# Patient Record
Sex: FEMALE | Race: BLACK OR AFRICAN AMERICAN | Employment: UNEMPLOYED | URBAN - METROPOLITAN AREA
[De-identification: names, ages, dates, MRNs, and addresses within clinical notes are randomized per-mention and may not be internally consistent; named-entity substitution may affect disease eponyms.]

---

## 2017-03-10 ENCOUNTER — HOSPITAL ENCOUNTER (EMERGENCY)
Age: 37
Discharge: PSYCHIATRIC OR SUBSTANCE USE FACILITY | End: 2017-03-10
Attending: EMERGENCY MEDICINE
Payer: MEDICAID

## 2017-03-10 ENCOUNTER — HOSPITAL ENCOUNTER (INPATIENT)
Age: 37
LOS: 4 days | Discharge: HOME | DRG: 750 | End: 2017-03-14
Attending: PSYCHIATRY & NEUROLOGY | Admitting: PSYCHIATRY & NEUROLOGY
Payer: MEDICAID

## 2017-03-10 VITALS
HEIGHT: 66 IN | OXYGEN SATURATION: 95 % | SYSTOLIC BLOOD PRESSURE: 124 MMHG | DIASTOLIC BLOOD PRESSURE: 83 MMHG | HEART RATE: 80 BPM | RESPIRATION RATE: 16 BRPM | TEMPERATURE: 98.4 F | WEIGHT: 155 LBS | BODY MASS INDEX: 24.91 KG/M2

## 2017-03-10 DIAGNOSIS — F25.0 SCHIZOAFFECTIVE DISORDER, BIPOLAR TYPE (HCC): Primary | ICD-10-CM

## 2017-03-10 PROCEDURE — 99285 EMERGENCY DEPT VISIT HI MDM: CPT

## 2017-03-10 PROCEDURE — 1240000000 HC EMOTIONAL WELLNESS R&B

## 2017-03-10 RX ORDER — HALOPERIDOL 5 MG
10 TABLET ORAL EVERY 6 HOURS PRN
Status: DISCONTINUED | OUTPATIENT
Start: 2017-03-10 | End: 2017-03-14 | Stop reason: HOSPADM

## 2017-03-10 RX ORDER — HALOPERIDOL 5 MG/ML
10 INJECTION INTRAMUSCULAR EVERY 6 HOURS PRN
Status: DISCONTINUED | OUTPATIENT
Start: 2017-03-10 | End: 2017-03-14 | Stop reason: HOSPADM

## 2017-03-10 ASSESSMENT — LIFESTYLE VARIABLES: HISTORY_ALCOHOL_USE: COMMENT

## 2017-03-10 ASSESSMENT — ENCOUNTER SYMPTOMS
RESPIRATORY NEGATIVE: 1
EYES NEGATIVE: 1
GASTROINTESTINAL NEGATIVE: 1
ALLERGIC/IMMUNOLOGIC NEGATIVE: 1

## 2017-03-11 LAB
-: ABNORMAL
ABSOLUTE EOS #: 0.1 K/UL (ref 0–0.4)
ABSOLUTE LYMPH #: 2.9 K/UL (ref 1–4.8)
ABSOLUTE MONO #: 0.6 K/UL (ref 0.1–1.3)
ALBUMIN SERPL-MCNC: 3.7 G/DL (ref 3.5–5.2)
ALBUMIN/GLOBULIN RATIO: ABNORMAL (ref 1–2.5)
ALP BLD-CCNC: 77 U/L (ref 35–104)
ALT SERPL-CCNC: 10 U/L (ref 5–33)
AMORPHOUS: ABNORMAL
AMPHETAMINE SCREEN URINE: NEGATIVE
ANION GAP SERPL CALCULATED.3IONS-SCNC: 12 MMOL/L (ref 9–17)
AST SERPL-CCNC: 13 U/L
BACTERIA: ABNORMAL
BARBITURATE SCREEN URINE: NEGATIVE
BASOPHILS # BLD: 1 % (ref 0–2)
BASOPHILS ABSOLUTE: 0.1 K/UL (ref 0–0.2)
BENZODIAZEPINE SCREEN, URINE: NEGATIVE
BILIRUB SERPL-MCNC: 0.27 MG/DL (ref 0.3–1.2)
BILIRUBIN URINE: NEGATIVE
BUN BLDV-MCNC: 5 MG/DL (ref 6–20)
BUN/CREAT BLD: ABNORMAL (ref 9–20)
BUPRENORPHINE URINE: NORMAL
CALCIUM SERPL-MCNC: 8.9 MG/DL (ref 8.6–10.4)
CANNABINOID SCREEN URINE: NEGATIVE
CASTS UA: ABNORMAL /LPF
CHLORIDE BLD-SCNC: 106 MMOL/L (ref 98–107)
CO2: 24 MMOL/L (ref 20–31)
COCAINE METABOLITE, URINE: NEGATIVE
COLOR: YELLOW
COMMENT UA: ABNORMAL
CREAT SERPL-MCNC: 0.62 MG/DL (ref 0.5–0.9)
CRYSTALS, UA: ABNORMAL /HPF
DIFFERENTIAL TYPE: ABNORMAL
EOSINOPHILS RELATIVE PERCENT: 1 % (ref 0–4)
EPITHELIAL CELLS UA: ABNORMAL /HPF
GFR AFRICAN AMERICAN: >60 ML/MIN
GFR NON-AFRICAN AMERICAN: >60 ML/MIN
GFR SERPL CREATININE-BSD FRML MDRD: ABNORMAL ML/MIN/{1.73_M2}
GFR SERPL CREATININE-BSD FRML MDRD: ABNORMAL ML/MIN/{1.73_M2}
GLUCOSE BLD-MCNC: 108 MG/DL (ref 70–99)
GLUCOSE URINE: NEGATIVE
HCT VFR BLD CALC: 38 % (ref 36–46)
HEMOGLOBIN: 12.7 G/DL (ref 12–16)
KETONES, URINE: NEGATIVE
LEUKOCYTE ESTERASE, URINE: NEGATIVE
LYMPHOCYTES # BLD: 41 % (ref 24–44)
MCH RBC QN AUTO: 33.4 PG (ref 26–34)
MCHC RBC AUTO-ENTMCNC: 33.5 G/DL (ref 31–37)
MCV RBC AUTO: 99.6 FL (ref 80–100)
MDMA URINE: NORMAL
METHADONE SCREEN, URINE: NEGATIVE
METHAMPHETAMINE, URINE: NORMAL
MONOCYTES # BLD: 8 % (ref 1–7)
MUCUS: ABNORMAL
NITRITE, URINE: NEGATIVE
OPIATES, URINE: NEGATIVE
OTHER OBSERVATIONS UA: ABNORMAL
OXYCODONE SCREEN URINE: NEGATIVE
PDW BLD-RTO: 13.7 % (ref 11.5–14.9)
PH UA: 7.5 (ref 5–8)
PHENCYCLIDINE, URINE: NEGATIVE
PLATELET # BLD: 303 K/UL (ref 150–450)
PLATELET ESTIMATE: ABNORMAL
PMV BLD AUTO: 7.9 FL (ref 6–12)
POTASSIUM SERPL-SCNC: 4.5 MMOL/L (ref 3.7–5.3)
PROPOXYPHENE, URINE: NORMAL
PROTEIN UA: NEGATIVE
RBC # BLD: 3.81 M/UL (ref 4–5.2)
RBC # BLD: ABNORMAL 10*6/UL
RBC UA: ABNORMAL /HPF
RENAL EPITHELIAL, UA: ABNORMAL /HPF
SEG NEUTROPHILS: 49 % (ref 36–66)
SEGMENTED NEUTROPHILS ABSOLUTE COUNT: 3.5 K/UL (ref 1.3–9.1)
SODIUM BLD-SCNC: 142 MMOL/L (ref 135–144)
SPECIFIC GRAVITY UA: 1.01 (ref 1–1.03)
TEST INFORMATION: NORMAL
TOTAL PROTEIN: 6.9 G/DL (ref 6.4–8.3)
TRICHOMONAS: ABNORMAL
TRICYCLIC ANTIDEPRESSANTS, UR: NORMAL
TURBIDITY: ABNORMAL
URINE HGB: NEGATIVE
UROBILINOGEN, URINE: NORMAL
WBC # BLD: 7.1 K/UL (ref 3.5–11)
WBC # BLD: ABNORMAL 10*3/UL
WBC UA: ABNORMAL /HPF
YEAST: ABNORMAL

## 2017-03-11 PROCEDURE — 6370000000 HC RX 637 (ALT 250 FOR IP): Performed by: PSYCHIATRY & NEUROLOGY

## 2017-03-11 PROCEDURE — 6360000002 HC RX W HCPCS: Performed by: PSYCHIATRY & NEUROLOGY

## 2017-03-11 PROCEDURE — 80307 DRUG TEST PRSMV CHEM ANLYZR: CPT

## 2017-03-11 PROCEDURE — 1240000000 HC EMOTIONAL WELLNESS R&B

## 2017-03-11 PROCEDURE — 81001 URINALYSIS AUTO W/SCOPE: CPT

## 2017-03-11 PROCEDURE — 80053 COMPREHEN METABOLIC PANEL: CPT

## 2017-03-11 PROCEDURE — 85025 COMPLETE CBC W/AUTO DIFF WBC: CPT

## 2017-03-11 PROCEDURE — 36415 COLL VENOUS BLD VENIPUNCTURE: CPT

## 2017-03-11 RX ORDER — ACETAMINOPHEN 325 MG/1
650 TABLET ORAL EVERY 4 HOURS PRN
Status: DISCONTINUED | OUTPATIENT
Start: 2017-03-11 | End: 2017-03-14 | Stop reason: HOSPADM

## 2017-03-11 RX ORDER — RISPERIDONE 2 MG/1
2 TABLET, FILM COATED ORAL NIGHTLY
Status: DISCONTINUED | OUTPATIENT
Start: 2017-03-11 | End: 2017-03-14 | Stop reason: HOSPADM

## 2017-03-11 RX ORDER — TRAZODONE HYDROCHLORIDE 100 MG/1
100 TABLET ORAL NIGHTLY
Status: DISCONTINUED | OUTPATIENT
Start: 2017-03-11 | End: 2017-03-14 | Stop reason: HOSPADM

## 2017-03-11 RX ORDER — TRAZODONE HYDROCHLORIDE 50 MG/1
50 TABLET ORAL NIGHTLY PRN
Status: DISCONTINUED | OUTPATIENT
Start: 2017-03-11 | End: 2017-03-11

## 2017-03-11 RX ORDER — CARBAMAZEPINE 200 MG/1
200 TABLET ORAL 3 TIMES DAILY
Status: DISCONTINUED | OUTPATIENT
Start: 2017-03-11 | End: 2017-03-14 | Stop reason: HOSPADM

## 2017-03-11 RX ORDER — RISPERIDONE 4 MG/1
4 TABLET, FILM COATED ORAL 2 TIMES DAILY
Status: DISCONTINUED | OUTPATIENT
Start: 2017-03-11 | End: 2017-03-14 | Stop reason: HOSPADM

## 2017-03-11 RX ORDER — HYDROXYZINE HYDROCHLORIDE 25 MG/1
25 TABLET, FILM COATED ORAL 3 TIMES DAILY PRN
Status: DISCONTINUED | OUTPATIENT
Start: 2017-03-11 | End: 2017-03-14 | Stop reason: HOSPADM

## 2017-03-11 RX ORDER — BENZTROPINE MESYLATE 1 MG/ML
2 INJECTION INTRAMUSCULAR; INTRAVENOUS DAILY PRN
Status: DISCONTINUED | OUTPATIENT
Start: 2017-03-11 | End: 2017-03-14 | Stop reason: HOSPADM

## 2017-03-11 RX ADMIN — RISPERIDONE 4 MG: 4 TABLET ORAL at 15:06

## 2017-03-11 RX ADMIN — LURASIDONE HYDROCHLORIDE 40 MG: 40 TABLET, FILM COATED ORAL at 17:29

## 2017-03-11 RX ADMIN — TRAZODONE HYDROCHLORIDE 100 MG: 100 TABLET ORAL at 21:14

## 2017-03-11 RX ADMIN — RISPERIDONE 2 MG: 2 TABLET ORAL at 21:14

## 2017-03-11 RX ADMIN — CARBAMAZEPINE 200 MG: 200 TABLET ORAL at 15:06

## 2017-03-11 RX ADMIN — CARBAMAZEPINE 200 MG: 200 TABLET ORAL at 21:11

## 2017-03-11 RX ADMIN — RISPERIDONE 4 MG: 4 TABLET ORAL at 21:19

## 2017-03-11 RX ADMIN — HALOPERIDOL LACTATE 10 MG: 5 INJECTION, SOLUTION INTRAMUSCULAR at 08:15

## 2017-03-12 PROCEDURE — 1240000000 HC EMOTIONAL WELLNESS R&B

## 2017-03-12 PROCEDURE — 6370000000 HC RX 637 (ALT 250 FOR IP): Performed by: PSYCHIATRY & NEUROLOGY

## 2017-03-12 RX ADMIN — NICOTINE POLACRILEX 2 MG: 2 GUM, CHEWING BUCCAL at 08:59

## 2017-03-12 RX ADMIN — LURASIDONE HYDROCHLORIDE 40 MG: 40 TABLET, FILM COATED ORAL at 17:05

## 2017-03-12 RX ADMIN — CARBAMAZEPINE 200 MG: 200 TABLET ORAL at 14:48

## 2017-03-12 RX ADMIN — TRAZODONE HYDROCHLORIDE 100 MG: 100 TABLET ORAL at 21:30

## 2017-03-12 RX ADMIN — RISPERIDONE 4 MG: 4 TABLET ORAL at 08:59

## 2017-03-12 RX ADMIN — CARBAMAZEPINE 200 MG: 200 TABLET ORAL at 08:59

## 2017-03-12 RX ADMIN — RISPERIDONE 4 MG: 4 TABLET ORAL at 21:29

## 2017-03-12 RX ADMIN — CARBAMAZEPINE 200 MG: 200 TABLET ORAL at 21:27

## 2017-03-12 RX ADMIN — RISPERIDONE 2 MG: 2 TABLET ORAL at 21:27

## 2017-03-12 RX ADMIN — HALOPERIDOL 10 MG: 5 TABLET ORAL at 16:08

## 2017-03-13 PROCEDURE — 6370000000 HC RX 637 (ALT 250 FOR IP): Performed by: PSYCHIATRY & NEUROLOGY

## 2017-03-13 PROCEDURE — 1240000000 HC EMOTIONAL WELLNESS R&B

## 2017-03-13 RX ADMIN — RISPERIDONE 2 MG: 2 TABLET ORAL at 20:47

## 2017-03-13 RX ADMIN — TRAZODONE HYDROCHLORIDE 100 MG: 100 TABLET ORAL at 20:45

## 2017-03-13 RX ADMIN — CARBAMAZEPINE 200 MG: 200 TABLET ORAL at 20:46

## 2017-03-13 RX ADMIN — RISPERIDONE 4 MG: 4 TABLET ORAL at 20:46

## 2017-03-13 RX ADMIN — RISPERIDONE 4 MG: 4 TABLET ORAL at 09:14

## 2017-03-13 RX ADMIN — LURASIDONE HYDROCHLORIDE 40 MG: 40 TABLET, FILM COATED ORAL at 17:40

## 2017-03-13 RX ADMIN — CARBAMAZEPINE 200 MG: 200 TABLET ORAL at 14:28

## 2017-03-13 RX ADMIN — ACETAMINOPHEN 650 MG: 325 TABLET ORAL at 10:07

## 2017-03-13 RX ADMIN — CARBAMAZEPINE 200 MG: 200 TABLET ORAL at 09:14

## 2017-03-13 ASSESSMENT — PAIN SCALES - GENERAL
PAINLEVEL_OUTOF10: 2
PAINLEVEL_OUTOF10: 0

## 2017-03-14 VITALS
BODY MASS INDEX: 20.57 KG/M2 | HEIGHT: 66 IN | TEMPERATURE: 97.8 F | SYSTOLIC BLOOD PRESSURE: 102 MMHG | WEIGHT: 128 LBS | DIASTOLIC BLOOD PRESSURE: 56 MMHG | RESPIRATION RATE: 14 BRPM | HEART RATE: 81 BPM | OXYGEN SATURATION: 98 %

## 2017-03-14 PROCEDURE — 6370000000 HC RX 637 (ALT 250 FOR IP): Performed by: PSYCHIATRY & NEUROLOGY

## 2017-03-14 PROCEDURE — 5130000000 HC BRIDGE APPOINTMENT

## 2017-03-14 RX ORDER — RISPERIDONE 4 MG/1
4 TABLET, FILM COATED ORAL 2 TIMES DAILY
Qty: 60 TABLET | Refills: 0 | Status: ON HOLD | OUTPATIENT
Start: 2017-03-14 | End: 2017-06-29

## 2017-03-14 RX ORDER — CARBAMAZEPINE 200 MG/1
200 TABLET ORAL 3 TIMES DAILY
Qty: 90 TABLET | Refills: 0 | Status: ON HOLD | OUTPATIENT
Start: 2017-03-14 | End: 2017-07-21

## 2017-03-14 RX ORDER — TRAZODONE HYDROCHLORIDE 100 MG/1
100 TABLET ORAL NIGHTLY
Qty: 30 TABLET | Refills: 0 | Status: ON HOLD | OUTPATIENT
Start: 2017-03-14 | End: 2017-06-29

## 2017-03-14 RX ADMIN — ACETAMINOPHEN 650 MG: 325 TABLET ORAL at 12:08

## 2017-03-14 RX ADMIN — CARBAMAZEPINE 200 MG: 200 TABLET ORAL at 14:32

## 2017-03-14 RX ADMIN — CARBAMAZEPINE 200 MG: 200 TABLET ORAL at 08:51

## 2017-03-14 RX ADMIN — RISPERIDONE 4 MG: 4 TABLET ORAL at 08:51

## 2017-03-14 ASSESSMENT — PAIN DESCRIPTION - ONSET: ONSET: SUDDEN

## 2017-03-14 ASSESSMENT — PAIN DESCRIPTION - PAIN TYPE: TYPE: ACUTE PAIN

## 2017-03-14 ASSESSMENT — PAIN SCALES - GENERAL
PAINLEVEL_OUTOF10: 0
PAINLEVEL_OUTOF10: 3

## 2017-03-14 ASSESSMENT — PAIN DESCRIPTION - FREQUENCY: FREQUENCY: INTERMITTENT

## 2017-03-14 ASSESSMENT — PAIN DESCRIPTION - LOCATION: LOCATION: HEAD

## 2017-05-17 ENCOUNTER — HOSPITAL ENCOUNTER (EMERGENCY)
Age: 37
Discharge: HOME | End: 2017-05-17
Attending: EMERGENCY MEDICINE
Payer: MEDICAID

## 2017-05-17 VITALS
RESPIRATION RATE: 16 BRPM | SYSTOLIC BLOOD PRESSURE: 111 MMHG | OXYGEN SATURATION: 100 % | TEMPERATURE: 97.5 F | HEART RATE: 87 BPM | DIASTOLIC BLOOD PRESSURE: 71 MMHG

## 2017-05-17 DIAGNOSIS — M54.6 ACUTE BILATERAL THORACIC BACK PAIN: Primary | ICD-10-CM

## 2017-05-17 PROCEDURE — 6370000000 HC RX 637 (ALT 250 FOR IP): Performed by: PHYSICIAN ASSISTANT

## 2017-05-17 PROCEDURE — 99283 EMERGENCY DEPT VISIT LOW MDM: CPT

## 2017-05-17 RX ORDER — ACETAMINOPHEN 500 MG
500 TABLET ORAL ONCE
Status: COMPLETED | OUTPATIENT
Start: 2017-05-17 | End: 2017-05-17

## 2017-05-17 RX ORDER — ACETAMINOPHEN 325 MG/1
325 TABLET ORAL EVERY 6 HOURS PRN
Qty: 30 TABLET | Refills: 0 | Status: ON HOLD | OUTPATIENT
Start: 2017-05-17 | End: 2017-07-21 | Stop reason: HOSPADM

## 2017-05-17 RX ADMIN — ACETAMINOPHEN 500 MG: 500 TABLET ORAL at 19:12

## 2017-05-17 ASSESSMENT — PAIN DESCRIPTION - ORIENTATION: ORIENTATION: MID

## 2017-05-17 ASSESSMENT — PAIN SCALES - GENERAL
PAINLEVEL_OUTOF10: 9
PAINLEVEL_OUTOF10: 9

## 2017-05-17 ASSESSMENT — ENCOUNTER SYMPTOMS
VOMITING: 0
DIARRHEA: 0
COLOR CHANGE: 0
SHORTNESS OF BREATH: 0
BACK PAIN: 1
ABDOMINAL PAIN: 0
COUGH: 0
NAUSEA: 0

## 2017-05-17 ASSESSMENT — PAIN DESCRIPTION - PAIN TYPE: TYPE: CHRONIC PAIN

## 2017-05-17 ASSESSMENT — PAIN DESCRIPTION - LOCATION: LOCATION: BACK

## 2017-06-29 ENCOUNTER — HOSPITAL ENCOUNTER (INPATIENT)
Age: 37
LOS: 22 days | Discharge: HOME | DRG: 750 | End: 2017-07-21
Attending: PSYCHIATRY & NEUROLOGY | Admitting: PSYCHIATRY & NEUROLOGY
Payer: MEDICAID

## 2017-06-29 PROBLEM — F25.1 SCHIZOAFFECTIVE DISORDER, DEPRESSIVE TYPE (HCC): Status: ACTIVE | Noted: 2017-06-29

## 2017-06-29 PROBLEM — F31.30 BIPOLAR I DISORDER, MOST RECENT EPISODE DEPRESSED (HCC): Status: ACTIVE | Noted: 2017-06-29

## 2017-06-29 PROBLEM — F25.1 SCHIZOAFFECTIVE DISORDER, DEPRESSIVE TYPE (HCC): Chronic | Status: ACTIVE | Noted: 2017-06-29

## 2017-06-29 PROBLEM — F31.30 BIPOLAR I DISORDER, MOST RECENT EPISODE DEPRESSED (HCC): Chronic | Status: ACTIVE | Noted: 2017-06-29

## 2017-06-29 PROCEDURE — 1240000000 HC EMOTIONAL WELLNESS R&B

## 2017-06-29 PROCEDURE — 6370000000 HC RX 637 (ALT 250 FOR IP): Performed by: PSYCHIATRY & NEUROLOGY

## 2017-06-29 RX ORDER — ACETAMINOPHEN 325 MG/1
325 TABLET ORAL EVERY 8 HOURS PRN
Status: DISCONTINUED | OUTPATIENT
Start: 2017-06-29 | End: 2017-07-21 | Stop reason: HOSPADM

## 2017-06-29 RX ORDER — CARBAMAZEPINE 200 MG/1
200 TABLET ORAL 3 TIMES DAILY
Status: DISCONTINUED | OUTPATIENT
Start: 2017-06-29 | End: 2017-07-21 | Stop reason: HOSPADM

## 2017-06-29 RX ORDER — VILAZODONE HYDROCHLORIDE 10 MG/1
10 TABLET ORAL DAILY
Status: DISCONTINUED | OUTPATIENT
Start: 2017-06-29 | End: 2017-07-21 | Stop reason: HOSPADM

## 2017-06-29 RX ORDER — HYDROXYZINE HYDROCHLORIDE 25 MG/1
25 TABLET, FILM COATED ORAL 3 TIMES DAILY PRN
Status: DISCONTINUED | OUTPATIENT
Start: 2017-06-29 | End: 2017-07-21 | Stop reason: HOSPADM

## 2017-06-29 RX ORDER — NICOTINE 21 MG/24HR
1 PATCH, TRANSDERMAL 24 HOURS TRANSDERMAL DAILY
Status: DISCONTINUED | OUTPATIENT
Start: 2017-06-29 | End: 2017-06-29

## 2017-06-29 RX ORDER — NICOTINE 21 MG/24HR
1 PATCH, TRANSDERMAL 24 HOURS TRANSDERMAL DAILY
Status: DISCONTINUED | OUTPATIENT
Start: 2017-06-30 | End: 2017-07-21 | Stop reason: HOSPADM

## 2017-06-29 RX ORDER — MAGNESIUM HYDROXIDE/ALUMINUM HYDROXICE/SIMETHICONE 120; 1200; 1200 MG/30ML; MG/30ML; MG/30ML
30 SUSPENSION ORAL 3 TIMES DAILY PRN
Status: DISCONTINUED | OUTPATIENT
Start: 2017-06-29 | End: 2017-07-21 | Stop reason: HOSPADM

## 2017-06-29 RX ORDER — TRAZODONE HYDROCHLORIDE 50 MG/1
50 TABLET ORAL NIGHTLY PRN
Status: DISCONTINUED | OUTPATIENT
Start: 2017-06-29 | End: 2017-07-21 | Stop reason: HOSPADM

## 2017-06-29 RX ADMIN — BREXPIPRAZOLE 1 MG: 1 TABLET ORAL at 08:25

## 2017-06-29 RX ADMIN — CARBAMAZEPINE 200 MG: 200 TABLET ORAL at 21:14

## 2017-06-29 RX ADMIN — VILAZODONE HYDROCHLORIDE 10 MG: 10 TABLET ORAL at 08:25

## 2017-06-29 RX ADMIN — CARBAMAZEPINE 200 MG: 200 TABLET ORAL at 10:40

## 2017-06-29 RX ADMIN — TRAZODONE HYDROCHLORIDE 50 MG: 50 TABLET ORAL at 21:14

## 2017-06-29 RX ADMIN — CARBAMAZEPINE 200 MG: 200 TABLET ORAL at 13:37

## 2017-06-29 ASSESSMENT — LIFESTYLE VARIABLES: HISTORY_ALCOHOL_USE: NO

## 2017-06-30 LAB
AMPHETAMINE SCREEN URINE: NEGATIVE
BARBITURATE SCREEN URINE: NEGATIVE
BENZODIAZEPINE SCREEN, URINE: NEGATIVE
BILIRUBIN URINE: NEGATIVE
BUPRENORPHINE URINE: NORMAL
CANNABINOID SCREEN URINE: NEGATIVE
COCAINE METABOLITE, URINE: NEGATIVE
COLOR: YELLOW
COMMENT UA: NORMAL
GLUCOSE URINE: NEGATIVE
HCG(URINE) PREGNANCY TEST: NEGATIVE
KETONES, URINE: NEGATIVE
LEUKOCYTE ESTERASE, URINE: NEGATIVE
MDMA URINE: NORMAL
METHADONE SCREEN, URINE: NEGATIVE
METHAMPHETAMINE, URINE: NORMAL
NITRITE, URINE: NEGATIVE
OPIATES, URINE: NEGATIVE
OXYCODONE SCREEN URINE: NEGATIVE
PH UA: 7.5 (ref 5–8)
PHENCYCLIDINE, URINE: NEGATIVE
PROPOXYPHENE, URINE: NORMAL
PROTEIN UA: NEGATIVE
SPECIFIC GRAVITY UA: 1 (ref 1–1.03)
TEST INFORMATION: NORMAL
TRICYCLIC ANTIDEPRESSANTS, UR: NORMAL
TURBIDITY: CLEAR
URINE HGB: NEGATIVE
UROBILINOGEN, URINE: NORMAL

## 2017-06-30 PROCEDURE — 84703 CHORIONIC GONADOTROPIN ASSAY: CPT

## 2017-06-30 PROCEDURE — 6370000000 HC RX 637 (ALT 250 FOR IP): Performed by: PSYCHIATRY & NEUROLOGY

## 2017-06-30 PROCEDURE — 80307 DRUG TEST PRSMV CHEM ANLYZR: CPT

## 2017-06-30 PROCEDURE — 1240000000 HC EMOTIONAL WELLNESS R&B

## 2017-06-30 PROCEDURE — 81003 URINALYSIS AUTO W/O SCOPE: CPT

## 2017-06-30 RX ADMIN — TRAZODONE HYDROCHLORIDE 50 MG: 50 TABLET ORAL at 21:48

## 2017-06-30 RX ADMIN — CARBAMAZEPINE 200 MG: 200 TABLET ORAL at 15:47

## 2017-06-30 RX ADMIN — BREXPIPRAZOLE 1 MG: 1 TABLET ORAL at 09:51

## 2017-06-30 RX ADMIN — HYDROXYZINE HYDROCHLORIDE 25 MG: 25 TABLET, FILM COATED ORAL at 21:48

## 2017-06-30 RX ADMIN — CARBAMAZEPINE 200 MG: 200 TABLET ORAL at 21:48

## 2017-06-30 RX ADMIN — VILAZODONE HYDROCHLORIDE 10 MG: 10 TABLET ORAL at 09:51

## 2017-06-30 RX ADMIN — CARBAMAZEPINE 200 MG: 200 TABLET ORAL at 09:50

## 2017-07-01 PROCEDURE — 1240000000 HC EMOTIONAL WELLNESS R&B

## 2017-07-01 PROCEDURE — 6370000000 HC RX 637 (ALT 250 FOR IP): Performed by: PSYCHIATRY & NEUROLOGY

## 2017-07-01 RX ADMIN — CARBAMAZEPINE 200 MG: 200 TABLET ORAL at 15:01

## 2017-07-01 RX ADMIN — ACETAMINOPHEN 325 MG: 325 TABLET ORAL at 21:36

## 2017-07-01 RX ADMIN — CARBAMAZEPINE 200 MG: 200 TABLET ORAL at 10:03

## 2017-07-01 RX ADMIN — TRAZODONE HYDROCHLORIDE 50 MG: 50 TABLET ORAL at 21:36

## 2017-07-01 RX ADMIN — BREXPIPRAZOLE 1 MG: 1 TABLET ORAL at 10:03

## 2017-07-01 RX ADMIN — CARBAMAZEPINE 200 MG: 200 TABLET ORAL at 21:36

## 2017-07-01 RX ADMIN — VILAZODONE HYDROCHLORIDE 10 MG: 10 TABLET ORAL at 11:23

## 2017-07-01 RX ADMIN — HYDROXYZINE HYDROCHLORIDE 25 MG: 25 TABLET, FILM COATED ORAL at 21:36

## 2017-07-01 ASSESSMENT — PAIN SCALES - GENERAL
PAINLEVEL_OUTOF10: 4
PAINLEVEL_OUTOF10: 3

## 2017-07-01 ASSESSMENT — PAIN DESCRIPTION - PAIN TYPE: TYPE: CHRONIC PAIN

## 2017-07-01 ASSESSMENT — PAIN DESCRIPTION - LOCATION: LOCATION: BACK

## 2017-07-02 PROCEDURE — 1240000000 HC EMOTIONAL WELLNESS R&B

## 2017-07-02 PROCEDURE — 6370000000 HC RX 637 (ALT 250 FOR IP): Performed by: PSYCHIATRY & NEUROLOGY

## 2017-07-02 RX ADMIN — VILAZODONE HYDROCHLORIDE 10 MG: 10 TABLET ORAL at 09:27

## 2017-07-02 RX ADMIN — ACETAMINOPHEN 325 MG: 325 TABLET ORAL at 22:23

## 2017-07-02 RX ADMIN — BREXPIPRAZOLE 2 MG: 2 TABLET ORAL at 12:26

## 2017-07-02 RX ADMIN — TRAZODONE HYDROCHLORIDE 50 MG: 50 TABLET ORAL at 22:23

## 2017-07-02 RX ADMIN — CARBAMAZEPINE 200 MG: 200 TABLET ORAL at 09:27

## 2017-07-02 RX ADMIN — CARBAMAZEPINE 200 MG: 200 TABLET ORAL at 22:23

## 2017-07-02 RX ADMIN — HYDROXYZINE HYDROCHLORIDE 25 MG: 25 TABLET, FILM COATED ORAL at 22:23

## 2017-07-02 RX ADMIN — CARBAMAZEPINE 200 MG: 200 TABLET ORAL at 15:17

## 2017-07-02 ASSESSMENT — PAIN SCALES - GENERAL
PAINLEVEL_OUTOF10: 0
PAINLEVEL_OUTOF10: 3
PAINLEVEL_OUTOF10: 3

## 2017-07-02 ASSESSMENT — PAIN DESCRIPTION - PAIN TYPE: TYPE: ACUTE PAIN

## 2017-07-02 ASSESSMENT — PAIN DESCRIPTION - LOCATION: LOCATION: BACK

## 2017-07-03 PROCEDURE — 6370000000 HC RX 637 (ALT 250 FOR IP): Performed by: PSYCHIATRY & NEUROLOGY

## 2017-07-03 PROCEDURE — 1240000000 HC EMOTIONAL WELLNESS R&B

## 2017-07-03 RX ADMIN — ACETAMINOPHEN 325 MG: 325 TABLET ORAL at 22:06

## 2017-07-03 RX ADMIN — CARBAMAZEPINE 200 MG: 200 TABLET ORAL at 09:30

## 2017-07-03 RX ADMIN — TRAZODONE HYDROCHLORIDE 50 MG: 50 TABLET ORAL at 22:06

## 2017-07-03 RX ADMIN — HYDROXYZINE HYDROCHLORIDE 25 MG: 25 TABLET, FILM COATED ORAL at 22:06

## 2017-07-03 RX ADMIN — CARBAMAZEPINE 200 MG: 200 TABLET ORAL at 22:05

## 2017-07-03 RX ADMIN — BREXPIPRAZOLE 2 MG: 2 TABLET ORAL at 09:30

## 2017-07-03 RX ADMIN — CARBAMAZEPINE 200 MG: 200 TABLET ORAL at 14:41

## 2017-07-03 RX ADMIN — VILAZODONE HYDROCHLORIDE 10 MG: 10 TABLET ORAL at 12:19

## 2017-07-03 ASSESSMENT — PAIN SCALES - GENERAL
PAINLEVEL_OUTOF10: 3
PAINLEVEL_OUTOF10: 0

## 2017-07-04 PROCEDURE — 1240000000 HC EMOTIONAL WELLNESS R&B

## 2017-07-04 PROCEDURE — 6370000000 HC RX 637 (ALT 250 FOR IP): Performed by: PSYCHIATRY & NEUROLOGY

## 2017-07-04 RX ADMIN — CARBAMAZEPINE 200 MG: 200 TABLET ORAL at 22:12

## 2017-07-04 RX ADMIN — TRAZODONE HYDROCHLORIDE 50 MG: 50 TABLET ORAL at 22:12

## 2017-07-04 RX ADMIN — CARBAMAZEPINE 200 MG: 200 TABLET ORAL at 15:34

## 2017-07-04 RX ADMIN — VILAZODONE HYDROCHLORIDE 10 MG: 10 TABLET ORAL at 09:20

## 2017-07-04 RX ADMIN — CARBAMAZEPINE 200 MG: 200 TABLET ORAL at 09:20

## 2017-07-04 RX ADMIN — BREXPIPRAZOLE 2 MG: 2 TABLET ORAL at 11:59

## 2017-07-04 ASSESSMENT — PAIN SCALES - GENERAL: PAINLEVEL_OUTOF10: 0

## 2017-07-05 PROCEDURE — 1240000000 HC EMOTIONAL WELLNESS R&B

## 2017-07-05 PROCEDURE — 6370000000 HC RX 637 (ALT 250 FOR IP): Performed by: PSYCHIATRY & NEUROLOGY

## 2017-07-05 RX ADMIN — VILAZODONE HYDROCHLORIDE 10 MG: 10 TABLET ORAL at 09:47

## 2017-07-05 RX ADMIN — TRAZODONE HYDROCHLORIDE 50 MG: 50 TABLET ORAL at 21:21

## 2017-07-05 RX ADMIN — CARBAMAZEPINE 200 MG: 200 TABLET ORAL at 09:47

## 2017-07-05 RX ADMIN — ACETAMINOPHEN 325 MG: 325 TABLET ORAL at 21:21

## 2017-07-05 RX ADMIN — CARBAMAZEPINE 200 MG: 200 TABLET ORAL at 21:21

## 2017-07-05 RX ADMIN — HYDROXYZINE HYDROCHLORIDE 25 MG: 25 TABLET, FILM COATED ORAL at 21:21

## 2017-07-05 RX ADMIN — BREXPIPRAZOLE 2 MG: 2 TABLET ORAL at 09:47

## 2017-07-05 ASSESSMENT — PAIN SCALES - GENERAL
PAINLEVEL_OUTOF10: 0
PAINLEVEL_OUTOF10: 10
PAINLEVEL_OUTOF10: 3

## 2017-07-05 ASSESSMENT — PAIN DESCRIPTION - LOCATION: LOCATION: GENERALIZED

## 2017-07-06 PROCEDURE — 6370000000 HC RX 637 (ALT 250 FOR IP): Performed by: PSYCHIATRY & NEUROLOGY

## 2017-07-06 PROCEDURE — 1240000000 HC EMOTIONAL WELLNESS R&B

## 2017-07-06 RX ADMIN — CARBAMAZEPINE 200 MG: 200 TABLET ORAL at 14:42

## 2017-07-06 RX ADMIN — BREXPIPRAZOLE 2 MG: 2 TABLET ORAL at 08:47

## 2017-07-06 RX ADMIN — VILAZODONE HYDROCHLORIDE 10 MG: 10 TABLET ORAL at 08:47

## 2017-07-06 RX ADMIN — CARBAMAZEPINE 200 MG: 200 TABLET ORAL at 08:47

## 2017-07-07 PROCEDURE — 6370000000 HC RX 637 (ALT 250 FOR IP): Performed by: PSYCHIATRY & NEUROLOGY

## 2017-07-07 PROCEDURE — 1240000000 HC EMOTIONAL WELLNESS R&B

## 2017-07-07 RX ADMIN — HYDROXYZINE HYDROCHLORIDE 25 MG: 25 TABLET, FILM COATED ORAL at 08:50

## 2017-07-07 RX ADMIN — TRAZODONE HYDROCHLORIDE 50 MG: 50 TABLET ORAL at 21:06

## 2017-07-07 RX ADMIN — CARBAMAZEPINE 200 MG: 200 TABLET ORAL at 21:06

## 2017-07-07 RX ADMIN — VILAZODONE HYDROCHLORIDE 10 MG: 10 TABLET ORAL at 08:50

## 2017-07-07 RX ADMIN — BREXPIPRAZOLE 3 MG: 3 TABLET ORAL at 08:50

## 2017-07-07 RX ADMIN — CARBAMAZEPINE 200 MG: 200 TABLET ORAL at 08:50

## 2017-07-07 RX ADMIN — CARBAMAZEPINE 200 MG: 200 TABLET ORAL at 13:23

## 2017-07-07 ASSESSMENT — PAIN SCALES - GENERAL: PAINLEVEL_OUTOF10: 6

## 2017-07-07 ASSESSMENT — PAIN DESCRIPTION - LOCATION: LOCATION: ABDOMEN

## 2017-07-07 ASSESSMENT — PAIN DESCRIPTION - PAIN TYPE: TYPE: ACUTE PAIN

## 2017-07-08 PROCEDURE — 1240000000 HC EMOTIONAL WELLNESS R&B

## 2017-07-08 PROCEDURE — 6370000000 HC RX 637 (ALT 250 FOR IP): Performed by: PSYCHIATRY & NEUROLOGY

## 2017-07-08 RX ADMIN — CARBAMAZEPINE 200 MG: 200 TABLET ORAL at 14:06

## 2017-07-08 RX ADMIN — CARBAMAZEPINE 200 MG: 200 TABLET ORAL at 09:44

## 2017-07-08 RX ADMIN — HYDROXYZINE HYDROCHLORIDE 25 MG: 25 TABLET, FILM COATED ORAL at 21:00

## 2017-07-08 RX ADMIN — TRAZODONE HYDROCHLORIDE 50 MG: 50 TABLET ORAL at 21:00

## 2017-07-08 RX ADMIN — CARBAMAZEPINE 200 MG: 200 TABLET ORAL at 21:00

## 2017-07-08 RX ADMIN — VILAZODONE HYDROCHLORIDE 10 MG: 10 TABLET ORAL at 09:44

## 2017-07-08 RX ADMIN — BREXPIPRAZOLE 3 MG: 3 TABLET ORAL at 09:44

## 2017-07-09 PROCEDURE — 6370000000 HC RX 637 (ALT 250 FOR IP): Performed by: PSYCHIATRY & NEUROLOGY

## 2017-07-09 PROCEDURE — 1240000000 HC EMOTIONAL WELLNESS R&B

## 2017-07-09 RX ADMIN — VILAZODONE HYDROCHLORIDE 10 MG: 10 TABLET ORAL at 09:03

## 2017-07-09 RX ADMIN — BREXPIPRAZOLE 3 MG: 3 TABLET ORAL at 09:03

## 2017-07-09 RX ADMIN — HYDROXYZINE HYDROCHLORIDE 25 MG: 25 TABLET, FILM COATED ORAL at 21:08

## 2017-07-09 RX ADMIN — CARBAMAZEPINE 200 MG: 200 TABLET ORAL at 21:08

## 2017-07-09 RX ADMIN — TRAZODONE HYDROCHLORIDE 50 MG: 50 TABLET ORAL at 21:08

## 2017-07-09 RX ADMIN — CARBAMAZEPINE 200 MG: 200 TABLET ORAL at 09:03

## 2017-07-10 PROCEDURE — 1240000000 HC EMOTIONAL WELLNESS R&B

## 2017-07-10 PROCEDURE — 6370000000 HC RX 637 (ALT 250 FOR IP): Performed by: PSYCHIATRY & NEUROLOGY

## 2017-07-10 RX ADMIN — CARBAMAZEPINE 200 MG: 200 TABLET ORAL at 09:11

## 2017-07-10 RX ADMIN — VILAZODONE HYDROCHLORIDE 10 MG: 10 TABLET ORAL at 09:12

## 2017-07-10 RX ADMIN — CARBAMAZEPINE 200 MG: 200 TABLET ORAL at 14:22

## 2017-07-11 PROCEDURE — 1240000000 HC EMOTIONAL WELLNESS R&B

## 2017-07-11 PROCEDURE — 6370000000 HC RX 637 (ALT 250 FOR IP): Performed by: PSYCHIATRY & NEUROLOGY

## 2017-07-11 RX ADMIN — TRAZODONE HYDROCHLORIDE 50 MG: 50 TABLET ORAL at 20:58

## 2017-07-11 RX ADMIN — CARBAMAZEPINE 200 MG: 200 TABLET ORAL at 09:09

## 2017-07-11 RX ADMIN — HYDROXYZINE HYDROCHLORIDE 25 MG: 25 TABLET, FILM COATED ORAL at 09:09

## 2017-07-11 RX ADMIN — BREXPIPRAZOLE 4 MG: 4 TABLET ORAL at 09:09

## 2017-07-11 RX ADMIN — VILAZODONE HYDROCHLORIDE 10 MG: 10 TABLET ORAL at 09:09

## 2017-07-11 RX ADMIN — CARBAMAZEPINE 200 MG: 200 TABLET ORAL at 20:58

## 2017-07-11 RX ADMIN — CARBAMAZEPINE 200 MG: 200 TABLET ORAL at 14:48

## 2017-07-12 PROCEDURE — 1240000000 HC EMOTIONAL WELLNESS R&B

## 2017-07-12 PROCEDURE — 6370000000 HC RX 637 (ALT 250 FOR IP): Performed by: PSYCHIATRY & NEUROLOGY

## 2017-07-12 RX ADMIN — BREXPIPRAZOLE 4 MG: 4 TABLET ORAL at 08:41

## 2017-07-12 RX ADMIN — CARBAMAZEPINE 200 MG: 200 TABLET ORAL at 08:41

## 2017-07-12 RX ADMIN — HYDROXYZINE HYDROCHLORIDE 25 MG: 25 TABLET, FILM COATED ORAL at 08:41

## 2017-07-12 RX ADMIN — CARBAMAZEPINE 200 MG: 200 TABLET ORAL at 21:57

## 2017-07-12 RX ADMIN — HYDROXYZINE HYDROCHLORIDE 25 MG: 25 TABLET, FILM COATED ORAL at 21:57

## 2017-07-12 RX ADMIN — VILAZODONE HYDROCHLORIDE 10 MG: 10 TABLET ORAL at 08:41

## 2017-07-12 RX ADMIN — TRAZODONE HYDROCHLORIDE 50 MG: 50 TABLET ORAL at 21:57

## 2017-07-12 RX ADMIN — CARBAMAZEPINE 200 MG: 200 TABLET ORAL at 14:35

## 2017-07-13 PROCEDURE — 1240000000 HC EMOTIONAL WELLNESS R&B

## 2017-07-13 PROCEDURE — 6370000000 HC RX 637 (ALT 250 FOR IP): Performed by: PSYCHIATRY & NEUROLOGY

## 2017-07-13 RX ADMIN — VILAZODONE HYDROCHLORIDE 10 MG: 10 TABLET ORAL at 08:58

## 2017-07-13 RX ADMIN — BREXPIPRAZOLE 4 MG: 4 TABLET ORAL at 08:58

## 2017-07-13 RX ADMIN — TRAZODONE HYDROCHLORIDE 50 MG: 50 TABLET ORAL at 21:36

## 2017-07-13 RX ADMIN — CARBAMAZEPINE 200 MG: 200 TABLET ORAL at 21:36

## 2017-07-13 RX ADMIN — HYDROXYZINE HYDROCHLORIDE 25 MG: 25 TABLET, FILM COATED ORAL at 21:36

## 2017-07-13 RX ADMIN — CARBAMAZEPINE 200 MG: 200 TABLET ORAL at 08:58

## 2017-07-13 RX ADMIN — HYDROXYZINE HYDROCHLORIDE 25 MG: 25 TABLET, FILM COATED ORAL at 08:58

## 2017-07-13 RX ADMIN — CARBAMAZEPINE 200 MG: 200 TABLET ORAL at 14:58

## 2017-07-14 PROCEDURE — 6370000000 HC RX 637 (ALT 250 FOR IP): Performed by: PSYCHIATRY & NEUROLOGY

## 2017-07-14 PROCEDURE — 1240000000 HC EMOTIONAL WELLNESS R&B

## 2017-07-14 RX ADMIN — TRAZODONE HYDROCHLORIDE 50 MG: 50 TABLET ORAL at 21:47

## 2017-07-14 RX ADMIN — BREXPIPRAZOLE 4 MG: 4 TABLET ORAL at 09:00

## 2017-07-14 RX ADMIN — CARBAMAZEPINE 200 MG: 200 TABLET ORAL at 21:46

## 2017-07-14 RX ADMIN — HYDROXYZINE HYDROCHLORIDE 25 MG: 25 TABLET, FILM COATED ORAL at 21:46

## 2017-07-14 RX ADMIN — VILAZODONE HYDROCHLORIDE 10 MG: 10 TABLET ORAL at 09:00

## 2017-07-14 RX ADMIN — CARBAMAZEPINE 200 MG: 200 TABLET ORAL at 08:59

## 2017-07-14 RX ADMIN — ACETAMINOPHEN 325 MG: 325 TABLET ORAL at 21:47

## 2017-07-14 ASSESSMENT — PAIN SCALES - GENERAL
PAINLEVEL_OUTOF10: 0
PAINLEVEL_OUTOF10: 3

## 2017-07-15 PROCEDURE — 6370000000 HC RX 637 (ALT 250 FOR IP): Performed by: PSYCHIATRY & NEUROLOGY

## 2017-07-15 PROCEDURE — 1240000000 HC EMOTIONAL WELLNESS R&B

## 2017-07-15 RX ADMIN — HYDROXYZINE HYDROCHLORIDE 25 MG: 25 TABLET, FILM COATED ORAL at 08:25

## 2017-07-15 RX ADMIN — BREXPIPRAZOLE 4 MG: 4 TABLET ORAL at 08:25

## 2017-07-15 RX ADMIN — CARBAMAZEPINE 200 MG: 200 TABLET ORAL at 14:24

## 2017-07-15 RX ADMIN — CARBAMAZEPINE 200 MG: 200 TABLET ORAL at 08:25

## 2017-07-15 RX ADMIN — VILAZODONE HYDROCHLORIDE 10 MG: 10 TABLET ORAL at 08:25

## 2017-07-15 RX ADMIN — HYDROXYZINE HYDROCHLORIDE 25 MG: 25 TABLET, FILM COATED ORAL at 20:40

## 2017-07-15 RX ADMIN — TRAZODONE HYDROCHLORIDE 50 MG: 50 TABLET ORAL at 20:40

## 2017-07-15 RX ADMIN — CARBAMAZEPINE 200 MG: 200 TABLET ORAL at 20:40

## 2017-07-16 PROCEDURE — 6370000000 HC RX 637 (ALT 250 FOR IP): Performed by: PSYCHIATRY & NEUROLOGY

## 2017-07-16 PROCEDURE — 1240000000 HC EMOTIONAL WELLNESS R&B

## 2017-07-16 RX ADMIN — VILAZODONE HYDROCHLORIDE 10 MG: 10 TABLET ORAL at 08:14

## 2017-07-16 RX ADMIN — HYDROXYZINE HYDROCHLORIDE 25 MG: 25 TABLET, FILM COATED ORAL at 08:14

## 2017-07-16 RX ADMIN — CARBAMAZEPINE 200 MG: 200 TABLET ORAL at 14:21

## 2017-07-16 RX ADMIN — BREXPIPRAZOLE 4 MG: 4 TABLET ORAL at 08:16

## 2017-07-16 RX ADMIN — ACETAMINOPHEN 325 MG: 325 TABLET ORAL at 14:23

## 2017-07-16 RX ADMIN — CARBAMAZEPINE 200 MG: 200 TABLET ORAL at 08:14

## 2017-07-16 ASSESSMENT — PAIN SCALES - GENERAL
PAINLEVEL_OUTOF10: 1
PAINLEVEL_OUTOF10: 3

## 2017-07-17 PROCEDURE — 1240000000 HC EMOTIONAL WELLNESS R&B

## 2017-07-17 PROCEDURE — 6370000000 HC RX 637 (ALT 250 FOR IP): Performed by: PSYCHIATRY & NEUROLOGY

## 2017-07-17 RX ADMIN — VILAZODONE HYDROCHLORIDE 10 MG: 10 TABLET ORAL at 09:33

## 2017-07-17 RX ADMIN — CARBAMAZEPINE 200 MG: 200 TABLET ORAL at 09:34

## 2017-07-17 RX ADMIN — CARBAMAZEPINE 200 MG: 200 TABLET ORAL at 15:39

## 2017-07-17 RX ADMIN — BREXPIPRAZOLE 4 MG: 4 TABLET ORAL at 09:33

## 2017-07-18 PROCEDURE — 1240000000 HC EMOTIONAL WELLNESS R&B

## 2017-07-18 PROCEDURE — 6370000000 HC RX 637 (ALT 250 FOR IP): Performed by: PSYCHIATRY & NEUROLOGY

## 2017-07-18 RX ADMIN — VILAZODONE HYDROCHLORIDE 10 MG: 10 TABLET ORAL at 10:41

## 2017-07-18 RX ADMIN — CARBAMAZEPINE 200 MG: 200 TABLET ORAL at 09:02

## 2017-07-18 RX ADMIN — BREXPIPRAZOLE 4 MG: 4 TABLET ORAL at 10:41

## 2017-07-18 RX ADMIN — CARBAMAZEPINE 200 MG: 200 TABLET ORAL at 14:33

## 2017-07-18 ASSESSMENT — PAIN SCALES - GENERAL: PAINLEVEL_OUTOF10: 9

## 2017-07-18 ASSESSMENT — PAIN DESCRIPTION - LOCATION: LOCATION: CHEST

## 2017-07-18 ASSESSMENT — PAIN DESCRIPTION - PAIN TYPE: TYPE: CHRONIC PAIN

## 2017-07-19 PROCEDURE — 6370000000 HC RX 637 (ALT 250 FOR IP): Performed by: PSYCHIATRY & NEUROLOGY

## 2017-07-19 PROCEDURE — 1240000000 HC EMOTIONAL WELLNESS R&B

## 2017-07-19 RX ADMIN — BREXPIPRAZOLE 4 MG: 4 TABLET ORAL at 09:31

## 2017-07-19 RX ADMIN — VILAZODONE HYDROCHLORIDE 10 MG: 10 TABLET ORAL at 09:31

## 2017-07-19 RX ADMIN — CARBAMAZEPINE 200 MG: 200 TABLET ORAL at 14:33

## 2017-07-19 RX ADMIN — CARBAMAZEPINE 200 MG: 200 TABLET ORAL at 09:31

## 2017-07-19 RX ADMIN — CARBAMAZEPINE 200 MG: 200 TABLET ORAL at 20:49

## 2017-07-20 PROCEDURE — 6370000000 HC RX 637 (ALT 250 FOR IP): Performed by: PSYCHIATRY & NEUROLOGY

## 2017-07-20 PROCEDURE — 1240000000 HC EMOTIONAL WELLNESS R&B

## 2017-07-20 RX ADMIN — CARBAMAZEPINE 200 MG: 200 TABLET ORAL at 14:03

## 2017-07-20 RX ADMIN — BREXPIPRAZOLE 4 MG: 4 TABLET ORAL at 08:48

## 2017-07-20 RX ADMIN — CARBAMAZEPINE 200 MG: 200 TABLET ORAL at 21:04

## 2017-07-20 RX ADMIN — CARBAMAZEPINE 200 MG: 200 TABLET ORAL at 08:48

## 2017-07-20 RX ADMIN — VILAZODONE HYDROCHLORIDE 10 MG: 10 TABLET ORAL at 08:48

## 2017-07-21 VITALS
BODY MASS INDEX: 22.5 KG/M2 | DIASTOLIC BLOOD PRESSURE: 66 MMHG | HEIGHT: 66 IN | OXYGEN SATURATION: 100 % | HEART RATE: 68 BPM | SYSTOLIC BLOOD PRESSURE: 108 MMHG | RESPIRATION RATE: 14 BRPM | TEMPERATURE: 97.9 F | WEIGHT: 140 LBS

## 2017-07-21 PROCEDURE — 6370000000 HC RX 637 (ALT 250 FOR IP): Performed by: PSYCHIATRY & NEUROLOGY

## 2017-07-21 RX ORDER — CARBAMAZEPINE 200 MG/1
200 TABLET ORAL 3 TIMES DAILY
Qty: 90 TABLET | Refills: 0 | Status: ON HOLD | OUTPATIENT
Start: 2017-07-21 | End: 2017-11-07 | Stop reason: HOSPADM

## 2017-07-21 RX ORDER — VILAZODONE HYDROCHLORIDE 10 MG/1
10 TABLET ORAL DAILY
Qty: 30 TABLET | Refills: 0 | Status: ON HOLD | OUTPATIENT
Start: 2017-07-21 | End: 2017-11-07 | Stop reason: HOSPADM

## 2017-07-21 RX ORDER — TRAZODONE HYDROCHLORIDE 50 MG/1
50 TABLET ORAL NIGHTLY PRN
Qty: 30 TABLET | Refills: 0 | Status: ON HOLD | OUTPATIENT
Start: 2017-07-21 | End: 2017-11-07 | Stop reason: HOSPADM

## 2017-07-21 RX ADMIN — BREXPIPRAZOLE 4 MG: 4 TABLET ORAL at 08:50

## 2017-07-21 RX ADMIN — CARBAMAZEPINE 200 MG: 200 TABLET ORAL at 08:50

## 2017-07-21 RX ADMIN — VILAZODONE HYDROCHLORIDE 10 MG: 10 TABLET ORAL at 08:50

## 2017-10-26 ENCOUNTER — HOSPITAL ENCOUNTER (INPATIENT)
Age: 37
LOS: 12 days | Discharge: HOME | DRG: 750 | End: 2017-11-07
Attending: PSYCHIATRY & NEUROLOGY | Admitting: PSYCHIATRY & NEUROLOGY
Payer: MEDICAID

## 2017-10-26 ENCOUNTER — HOSPITAL ENCOUNTER (EMERGENCY)
Age: 37
Discharge: PSYCHIATRIC OR SUBSTANCE USE FACILITY | End: 2017-10-26
Attending: EMERGENCY MEDICINE
Payer: MEDICAID

## 2017-10-26 VITALS
DIASTOLIC BLOOD PRESSURE: 74 MMHG | HEART RATE: 81 BPM | BODY MASS INDEX: 20.98 KG/M2 | WEIGHT: 130 LBS | OXYGEN SATURATION: 100 % | SYSTOLIC BLOOD PRESSURE: 115 MMHG | RESPIRATION RATE: 16 BRPM | TEMPERATURE: 99 F

## 2017-10-26 DIAGNOSIS — R45.851 SUICIDAL IDEATION: Primary | ICD-10-CM

## 2017-10-26 DIAGNOSIS — R45.1 AGITATION: ICD-10-CM

## 2017-10-26 DIAGNOSIS — F25.0 SCHIZOAFFECTIVE DISORDER, BIPOLAR TYPE (HCC): ICD-10-CM

## 2017-10-26 PROCEDURE — 1240000000 HC EMOTIONAL WELLNESS R&B

## 2017-10-26 PROCEDURE — 99285 EMERGENCY DEPT VISIT HI MDM: CPT

## 2017-10-26 ASSESSMENT — LIFESTYLE VARIABLES: HISTORY_ALCOHOL_USE: NO

## 2017-10-26 ASSESSMENT — SLEEP AND FATIGUE QUESTIONNAIRES
RESTFUL SLEEP: NO
DO YOU HAVE DIFFICULTY SLEEPING: YES
DIFFICULTY ARISING: NO
DIFFICULTY FALLING ASLEEP: YES
DO YOU USE A SLEEP AID: NO
DIFFICULTY STAYING ASLEEP: YES
AVERAGE NUMBER OF SLEEP HOURS: 6
SLEEP PATTERN: UTA

## 2017-10-26 ASSESSMENT — ENCOUNTER SYMPTOMS
SHORTNESS OF BREATH: 0
COUGH: 0

## 2017-10-26 ASSESSMENT — PATIENT HEALTH QUESTIONNAIRE - PHQ9: SUM OF ALL RESPONSES TO PHQ QUESTIONS 1-9: 16

## 2017-10-26 NOTE — ED NOTES
[] [place]    [] [address]    [x]  One [facility] ASSESSMENT      Y  N     [x] [] In the past two weeks have you had thoughts of hurting yourself in any way? [x] [] In the past two weeks have you had thoughts that you would be better off dead? [x] [] Have you made a suicide attempt in the past two months? [] [] Do you have a plan for hurting yourself or suicide? [x] [] Presence of hallucinations/voices related to hurting himself or herself or someone else. SUICIDE/SECURITY WATCH PRECAUTION CHECKLIST     Orders    [x]  Suicide/Security Watch Precautions initiated as checked below:   10/26/17 7:14 PM 03/03    [x] Notified physician:  Mitchell Gomez MD  10/26/17 7:14 PM    [x] Orders obtained as appropriate:     [x] 1:1 Observer     [] Psych Consult     [] Psych Consult    Name:  Date:  Time:    [x] 1:1 Observer, Notified by:  Martín Mata Nurse Supervisor    [x] Remove all personal clothes from room and place in snap/paper gown/pants. Slipper only    [x] Remove all personal belongings from room and secured away from patient. Documentation    [x] Initiate Suicide/Security Watch Precaution Flow Sheet    [x] Initiate individualized Care Plan/Problem    [x] Document why precautions initiated on flow sheet (Initiate Nursing Care Plan/Problem)    [x] 1:1 Observer in place; instructions provided. Suicide precautions require observer be within arms length. [x] Nurse-Observer Communication Hand-off initiated by RN, reviewed with Observer. Subsequently used as Hand Off between Observers. [x] Initiate every 15 minute observations per observer as delegated by the RN.     [x] Initiate RN assessment and documentation    Environmental Scan  Search Criteria and Process: OPTIONAL, see Search Policy    [x] Reason for search: suicidal    [] Nursing in presence of second person to search patient    [] Patient notified of reason for body assessment and belongings ideation/behavior  [] Depression  [] Suicide attempt      [] Low self-esteem  [x] Hallucinations      [] Feeling of Hopelessness  [] Substance abuse or withdrawal    [] Dysfunctional family  [] Major traumatic event, eg., divorce, etc   [x] Excessive stress/anxiety    10/26/17    Expected Outcomes    Patient will:   [x] Patient will remain safe for the duration of their stay   [x] Patient's environment will be safe, eg. Free of potential suicide weapons   [] Verbalize Recovery from suicidal episode and improvement in self-worth   [x] Discuss feeling that precipitated suicide attempt/thoughts/behavior   [] Will describe available resources for crisis prevention and management   [] Will verbalize positive coping skills     Nursing Intervention   [x] Assessment and Observations hourly   [x] Suicide Precautions implemented with patient, should be 1:1 observation   [x] Document observation [id number] and RN assessment hourly   [] Consult physician for:    [] Psychiatric consult    [] Pharmacological therapy    [] Other:    [x] Patient search completed by security   [x] Initiated appropriate safety protocols by removing from the patient's environment anything that could be used to inflict self injury, eg. Order safe tray, snap gown, etc   [x] Maintain open, warm, caring, non-judgmental attitude/manner towards patient   [] Discuss advantages and disadvantages of existing coping methods/skills   [x] Assist and educate patient with identifying present strengths and coping skills   [x] Keep patient informed regarding plan of care and provide clear concise explanations. Provide the patient/family education information as well as telephone numbers and other information about crisis centers, hot lines, and counselors.     Discharge Planning:   [] Referral  [] Groups [] Health agencies  [] Other:          Mike Lazar RN  10/26/17 9786

## 2017-10-26 NOTE — ED NOTES
Bed: 03  Expected date:   Expected time:   Means of arrival:   Comments:  Medic, combative     Mere Hernandes RN  10/26/17 0397
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
## 2017-10-26 NOTE — ED PROVIDER NOTES
Sexual activity: Not on file     Other Topics Concern    Not on file     Social History Narrative    No narrative on file       Family History   Problem Relation Age of Onset    Cancer Mother        Allergies:  Bee venom; Beeswax; and Wasp venom protein    Home Medications:  Prior to Admission medications    Medication Sig Start Date End Date Taking? Authorizing Provider   carBAMazepine (TEGRETOL) 200 MG tablet Take 1 tablet by mouth 3 times daily 7/21/17   Danial Zavala MD   traZODone (DESYREL) 50 MG tablet Take 1 tablet by mouth nightly as needed for Sleep 7/21/17   Danial Zavala MD   vilazodone HCl (VIIBRYD) 10 MG TABS Take 1 tablet by mouth daily 7/21/17   Danial Zavala MD   ARIPiprazole ER (ABILIFY MAINTENA) 400 MG SUSR Inject 400 mg into the muscle every 28 days 7/21/17   Danial Zavala MD   brexpiprazole (REXULTI) 4 MG TABS tablet Take 1 tablet by mouth daily 7/21/17   Danial Zavala MD       REVIEW OF SYSTEMS    (2-9 systems for level 4, 10 or more for level 5)      Review of Systems   Constitutional: Negative for chills and fever. Respiratory: Negative for cough and shortness of breath. Cardiovascular: Negative for chest pain. Allergic/Immunologic: Positive for environmental allergies. Psychiatric/Behavioral: Positive for agitation, behavioral problems, decreased concentration, hallucinations, sleep disturbance and suicidal ideas. ROS limited secondary to psychiatric disturbance. PHYSICAL EXAM   (up to 7 for level 4, 8 or more for level 5)      INITIAL VITALS:   /74   Pulse 81   Temp 99 °F (37.2 °C) (Oral)   Resp 16   Wt 130 lb (59 kg)   SpO2 100%   BMI 20.98 kg/m²     Physical Exam   Constitutional: No distress. Pressured speech, tangential thinking, does not appear internally stimulated   HENT:   Head: Normocephalic and atraumatic. Eyes: Conjunctivae and EOM are normal. Pupils are equal, round, and reactive to light.    Neck: No tracheal deviation present. Cardiovascular: Normal rate, regular rhythm, normal heart sounds and intact distal pulses. Exam reveals no gallop and no friction rub. No murmur heard. Pulmonary/Chest: Effort normal and breath sounds normal. No stridor. No respiratory distress. She has no wheezes. She has no rales. Abdominal: Soft. Bowel sounds are normal. She exhibits no distension. There is no tenderness. Neurological: She is alert. Skin: Skin is warm and dry. Psychiatric: Her affect is blunt. Her speech is tangential.       DIFFERENTIAL  DIAGNOSIS     PLAN (LABS / IMAGING / EKG):  Orders Placed This Encounter   Procedures    Suicide precautions       MEDICATIONS ORDERED:  No orders of the defined types were placed in this encounter. DDX: Acute on chronic schizoaffective disorder, Intoxication,    DIAGNOSTIC RESULTS / EMERGENCY DEPARTMENT COURSE / MDM     LABS:  No results found for this visit on 10/26/17. IMPRESSION: Acute on Chronic Schizoaeffective Disorder    RADIOLOGY:  None    EKG  None    All EKG's are interpreted by the Emergency Department Physician who either signs or Co-signs this chart in the absence of a cardiologist.    EMERGENCY DEPARTMENT COURSE:  Patient was pink slipped by police department. Patient has been accepted by [facility]. No acute events during my shift. This patient was signed out to Dr. Ananya Naqvi. PROCEDURES:  None    CONSULTS:  None    CRITICAL CARE:  None    FINAL IMPRESSION      1. Suicidal ideation    2. Agitation    3. Schizoaffective disorder, bipolar type ([facility] [address]          DISPOSITION / PLAN     DISPOSITION     PATIENT REFERRED TO:  No follow-up provider specified. DISCHARGE MEDICATIONS:  New Prescriptions    No medications on file       Gertrude Curran D.O.   Emergency Medicine Resident    (Please note that portions of this note were completed with a voice recognition program.  Efforts were made to edit the dictations but occasionally words are mis-transcribed.) Aviva Bishop MD  10/26/17 2106    Attending Addendum:    Note reviewed and I agree with resident/SPENCER documenation. Changes to chart made as appropriate.     Mitchell Gomez MD  Attending Emergency Physician  1:03 AM 10/28/2017       Mitchell Gomez MD  10/28/17 0510

## 2017-10-26 NOTE — ED PROVIDER NOTES
affect, cardiac exam regular rate and rhythm no murmurs rubs or gallops complications clear to auscultation bilaterally, abdomen is soft nontender nondistended    Impression: Schizoaffective    Plan: Involuntary admission form signed by [employer]. Medically stable for psychiatric evaluation at this time.         Coco Baxter MD  Attending Emergency Physician        Suad Cuevas MD  10/26/17 0878

## 2017-10-26 NOTE — ED NOTES
Pt changed out and put into 3A. Pt calm and cooperative at this point.       Danni Naidu RN  10/26/17 9766

## 2017-10-27 PROBLEM — F25.1 SCHIZOAFFECTIVE DISORDER, DEPRESSIVE TYPE (HCC): Chronic | Status: RESOLVED | Noted: 2017-06-29 | Resolved: 2017-10-27

## 2017-10-27 PROCEDURE — 1240000000 HC EMOTIONAL WELLNESS R&B

## 2017-10-27 RX ORDER — ACETAMINOPHEN 325 MG/1
650 TABLET ORAL EVERY 4 HOURS PRN
Status: DISCONTINUED | OUTPATIENT
Start: 2017-10-27 | End: 2017-11-07 | Stop reason: HOSPADM

## 2017-10-27 RX ORDER — HALOPERIDOL 5 MG/ML
10 INJECTION INTRAMUSCULAR EVERY 4 HOURS PRN
Status: DISCONTINUED | OUTPATIENT
Start: 2017-10-27 | End: 2017-11-07 | Stop reason: HOSPADM

## 2017-10-27 RX ORDER — CARBAMAZEPINE 200 MG/1
200 TABLET ORAL 3 TIMES DAILY
Status: DISCONTINUED | OUTPATIENT
Start: 2017-10-27 | End: 2017-11-07 | Stop reason: HOSPADM

## 2017-10-27 RX ORDER — HALOPERIDOL 5 MG
10 TABLET ORAL EVERY 4 HOURS PRN
Status: DISCONTINUED | OUTPATIENT
Start: 2017-10-27 | End: 2017-11-07 | Stop reason: HOSPADM

## 2017-10-27 RX ORDER — TRAZODONE HYDROCHLORIDE 50 MG/1
50 TABLET ORAL NIGHTLY PRN
Status: DISCONTINUED | OUTPATIENT
Start: 2017-10-27 | End: 2017-11-07 | Stop reason: HOSPADM

## 2017-10-27 RX ORDER — VILAZODONE HYDROCHLORIDE 10 MG/1
10 TABLET ORAL DAILY
Status: DISCONTINUED | OUTPATIENT
Start: 2017-10-27 | End: 2017-11-07 | Stop reason: HOSPADM

## 2017-10-27 ASSESSMENT — LIFESTYLE VARIABLES: HISTORY_ALCOHOL_USE: YES

## 2017-10-27 ASSESSMENT — SLEEP AND FATIGUE QUESTIONNAIRES
DIFFICULTY FALLING ASLEEP: YES
AVERAGE NUMBER OF SLEEP HOURS: 6
SLEEP PATTERN: INSOMNIA;DIFFICULTY FALLING ASLEEP
DIFFICULTY ARISING: YES
DO YOU HAVE DIFFICULTY SLEEPING: YES
DIFFICULTY STAYING ASLEEP: YES
DO YOU USE A SLEEP AID: NO
RESTFUL SLEEP: NO

## 2017-10-27 ASSESSMENT — PATIENT HEALTH QUESTIONNAIRE - PHQ9: SUM OF ALL RESPONSES TO PHQ QUESTIONS 1-9: 16

## 2017-10-27 NOTE — BH NOTE
`[facility]  Admission Note     Admission Type:   Admission Type: Involuntary    Reason for admission:  Reason for Admission: Pt involuntary from  [facility] ED with suicidal ideation to stab self.     PATIENT STRENGTHS:  Strengths: No significant Physical Illness    Patient Strengths and Limitations:  Limitations: Unrealistic self-view, Difficult relationships / poor social skills, Multiple barriers to leisure interests    Addictive Behavior:   Addictive Behavior  In the past 3 months, have you felt or has someone told you that you have a problem with:  : None  Do you have a history of Chemical Use?: No  Do you have a history of Alcohol Use?: No  Do you have a history of Street Drug Abuse?: No  Histroy of Prescripton Drug Abuse?: No    Medical Problems:   Past Medical History:   Diagnosis Date    Anxiety     Asthma     Bipolar 1 disorder (HCC)     Multiple personality     Seizures ([address]        Status EXAM:  Status and Exam  Normal: No  Facial Expression: Flat  Affect: Blunt  Level of Consciousness: Confused  Mood:Normal: No  Mood: Depressed, Anxious, Irritable  Motor Activity:Normal: No  Motor Activity: Agitated  Interview Behavior: Evasive, Irritable, Uncooperative/Withdrawn  Preception: Mountain to Person, Amirah Blunt to Time  Attention:Normal: No  Attention: Distractible, Unable to Concentrate  Thought Processes: Blocking  Thought Content:Normal: No  Thought Content: Paranoia  Hallucinations: None  Delusions: No  Memory:Normal: No  Memory: Poor Recent  Insight and Judgment: No  Insight and Judgment: Poor Judgment, Poor Insight  Present Suicidal Ideation: Yes  Present Homicidal Ideation: No    Tobacco Screening:  Practical Counseling, on admission, soren X, if applicable and completed (first 3 are required if patient doesn't refuse):            ( )  Recognizing danger situations (included triggers and roadblocks)                    ( )  Coping skills (new ways to manage stress, exercise, relaxation techniques, changing routine, distraction)                                                           ( )  Basic information about quitting (benefits of quitting, techniques in how to quit, available resources  ( ) Referral for counseling faxed to [facility]                                           ( ) Patient refused counseling  (X ) Patient has not smoked in the last 30 days      Pt admitted with followings belongings:  Dentures: None  Vision - Corrective Lenses: None  Hearing Aid: None  Jewelry: None  Body Piercings Removed: N/A  Clothing: Dress, Sweater, Pants, Socks, Undergarments (Comment)  Were All Patient Medications Collected?: Not Applicable  Other Valuables: None        Patient oriented to surroundings and program expectations and copy of patient rights given. Received admission packet:  Yes. Consents reviewed, signed yes. Refused yes. Patient verbalize understanding:  Yes. Patient education on precautions: Yes. Patient was pinked from [facility] Patient was delusional having auditory hallucinations to kills self. Patient was attempting to stab self in front of [employer]. Patient was taken to [facility]. Patient admits to [facility] and auditory hallucinations. Patient denies any homicidal thoughts at this time. Patient was oriented to the unit. Patient was wanded for safety and changed into paper pants hospital gown and socks. Patient denies any additional needs at this time. 15 minute safety checks were maintained.                    Nathalia Malik RN
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
## 2017-10-27 NOTE — PROGRESS NOTES
Patient seclusive to room all day with blanket over head. Patient refused all medications;very irritable.

## 2017-10-27 NOTE — PLAN OF CARE
Learner Progress Toward Treatment Goals: reviewed group plans and strategies for care    Method:group therapy, medication compliance, individualized assessments and care planning    Outcome: needs reinforcement    PATIENT GOALS: to be discussed with patient within 72 hours    PLAN/TREATMENT RECOMMENDATIONS:     continue group therapy , medications compliance, goal setting, individualized assessments and care, continue to monitor pt on unit      SHORT-TERM GOALS:   Time frame for Short-Term Goals: 5-7 days    LONG-TERM GOALS:  Time frame for Long-Term Goals: 6 months  Members Present in Team Meeting: See Signature Sheet    Flaquita Gleason, [address]
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
## 2017-10-28 PROCEDURE — 6370000000 HC RX 637 (ALT 250 FOR IP): Performed by: PSYCHIATRY & NEUROLOGY

## 2017-10-28 PROCEDURE — 1240000000 HC EMOTIONAL WELLNESS R&B

## 2017-10-28 RX ADMIN — ACETAMINOPHEN 650 MG: 325 TABLET, FILM COATED ORAL at 08:13

## 2017-10-28 RX ADMIN — BREXPIPRAZOLE 4 MG: 4 TABLET ORAL at 08:08

## 2017-10-28 RX ADMIN — VILAZODONE HYDROCHLORIDE 10 MG: 10 TABLET ORAL at 08:08

## 2017-10-28 RX ADMIN — CARBAMAZEPINE 200 MG: 200 TABLET ORAL at 08:08

## 2017-10-28 ASSESSMENT — PAIN SCALES - GENERAL
PAINLEVEL_OUTOF10: 5
PAINLEVEL_OUTOF10: 2

## 2017-10-28 NOTE — PROGRESS NOTES
Pt declined 1100 group despite staff invitation and encouragement to attend. Enrique Lefort will continue to encourage pt to attend future groups.

## 2017-10-28 NOTE — PLAN OF CARE
Problem: Altered Mood, Depressive Behavior  Goal: LTG-Able to verbalize and/or display a decrease in depressive symptoms  Outcome: Ongoing  Pt did not attend communication and problem solving skills group at [phone] despite staff invitation to attend.

## 2017-10-28 NOTE — PLAN OF CARE
Problem: Altered Mood, Depressive Behavior  Goal: LTG-Able to verbalize and/or display a decrease in depressive symptoms  Outcome: Ongoing  [facility]  Day 3 Interdisciplinary Treatment Plan NOTE    Review Date & Time: 10/28/17 1330    Admission Type:   Admission Type: Involuntary    Reason for admission:  Reason for Admission: Pt involuntary from  [facility] ED with suicidal ideation to stab self.   Estimated Length of Stay: 5-7 days  Estimated Discharge Date Update: to be determined by physician    PATIENT STRENGTHS:  Patient Strengths Strengths: No significant Physical Illness  Patient Strengths and Limitations:Limitations: Difficult relationships / poor social skills, General negative or hopeless attitude about future/recovery, Multiple barriers to leisure interests, Apathetic / unmotivated, Limited education -> difficulty reading or writing, Unrealistic self-view, Demonstrates discomfort with /lack of social skills, Inappropriate/potentially harmful leisure interests, Difficulty problem solving/relies on others to help solve problems, Lacks leisure interests, External locus of control, Tendency to isolate self  Addictive Behavior:Addictive Behavior  In the past 3 months, have you felt or has someone told you that you have a problem with:  :  (YONY)  Do you have a history of Chemical Use?: Yes  Do you have a history of Alcohol Use?: Yes  Do you have a history of Street Drug Abuse?: Yes  Histroy of Prescripton Drug Abuse?: Yes  Medical Problems:  Past Medical History:   Diagnosis Date    Anxiety     Asthma     Bipolar 1 disorder ([facility] [address]     Multiple personality     [facility]        Risk:  Fall RiskTotal: 8  Stephen Scale Stephen Scale Score: 22  BVC Total: 1  Change in scores no Changes to plan of Care no    Status EXAM:   Status and Exam  Normal: No  Facial Expression: Flat  Affect: Appropriate  Level of Consciousness: Alert  Mood:Normal: No  Mood: Empty, Depressed  Motor Activity:Normal: team, Reviewed group plan and strategies, Reviewed signs, symptoms and risk of self harm and violent behavior, Reviewed goals and plan of care    Method:small group, individual verbal education    Outcome:verbalized by patient, but needs reinforcement to obtain goals    PATIENT GOALS:  Short term: Pt refused to participate  Long term: Pt refused to participate    PLAN/TREATMENT RECOMMENDATIONS UPDATE: continue with group therapies, increased socialization, continue planning for after discharge goals, continue with medication compliance    SHORT-TERM GOALS UPDATE:   Time frame for Short-Term Goals: 5-7 days    LONG-TERM GOALS UPDATE:   Time frame for Long-Term Goals: 6 months  Members Present in Team Meeting: See Signature Sheet    Ameya Perry, ASHLYNS    Goal: STG-Able to verbalize suicidal ideations  Outcome: Ongoing    Goal: LTG-Absence of self-harm  Outcome: Ongoing      Problem: Suicide risk  Goal: Provide patient with safe environment  Provide patient with safe environment   Outcome: Ongoing

## 2017-10-28 NOTE — PLAN OF CARE
Problem: Altered Mood, Depressive Behavior  Goal: LTG-Absence of self-harm  Outcome: Ongoing  Patient currently denies any thoughts of self harm. She was medication compliant this morning and her behavior was well controlled. She allowed me to do a physical assessment. She does need a shower but currently refuses. No signs or symptoms of distress.

## 2017-10-29 PROCEDURE — 6370000000 HC RX 637 (ALT 250 FOR IP): Performed by: PSYCHIATRY & NEUROLOGY

## 2017-10-29 PROCEDURE — 1240000000 HC EMOTIONAL WELLNESS R&B

## 2017-10-29 RX ADMIN — BREXPIPRAZOLE 4 MG: 4 TABLET ORAL at 08:22

## 2017-10-29 RX ADMIN — CARBAMAZEPINE 200 MG: 200 TABLET ORAL at 20:53

## 2017-10-29 RX ADMIN — VILAZODONE HYDROCHLORIDE 10 MG: 10 TABLET ORAL at 08:22

## 2017-10-29 RX ADMIN — HALOPERIDOL 10 MG: 5 TABLET ORAL at 20:53

## 2017-10-29 RX ADMIN — ACETAMINOPHEN 650 MG: 325 TABLET, FILM COATED ORAL at 08:22

## 2017-10-29 RX ADMIN — CARBAMAZEPINE 200 MG: 200 TABLET ORAL at 08:22

## 2017-10-29 RX ADMIN — CARBAMAZEPINE 200 MG: 200 TABLET ORAL at 14:39

## 2017-10-29 ASSESSMENT — PAIN SCALES - GENERAL
PAINLEVEL_OUTOF10: 5
PAINLEVEL_OUTOF10: 1

## 2017-10-29 NOTE — CARE COORDINATION
Rescue to come out and do assessment for probate process sometime today for Dr Doc Hayes. @0575 rescue cancelled to come up and do assessment for probate due to patient signed in voluntarily this date.

## 2017-10-29 NOTE — PLAN OF CARE
Problem: Altered Mood, Depressive Behavior  Goal: LTG-Able to verbalize and/or display a decrease in depressive symptoms  Outcome: Ongoing  Pt did not attend community meeting and goal setting skills group at 0900 despite staff invitation to attend.

## 2017-10-29 NOTE — PLAN OF CARE
Problem: Altered Mood, Depressive Behavior  Goal: LTG-Able to verbalize and/or display a decrease in depressive symptoms  Outcome: Ongoing  Pt laying in bed with blanket over head. She is irritable and refuses to talk with staff. Yelling why are you bothering me and just leave. Goal: STG-Able to verbalize suicidal ideations  Outcome: Ongoing  Refuses all assessment. Goal: LTG-Absence of self-harm  Outcome: Ongoing  Did not exhibit any self harm this shift.

## 2017-10-29 NOTE — PLAN OF CARE
Problem: Altered Mood, Depressive Behavior  Goal: LTG-Able to verbalize and/or display a decrease in depressive symptoms  Outcome: Ongoing    Goal: STG-Able to verbalize suicidal ideations  Outcome: Ongoing  Pt is isolative to her room for the majority of this shift. Pt does not attend groups or socialize with peers, can be present in the milieu occasionally but remains aloof. Pt is irritable on approach, refuses assessments but is compliant with medications. Pt denies SI, HI, AVH at this time. Q15min checks maintained.

## 2017-10-29 NOTE — PLAN OF CARE
Problem: Altered Mood, Depressive Behavior  Goal: LTG-Able to verbalize and/or display a decrease in depressive symptoms  Outcome: Ongoing  Pt did not attend aromatherapy and essential oil benefits group at 1330 despite staff invitation to attend.

## 2017-10-30 PROCEDURE — 6370000000 HC RX 637 (ALT 250 FOR IP): Performed by: PSYCHIATRY & NEUROLOGY

## 2017-10-30 PROCEDURE — 1240000000 HC EMOTIONAL WELLNESS R&B

## 2017-10-30 RX ADMIN — CARBAMAZEPINE 200 MG: 200 TABLET ORAL at 20:45

## 2017-10-30 RX ADMIN — CARBAMAZEPINE 200 MG: 200 TABLET ORAL at 08:32

## 2017-10-30 RX ADMIN — HALOPERIDOL 10 MG: 5 TABLET ORAL at 20:45

## 2017-10-30 RX ADMIN — CARBAMAZEPINE 200 MG: 200 TABLET ORAL at 14:56

## 2017-10-30 RX ADMIN — BREXPIPRAZOLE 4 MG: 4 TABLET ORAL at 08:31

## 2017-10-30 RX ADMIN — VILAZODONE HYDROCHLORIDE 10 MG: 10 TABLET ORAL at 08:31

## 2017-10-30 NOTE — PLAN OF CARE
Problem: Altered Mood, Depressive Behavior  Goal: LTG-Able to verbalize and/or display a decrease in depressive symptoms  Outcome: Ongoing  Pt denies dep but states that she is \"confused\". Goal: STG-Able to verbalize suicidal ideations  Outcome: Ongoing  Pt denies SI. Goal: LTG-Absence of self-harm  Outcome: Ongoing  Safe environment maintained & pt remains free from self-harm. Agreeable to seeking staff should thoughts to harm self or others arise. Q15min checks for safety. Problem: Suicide risk  Goal: Provide patient with safe environment  Provide patient with safe environment   Outcome: Ongoing  Remains free from self-harm and is safe on the unit. Q15min checks for safety.

## 2017-10-30 NOTE — PLAN OF CARE
Problem: Altered Mood, Depressive Behavior  Goal: STG-Able to verbalize suicidal ideations  Outcome: Ongoing  Patient did not attend psychotherapy group despite encouragement from staff at 1000.

## 2017-10-30 NOTE — PLAN OF CARE
Problem: Altered Mood, Depressive Behavior  Goal: LTG-Able to verbalize and/or display a decrease in depressive symptoms  Outcome: Ongoing  Pt did not attend creative expression skills group at 1100 despite staff invitation to attend.

## 2017-10-30 NOTE — H&P
HISTORY and [id number]       NAME:  Rhonda Newsome  MRN: [id number]   YOB: 1980   Date: 10/30/2017   Age: 40 y.o. Gender: female     COMPLAINT AND PRESENT HISTORY:    40 y o female admitted to [facility] states she hears voices. She is a poor historian. Apparently has had previous psychiatric admissions. States she has contact with [employer], completed the 9th grade. \"stays with her Sister\". Has a history of seizures. She is unable to state clearly why she is admitted. ED report indicates that pt \"was at someone's house waving around a knife and was trying to stab someone. EMS arrived and pt became more erratic and then bit a . She is reporting suicidal ideations and auditory hallucinations. She states that she feels nervous and hasn't been sleeping. She is having flight of ideas. She denies thoughts of hurting others at this time. [employer] has pink slipped pt.  pt called in as suicidal watch. \"   She denies use of alcohol/drugs.          DIAGNOSTIC RESULTS   Labs:  CBC: No results for input(s): WBC, HGB, PLT in the last 72 hours. BMP:  No results for input(s): NA, K, CL, CO2, BUN, CREATININE, GLUCOSE in the last 72 hours. Hepatic: No results for input(s): AST, ALT, ALB, BILITOT, ALKPHOS in the last 72 hours. Lipids: No results for input(s): CHOL, HDL in the last 72 hours.     Invalid input(s): LDLCALCU  U/A:  Lab Results   Component Value Date    NITRITE neg 04/27/2015    COLORU YELLOW 06/30/2017    WBCUA 0 TO 2 03/11/2017    RBCUA 0 TO 2 03/11/2017    MUCUS NOT REPORTED 03/11/2017    BACTERIA FEW 03/11/2017    CLARITYU clear 04/27/2015    SPECGRAV 1.003 06/30/2017    LEUKOCYTESUR NEGATIVE 06/30/2017    BLOODU neg 04/27/2015    GLUCOSEU NEGATIVE 06/30/2017    GLUCOSEU NEGATIVE 11/13/2011    AMORPHOUS NOT REPORTED 03/11/2017       PAST MEDICAL HISTORY     Past Medical History:   Diagnosis Date    Anxiety     Asthma     Bipolar 1 disorder ([facility] [address]     Multiple personality     Seizures ([facility] [address]    Hx pneumonia, Hepatitis C,     Pt denies any history of Diabetes mellitus type 2, hypertension, stroke, heart disease, COPD, GERD, HLD, Cancer, Thyroid disease, Kidney Disease,  TB.    SURGICAL HISTORY     No past surgical history on file. FAMILY HISTORY       Family History   Problem Relation Age of Onset    Cancer Mother        SOCIAL HISTORY       Social History     Social History    Marital status: Single     Spouse name: N/A    Number of children: One, age 6    Years of education: N/A     Social History Main Topics    Smoking status: Current Every Day Smoker     Packs/day: 0.25     Types: Cigarettes, Cigars    Smokeless tobacco: Current User      Comment: Pt accepting of nicotine replacement    Alcohol use No    Drug use: No    Sexual activity: Not on file     Other Topics Concern    Not on file     Social History Narrative    No narrative on file           REVIEW OF SYSTEMS      Allergies   Allergen Reactions    Bee Venom     Beeswax     Wasp Venom Protein        No current facility-administered medications on file prior to encounter. Current Outpatient Prescriptions on File Prior to Encounter   Medication Sig Dispense Refill    carBAMazepine (TEGRETOL) 200 MG tablet Take 1 tablet by mouth 3 times daily 90 tablet 0    traZODone (DESYREL) 50 MG tablet Take 1 tablet by mouth nightly as needed for Sleep 30 tablet 0    vilazodone HCl (VIIBRYD) 10 MG TABS Take 1 tablet by mouth daily 30 tablet 0    ARIPiprazole ER (ABILIFY MAINTENA) 400 MG SUSR Inject 400 mg into the muscle every 28 days 1 each 0    brexpiprazole (REXULTI) 4 MG TABS tablet Take 1 tablet by mouth daily 30 tablet 0                      General health:  Fairly good. No fever or chills. Skin:  No itching, redness or rash. Head, eyes, ears, nose, throat:  No headache, epistaxis, rhinorrhea hearing loss or sore throat. Pain at back of head.     Neck:  No pain,

## 2017-10-30 NOTE — PROGRESS NOTES
She started taking her medications this morning but continues to be very psychotic delusional paranoid and interacting with internal stimuli. Getting very agitated . Affect-Superficial  Mood -  Agitated and labile  Thought process -  Disorganized showing looseness of association and tangentiality. Reality testing-Impaired  Cognition -  Impaired  Memory- Recent-Impaired                Remote-Impaired  Orientation-Disoriented                                              Insight-Poor  Judgement-Poor    Attempted to challenge his delusions. Attempted to develop insight and improve reality testing  Medications reviewed. Side effects of medications reviewed and medication education provided  No side effects including akathisia,tremers,dystonia or orthostasis reported or observed. Plan: Stabilization with antipsychotic and mood stabilization medications,group therapy and develop  Coping skills,discharge to community. Encouraged to interact with peers  and participate in activities. Lorenso Frankel

## 2017-10-30 NOTE — PLAN OF CARE
Problem: Altered Mood, Depressive Behavior  Goal: LTG-Able to verbalize and/or display a decrease in depressive symptoms  Outcome: Ongoing  Pt did not attend Community meeting/goal setting at 0900 d/t resting in room despite staff invitation to attend.

## 2017-10-31 PROCEDURE — 1240000000 HC EMOTIONAL WELLNESS R&B

## 2017-10-31 PROCEDURE — 6370000000 HC RX 637 (ALT 250 FOR IP): Performed by: PSYCHIATRY & NEUROLOGY

## 2017-10-31 RX ADMIN — TRAZODONE HYDROCHLORIDE 50 MG: 50 TABLET ORAL at 20:27

## 2017-10-31 RX ADMIN — CARBAMAZEPINE 200 MG: 200 TABLET ORAL at 20:27

## 2017-10-31 RX ADMIN — NICOTINE POLACRILEX 2 MG: 2 GUM, CHEWING BUCCAL at 15:07

## 2017-10-31 RX ADMIN — VILAZODONE HYDROCHLORIDE 10 MG: 10 TABLET ORAL at 08:41

## 2017-10-31 RX ADMIN — CARBAMAZEPINE 200 MG: 200 TABLET ORAL at 08:41

## 2017-10-31 RX ADMIN — BREXPIPRAZOLE 4 MG: 4 TABLET ORAL at 08:42

## 2017-10-31 RX ADMIN — CARBAMAZEPINE 200 MG: 200 TABLET ORAL at 15:03

## 2017-10-31 RX ADMIN — ACETAMINOPHEN 650 MG: 325 TABLET, FILM COATED ORAL at 09:25

## 2017-10-31 ASSESSMENT — PAIN SCALES - GENERAL
PAINLEVEL_OUTOF10: 3
PAINLEVEL_OUTOF10: 0

## 2017-10-31 ASSESSMENT — PAIN DESCRIPTION - DESCRIPTORS: DESCRIPTORS: ACHING

## 2017-10-31 ASSESSMENT — PAIN DESCRIPTION - LOCATION: LOCATION: HEAD

## 2017-10-31 NOTE — PLAN OF CARE
Problem: Altered Mood, Depressive Behavior  Goal: LTG-Able to verbalize and/or display a decrease in depressive symptoms  Outcome: Ongoing  Pt did not attend RT group at 1330 d/t resting in room despite staff invitation to attend.

## 2017-10-31 NOTE — PLAN OF CARE
Problem: Altered Mood, Depressive Behavior  Goal: STG-Able to verbalize suicidal ideations  Outcome: Ongoing  Pt denies SI/HI at this time. Pt denies A/V hallucinations at this time. Patient stated he doesn't take any Medication,so wont be needing any. Encouraged to express feelings during 1:1 and as needed. Patient aloof of peers. Pt eating  Well. q15 minute safety checks maintained.

## 2017-10-31 NOTE — BH NOTE
Pt was encouraged to attend evening relaxation group pt declined group at this time. Pt will continue to be encouraged to attend groups.

## 2017-10-31 NOTE — PLAN OF CARE
Problem: Altered Mood, Depressive Behavior  Intervention: Group therapy participation per treatment plan to increase activity level  Patient declined to attend 10:00 am psychotherapy group even when encouraged by clinician.

## 2017-10-31 NOTE — PROGRESS NOTES
She continues to be responding to internal stimuli, isolating self and doesn't have any motivation or insight. Thought process is disorganized and poorly productive. Psychomotor activity is retarded and has no motivation . Affect-Superficial  Mood -  Agitated and labile  Thought process -  Disorganized showing looseness of association and tangentiality. Reality testing-Impaired  Cognition -  Impaired  Memory- Recent-Impaired                Remote-Impaired  Orientation-Disoriented                                              Insight-Poor  Judgement-Poor     Encouraged to participate and interact with peers. Attempted to develop insight. Medications reviewed. Side effects of medications reviewed and medication education provided. No side effects including akathisia,tremers,dystonia or orthostasis reported or observed. Plan: Stabilization with antipsychotic and mood stabilization medications,group therapy and develop coping skills,discharge to community. Encouraged to interact with peers  and participate in activities.

## 2017-10-31 NOTE — PLAN OF CARE
Problem: Altered Mood, Depressive Behavior  Goal: LTG-Able to verbalize and/or display a decrease in depressive symptoms  Outcome: Ongoing  Pt encouraged to explore coping skills for reducing anxiety. Patient need much encouragement, PT ambulate about the unit ,appears to be responding at times,but denies all. Patient MED complaint. 15 MN safety checks maintained.

## 2017-10-31 NOTE — PLAN OF CARE
Problem: Altered Mood, Depressive Behavior  Goal: LTG-Able to verbalize and/or display a decrease in depressive symptoms  Outcome: Ongoing  Therapeutic Recreation Refusal  Pt did not participate in coping skills at 1100 despite staff encouragement.

## 2017-10-31 NOTE — PLAN OF CARE
Problem: Altered Mood, Depressive Behavior  Goal: LTG-Able to verbalize and/or display a decrease in depressive symptoms  Outcome: Ongoing  PSYCHOEDUCATION GROUP NOTE    Date: 10/31/17  Start Time: 0900  End Time: 0915    Number Participants in Group:  9/19     Goal:  Patient will demonstrate increased interpersonal interaction   Topic: Community Meeting     Discipline Responsible:   OT  AT  [facility]. x RT MHP Other       Participation Level:     None  Minimal   x Active Listener x Interactive    Monopolizing         Participation Quality:  x Appropriate  Inappropriate   x       Attentive        Intrusive   x       Sharing        Resistant          Supportive        Lethargic       Affective:   x Congruent  Incongruent  Blunted  Flat    Constricted  Anxious  Elated  Angry    Labile  Depressed  Other         Cognitive:  x Alert x Oriented PPTP     Concentration x G  F  P   Attention Span x G  F  P   Short-Term Memory x G  F  P   Long-Term Memory x G  F  P   ProblemSolving/  Decision Making x G  F  P   Ability to Process  Information x G  F  P      Contributing Factors             Delusional             Hallucinating             Flight of Ideas             Other:       Modes of Intervention:  x Education x Support x Exploration   x Clarifying x Problem Solving  Confrontation    Socialization  Limit Setting x Reality Testing    Activity  Movement  Media    Other:            Response to Learning:   Able to verbalize current knowledge/experience    Able to verbalize/acknowledge new learning    Able to retain information    Capable of insight    Able to change behavior   x Progressing to goal    Other:        Comments:

## 2017-11-01 PROCEDURE — 6370000000 HC RX 637 (ALT 250 FOR IP): Performed by: PSYCHIATRY & NEUROLOGY

## 2017-11-01 PROCEDURE — 1240000000 HC EMOTIONAL WELLNESS R&B

## 2017-11-01 RX ADMIN — CARBAMAZEPINE 200 MG: 200 TABLET ORAL at 15:05

## 2017-11-01 RX ADMIN — BREXPIPRAZOLE 4 MG: 4 TABLET ORAL at 07:58

## 2017-11-01 RX ADMIN — VILAZODONE HYDROCHLORIDE 10 MG: 10 TABLET ORAL at 07:58

## 2017-11-01 RX ADMIN — HALOPERIDOL 10 MG: 5 TABLET ORAL at 20:57

## 2017-11-01 RX ADMIN — TRAZODONE HYDROCHLORIDE 50 MG: 50 TABLET ORAL at 20:57

## 2017-11-01 RX ADMIN — CARBAMAZEPINE 200 MG: 200 TABLET ORAL at 07:58

## 2017-11-01 RX ADMIN — NICOTINE POLACRILEX 2 MG: 2 GUM, CHEWING BUCCAL at 09:45

## 2017-11-01 RX ADMIN — CARBAMAZEPINE 200 MG: 200 TABLET ORAL at 20:57

## 2017-11-01 NOTE — PLAN OF CARE
Problem: Altered Mood, Depressive Behavior  Goal: LTG-Able to verbalize and/or display a decrease in depressive symptoms  Outcome: Ongoing  Pt denies depression at this time. Goal: STG-Able to verbalize suicidal ideations  Outcome: Ongoing  Pt denies suicidal ideations at this time. Goal: LTG-Absence of self-harm  Outcome: Ongoing  Pt denies thoughts of self-harm at this time.

## 2017-11-01 NOTE — PLAN OF CARE
Problem: Altered Mood, Depressive Behavior  Goal: LTG-Able to verbalize and/or display a decrease in depressive symptoms  Outcome: Ongoing  Pt did not attend creative expression skills group at 1330 despite staff invitation to attend.

## 2017-11-01 NOTE — PLAN OF CARE
Problem: Altered Mood, Depressive Behavior  Goal: LTG-Able to verbalize and/or display a decrease in depressive symptoms  Outcome: Ongoing  PSYCHOEDUCATION GROUP NOTE    Date: 11/1/17  Start Time: 0900  End Time: 0920    Number Participants in Group:  8    Goal:  Patient will demonstrate increased interpersonal interaction   Topic: Community meeting and goal setting    Discipline Responsible:   OT  AT  [facility]. x RT MHP Other       Participation Level:     None x Minimal   x Active Listener  Interactive    Monopolizing         Participation Quality:  x Appropriate  Inappropriate   x       Attentive        Intrusive          Sharing        Resistant          Supportive        Lethargic       Affective:    Congruent x Incongruent  Blunted  Flat    Constricted x Anxious  Elated  Angry    Labile  Depressed  Other         Cognitive:  x Alert x Oriented PPTP     Concentration  G x F  P   Attention Span  G x F  P   Short-Term Memory  G  F x P   Long-Term Memory  G x F  P   ProblemSolving/  Decision Making  G  F x P   Ability to Process  Information  G x F  P      Contributing Factors             Delusional             Hallucinating             Flight of Ideas             Other:       Modes of Intervention:  x Education x Support x Exploration    Clarifying  Problem Solving  Confrontation   x Socialization x Limit Setting  Reality Testing   x Activity  Movement  Media    Other:            Response to Learning:  x Able to verbalize current knowledge/experience   x Able to verbalize/acknowledge new learning   x Able to retain information    Capable of insight    Able to change behavior   x Progressing to goal    Other:        Comments: Pt attended group and participated.

## 2017-11-01 NOTE — PROGRESS NOTES
Her thought process is slightly improving as less incoherent and bizarre. Starting to make some sense. Still quite delusional and reality testing is impaired. Withdrawn and isolating self as paranoid around others. Affect-Superficial  Mood -  Agitated and labile  Thought process -  Disorganized showing looseness of association and tangentiality. Reality testing-Impaired  Cognition -  Impaired  Memory- Recent-Impaired                Remote-Impaired  Orientation-Disoriented                                              Insight-Poor  Judgement-Poor                                                Attempted to develop insight. Medications reviewed. Side effects of medications reviewed and medication education provided. Reassurance and support provided with focus on developing No side effects including akathisia,tremers,dystonia or orthostasis reported or observed. Plan: Start injection Midkiff Batman; Stabilization with antipsychotic and mood stabilization medications,group therapy and develop copingl skills,discharge to community. Oma Abreu

## 2017-11-01 NOTE — PLAN OF CARE
Problem: Altered Mood, Depressive Behavior  Goal: LTG-Able to verbalize and/or display a decrease in depressive symptoms  Outcome: Ongoing  Therapeutic Recreation Refusal  Pt did not participate in cognitive skills at 1100 despite staff encouragement.

## 2017-11-02 PROCEDURE — 1240000000 HC EMOTIONAL WELLNESS R&B

## 2017-11-02 PROCEDURE — 6370000000 HC RX 637 (ALT 250 FOR IP): Performed by: PSYCHIATRY & NEUROLOGY

## 2017-11-02 RX ADMIN — VILAZODONE HYDROCHLORIDE 10 MG: 10 TABLET ORAL at 10:28

## 2017-11-02 RX ADMIN — CARBAMAZEPINE 200 MG: 200 TABLET ORAL at 14:57

## 2017-11-02 RX ADMIN — HALOPERIDOL 10 MG: 5 TABLET ORAL at 22:46

## 2017-11-02 RX ADMIN — CARBAMAZEPINE 200 MG: 200 TABLET ORAL at 10:27

## 2017-11-02 RX ADMIN — ACETAMINOPHEN 650 MG: 325 TABLET, FILM COATED ORAL at 15:10

## 2017-11-02 RX ADMIN — BREXPIPRAZOLE 4 MG: 4 TABLET ORAL at 10:28

## 2017-11-02 RX ADMIN — TRAZODONE HYDROCHLORIDE 50 MG: 50 TABLET ORAL at 22:46

## 2017-11-02 RX ADMIN — CARBAMAZEPINE 200 MG: 200 TABLET ORAL at 22:46

## 2017-11-02 ASSESSMENT — PAIN SCALES - GENERAL
PAINLEVEL_OUTOF10: 3
PAINLEVEL_OUTOF10: 2
PAINLEVEL_OUTOF10: 0

## 2017-11-02 NOTE — BH NOTE
Pt loud with staff and slamming doors in room. Pt also popped open bag of popcorn on way to room and threw is all over floor. Pt given PRN Hadol 10mg oral for behaviors.

## 2017-11-02 NOTE — PLAN OF CARE
Problem: Altered Mood, Depressive Behavior  Goal: LTG-Able to verbalize and/or display a decrease in depressive symptoms  Outcome: Ongoing  Pt did not attend health education group at 1100 despite staff invitation to attend.

## 2017-11-02 NOTE — PROGRESS NOTES
She is gradually and slowly improving. She is still quite withdrawn, seclusive, and reality testing is impaired. Still delusional and hallucinating. Affect-Superficial  Mood -  labile  Thought process -  Disorganized showing looseness of association and circumstantiality. Reality testing-Impaired  Cognition -  Improving  Memory- Recent-Intact                Remote-Intact  Orientation-OK                                            Insight-Poor  Judgement-Poor   Attempted to challenge his delusions. Attempted to develop insight. Medications reviewed. Side effects of medications reviewed and medication education provided  . No side effects including akathisia,tremers,dystonia or orthostasis reported or observed. Plan: Stabilization with antipsychotic and mood stabilization medications,group therapy and develop coping skills,discharge to community. Encouraged to interact with peers  and participate in activities.

## 2017-11-02 NOTE — PLAN OF CARE
Problem: Altered Mood, Depressive Behavior  Goal: LTG-Able to verbalize and/or display a decrease in depressive symptoms  Outcome: Ongoing  Pt did not attend physical health and yoga principles group at [phone] despite staff invitation to attend.

## 2017-11-02 NOTE — PLAN OF CARE
Problem: Altered Mood, Depressive Behavior  Goal: LTG-Absence of self-harm  Outcome: Ongoing  Pt denies SI/HI at this time. Pt denies any hallucinations , but appears responding to internal stimuli at times. Pt thoughts blocked and mood is labile, pt easily irritated upon approach or with increase conversation. Encouraged to express feelings during 1:1 and as needed. Pt is med compliant. Pt eating appropriately. Instructed and encouraged to attend to ADLs, as pt exhibits poor hygiene . q15 minute safety checks maintained.

## 2017-11-02 NOTE — PLAN OF CARE
Problem: Altered Mood, Depressive Behavior  Goal: LTG-Absence of self-harm  Pt has had no self harm whilst in care of [facility] stall this shift    Problem: Pain:  Goal: Control of acute pain  Control of acute pain   Pt has had no c/o pain this shift.

## 2017-11-02 NOTE — PLAN OF CARE
Problem: Altered Mood, Depressive Behavior  Intervention: Group therapy participation per treatment plan to increase activity level  Pt declined to attend psychotherapy at 1000 am despite encouragement.   Pt offered 1:1.

## 2017-11-03 PROCEDURE — 6370000000 HC RX 637 (ALT 250 FOR IP): Performed by: PSYCHIATRY & NEUROLOGY

## 2017-11-03 PROCEDURE — 1240000000 HC EMOTIONAL WELLNESS R&B

## 2017-11-03 RX ADMIN — CARBAMAZEPINE 200 MG: 200 TABLET ORAL at 08:36

## 2017-11-03 RX ADMIN — ACETAMINOPHEN 650 MG: 325 TABLET, FILM COATED ORAL at 08:32

## 2017-11-03 RX ADMIN — VILAZODONE HYDROCHLORIDE 10 MG: 10 TABLET ORAL at 08:36

## 2017-11-03 RX ADMIN — BREXPIPRAZOLE 4 MG: 4 TABLET ORAL at 08:36

## 2017-11-03 RX ADMIN — NICOTINE POLACRILEX 2 MG: 2 GUM, CHEWING BUCCAL at 15:19

## 2017-11-03 RX ADMIN — CARBAMAZEPINE 200 MG: 200 TABLET ORAL at 15:19

## 2017-11-03 ASSESSMENT — PAIN DESCRIPTION - LOCATION: LOCATION: BACK

## 2017-11-03 ASSESSMENT — PAIN SCALES - GENERAL
PAINLEVEL_OUTOF10: 2
PAINLEVEL_OUTOF10: 5

## 2017-11-03 ASSESSMENT — PAIN DESCRIPTION - PAIN TYPE: TYPE: ACUTE PAIN

## 2017-11-03 NOTE — PLAN OF CARE
Problem: Altered Mood, Depressive Behavior  Goal: LTG-Able to verbalize and/or display a decrease in depressive symptoms  Outcome: Ongoing  PSYCHOEDUCATION GROUP NOTE    Date: 11/3/17  Start Time: 1345  End Time: 1415    Number Participants in Group:  7    Goal:  Patient will demonstrate increased interpersonal interaction   Topic: Music therapy     Discipline Responsible:   OT  AT  [facility]. x RT MHP Other       Participation Level:     None  Minimal    Active Listener x Interactive    Monopolizing         Participation Quality:  x Appropriate  Inappropriate          Attentive        Intrusive          Sharing        Resistant          Supportive        Lethargic       Affective:    Congruent  Incongruent  Blunted  Flat    Constricted x Anxious  Elated  Angry    Labile  Depressed  Other         Cognitive:  x Alert  Oriented PPTP     Concentration  G  F x P   Attention Span  G  F x P   Short-Term Memory  G  F x P   Long-Term Memory  G x F  P   ProblemSolving/  Decision Making  G  F x P   Ability to Process  Information  G x F  P      Contributing Factors             Delusional             Hallucinating             Flight of Ideas             Other:       Modes of Intervention:   Education x Support x Exploration    Clarifying  Problem Solving  Confrontation   x Socialization  Limit Setting x Reality Testing   x Activity  Movement x Media    Other:            Response to Learning:  x Able to verbalize current knowledge/experience    Able to verbalize/acknowledge new learning    Able to retain information    Capable of insight    Able to change behavior   x Progressing to goal    Other:        Comments: Pt attended group but chose to only observe. Pt was in behavioral control and had no disruptions during group.

## 2017-11-03 NOTE — PLAN OF CARE
Problem: Altered Mood, Depressive Behavior  Goal: LTG-Able to verbalize and/or display a decrease in depressive symptoms  Outcome: Ongoing  Pt denies depression but was seclusive to room most of the evening and wouldn't even take blanket off the head when writer attempted to have 1 to 1 with pt. Goal: STG-Able to verbalize suicidal ideations  Outcome: Ongoing  Pt denies any suicidal ideation at this time. Goal: LTG-Absence of self-harm  Outcome: Ongoing  Pt is free of self harm at this time. Pt agrees to seek out staff if thoughts to harm self arise. Staff will provide support and reassurance as needed. Safety checks maintained every 15 minutes.

## 2017-11-03 NOTE — PLAN OF CARE
care no     EDUCATION:   Learner Progress Toward Treatment Goals: Reviewed results and recommendations of this team    Method: Small group    Outcome: Verbalized understanding    PATIENT GOALS: to take medications and keep on medications and long term is to find own housing with [employer]     PLAN/TREATMENT RECOMMENDATIONS UPDATE:  Follow all treatment team recommendations and attend all groups     SHORT-TERM GOALS UPDATE:  Time frame for Short-Term Goals: 5-7 days     LONG-TERM GOALS UPDATE:  Time frame for Long-Term Goals: 6 months   Members Present in Team Meeting: See Signature Sheet    [id number], [place]  Goal: STG-Able to verbalize suicidal ideations  Outcome: Ongoing    Goal: LTG-Absence of self-harm  Outcome: Ongoing

## 2017-11-04 PROCEDURE — 1240000000 HC EMOTIONAL WELLNESS R&B

## 2017-11-04 PROCEDURE — 6370000000 HC RX 637 (ALT 250 FOR IP): Performed by: PSYCHIATRY & NEUROLOGY

## 2017-11-04 RX ADMIN — TRAZODONE HYDROCHLORIDE 50 MG: 50 TABLET ORAL at 00:02

## 2017-11-04 RX ADMIN — HALOPERIDOL 10 MG: 5 TABLET ORAL at 21:44

## 2017-11-04 RX ADMIN — CARBAMAZEPINE 200 MG: 200 TABLET ORAL at 00:02

## 2017-11-04 RX ADMIN — TRAZODONE HYDROCHLORIDE 50 MG: 50 TABLET ORAL at 21:44

## 2017-11-04 RX ADMIN — NICOTINE POLACRILEX 2 MG: 2 GUM, CHEWING BUCCAL at 17:04

## 2017-11-04 RX ADMIN — HALOPERIDOL 10 MG: 5 TABLET ORAL at 11:33

## 2017-11-04 RX ADMIN — VILAZODONE HYDROCHLORIDE 10 MG: 10 TABLET ORAL at 11:32

## 2017-11-04 RX ADMIN — CARBAMAZEPINE 200 MG: 200 TABLET ORAL at 08:23

## 2017-11-04 RX ADMIN — BREXPIPRAZOLE 6 MG: 3 TABLET ORAL at 10:50

## 2017-11-04 RX ADMIN — NICOTINE POLACRILEX 2 MG: 2 GUM, CHEWING BUCCAL at 08:23

## 2017-11-04 RX ADMIN — CARBAMAZEPINE 200 MG: 200 TABLET ORAL at 21:44

## 2017-11-04 NOTE — PLAN OF CARE
Problem: Altered Mood, Depressive Behavior  Goal: LTG-Able to verbalize and/or display a decrease in depressive symptoms  Outcome: Ongoing  Pt is out on the unit in intervals remaining calm much of the morning, then became agitated and yelling shortly after 1100, see progress note. Pt stated she slept well and is feeling ok, pt denies hallucinations but is responding to internal stimuli. Pt attended select groups for brief intervals. Pt is selectively social with staff, but aloof of peers. Pts appetite is good and pt took all medications as prescribed. Patient denies any current thoughts to harm herself or others. After patient calmed down she stated she is sorry for cussing the way she did and towels to assist staff in cleaning up the beverage she spilled in the dayroom. Goal: STG-Able to verbalize suicidal ideations  Outcome: Ongoing  Pt denies any thoughts to harm self.

## 2017-11-04 NOTE — PLAN OF CARE
Problem: Altered Mood, Depressive Behavior  Goal: STG-Able to verbalize suicidal ideations  Outcome: Ongoing  YONY refused talk time with nurse  Goal: LTG-Absence of self-harm  Outcome: Ongoing  Pt refused 1x1 time with nurse only allowing basic physical assessment.

## 2017-11-04 NOTE — BH NOTE
Pt was encouraged to attend evening wrap up group with staff encouragement. Pt declined to attend. Pt will continue to be encouraged to attend groups.

## 2017-11-04 NOTE — PLAN OF CARE
Problem: Altered Mood, Depressive Behavior  Goal: LTG-Able to verbalize and/or display a decrease in depressive symptoms  Outcome: Ongoing                                                   PSYCHOEDUCATION GROUP NOTE       Date:   11/04/17               Start Time: 10:00 am                     End Time: 10:55 am      Number Participants in Group: 5/18      Name of group: Psychoeducation-Dealing with Conflicts        RT  SW  Nsg  LPN   BHTII     X Other       Participation Level:     None  Minimal   x Active Listener  Interactive    Monopolizing         Participation Quality:  x Appropriate  Inappropriate   x  Attentive   Intrusive   x  Sharing   Resistant     Supportive    Lethargic       Affective:   x Congruent  Incongruent  Blunted  Flat    Constricted  Anxious  Elated  Angry    Labile  Depressed  Other         Cognitive:  x Alert  Oriented PPTS     Concentration G x F  P    Attention Span G x F  P    Short-Term Memory G x F  P    Long-Term Memory G x F  P    ProblemSolving/  Decision Making G x F  P    Ability to Process  Information G x F  P       Contributing Factors             Delusional             Hallucinating             Flight of Ideas             Other: poor concentration       Modes of Intervention:  x Education x Support  Exploration    Clarifying x Problem Solving  Confrontation    Socialization  Limit Setting  Reality Testing    Activity  Movement  Media    Other:          Response to Learning:  x Able to verbalize current knowledge/experience   x Able to verbalize/acknowledge new learning   x Able to retain information   x Capable of insight   x Able to change behavior   x Progressing to goal    Other:        Comments: PT was attentive and engaged.

## 2017-11-05 PROCEDURE — 6360000002 HC RX W HCPCS: Performed by: PSYCHIATRY & NEUROLOGY

## 2017-11-05 PROCEDURE — 1240000000 HC EMOTIONAL WELLNESS R&B

## 2017-11-05 PROCEDURE — 6370000000 HC RX 637 (ALT 250 FOR IP): Performed by: PSYCHIATRY & NEUROLOGY

## 2017-11-05 RX ADMIN — BREXPIPRAZOLE 6 MG: 3 TABLET ORAL at 08:47

## 2017-11-05 RX ADMIN — CARBAMAZEPINE 200 MG: 200 TABLET ORAL at 08:47

## 2017-11-05 RX ADMIN — VILAZODONE HYDROCHLORIDE 10 MG: 10 TABLET ORAL at 08:48

## 2017-11-05 RX ADMIN — NICOTINE POLACRILEX 2 MG: 2 GUM, CHEWING BUCCAL at 08:49

## 2017-11-05 RX ADMIN — CARBAMAZEPINE 200 MG: 200 TABLET ORAL at 13:51

## 2017-11-05 RX ADMIN — NICOTINE POLACRILEX 2 MG: 2 GUM, CHEWING BUCCAL at 13:51

## 2017-11-05 RX ADMIN — HALOPERIDOL LACTATE 10 MG: 5 INJECTION, SOLUTION INTRAMUSCULAR at 15:38

## 2017-11-05 RX ADMIN — HALOPERIDOL 10 MG: 5 TABLET ORAL at 08:47

## 2017-11-05 RX ADMIN — ACETAMINOPHEN 650 MG: 325 TABLET, FILM COATED ORAL at 12:37

## 2017-11-05 RX ADMIN — ACETAMINOPHEN 650 MG: 325 TABLET, FILM COATED ORAL at 17:38

## 2017-11-05 ASSESSMENT — PAIN SCALES - GENERAL
PAINLEVEL_OUTOF10: 4
PAINLEVEL_OUTOF10: 0
PAINLEVEL_OUTOF10: 0
PAINLEVEL_OUTOF10: 3

## 2017-11-05 NOTE — BH NOTE
Pt shows signs of agitation as evidence by slamming doors, demanding food and other items. Pt stated that she wanted something to help keep her calm. Pt was given Haldol 10mg orally. Pt was accepting of medication.

## 2017-11-05 NOTE — BH NOTE
Pt out in the dayroom yelling, threatening to murder writer for giving her the PRN injection. States she will \"smash your head in\".

## 2017-11-05 NOTE — PLAN OF CARE
Problem: Altered Mood, Depressive Behavior  Goal: LTG-Able to verbalize and/or display a decrease in depressive symptoms  Outcome: Ongoing                                                   PSYCHOEDUCATION GROUP NOTE       Date:    11/05/17              Start Time:  10:00 am              End Time: 11:00 am      Number Participants in Group: 5/20      Name of group: Psychoeducation-Defense Mechanisms        RT  SW  Nsg  LPN   BHTII    X Other       Participation Level:     None x Minimal    Active Listener  Interactive    Monopolizing         Participation Quality:  x Appropriate  Inappropriate   x  Attentive   Intrusive     Sharing   Resistant     Supportive    Lethargic       Affective:    Congruent  Incongruent x Blunted  Flat    Constricted  Anxious  Elated  Angry    Labile  Depressed  Other         Cognitive:   Alert  Oriented PPTS     Concentration G  F x P    Attention Span G  F x P    Short-Term Memory G  F x P    Long-Term Memory G  F x P    ProblemSolving/  Decision Making G  F x P    Ability to Process  Information G  F x P       Contributing Factors             Delusional             Hallucinating             Flight of Ideas             Other: poor concentration       Modes of Intervention:  x Education x Support  Exploration    Clarifying x Problem Solving  Confrontation    Socialization  Limit Setting  Reality Testing    Activity  Movement  Media    Other:          Response to Learning:  x Able to verbalize current knowledge/experience    Able to verbalize/acknowledge new learning   x Able to retain information   x Capable of insight    Able to change behavior    Progressing to goal    Other:        Comments: PT participated and left group early.

## 2017-11-05 NOTE — PROGRESS NOTES
Her thought process is slightly improving as less incoherent and bizarre. Starting to make some sense. Still quite delusional and reality testing is impaired. Withdrawn and isolating self as paranoid around others. Affect-Superficial  Mood -  Agitated and labile  Thought process -  Disorganized showing looseness of association and tangentiality. Reality testing-Impaired  Cognition -  Impaired  Memory- Recent-Impaired                Remote-Impaired  Orientation-Disoriented                                              Insight-Poor  Judgement-Poor                                                Attempted to develop insight. Medications reviewed. Side effects of medications reviewed and medication education provided. Reassurance and support provided with focus on developing No side effects including akathisia,tremers,dystonia or orthostasis reported or observed. Plan: Stabilization with antipsychotic and mood stabilization medications,group therapy and develop copingl skills,discharge to community. Chelsy Villarreal

## 2017-11-05 NOTE — PLAN OF CARE
Problem: Altered Mood, Depressive Behavior  Goal: LTG-Able to verbalize and/or display a decrease in depressive symptoms  Outcome: Ongoing  Psychoeducation Group Note    Date: 11/5/2017  Start Time: 0900  End Time: 0920    Number Participants in Group:  6    Goal:  Patient will demonstrate increased interpersonal interaction   Topic: Community meeting/ goals group    Discipline Responsible:   OT  AT  [facility]. x RT  Other       Participation Level:     None  Minimal   x Active Listener x Interactive    Monopolizing         Participation Quality:  x Appropriate  Inappropriate   x       Attentive        Intrusive   x       Sharing        Resistant          Supportive        Lethargic       Affective:    Congruent  Incongruent x Blunted  Flat    Constricted  Anxious  Elated  Angry    Labile  Depressed  Other         Cognitive:  x Alert x Oriented PPTP     Concentration  G x F  P   Attention Span  G x F  P   Short-Term Memory  G x F  P   Long-Term Memory  G x F  P   ProblemSolving/  Decision Making  G x F  P   Ability to Process  Information  G x F  P      Contributing Factors             Delusional             Hallucinating             Flight of Ideas             Other:       Modes of Intervention:  x Education x Support x Exploration   x Clarifying x Problem Solving  Confrontation   x Socialization  Limit Setting x Reality Testing   x Activity  Movement  Media    Other:            Response to Learning:  x Able to verbalize current knowledge/experience   x Able to verbalize/acknowledge new learning   x Able to retain information    Capable of insight    Able to change behavior   x Progressing to goal    Other:        Comments:

## 2017-11-05 NOTE — BH NOTE
Patient agitated AEB yelling  And cursing in room and slamming doors in room. Attempted talk time with patient. Not accepting. Attempted quiet room not accepting. Patient received Haldol IM prn 10mg. Patient accepting of shot. Remains in room.  Will continue to monitor

## 2017-11-06 PROCEDURE — 6370000000 HC RX 637 (ALT 250 FOR IP): Performed by: PSYCHIATRY & NEUROLOGY

## 2017-11-06 PROCEDURE — 1240000000 HC EMOTIONAL WELLNESS R&B

## 2017-11-06 RX ADMIN — CARBAMAZEPINE 200 MG: 200 TABLET ORAL at 13:12

## 2017-11-06 RX ADMIN — HALOPERIDOL 10 MG: 5 TABLET ORAL at 08:04

## 2017-11-06 RX ADMIN — BREXPIPRAZOLE 6 MG: 3 TABLET ORAL at 08:04

## 2017-11-06 RX ADMIN — CARBAMAZEPINE 200 MG: 200 TABLET ORAL at 08:03

## 2017-11-06 RX ADMIN — NICOTINE POLACRILEX 2 MG: 2 GUM, CHEWING BUCCAL at 17:56

## 2017-11-06 RX ADMIN — ACETAMINOPHEN 650 MG: 325 TABLET, FILM COATED ORAL at 15:30

## 2017-11-06 RX ADMIN — NICOTINE POLACRILEX 2 MG: 2 GUM, CHEWING BUCCAL at 15:30

## 2017-11-06 RX ADMIN — VILAZODONE HYDROCHLORIDE 10 MG: 10 TABLET ORAL at 08:04

## 2017-11-06 ASSESSMENT — PAIN DESCRIPTION - PAIN TYPE
TYPE: ACUTE PAIN
TYPE: ACUTE PAIN

## 2017-11-06 ASSESSMENT — PAIN DESCRIPTION - ORIENTATION: ORIENTATION: LEFT

## 2017-11-06 ASSESSMENT — PAIN SCALES - GENERAL
PAINLEVEL_OUTOF10: 10
PAINLEVEL_OUTOF10: 2
PAINLEVEL_OUTOF10: 5
PAINLEVEL_OUTOF10: 5

## 2017-11-06 ASSESSMENT — PAIN DESCRIPTION - LOCATION
LOCATION: BACK
LOCATION: ARM
LOCATION: BACK

## 2017-11-06 NOTE — PLAN OF CARE
Problem: Altered Mood, Depressive Behavior  Goal: LTG-Able to verbalize and/or display a decrease in depressive symptoms  Outcome: Ongoing  Psychoeducation Group Note    Date: 11/6/17  Start Time: 0900  End Time: 1133    Number Participants in Group:  5     Goal:  Patient will demonstrate increased interpersonal interaction. Topic:  Community meeting/goals    Discipline Responsible:   OT  AT  [facility]. X RT  Other       Participation Level:     None x Minimal    Active Listener  Interactive    Monopolizing         Participation Quality:   Appropriate  Inappropriate   x       Attentive        Intrusive          Sharing        Resistant          Supportive        Lethargic       Affective:   x Congruent  Incongruent  Blunted  Flat    Constricted  Anxious  Elated  Angry    Labile  Depressed  Other  Bright       Cognitive:  x Alert  Oriented PPTP     Concentration  G x F  P   Attention Span  G x F  P   Short-Term Memory  G  F  P   Long-Term Memory  G  F  P   ProblemSolving/  Decision Making  G  F x P   Ability to Process  Information  G x F  P      Contributing Factors             Delusional             Hallucinating             Flight of Ideas             Other:       Modes of Intervention:   Education x Support  Exploration   x Clarifying x Problem Solving x Confrontation   x Socialization  Limit Setting  Reality Testing    Activity  Movement  Media    Other:            Response to Learning:  x Able to verbalize current knowledge/experience    Able to verbalize/acknowledge new learning    Able to retain information    Capable of insight    Able to change behavior   x Progressing to goal    Other:        Comments: pt attended group and participated.

## 2017-11-06 NOTE — PROGRESS NOTES
Patient is still very bizarre and combative, threatening staff,  psychotic, agitated, paranoid, and guarded. Overwhelmed with persecutory delusions and . Unable to make any rational plans . Affect-Superficial  Mood -  Agitated and labile  Thought process -  Disorganized showing looseness of association and tangentiality. Reality testing-Impaired  Cognition -  Impaired  Memory- Recent-Impaired                Remote-Impaired  Orientation-Disoriented                                              Insight-Poor  Judgement-Poor                                                Attempted to develop insight. Medications reviewed. Side effects of medications reviewed and medication education provided. No side effects including akathisia,tremers,dystonia or orthostasis reported or observed. Plan: Stabilization with antipsychotic and mood stabilization medications,group therapy and develop coping skills,discharge to community. Fer Snyder

## 2017-11-07 VITALS
DIASTOLIC BLOOD PRESSURE: 49 MMHG | WEIGHT: 124 LBS | TEMPERATURE: 97.9 F | BODY MASS INDEX: 19.93 KG/M2 | HEIGHT: 66 IN | RESPIRATION RATE: 16 BRPM | OXYGEN SATURATION: 96 % | HEART RATE: 71 BPM | SYSTOLIC BLOOD PRESSURE: 89 MMHG

## 2017-11-07 PROCEDURE — 6370000000 HC RX 637 (ALT 250 FOR IP): Performed by: PSYCHIATRY & NEUROLOGY

## 2017-11-07 PROCEDURE — 5130000000 HC BRIDGE APPOINTMENT: Performed by: COUNSELOR

## 2017-11-07 RX ORDER — CARBAMAZEPINE 200 MG/1
200 TABLET ORAL 3 TIMES DAILY
Qty: 90 TABLET | Refills: 0 | Status: ON HOLD | OUTPATIENT
Start: 2017-11-07 | End: 2018-03-29 | Stop reason: HOSPADM

## 2017-11-07 RX ORDER — VILAZODONE HYDROCHLORIDE 10 MG/1
10 TABLET ORAL DAILY
Qty: 30 TABLET | Refills: 0 | Status: ON HOLD | OUTPATIENT
Start: 2017-11-08 | End: 2018-03-29 | Stop reason: HOSPADM

## 2017-11-07 RX ORDER — TRAZODONE HYDROCHLORIDE 50 MG/1
50 TABLET ORAL NIGHTLY PRN
Qty: 30 TABLET | Refills: 0 | Status: ON HOLD | OUTPATIENT
Start: 2017-11-07 | End: 2018-03-29 | Stop reason: HOSPADM

## 2017-11-07 RX ADMIN — NICOTINE POLACRILEX 2 MG: 2 GUM, CHEWING BUCCAL at 11:11

## 2017-11-07 RX ADMIN — VILAZODONE HYDROCHLORIDE 10 MG: 10 TABLET ORAL at 08:03

## 2017-11-07 RX ADMIN — NICOTINE POLACRILEX 2 MG: 2 GUM, CHEWING BUCCAL at 08:02

## 2017-11-07 RX ADMIN — BREXPIPRAZOLE 6 MG: 3 TABLET ORAL at 08:02

## 2017-11-07 RX ADMIN — CARBAMAZEPINE 200 MG: 200 TABLET ORAL at 01:27

## 2017-11-07 RX ADMIN — CARBAMAZEPINE 200 MG: 200 TABLET ORAL at 08:02

## 2017-11-07 NOTE — CARE COORDINATION
Patient signed release for sister to see if she can go back there to her residence. Phone call to [facility] team Wili Da Silva to let her know patient is possibly being discharged tomorrow and sister needs to be contacted. Clinician called fabiana SHELBY and Fatimah Brady has for patient's sister and number keeps ringing as busy and there is no voice mail options. Jann Cope    Contact 1: Number [phone]     Patient will be discharged to sister's home on [facility] as she wishes.

## 2017-11-07 NOTE — PLAN OF CARE
Problem: Altered Mood, Depressive Behavior  Goal: LTG-Able to verbalize and/or display a decrease in depressive symptoms  Outcome: Ongoing  Patient denies feelings of depression, reports feelings of anxiety. Patient denies SI stating she doesn't hurt herself, denies HI stating \"thats not nice. \" Patient also denies AVH. Patient encouraged to seek staff if this changes while in patient. Patient reports wanting to be discharge and states she might be going to a group home, which she is okay with because she use to be in a group home in [place]. Goal: STG-Able to verbalize suicidal ideations  Outcome: Ongoing  Patient denies Suicidal ideations. Patient contracts for safety while on unit. Goal: LTG-Absence of self-harm  Outcome: Ongoing  Patient remains free from self harm thus far this shift. Patient states she doesn't hurt herself.

## 2017-11-07 NOTE — CARE COORDINATION
Bridge Appointment completed: Reviewed Discharge Instructions with patient. Patient verbalizes understanding and agreement with the discharge plan using the teachback method.        Discharge Arrangements: to sisters and follow up with [facility] PACT team     Guardian notified: no but PACT  Marvin Fernandez was left a vm today about discharge of patient   Discharge destination/address:   [address]   [facility] [address]       Transported by:  [facility]

## 2017-11-07 NOTE — PLAN OF CARE
Problem: Altered Mood, Depressive Behavior  Goal: STG-Able to verbalize suicidal ideations  Outcome: Ongoing  Pt did not attend community meeting and goal setting group at [phone] despite staff invitation to attend.

## 2017-11-07 NOTE — PLAN OF CARE
Problem: Altered Mood, Depressive Behavior  Goal: STG-Able to verbalize suicidal ideations  Outcome: Ongoing  Patient denies any thoughts of suicide, homicide or self harm. Patient states that she is ready for discharge and agrees to return to group home. Med compliant and behavior is controlled.

## 2017-11-07 NOTE — CARE COORDINATION
Name: Crow Holley    : 1980    Discharge Date: 2017    Primary Auth/Cert #: [id number]    Discharge Medications:      Medication List      START taking these medications    nicotine polacrilex 2 MG gum  Commonly known as:  NICORETTE  Take 1 each by mouth as needed for Smoking cessation  Notes to patient:  Smoking cessation        CHANGE how you take these medications    brexpiprazole 3 MG Tabs tablet  Commonly known as:  REXULTI  Take 2 tablets by mouth daily  Start taking on:  2017  What changed:  · medication strength  · how much to take  Notes to patient:  Used to improve mood and clear thoughts        CONTINUE taking these medications    ARIPiprazole  MG Susr  Commonly known as:  ABILIFY MAINTENA  Inject 400 mg into the muscle every 28 days  Notes to patient:  Used to improve mood and clear thoughts     carBAMazepine 200 MG tablet  Commonly known as:  TEGRETOL  Take 1 tablet by mouth 3 times daily  Notes to patient:  Used to help prevent seizures     traZODone 50 MG tablet  Commonly known as:  DESYREL  Take 1 tablet by mouth nightly as needed for Sleep  Notes to patient:  For sleep     vilazodone HCl 10 MG Tabs  Commonly known as:  VIIBRYD  Take 1 tablet by mouth daily  Start taking on:  2017  Notes to patient:  Used to improve mood           Where to Get Your Medications      You can get these medications from any pharmacy    Bring a paper prescription for each of these medications  · brexpiprazole 3 MG Tabs tablet  · carBAMazepine 200 MG tablet  · traZODone 50 MG tablet  · vilazodone HCl 10 MG Tabs     Information about where to get these medications is not yet available    Ask your nurse or doctor about these medications  · nicotine polacrilex 2 MG gum         Follow Up Appointment: [place] [phone]  On 2017  @ 140 PM with Dr Alisha Michelle for medication continuation

## 2017-11-07 NOTE — PROGRESS NOTES
Therapeutic Recreation Refusal  Pt did not participate in creative expressions at 1000 despite staff encouragement.

## 2017-11-07 NOTE — BH NOTE
[facility]  Discharge Note    Pt discharged with followings belongings:   Dentures: None  Vision - Corrective Lenses: None  Hearing Aid: None  Jewelry: None  Body Piercings Removed: N/A  Clothing: Dress, Sweater, Pants, Socks, Undergarments (Comment)  Were All Patient Medications Collected?: Not Applicable  Other Valuables: None   Valuables sent home with patient. Valuables retrieved from safe, Security envelope number: N/A and returned to patient. Patient left department with Departure Mode: By self, In cab via Mobility at Departure: Ambulatory, discharged to Discharged to: Group Home. Patient education on aftercare instructions: Yes  Information faxed to [facility] by LPN Patient verbalize understanding of AVS: Yes. Status EXAM upon discharge:  Status and Exam  Normal: No  Facial Expression: Avoids Gaze, Flat  Affect: Blunt  Level of Consciousness: Alert  Mood:Normal: No  Mood: Anxious  Motor Activity:Normal: No  Motor Activity: Decreased  Interview Behavior: Cooperative  Preception: [place] to Person, [place]i Founds to Time, [place] to Place, [place] to Situation  Attention:Normal: No  Attention: Distractible, Unable to Concentrate  Thought Processes: Circumstantial  Thought Content:Normal: No  Thought Content: Poverty of Content, Preoccupations  Hallucinations:  Other (Comment) (denies but appears to be responding to internal stimuli)  Delusions: No  Delusions: Persecution  Memory:Normal: No  Memory: Poor Recent, Poor Remote  Insight and Judgment: No  Insight and Judgment: Poor Judgment, Poor Insight  Present Suicidal Ideation: No  Present Homicidal Ideation: No    Kee Parker LPN

## 2017-11-18 ENCOUNTER — HOSPITAL ENCOUNTER (EMERGENCY)
Age: 37
Discharge: HOME | End: 2017-11-18
Attending: EMERGENCY MEDICINE
Payer: MEDICAID

## 2017-11-18 VITALS
TEMPERATURE: 97.5 F | HEART RATE: 90 BPM | SYSTOLIC BLOOD PRESSURE: 105 MMHG | WEIGHT: 124 LBS | RESPIRATION RATE: 16 BRPM | OXYGEN SATURATION: 98 % | DIASTOLIC BLOOD PRESSURE: 68 MMHG | BODY MASS INDEX: 20.01 KG/M2

## 2017-11-18 DIAGNOSIS — F20.9 SCHIZOPHRENIA, UNSPECIFIED TYPE (HCC): Primary | ICD-10-CM

## 2017-11-18 LAB
-: ABNORMAL
AMORPHOUS: ABNORMAL
AMPHETAMINE SCREEN URINE: NEGATIVE
BACTERIA: ABNORMAL
BARBITURATE SCREEN URINE: NEGATIVE
BENZODIAZEPINE SCREEN, URINE: NEGATIVE
BILIRUBIN URINE: ABNORMAL
BUPRENORPHINE URINE: NORMAL
CANNABINOID SCREEN URINE: NEGATIVE
CASTS UA: ABNORMAL /LPF
COCAINE METABOLITE, URINE: NEGATIVE
COLOR: YELLOW
COMMENT UA: ABNORMAL
CRYSTALS, UA: ABNORMAL /HPF
EPITHELIAL CELLS UA: ABNORMAL /HPF
GLUCOSE URINE: NEGATIVE
HCG(URINE) PREGNANCY TEST: NEGATIVE
KETONES, URINE: NEGATIVE
LEUKOCYTE ESTERASE, URINE: NEGATIVE
MDMA URINE: NORMAL
METHADONE SCREEN, URINE: NEGATIVE
METHAMPHETAMINE, URINE: NORMAL
MUCUS: ABNORMAL
NITRITE, URINE: NEGATIVE
OPIATES, URINE: NEGATIVE
OTHER OBSERVATIONS UA: ABNORMAL
OXYCODONE SCREEN URINE: NEGATIVE
PH UA: 6 (ref 5–8)
PHENCYCLIDINE, URINE: NEGATIVE
PROPOXYPHENE, URINE: NORMAL
PROTEIN UA: ABNORMAL
RBC UA: ABNORMAL /HPF
RENAL EPITHELIAL, UA: ABNORMAL /HPF
SPECIFIC GRAVITY UA: 1.03 (ref 1–1.03)
TEST INFORMATION: NORMAL
TRICHOMONAS: ABNORMAL
TRICYCLIC ANTIDEPRESSANTS, UR: NORMAL
TURBIDITY: ABNORMAL
URINE HGB: NEGATIVE
UROBILINOGEN, URINE: NORMAL
WBC UA: ABNORMAL /HPF
YEAST: ABNORMAL

## 2017-11-18 PROCEDURE — 80307 DRUG TEST PRSMV CHEM ANLYZR: CPT

## 2017-11-18 PROCEDURE — 81001 URINALYSIS AUTO W/SCOPE: CPT

## 2017-11-18 PROCEDURE — 84703 CHORIONIC GONADOTROPIN ASSAY: CPT

## 2017-11-18 PROCEDURE — 99285 EMERGENCY DEPT VISIT HI MDM: CPT

## 2017-11-18 ASSESSMENT — ENCOUNTER SYMPTOMS
DIARRHEA: 0
COUGH: 0
SHORTNESS OF BREATH: 0
RHINORRHEA: 0
SORE THROAT: 0
CHEST TIGHTNESS: 0
SINUS PRESSURE: 0
FACIAL SWELLING: 0
EYE DISCHARGE: 0
ABDOMINAL PAIN: 0
TROUBLE SWALLOWING: 0
NAUSEA: 0
BACK PAIN: 0
CONSTIPATION: 0
COLOR CHANGE: 0
BLOOD IN STOOL: 0
VOMITING: 0
EYE REDNESS: 0
EYE PAIN: 0
WHEEZING: 0

## 2017-11-18 NOTE — FLOWSHEET NOTE
11/18/17 1152   Encounter Summary   Services provided to: Patient   Referral/Consult From: Nurse   Continue Visiting (11/18/17)   Complexity of Encounter Low   Length of Encounter 15 minutes   Spiritual Assessment Completed Yes   Routine   Type Initial   Assessment Approachable  (confussed )   Intervention Active listening;Prayer;Sustaining presence/ Ministry of presence   Outcome Expressed gratitude;Engaged in conversation

## 2017-11-18 NOTE — ED PROVIDER NOTES
[address] ED  eMERGENCY dEPARTMENT eNCOUnter      Pt Name: Stephanie Monet  MRN: [id number]  [place] 1980  Date of evaluation: 11/18/17      CHIEF COMPLAINT       Chief Complaint   Patient presents with    Other     off medication         HISTORY OF PRESENT ILLNESS    Stephanie Monet is a 40 y.o. female who presents complaining of Psychiatric evaluation. Patient was found on the other side of [facility] wandering and talking strangely. Please also the found her evidently knows her well and states that she's not normally on that side of town. She evidently reported to the  that she was having thoughts of hurting people. Please also also states that while in custody with her she was having what she thought were many seizures but was not really able to explain what that meant. Patient at this time states that she is not having any thoughts of hurting herself or anyone else because she does not hurt people. Patient denies any pain anywhere. REVIEW OF SYSTEMS       Review of Systems   Constitutional: Negative for activity change, appetite change, chills, diaphoresis and fever. HENT: Negative for congestion, ear pain, facial swelling, nosebleeds, rhinorrhea, sinus pressure, sore throat and trouble swallowing. Eyes: Negative for pain, discharge and redness. Respiratory: Negative for cough, chest tightness, shortness of breath and wheezing. Cardiovascular: Negative for chest pain, palpitations and leg swelling. Gastrointestinal: Negative for abdominal pain, blood in stool, constipation, diarrhea, nausea and vomiting. Genitourinary: Negative for difficulty urinating, dysuria, flank pain, frequency, genital sores and hematuria. Musculoskeletal: Negative for arthralgias, back pain, gait problem, joint swelling, myalgias and neck pain. Skin: Negative for color change, pallor, rash and wound. Neurological: Positive for seizures.  Negative for dizziness, tremors, syncope, speech difficulty, weakness, numbness and headaches. Psychiatric/Behavioral: Negative for confusion, decreased concentration, hallucinations, self-injury, sleep disturbance and suicidal ideas. PAST MEDICAL HISTORY     Past Medical History:   Diagnosis Date    Anxiety     Asthma     Bipolar 1 disorder ([facility] [address]     Multiple personality     Seizures ([facility] [address]        SURGICAL HISTORY     History reviewed. No pertinent surgical history. CURRENT MEDICATIONS       Current Discharge Medication List      CONTINUE these medications which have NOT CHANGED    Details   carBAMazepine (TEGRETOL) 200 MG tablet Take 1 tablet by mouth 3 times daily  Qty: 90 tablet, Refills: 0      traZODone (DESYREL) 50 MG tablet Take 1 tablet by mouth nightly as needed for Sleep  Qty: 30 tablet, Refills: 0      vilazodone HCl (VIIBRYD) 10 MG TABS Take 1 tablet by mouth daily  Qty: 30 tablet, Refills: 0      brexpiprazole (REXULTI) 3 MG TABS tablet Take 2 tablets by mouth daily  Qty: 60 tablet, Refills: 0      nicotine polacrilex (NICORETTE) 2 MG gum Take 1 each by mouth as needed for Smoking cessation  Qty: 110 each, Refills: 3      ARIPiprazole ER (ABILIFY MAINTENA) 400 MG SUSR Inject 400 mg into the muscle every 28 days  Qty: 1 each, Refills: 0             ALLERGIES     is allergic to bee venom; beeswax; and wasp venom protein. SOCIAL HISTORY      reports that she has been smoking Cigarettes and Cigars. She has been smoking about 0.25 packs per day. She uses smokeless tobacco. She reports that she does not drink alcohol or use drugs. PHYSICAL EXAM     INITIAL VITALS: /68   Pulse 90   Temp 97.5 °F (36.4 °C) (Oral)   Resp 16   Wt 124 lb (56.2 kg)   SpO2 98%   BMI 20.01 kg/m²      Physical Exam   Constitutional: She appears well-developed and well-nourished. No distress. HENT:   Head: Normocephalic and atraumatic. Eyes: Conjunctivae and EOM are normal. Pupils are equal, round, and reactive to light.  Right eye exhibits no discharge. Left eye exhibits no discharge. No scleral icterus. Cardiovascular: Normal rate, regular rhythm, normal heart sounds and intact distal pulses. Exam reveals no gallop and no friction rub. No murmur heard. Pulmonary/Chest: Effort normal and breath sounds normal. No respiratory distress. She has no wheezes. She has no rales. Abdominal: Soft. Bowel sounds are normal. She exhibits no distension and no mass. There is no tenderness. There is no rebound and no guarding. Neurological: She is alert. She is disoriented. No cranial nerve deficit or sensory deficit. She exhibits normal muscle tone. Skin: She is not diaphoretic. Psychiatric: Her affect is blunt. Her speech is rapid and/or pressured and tangential. Her speech is not slurred. She is hyperactive. She expresses no homicidal and no suicidal ideation. DIAGNOSTIC RESULTS     RADIOLOGY:All plain film, CT, MRI, and formal ultrasound images (except ED bedside ultrasound) are read by the radiologist and the images and interpretations are directly viewed by the emergency physician. LABS: All lab results were reviewed by myself, and all abnormals are listed below. Labs Reviewed   UA W/REFLEX CULTURE - Abnormal; Notable for the following:        Result Value    Turbidity UA CLOUDY (*)     Bilirubin Urine   (*)     Value: Presumptive positive. Unable to confirm due to unavailability of reagent.     Specific [place], UA 1.035 (*)     Protein, UA TRACE (*)     All other components within normal limits   MICROSCOPIC URINALYSIS - Abnormal; Notable for the following:     Bacteria, UA FEW (*)     Mucus, UA 1+ (*)     All other components within normal limits   URINE DRUG SCREEN   PREGNANCY, URINE   CBC WITH AUTO DIFFERENTIAL   COMPREHENSIVE METABOLIC PANEL   TSH WITHOUT REFLEX   ETHANOL   ACETAMINOPHEN LEVEL   SALICYLATE LEVEL   ICTOTEST, URINE         MEDICAL DECISION MAKING:     I will have the patient evaluated by the  for

## 2018-03-22 ENCOUNTER — HOSPITAL ENCOUNTER (INPATIENT)
Age: 38
LOS: 7 days | Discharge: HOME | DRG: 750 | End: 2018-03-29
Attending: EMERGENCY MEDICINE | Admitting: PSYCHIATRY & NEUROLOGY
Payer: MEDICAID

## 2018-03-22 DIAGNOSIS — F25.9 SCHIZOAFFECTIVE DISORDER, UNSPECIFIED TYPE (HCC): Primary | ICD-10-CM

## 2018-03-22 PROCEDURE — 1240000000 HC EMOTIONAL WELLNESS R&B

## 2018-03-22 PROCEDURE — 99284 EMERGENCY DEPT VISIT MOD MDM: CPT

## 2018-03-22 RX ORDER — LORAZEPAM 1 MG/1
1 TABLET ORAL 2 TIMES DAILY PRN
Status: DISPENSED | OUTPATIENT
Start: 2018-03-22 | End: 2018-03-24

## 2018-03-22 RX ORDER — TRAZODONE HYDROCHLORIDE 50 MG/1
50 TABLET ORAL NIGHTLY PRN
Status: DISCONTINUED | OUTPATIENT
Start: 2018-03-22 | End: 2018-03-29 | Stop reason: HOSPADM

## 2018-03-22 RX ORDER — BENZTROPINE MESYLATE 1 MG/ML
2 INJECTION INTRAMUSCULAR; INTRAVENOUS DAILY PRN
Status: DISCONTINUED | OUTPATIENT
Start: 2018-03-22 | End: 2018-03-29 | Stop reason: HOSPADM

## 2018-03-22 RX ORDER — HALOPERIDOL 5 MG/ML
5 INJECTION INTRAMUSCULAR ONCE
Status: DISCONTINUED | OUTPATIENT
Start: 2018-03-22 | End: 2018-03-29 | Stop reason: HOSPADM

## 2018-03-22 RX ORDER — ACETAMINOPHEN 325 MG/1
650 TABLET ORAL EVERY 6 HOURS PRN
Status: DISCONTINUED | OUTPATIENT
Start: 2018-03-22 | End: 2018-03-29 | Stop reason: HOSPADM

## 2018-03-22 RX ORDER — MAGNESIUM HYDROXIDE/ALUMINUM HYDROXICE/SIMETHICONE 120; 1200; 1200 MG/30ML; MG/30ML; MG/30ML
30 SUSPENSION ORAL 2 TIMES DAILY PRN
Status: DISCONTINUED | OUTPATIENT
Start: 2018-03-22 | End: 2018-03-29 | Stop reason: HOSPADM

## 2018-03-22 RX ORDER — HALOPERIDOL 5 MG/ML
5 INJECTION INTRAMUSCULAR 3 TIMES DAILY PRN
Status: ACTIVE | OUTPATIENT
Start: 2018-03-22 | End: 2018-03-25

## 2018-03-22 ASSESSMENT — SLEEP AND FATIGUE QUESTIONNAIRES
AVERAGE NUMBER OF SLEEP HOURS: 6
DO YOU HAVE DIFFICULTY SLEEPING: NO
DO YOU USE A SLEEP AID: NO

## 2018-03-22 ASSESSMENT — PAIN - FUNCTIONAL ASSESSMENT: PAIN_FUNCTIONAL_ASSESSMENT: 0-10

## 2018-03-22 ASSESSMENT — PATIENT HEALTH QUESTIONNAIRE - PHQ9: SUM OF ALL RESPONSES TO PHQ QUESTIONS 1-9: 5

## 2018-03-22 ASSESSMENT — LIFESTYLE VARIABLES: HISTORY_ALCOHOL_USE: NO

## 2018-03-22 NOTE — ED NOTES
Provisional Diagnosis:   Schizoaffective Disorder    Psychosocial and Contextual Factors:   Police was called on patient by her sister whom she lives with. Patient is on the ACT team and the worker who was     C-SSRS Summary:      Patient: X  Family:   Agency: [place] [employer]/[facility] ACT team      Present Suicidal Behavior:  Patient unable to answer question due to intensity of symptoms. Verbal:     Attempt:    Past Suicidal Behavior: Unable to assess due to interference from mental health symptoms. Verbal:    Attempt:      Self-Injurious/Self-Mutilation: Unable to assess due to interference from mental health symptoms. Trauma Identified:  Unable to assess due to interference from mental health symptoms. Protective Factors:    Patient is linked with outpatient services at [facility]. Patient appears to having housing with her sister. Risk Factors:    Patient has been off her medication for the last month. Clinical Summary:    Michelle Pinto is a 40 y.o. female who presents to the ED by [place] police after patients sister called due to patient making threats to kill everyone within the home. Patients  on the ACT team whom went to patients home to administer medication reports patient has been making threats to kill everyone in the home and has been having an increase in aggressive behaviors such as throwing objects at her sister.  and Police report patient threatened to cut the police when they arrived. Patient has been off her medications for over a month. Patient is positive for delusions and reports when people give her a shot \"they make worms come out\" and states \"A whip went through my head\". Patient has tangential speech and word salad. Patient does not respond to direct questions asked due to mental health symptoms leading to tangential thoughts. Unable to assess suicidal thoughts due to patients tangential thought when asked.  Patient unable to meet basic needs

## 2018-03-23 PROBLEM — F19.10 POLYSUBSTANCE ABUSE (HCC): Status: ACTIVE | Noted: 2018-03-23

## 2018-03-23 PROCEDURE — 6370000000 HC RX 637 (ALT 250 FOR IP): Performed by: PSYCHIATRY & NEUROLOGY

## 2018-03-23 PROCEDURE — 1240000000 HC EMOTIONAL WELLNESS R&B

## 2018-03-23 RX ORDER — CARBAMAZEPINE 200 MG/1
200 TABLET ORAL 3 TIMES DAILY
Status: DISCONTINUED | OUTPATIENT
Start: 2018-03-23 | End: 2018-03-29 | Stop reason: HOSPADM

## 2018-03-23 RX ORDER — ARIPIPRAZOLE 10 MG/1
10 TABLET ORAL NIGHTLY
Status: DISCONTINUED | OUTPATIENT
Start: 2018-03-23 | End: 2018-03-24

## 2018-03-23 RX ORDER — ESCITALOPRAM OXALATE 10 MG/1
10 TABLET ORAL DAILY
Status: DISCONTINUED | OUTPATIENT
Start: 2018-03-24 | End: 2018-03-24

## 2018-03-23 RX ORDER — CARBAMAZEPINE 200 MG/1
400 TABLET ORAL ONCE
Status: COMPLETED | OUTPATIENT
Start: 2018-03-23 | End: 2018-03-23

## 2018-03-23 RX ORDER — VILAZODONE HYDROCHLORIDE 10 MG/1
10 TABLET ORAL DAILY
Status: DISCONTINUED | OUTPATIENT
Start: 2018-03-23 | End: 2018-03-23

## 2018-03-23 RX ADMIN — CARBAMAZEPINE 400 MG: 200 TABLET ORAL at 11:35

## 2018-03-23 RX ADMIN — LORAZEPAM 1 MG: 1 TABLET ORAL at 11:35

## 2018-03-23 RX ADMIN — VILAZODONE HYDROCHLORIDE 10 MG: 10 TABLET ORAL at 11:35

## 2018-03-23 RX ADMIN — ACETAMINOPHEN 650 MG: 325 TABLET, FILM COATED ORAL at 11:35

## 2018-03-23 RX ADMIN — BREXPIPRAZOLE 2 MG: 2 TABLET ORAL at 11:36

## 2018-03-23 RX ADMIN — CARBAMAZEPINE 200 MG: 200 TABLET ORAL at 20:46

## 2018-03-23 RX ADMIN — ARIPIPRAZOLE 10 MG: 10 TABLET ORAL at 20:46

## 2018-03-23 ASSESSMENT — LIFESTYLE VARIABLES: HISTORY_ALCOHOL_USE: NO

## 2018-03-23 ASSESSMENT — PAIN SCALES - GENERAL
PAINLEVEL_OUTOF10: 1
PAINLEVEL_OUTOF10: 3

## 2018-03-23 NOTE — CARE COORDINATION
Pt declined to attend psychotherapy at 1100 am despite encouragement. PT was offered 1:1 and accepted on this date.

## 2018-03-23 NOTE — BH NOTE
RT ASSESSMENT TREATMENT GOALS    []Pt Goal: Pt will identify 1-2 positive coping skills by time of discharge. []Pt Goal: Pt will identify 1-2 positive aspects of self by time of discharge. [x]Pt Goal: Pt will remain on task/topic for 15-30 minutes during group by time of discharge. []Pt Goal: Pt will identify 1-2 aspects of relapse prevention plan by time of discharge. [x]Pt Goal: Pt will join in conversation with peers 1-2 times per group by time of discharge. []Pt Goal: Pt will identify 1-2 new leisure interests by time of discharge. [x]Pt Goal: Pt will not voice any delusional content by time of discharge.

## 2018-03-23 NOTE — PLAN OF CARE
Problem: Anger Management/Homicidal Ideation:  Goal: Able to display appropriate communication and problem solving  Able to display appropriate communication and problem solving   Outcome: Ongoing  [facility]  Initial Interdisciplinary Treatment Plan NO      Original treatment plan Date & Time: 3/23/2018 0730    Admission Type:  Admission Type: Voluntary    Reason for admission:   Reason for Admission: unhappy with current life situation    Estimated Length of Stay:  5-7days  Estimated Discharge Date: to be determined by physician    PATIENT STRENGTHS:  Patient Strengths:Strengths: Connection to output provider, Positive Support  Patient Strengths and Limitations:Limitations: Lacks leisure interests, Inappropriate/potentially harmful leisure interests, Multiple barriers to leisure interests, Perceives need for assistance with self-care, Difficult relationships / poor social skills, Tendency to isolate self, Difficulty problem solving/relies on others to help solve problems  Addictive Behavior: Addictive Behavior  In the past 3 months, have you felt or has someone told you that you have a problem with:  : None  Do you have a history of Chemical Use?: No  Do you have a history of Alcohol Use?: No  Do you have a history of Street Drug Abuse?: No  Histroy of Prescripton Drug Abuse?: No  Medical Problems:  Past Medical History:   Diagnosis Date    Anxiety     Asthma     Bipolar 1 disorder ([facility] [address]     Multiple personality     Seizures ([facility] [address]      Status EXAM:Status and Exam  Normal: No  Facial Expression: Avoids Gaze, Flat, Worried  Affect: Blunt  Level of Consciousness: Alert (A&Ox4)  Mood:Normal: No  Mood: Depressed, Anxious, Helpless, Guilty, Worthless, low self-esteem  Motor Activity:Normal: No  Motor Activity: Decreased  Interview Behavior: Evasive, Cooperative  Preception: [place] to Person, [place] to Place, [place] to Situation, [place] to Time (A&Ox4)  Attention:Normal: No  Attention: Distractible, Unable to Concentrate  Thought Processes: Blocking, Flt.of Ideas, Loose Assoc., Tangential  Thought Content:Normal: No  Thought Content: Delusions, Preoccupations, Poverty of Content  Hallucinations: Unable to assess (YONY: pt was unable to focus on answering the question)  Delusions: Yes  Delusions:  Other(See Comment)  Memory:Normal: No  Memory: Poor Recent, Poor Remote, Confabulation  Insight and Judgment: No  Insight and Judgment: Poor Judgment, Poor Insight, Unmotivated  Present Suicidal Ideation: No (denied )  Present Homicidal Ideation: No (denied)    EDUCATION:   Learner Progress Toward Treatment Goals: reviewed group plans and strategies for care    Method:group therapy, medication compliance, individualized assessments and care planning    Outcome: needs reinforcement    PATIENT GOALS: to be discussed with patient within 72 hours    PLAN/TREATMENT RECOMMENDATIONS:     continue group therapy , medications compliance, goal setting, individualized assessments and care, continue to monitor pt on unit      SHORT-TERM GOALS:   Time frame for Short-Term Goals: 5-7 days    LONG-TERM GOALS:  Time frame for Long-Term Goals: 6 months  Members Present in Team Meeting: See Signature Sheet    CARY SORTO  Goal: Absence of homicidal ideation  Absence of homicidal ideation   Outcome: Ongoing

## 2018-03-23 NOTE — CARE COORDINATION
BHI Biopsychosocial Assessment    Current Level of Psychosocial Functioning     Independent   Dependent   X  Minimal Assist     Comments:  Prior to admission PT lived with her sister Kanwal Auguste in her home in [facility]. Psychosocial High Risk Factors (check all that apply)    Unable to obtain meds   Chronic illness/pain    Substance abuse   Lack of Family Support X  Financial stress X  Isolation X  Inadequate Community Resources  Suicide attempt(s)  Not taking medications X  Victim of crime   Developmental Delay  Unable to manage personal needs    Age 72 or older   Homeless  No transportation   Readmission within 30 days  Unemployment  Traumatic Event      Psychiatric Advanced Directives: none reported    Family to Involve in Treatment: lack of family support    Sexual Orientation:  YONY    Patient Strengths: YONY PT was unable to answer assessment questions, according to ED notes PT is linked with ACT Team at [place], PT has adequate housing with her sister. Patient Barriers: YONY PT was unable to answer assessment questions, according to ED notes PT has been off of her medications for one month, PT is non-compliant with treatment    Opiate Education Provided: N/A PT does not have any documemented history of Heroin or Opiate use/ abuse. CMHC/mental health history: PT is linked with the [facility] Team and they have been informed that PT was admitted. Plan of Care   medication management, group/individual therapies, family meetings, psycho -education, treatment team meetings to assist with stabilization, referral to community resources. Initial Discharge Plan:   YONY PT was unable to answer assessment questions, It was noted from ED documentation that PT was living with her sister Kanwal Auguste prior to admission.        Clinical Summary:  YONY PT was unable to answer assessment questions, according to ED notes PT Nakul Wallace is a 40 y.o. female who presents to the ED by [facility] police after patients sister called due to patient making threats to kill everyone within the home. Patients  on the ACT team whom went to patients home,  reports patient has been making threats to kill everyone in the home and has been having an increase in aggressive behaviors such as throwing objects at her sister.  and Police report patient threatened to cut the police when they arrived. Patient has been off her medications for over a month. Patient is positive for delusions and reports when people give her a shot \"they make worms come out\" and states \"A whip went through my head\". Patient has tangential speech. Patient does not respond to direct questions asked due to mental health symptoms leading to tangential thoughts.     Unable to assess suicidal thoughts due to patients tangential thought when asked. Patient unable to meet basic needs at this time due to severity of mental health symptoms.  Patient is a threat to others as evidence of threats made to [facility] police and family members within her home.

## 2018-03-23 NOTE — PROGRESS NOTES
Psychiatric Admission Note            Referred by ED       Chief Complaint;     Brought by [facility] after pt was threatening to kill everyone in the house,been off her psych meds more than a month    HX of present illness[de-identified]      Flavia Bhagat is a 40 y.o. female who presents to the ED by [place] police after patients sister called due to patient making threats to kill everyone within the home. Patients  on the ACT team whom went to patients home to administer medication reports patient has been making threats to kill everyone in the home and has been having an increase in aggressive behaviors such as throwing objects at her sister.  and Police report patient threatened to cut the police when they arrived. Patient has been off her medications for over a month. Patient is positive for delusions and reports when people give her a shot \"they make worms come out\" and states \"A whip went through my head\". Patient has tangential speech and word salad. Patient does not respond to direct questions asked due to mental health symptoms leading to tangential thoughts. Flavia Bhagat is a 40 y.o. female who presented with specific plan to harm others. . The patient also presents with sleep disturbance difficulty getting to sleep, feelings of guilt, worthlessness or loss of self confidence and psychomotor changes  agitation. volatile mood, racing thoughts, mood swings and paranoia, and auditory hallucinations were present. Allergies:  Bee venom; Beeswax; and Wasp venom protein       History of Substance Abuse:    Patient did  present with substance use, being positive for  alcohol and cocaine . Past Psychiatric History:     Patient admits to past psychiatric treatment. Family History of psychiatric disorders:    Family history: negative mood disorders.         Medical History:     Past Medical History:   Diagnosis Date    Anxiety     Asthma     B[facility] 1 disorder [address] [address]    

## 2018-03-24 PROCEDURE — 6370000000 HC RX 637 (ALT 250 FOR IP): Performed by: PSYCHIATRY & NEUROLOGY

## 2018-03-24 PROCEDURE — 6360000002 HC RX W HCPCS: Performed by: PSYCHIATRY & NEUROLOGY

## 2018-03-24 PROCEDURE — 1240000000 HC EMOTIONAL WELLNESS R&B

## 2018-03-24 RX ORDER — PAROXETINE 10 MG/1
10 TABLET, FILM COATED ORAL DAILY
Status: DISCONTINUED | OUTPATIENT
Start: 2018-03-24 | End: 2018-03-29 | Stop reason: HOSPADM

## 2018-03-24 RX ADMIN — CARBAMAZEPINE 200 MG: 200 TABLET ORAL at 09:03

## 2018-03-24 RX ADMIN — CARBAMAZEPINE 200 MG: 200 TABLET ORAL at 13:59

## 2018-03-24 RX ADMIN — LORAZEPAM 1 MG: 1 TABLET ORAL at 20:51

## 2018-03-24 RX ADMIN — ACETAMINOPHEN 650 MG: 325 TABLET, FILM COATED ORAL at 01:27

## 2018-03-24 RX ADMIN — PAROXETINE HYDROCHLORIDE 10 MG: 10 TABLET, FILM COATED ORAL at 13:59

## 2018-03-24 RX ADMIN — ACETAMINOPHEN 650 MG: 325 TABLET, FILM COATED ORAL at 17:02

## 2018-03-24 RX ADMIN — LORAZEPAM 1 MG: 1 TABLET ORAL at 01:45

## 2018-03-24 RX ADMIN — CARBAMAZEPINE 200 MG: 200 TABLET ORAL at 20:51

## 2018-03-24 RX ADMIN — ESCITALOPRAM OXALATE 10 MG: 10 TABLET ORAL at 09:03

## 2018-03-24 RX ADMIN — BREXPIPRAZOLE 2 MG: 2 TABLET ORAL at 09:03

## 2018-03-24 RX ADMIN — TRAZODONE HYDROCHLORIDE 50 MG: 50 TABLET ORAL at 01:28

## 2018-03-24 RX ADMIN — ARIPIPRAZOLE 400 MG: KIT at 09:24

## 2018-03-24 RX ADMIN — NICOTINE POLACRILEX 2 MG: 2 GUM, CHEWING BUCCAL at 18:13

## 2018-03-24 RX ADMIN — TRAZODONE HYDROCHLORIDE 50 MG: 50 TABLET ORAL at 20:51

## 2018-03-24 RX ADMIN — ACETAMINOPHEN 650 MG: 325 TABLET, FILM COATED ORAL at 09:37

## 2018-03-24 ASSESSMENT — PAIN SCALES - GENERAL
PAINLEVEL_OUTOF10: 5
PAINLEVEL_OUTOF10: 10
PAINLEVEL_OUTOF10: 3
PAINLEVEL_OUTOF10: 0
PAINLEVEL_OUTOF10: 3
PAINLEVEL_OUTOF10: 2

## 2018-03-24 NOTE — PROGRESS NOTES
Psychiatry Progress  Note     CHIEF  COMPLAINT     <principal problem not specified>        SUBJECT DEEPA AND OBJECT DEEPA:    Steve Rabago is presenting with persistant depressed moods,  delusional thinking, limited participation in groups, less agitation than yesterday and thought blocking,   Moderate to severe mood swings. with auditory hallucinations and thoughts are tangential. The symptoms still   present are marked in severity    . ASSESSMENT AND PLAN    Symptoms of illness and medications  discussed with pt and support provided. Progress of treatment discussed with staff     Encouraged to participate in activies and groups. Her   <principal problem not specified>  is gradually improving.        carBAMazepine  200 mg Oral TID    escitalopram  10 mg Oral Daily    haloperidol lactate  5 mg Intramuscular Once       nicotine polacrilex, acetaminophen, LORazepam, haloperidol lactate, traZODone, benztropine mesylate, magnesium hydroxide, aluminum & magnesium hydroxide-simethicone       Loyda Hutson MD  Psychiatry

## 2018-03-24 NOTE — H&P
HISTORY and [id number]       NAME:  Sherrie Talamantes  MRN: [id number]   YOB: 1980   Date: 3/24/2018   Age: 40 y.o. Gender: female     COMPLAINT AND PRESENT HISTORY:      Sherrie Talamantes is 40 y.o., female, admitted after she was brought to the ER by police after she threatened her family with physical violence. Patient reports that she was brought in due to [place] nervous breakdown\". Patient reports that she had a lot going on in her life, denies any suicidal or homicidal ideation prior to admission, none currently. Patient denies history of previous suicide attempts or self harm. Patients current stressors include chronic pain in back and shoulders, relationships with her boyfriend and son, financial stressors, and lack of close friends. Patient admits to feeling hopeless, helpless, worthless, with a general lack of interest in everyday activities. In the past few weeks, patient states that sleep has been decreased, appetite has been decreased, energy level has been fair, focus/concentration has been abnormal, and memory has been abnormal. Patient denies any auditory, visual or tactile hallucinations. Patient denies any current alcohol or substance abuse. Patient lives with her mother and sister, says she has good support system. Patient states that she has been compliant with psychiatric medications. Patient complaining of chronic pain in back and shoulders which is unchanged for baseline. No other somatic complaints, patient denies any fever/chills, chest pain, shortness of breath. DIAGNOSTIC RESULTS   Labs:  None available.      PAST MEDICAL HISTORY     Past Medical History:   Diagnosis Date    Anxiety     Asthma     B[facility] disorder ([facility] [address]     Multiple personality     Seizures ([facility] [address]      Pt denies any history of:  Diabetes Mellitus, Hypertension, Hyperlipidemia, Coronary Artery Disease, Stroke/TIA, COPD, Thyroid disease, GERD/PUD, Kidney epistaxis, rhinorrhea hearing loss or sore throat. Neck:  No pain, stiffness or masses. Cardiovascular/Respiratory system:  No chest pain, palpitations, shortness of breath, coughing or expectoration. Gastrointestinal tract: No abdominal pain, nausea, vomiting, diarrhea or constipation. Genitourinary:  No burning on micturition. No hesitancy, urgency, frequency or discoloration of urine. Locomotor:  No swelling or deformities. Chronic back pain, shoulder pain bilateral.     Neuropsychiatric:  See HPI. GENERAL PHYSICAL EXAM:     Vitals: /69   Pulse 86   Temp 97.9 °F (36.6 °C)   Resp 16   Ht 5' 5\" (1.651 m)   Wt 124 lb (56.2 kg)   SpO2 100%   BMI 20.63 kg/m²  Body mass index is 20.63 kg/m². Pt was examined with a nurse present in the room. GENERAL APPEARANCE:  Jessica Saavedra is 40 y.o.,   female, not obese, nourished, conscious, alert. Does not appear to be distress or pain at this time. Patient is cooperative. Affect is flat. Mood is anxious. Grooming and hygiene slightly disheveled, strong body odor. Some pressured speech, tangential thinking. Bizarre thought content, delusions. SKIN:  Normal temperature, turgor and texture. No cyanosis or jaundice. HEAD:  Normocephalic, atraumatic, no swelling or tenderness. EYES:  Pupils equal, reactive to light and accomodation. Conjunctiva clear. EOMs intact bilaterally. EARS:  No discharge, no marked hearing loss. NOSE:  No rhinorrhea, epistaxis or septal deformity. THROAT:  Mucous membranes moist. No tonsillar erythema or exudates. Poor dental hygiene. NECK:  No stiffness, trachea central.  No palpable masses. CHEST:  Symmetrical and equal on expansion. HEART:  Regular rate and rhythm. S1 > S2, No audible murmurs or gallops. LUNGS:  Equal on expansion, normal breath sounds. No adventitious sounds.

## 2018-03-24 NOTE — PLAN OF CARE
Poverty of Content  Hallucinations: None  Delusions: No  Delusions: Other(See Comment)  Memory:Normal: No  Memory: Poor Recent, Poor Remote  Insight and Judgment: No  Insight and Judgment: Poor Judgment, Poor Insight  Present Suicidal Ideation: No  Present Homicidal Ideation: No    Daily Assessment Last Entry:   Daily Sleep (WDL): Exceptions to WDL         Patient Currently in Pain: Denies  Daily Nutrition (WDL): Within Defined Limits    Patient Monitoring:  Frequency of Checks: 4 times per hour, close    Psychiatric Symptoms:   Depression Symptoms  Depression Symptoms: Loss of interest  Anxiety Symptoms  Anxiety Symptoms: Generalized, Obsessions  Lora Symptoms  Lora Symptoms: No problems reported or observed. Psychosis Symptoms  Delusion Type: No problems reported or observed.     Suicide Risk CSSR-S:  1) Within the past month, have you wished you were dead or wished you could go to sleep and not wake up? : NO  2) Within the past month, have you actually had any thoughts of killing yourself? : NO  6)  Have you ever done anything, started to do anything, or prepared to do anything to end your life?: NO  Change in Result No Change in Plan of care No      EDUCATION:   EDUCATION:   Learner Progress Toward Treatment Goals: Reviewed results and recommendations of this team, Reviewed group plan and strategies, Reviewed signs, symptoms and risk of self harm and violent behavior, Reviewed goals and plan of care    Method:small group, individual verbal education    Outcome:verbalized by patient, but needs reinforcement to obtain goals    PATIENT GOALS:  Short term: Pt refused to attend  Long term: Pt refused to attend     PLAN/TREATMENT RECOMMENDATIONS UPDATE: continue with group therapies, increased socialization, continue planning for after discharge goals, continue with medication compliance    SHORT-TERM GOALS UPDATE:   Time frame for Short-Term Goals: 5-7 days    LONG-TERM GOALS UPDATE:   Time frame for Long-Term

## 2018-03-24 NOTE — PLAN OF CARE
Problem: Depressive Behavior With or Without Suicide Precautions:  Goal: Absence of self-harm  Absence of self-harm   Outcome: Ongoing  Pt remains free from self harm

## 2018-03-25 PROCEDURE — 6370000000 HC RX 637 (ALT 250 FOR IP): Performed by: PSYCHIATRY & NEUROLOGY

## 2018-03-25 PROCEDURE — 1240000000 HC EMOTIONAL WELLNESS R&B

## 2018-03-25 RX ADMIN — NICOTINE POLACRILEX 2 MG: 2 GUM, CHEWING BUCCAL at 12:02

## 2018-03-25 RX ADMIN — CARBAMAZEPINE 200 MG: 200 TABLET ORAL at 08:31

## 2018-03-25 RX ADMIN — ALUMINUM HYDROXIDE, MAGNESIUM HYDROXIDE, AND SIMETHICONE 30 ML: 200; 200; 20 SUSPENSION ORAL at 00:52

## 2018-03-25 RX ADMIN — CARBAMAZEPINE 200 MG: 200 TABLET ORAL at 14:09

## 2018-03-25 RX ADMIN — ACETAMINOPHEN 650 MG: 325 TABLET, FILM COATED ORAL at 00:51

## 2018-03-25 RX ADMIN — TRAZODONE HYDROCHLORIDE 50 MG: 50 TABLET ORAL at 23:36

## 2018-03-25 RX ADMIN — CARBAMAZEPINE 200 MG: 200 TABLET ORAL at 23:36

## 2018-03-25 RX ADMIN — PAROXETINE HYDROCHLORIDE 10 MG: 10 TABLET, FILM COATED ORAL at 08:31

## 2018-03-25 ASSESSMENT — PAIN DESCRIPTION - PAIN TYPE: TYPE: ACUTE PAIN

## 2018-03-25 ASSESSMENT — PAIN DESCRIPTION - LOCATION: LOCATION: ABDOMEN

## 2018-03-25 ASSESSMENT — PAIN SCALES - GENERAL
PAINLEVEL_OUTOF10: 3
PAINLEVEL_OUTOF10: 0

## 2018-03-25 NOTE — BH NOTE
Pt and other peer had a verbal altercation, pt began threatening stating \"I will cut off your head if you keep talking. \"  Pt was unable to be redirected at that time, stating \"stop talking bitch, I will slap your mouth, don't say my name. \"  Pt then got up at that time and walked back to her room briefly then came out pacing the unit, upsetting the other peer due to walking behind him frequently. Pt was transferred to A unit, pt agreeable at this time, walked off the unit with staff and security without incident.

## 2018-03-25 NOTE — CARE COORDINATION
SOCIAL SERVICE NOTE: AMANDA returned Call from [employer]  on the [employer],  [place]. SW gave [place] an update on PT's behavior and let her know that at this time PT was covering her head up with blankets and refusing to speak with staff. Lynn asked regarding PT's injection and if it will be restarted before discharge. AMANDA coordinated with nursing staff who reported that PT's Abilify Maintena injection was refused by PT today, but staff will attempt again to administer it again tomorrow 3/24/18. AMANDA relayed this information to Pt's  on this date. AMANDA will continue to monitor the situation.

## 2018-03-25 NOTE — PLAN OF CARE
Problem: Anger Management/Homicidal Ideation:  Goal: Able to display appropriate communication and problem solving  Able to display appropriate communication and problem solving   Outcome: Ongoing  Pt refused 1 to 1 stating she is just tired. Goal: Absence of homicidal ideation  Absence of homicidal ideation   Outcome: Ongoing  Pt denies any homicidal ideation at this time. Problem: Depressive Behavior With or Without Suicide Precautions:  Goal: Ability to disclose and discuss suicidal ideas will improve  Ability to disclose and discuss suicidal ideas will improve   Outcome: Ongoing  Pt denies any suicidal ideation at this time. Goal: Absence of self-harm  Absence of self-harm   Outcome: Ongoing  Pt is free of self harm at this time. Pt agrees to seek out staff if thoughts to harm self arise. Staff will provide support and reassurance as needed. Safety checks maintained every 15 minutes.

## 2018-03-26 PROCEDURE — 6370000000 HC RX 637 (ALT 250 FOR IP): Performed by: PSYCHIATRY & NEUROLOGY

## 2018-03-26 PROCEDURE — 1240000000 HC EMOTIONAL WELLNESS R&B

## 2018-03-26 RX ADMIN — CARBAMAZEPINE 200 MG: 200 TABLET ORAL at 13:56

## 2018-03-26 RX ADMIN — NICOTINE POLACRILEX 2 MG: 2 GUM, CHEWING BUCCAL at 14:26

## 2018-03-26 RX ADMIN — PAROXETINE HYDROCHLORIDE 10 MG: 10 TABLET, FILM COATED ORAL at 08:31

## 2018-03-26 RX ADMIN — CARBAMAZEPINE 200 MG: 200 TABLET ORAL at 08:31

## 2018-03-26 NOTE — PLAN OF CARE
Problem: Anger Management/Homicidal Ideation:  Goal: Able to display appropriate communication and problem solving  Able to display appropriate communication and problem solving   Outcome: Ongoing  Psychoeducation Group Note    Date: 3/26/18  Start Time: 1330  End Time: 7111    Number Participants in Group:  9    Goal:  Patient will demonstrate increased interpersonal interaction. Topic:  Cognitive Skills Therapeutic Group    Discipline Responsible:   OT  AT  [facility]. X RT  Other       Participation Level:     None X Minimal    Active Listener  Interactive    Monopolizing         Participation Quality:  X Appropriate  Inappropriate          Attentive        Intrusive          Sharing        Resistant          Supportive        Lethargic       Affective:    Congruent  Incongruent  Blunted  Flat   X Constricted  Anxious  Elated  Angry    Labile  Depressed  Other  Bright       Cognitive:  X Alert X Oriented PPTP     Concentration  G  F X P   Attention Span  G  F X P   Short-Term Memory  G X F  P   Long-Term Memory  G X F  P   ProblemSolving/  Decision Making  G X F  P   Ability to Process  Information  G X F  P      Contributing Factors             Delusional             Hallucinating             Flight of Ideas             Other:       Modes of Intervention:   Education X Support X Exploration   X Clarifying X Problem Solving  Confrontation   X Socialization X Limit Setting  Reality Testing   X Activity  Movement  Media    Other:            Response to Learning:  X Able to verbalize current knowledge/experience    Able to verbalize/acknowledge new learning    Able to retain information    Capable of insight    Able to change behavior   X Progressing to goal    Other:        Comments: Pt had minimal participation in group and wandered in and out of group area.

## 2018-03-26 NOTE — PLAN OF CARE
Problem: Anger Management/Homicidal Ideation:  Goal: Able to display appropriate communication and problem solving  Able to display appropriate communication and problem solving   Outcome: Ongoing  Psychoeducation Group Note    Date: 3/26/18  Start Time: 0797  End Time: 0930    Number Participants in Group:  12    Goal:  Patient will demonstrate increased interpersonal interaction.    Topic:  Goal Setting and [employer]    Discipline Responsible:   OT  AT  [facility]. X RT  Other       Participation Level:     None X Minimal    Active Listener  Interactive    Monopolizing         Participation Quality:  X Appropriate  Inappropriate          Attentive        Intrusive          Sharing        Resistant          Supportive        Lethargic       Affective:   X Congruent  Incongruent  Blunted  Flat    Constricted  Anxious  Elated  Angry    Labile  Depressed  Other  Bright       Cognitive:  X Alert X Oriented PPTP     Concentration  G  F X P   Attention Span  G  F X P   Short-Term Memory  G  F X P   Long-Term Memory  G  F X P   ProblemSolving/  Decision Making  G  F X P   Ability to Process  Information  G  F X P      Contributing Factors             Delusional             Hallucinating             Flight of Ideas             Other:       Modes of Intervention:  X Education X Support X Exploration   X Clarifying X Problem Solving  Confrontation   X Socialization  Limit Setting  Reality Testing    Activity  Movement  Media    Other:            Response to Learning:   Able to verbalize current knowledge/experience    Able to verbalize/acknowledge new learning    Able to retain information    Capable of insight    Able to change behavior   X Progressing to goal    Other:        Comments: Pt was in and out of group area but did not develop daily goal.

## 2018-03-26 NOTE — PLAN OF CARE
Problem: Anger Management/Homicidal Ideation:  Goal: Able to display appropriate communication and problem solving  Able to display appropriate communication and problem solving   Outcome: Ongoing  Pt declined to attend psychotherapy at 11:00am despite encouragement from staff. Pt offered 1:1. Pt refused 1:1 at this time.

## 2018-03-26 NOTE — PLAN OF CARE
Problem: Anger Management/Homicidal Ideation:  Goal: Able to display appropriate communication and problem solving  Able to display appropriate communication and problem solving   Outcome: Ongoing  Pt did not participate in Leisure Skills / Leisure Awareness / Social Skills group at 1000 despite staff encouragement.

## 2018-03-27 PROCEDURE — 6370000000 HC RX 637 (ALT 250 FOR IP): Performed by: PSYCHIATRY & NEUROLOGY

## 2018-03-27 PROCEDURE — 1240000000 HC EMOTIONAL WELLNESS R&B

## 2018-03-27 RX ORDER — HALOPERIDOL 5 MG
5 TABLET ORAL 3 TIMES DAILY PRN
Status: DISCONTINUED | OUTPATIENT
Start: 2018-03-27 | End: 2018-03-29 | Stop reason: HOSPADM

## 2018-03-27 RX ADMIN — HALOPERIDOL 5 MG: 5 TABLET ORAL at 14:46

## 2018-03-27 RX ADMIN — PAROXETINE HYDROCHLORIDE 10 MG: 10 TABLET, FILM COATED ORAL at 07:38

## 2018-03-27 RX ADMIN — CARBAMAZEPINE 200 MG: 200 TABLET ORAL at 07:38

## 2018-03-27 RX ADMIN — CARBAMAZEPINE 200 MG: 200 TABLET ORAL at 13:24

## 2018-03-27 NOTE — PLAN OF CARE
Problem: Anger Management/Homicidal Ideation:  Goal: Able to display appropriate communication and problem solving  Able to display appropriate communication and problem solving   Pt. Alert and oriented, able to make needs known, Pt. Denies all, denies SI/HI/self harm,  but is responding, yells in room, irritable, pacing Pt.  Accepted medications with encouragement   Goal: Absence of homicidal ideation  Absence of homicidal ideation   Outcome: Ongoing      Problem: Depressive Behavior With or Without Suicide Precautions:  Goal: Ability to disclose and discuss suicidal ideas will improve  Ability to disclose and discuss suicidal ideas will improve   Outcome: Ongoing    Goal: Absence of self-harm  Absence of self-harm   Outcome: Ongoing

## 2018-03-27 NOTE — PLAN OF CARE
Problem: Depressive Behavior With or Without Suicide Precautions:  Goal: Ability to disclose and discuss suicidal ideas will improve  Ability to disclose and discuss suicidal ideas will improve   Outcome: Ongoing  Psychoeducation Group Note    Date: 3/27/18  Start Time: 0845  End Time: 0920    Number Participants in Group:  8    Goal:  Patient will demonstrate increased interpersonal interaction. Topic:  Goal Setting and [employer]    Discipline Responsible:   OT  AT  [facility]. X RT  Other       Participation Level:     None X Minimal    Active Listener  Interactive    Monopolizing         Participation Quality:  X Appropriate  Inappropriate          Attentive        Intrusive   X       Sharing        Resistant          Supportive        Lethargic       Affective:    Congruent  Incongruent  Blunted  Flat   X Constricted  Anxious  Elated  Angry    Labile  Depressed  Other  Bright       Cognitive:  X Alert X Oriented PPTP     Concentration  G X F  P   Attention Span  G X F  P   Short-Term Memory  G X F  P   Long-Term Memory  G X F  P   ProblemSolving/  Decision Making  G X F  P   Ability to Process  Information  G X F  P      Contributing Factors             Delusional             Hallucinating             Flight of Ideas             Other:       Modes of Intervention:   Education X Support X Exploration   X Clarifying X Problem Solving  Confrontation   X Socialization X Limit Setting  Reality Testing    Activity  Movement  Media    Other:            Response to Learning:  X Able to verbalize current knowledge/experience    Able to verbalize/acknowledge new learning    Able to retain information    Capable of insight    Able to change behavior   X Progressing to goal    Other:        Comments: Pt developed daily goal of discharge planning, \"self time,\" and go to more groups.

## 2018-03-27 NOTE — PLAN OF CARE
Problem: Anger Management/Homicidal Ideation:  Goal: Able to display appropriate communication and problem solving  Able to display appropriate communication and problem solving   Outcome: Ongoing  PSYCHOTHERAPY GROUP NOTE    Date: 3/27/18  Start Time: 10 AM  End Time: 1050AM    Number Participants in Group:  7    Goal:  Patient will demonstrate increased interpersonal interaction   Topic: Support     Discipline Responsible:   OT  AT X SW  Nsg.  RT [facility] Other       Participation Level:    x None  Minimal    Active Listener  Interactive    Monopolizing         Participation Quality:   Appropriate  Inappropriate          Attentive        Intrusive          Sharing x       Resistant          Supportive        Lethargic       Affective:    Congruent  Incongruent  Blunted x Flat    Constricted  Anxious  Elated  Angry    Labile  Depressed  Other         Cognitive:  x Alert  Oriented PPTP     Concentration  G  F x P   Attention Span  G  F x P   Short-Term Memory  G  F x P   Long-Term Memory  G  F x P   ProblemSolving/  Decision Making  G  F x P   Ability to Process  Information  G  F x P      Contributing Factors             Delusional             Hallucinating             Flight of Ideas             Other:       Modes of Intervention:   Education x Support  Exploration    Clarifying  Problem Solving  Confrontation    Socialization  Limit Setting  Reality Testing    Activity  Movement  Media    Other:            Response to Learning:   Able to verbalize current knowledge/experience    Able to verbalize/acknowledge new learning    Able to retain information    Capable of insight    Able to change behavior    Progressing to goal   x Other: attempted group attendance        Comments:   Pt unable to concentrate AEB coming In and out of room; did not interact with peers

## 2018-03-28 PROCEDURE — 1240000000 HC EMOTIONAL WELLNESS R&B

## 2018-03-28 PROCEDURE — 6370000000 HC RX 637 (ALT 250 FOR IP): Performed by: PSYCHIATRY & NEUROLOGY

## 2018-03-28 RX ADMIN — ACETAMINOPHEN 650 MG: 325 TABLET, FILM COATED ORAL at 09:18

## 2018-03-28 RX ADMIN — CARBAMAZEPINE 200 MG: 200 TABLET ORAL at 15:27

## 2018-03-28 RX ADMIN — PAROXETINE HYDROCHLORIDE 10 MG: 10 TABLET, FILM COATED ORAL at 09:18

## 2018-03-28 RX ADMIN — CARBAMAZEPINE 200 MG: 200 TABLET ORAL at 09:18

## 2018-03-28 ASSESSMENT — PAIN SCALES - GENERAL
PAINLEVEL_OUTOF10: 5
PAINLEVEL_OUTOF10: 0

## 2018-03-28 NOTE — PLAN OF CARE
Problem: Anger Management/Homicidal Ideation:  Goal: Able to display appropriate communication and problem solving  Able to display appropriate communication and problem solving   Outcome: Ongoing  Pt is isolative to self in room/bed  Goal: Absence of homicidal ideation  Absence of homicidal ideation   Outcome: Ongoing  Pt denies homicidal ideation at this time. Problem: Depressive Behavior With or Without Suicide Precautions:  Goal: Ability to disclose and discuss suicidal ideas will improve  Ability to disclose and discuss suicidal ideas will improve   Outcome: Ongoing  Pt denies suicidal ideations at this time. Goal: Absence of self-harm  Absence of self-harm   Outcome: Ongoing  Pt denies thoughts of self-harm at this time.

## 2018-03-28 NOTE — PLAN OF CARE
Problem: Depressive Behavior With or Without Suicide Precautions:  Goal: Absence of self-harm  Absence of self-harm   Outcome: Ongoing  Patient in room for long intervals,appears sad,no interaction with peers,brief with staff Pt denies thoughts of self harm and is agreeable to seeking out should thoughts of self harm arise. Safe environment maintained. Q15 minute checks for safety cont per unit policy. Will cont to monitor for safety and provides support and reassurance as needed.

## 2018-03-28 NOTE — PLAN OF CARE
Problem: Anger Management/Homicidal Ideation:  Goal: Able to display appropriate communication and problem solving  Able to display appropriate communication and problem solving   Outcome: Ongoing  Pt did not participate in Leisure Skills + Awareness / Recalling / Social Skills group at 1000 despite staff encouragement.

## 2018-03-28 NOTE — PROGRESS NOTES
Pharmacy Med Education Group Note    Date: 3/28/18  Start Time: 5259  End Time: 6146    Number Participants in Group:  8    Goal:  Patient will demonstrate an understanding of the medications intended purpose and possible adverse effects  Topic: [facility] for Pharmacy Med Ed Group    Discipline Responsible:     OT  AT  [facility].  RT     X Other       Participation Level:     None  Minimal      X Active Listener    X Interactive    Monopolizing         Participation Quality:    X Appropriate  Inappropriate     X       Attentive        Intrusive          Sharing        Resistant          Supportive        Lethargic       Affective:     X Congruent  Incongruent  Blunted  Flat    Constricted  Anxious  Elated  Angry    Labile  Depressed  Other         Cognitive:    X Alert  Oriented PPTP     Concentration   X G  F  P   Attention Span   X G  F  P   Short-Term Memory   X G  F  P   Long-Term Memory  G  F  P   ProblemSolving/  Decision Making  G  F  P   Ability to Process  Information   X G  F  P      Contributing Factors             Delusional             Hallucinating             Flight of Ideas             Other:       Modes of Intervention:    X Education   X Support  Exploration    Clarifying  Problem Solving  Confrontation    Socialization  Limit Setting  Reality Testing    Activity  Movement  Media    Other:            Response to Learning:    X Able to verbalize current knowledge/experience    Able to verbalize/acknowledge new learning    Able to retain information    Capable of insight    Able to change behavior    Progressing to goal    Other:        Comments:     Kelley Foster,PharmD, 3/28/2018, 4:21 PM

## 2018-03-29 VITALS
RESPIRATION RATE: 14 BRPM | HEART RATE: 74 BPM | WEIGHT: 124 LBS | HEIGHT: 65 IN | TEMPERATURE: 97.9 F | OXYGEN SATURATION: 100 % | BODY MASS INDEX: 20.66 KG/M2 | DIASTOLIC BLOOD PRESSURE: 69 MMHG | SYSTOLIC BLOOD PRESSURE: 102 MMHG

## 2018-03-29 PROCEDURE — 6370000000 HC RX 637 (ALT 250 FOR IP): Performed by: PSYCHIATRY & NEUROLOGY

## 2018-03-29 PROCEDURE — 5130000000 HC BRIDGE APPOINTMENT

## 2018-03-29 RX ORDER — PAROXETINE 10 MG/1
10 TABLET, FILM COATED ORAL DAILY
Qty: 14 TABLET | Refills: 0 | Status: ON HOLD | OUTPATIENT
Start: 2018-03-30 | End: 2019-04-09 | Stop reason: HOSPADM

## 2018-03-29 RX ORDER — TRAZODONE HYDROCHLORIDE 50 MG/1
50 TABLET ORAL NIGHTLY PRN
Qty: 14 TABLET | Refills: 0 | Status: ON HOLD | OUTPATIENT
Start: 2018-03-29 | End: 2019-04-09 | Stop reason: SDUPTHER

## 2018-03-29 RX ORDER — CARBAMAZEPINE 200 MG/1
200 TABLET ORAL 3 TIMES DAILY
Qty: 42 TABLET | Refills: 0 | Status: ON HOLD | OUTPATIENT
Start: 2018-03-29 | End: 2018-05-08 | Stop reason: HOSPADM

## 2018-03-29 RX ADMIN — CARBAMAZEPINE 200 MG: 200 TABLET ORAL at 00:18

## 2018-03-29 RX ADMIN — CARBAMAZEPINE 200 MG: 200 TABLET ORAL at 14:39

## 2018-03-29 RX ADMIN — TRAZODONE HYDROCHLORIDE 50 MG: 50 TABLET ORAL at 00:18

## 2018-03-29 RX ADMIN — CARBAMAZEPINE 200 MG: 200 TABLET ORAL at 08:21

## 2018-03-29 RX ADMIN — PAROXETINE HYDROCHLORIDE 10 MG: 10 TABLET, FILM COATED ORAL at 08:21

## 2018-04-04 NOTE — DISCHARGE SUMMARY
carBAMazepine 200 MG tablet  Commonly known as:  TEGRETOL  Take 1 tablet by mouth 3 times daily for 14 days  Notes to patient:  Help with seizures     traZODone 50 MG tablet  Commonly known as:  DESYREL  Take 1 tablet by mouth nightly as needed for Sleep  Notes to patient:  Help with sleep        STOP taking these medications    brexpiprazole 3 MG Tabs tablet  Commonly known as:  REXULTI     vilazodone HCl 10 MG Tabs  Commonly known as:  VIIBRYD        ASK your doctor about these medications    nicotine polacrilex 2 MG gum  Commonly known as:  NICORETTE  Take 1 each by mouth as needed for Smoking cessation  Notes to patient:  Help with smoking cessation           Where to Get Your Medications      You can get these medications from any pharmacy    Bring a paper prescription for each of these medications  · carBAMazepine 200 MG tablet  · PARoxetine 10 MG tablet  · traZODone 50 MG tablet      Pharmacy Instructions:      [address] [place] [address] (discharge meds called in)                        Follow Up and disposition    Discharged  to  Home    Follow up with [facility]/PCP/PSYCHIATRIST within 2 weeks.   [facility] [place]  [phone]  [phone]  On 3/29/2018  @ 4 pm with nurse for discharge follow up    [facility] [place]  [phone]  [phone]  On 4/2/2018  @ 215 PM with Dr Keily Meyer MD  Psychiatrist.

## 2018-05-01 ENCOUNTER — HOSPITAL ENCOUNTER (EMERGENCY)
Age: 38
Discharge: PSYCHIATRIC OR SUBSTANCE USE FACILITY | End: 2018-05-01
Attending: EMERGENCY MEDICINE
Payer: MEDICAID

## 2018-05-01 ENCOUNTER — APPOINTMENT (OUTPATIENT)
Dept: GENERAL RADIOLOGY | Age: 38
End: 2018-05-01
Payer: MEDICAID

## 2018-05-01 ENCOUNTER — HOSPITAL ENCOUNTER (INPATIENT)
Age: 38
LOS: 7 days | Discharge: PSYCHIATRIC OR SUBSTANCE USE FACILITY | DRG: 750 | End: 2018-05-08
Attending: PSYCHIATRY & NEUROLOGY | Admitting: PSYCHIATRY & NEUROLOGY
Payer: MEDICAID

## 2018-05-01 VITALS
OXYGEN SATURATION: 99 % | SYSTOLIC BLOOD PRESSURE: 101 MMHG | HEART RATE: 60 BPM | DIASTOLIC BLOOD PRESSURE: 73 MMHG | HEIGHT: 66 IN | TEMPERATURE: 98.2 F | WEIGHT: 130 LBS | RESPIRATION RATE: 16 BRPM | BODY MASS INDEX: 20.89 KG/M2

## 2018-05-01 DIAGNOSIS — R45.850 HOMICIDAL IDEATION: ICD-10-CM

## 2018-05-01 DIAGNOSIS — R45.851 SUICIDAL IDEATION: Primary | ICD-10-CM

## 2018-05-01 PROBLEM — F25.9 SCHIZOAFFECTIVE DISORDER (HCC): Status: ACTIVE | Noted: 2018-05-01

## 2018-05-01 LAB
EKG ATRIAL RATE: 55 BPM
EKG P AXIS: 78 DEGREES
EKG P-R INTERVAL: 164 MS
EKG Q-T INTERVAL: 430 MS
EKG QRS DURATION: 82 MS
EKG QTC CALCULATION (BAZETT): 411 MS
EKG R AXIS: 88 DEGREES
EKG T AXIS: 100 DEGREES
EKG VENTRICULAR RATE: 55 BPM

## 2018-05-01 PROCEDURE — 1240000000 HC EMOTIONAL WELLNESS R&B

## 2018-05-01 PROCEDURE — 73630 X-RAY EXAM OF FOOT: CPT

## 2018-05-01 PROCEDURE — 99285 EMERGENCY DEPT VISIT HI MDM: CPT

## 2018-05-01 PROCEDURE — 86803 HEPATITIS C AB TEST: CPT

## 2018-05-01 PROCEDURE — 87389 HIV-1 AG W/HIV-1&-2 AB AG IA: CPT

## 2018-05-01 PROCEDURE — 96372 THER/PROPH/DIAG INJ SC/IM: CPT

## 2018-05-01 PROCEDURE — 87340 HEPATITIS B SURFACE AG IA: CPT

## 2018-05-01 PROCEDURE — 6360000002 HC RX W HCPCS: Performed by: EMERGENCY MEDICINE

## 2018-05-01 PROCEDURE — 93005 ELECTROCARDIOGRAM TRACING: CPT

## 2018-05-01 PROCEDURE — 6370000000 HC RX 637 (ALT 250 FOR IP): Performed by: EMERGENCY MEDICINE

## 2018-05-01 RX ORDER — PAROXETINE 10 MG/1
10 TABLET, FILM COATED ORAL DAILY
Status: DISCONTINUED | OUTPATIENT
Start: 2018-05-02 | End: 2018-05-08 | Stop reason: HOSPADM

## 2018-05-01 RX ORDER — IBUPROFEN 800 MG/1
800 TABLET ORAL ONCE
Status: COMPLETED | OUTPATIENT
Start: 2018-05-01 | End: 2018-05-01

## 2018-05-01 RX ORDER — HALOPERIDOL 5 MG/ML
5 INJECTION INTRAMUSCULAR ONCE
Status: COMPLETED | OUTPATIENT
Start: 2018-05-01 | End: 2018-05-01

## 2018-05-01 RX ORDER — CARBAMAZEPINE 200 MG/1
200 TABLET ORAL 2 TIMES DAILY
Status: ON HOLD | COMMUNITY
Start: 2018-05-02 | End: 2019-04-09 | Stop reason: SDUPTHER

## 2018-05-01 RX ORDER — LORAZEPAM 2 MG/ML
1 INJECTION INTRAMUSCULAR EVERY 4 HOURS PRN
Status: DISCONTINUED | OUTPATIENT
Start: 2018-05-01 | End: 2018-05-08 | Stop reason: HOSPADM

## 2018-05-01 RX ORDER — HYDROXYZINE HYDROCHLORIDE 25 MG/1
50 TABLET, FILM COATED ORAL 3 TIMES DAILY PRN
Status: DISCONTINUED | OUTPATIENT
Start: 2018-05-01 | End: 2018-05-08 | Stop reason: HOSPADM

## 2018-05-01 RX ORDER — ACETAMINOPHEN 325 MG/1
650 TABLET ORAL EVERY 4 HOURS PRN
Status: DISCONTINUED | OUTPATIENT
Start: 2018-05-01 | End: 2018-05-08 | Stop reason: HOSPADM

## 2018-05-01 RX ORDER — TRAZODONE HYDROCHLORIDE 50 MG/1
50 TABLET ORAL NIGHTLY PRN
Status: DISCONTINUED | OUTPATIENT
Start: 2018-05-02 | End: 2018-05-08 | Stop reason: HOSPADM

## 2018-05-01 RX ORDER — CARBAMAZEPINE 200 MG/1
200 TABLET ORAL 2 TIMES DAILY
Status: DISCONTINUED | OUTPATIENT
Start: 2018-05-02 | End: 2018-05-08 | Stop reason: HOSPADM

## 2018-05-01 RX ORDER — LORAZEPAM 2 MG/ML
2 INJECTION INTRAMUSCULAR ONCE
Status: COMPLETED | OUTPATIENT
Start: 2018-05-01 | End: 2018-05-01

## 2018-05-01 RX ORDER — DIPHENHYDRAMINE HYDROCHLORIDE 50 MG/ML
25 INJECTION INTRAMUSCULAR; INTRAVENOUS ONCE
Status: COMPLETED | OUTPATIENT
Start: 2018-05-01 | End: 2018-05-01

## 2018-05-01 RX ORDER — DOCUSATE SODIUM 100 MG/1
100 CAPSULE, LIQUID FILLED ORAL 2 TIMES DAILY
Status: DISCONTINUED | OUTPATIENT
Start: 2018-05-02 | End: 2018-05-08 | Stop reason: HOSPADM

## 2018-05-01 RX ADMIN — LORAZEPAM 2 MG: 2 INJECTION INTRAMUSCULAR at 15:29

## 2018-05-01 RX ADMIN — IBUPROFEN 800 MG: 800 TABLET, FILM COATED ORAL at 12:20

## 2018-05-01 RX ADMIN — HALOPERIDOL LACTATE 5 MG: 5 INJECTION, SOLUTION INTRAMUSCULAR at 14:57

## 2018-05-01 RX ADMIN — DIPHENHYDRAMINE HYDROCHLORIDE 25 MG: 50 INJECTION, SOLUTION INTRAMUSCULAR; INTRAVENOUS at 15:30

## 2018-05-01 ASSESSMENT — PAIN DESCRIPTION - PAIN TYPE: TYPE: ACUTE PAIN

## 2018-05-01 ASSESSMENT — PAIN DESCRIPTION - LOCATION: LOCATION: HAND;NOSE

## 2018-05-01 ASSESSMENT — PAIN SCALES - GENERAL: PAINLEVEL_OUTOF10: 8

## 2018-05-01 ASSESSMENT — PAIN DESCRIPTION - ORIENTATION: ORIENTATION: RIGHT

## 2018-05-01 ASSESSMENT — PAIN - FUNCTIONAL ASSESSMENT: PAIN_FUNCTIONAL_ASSESSMENT: 0-10

## 2018-05-01 ASSESSMENT — ENCOUNTER SYMPTOMS
ABDOMINAL PAIN: 0
SHORTNESS OF BREATH: 0

## 2018-05-01 ASSESSMENT — PAIN DESCRIPTION - DESCRIPTORS: DESCRIPTORS: DISCOMFORT

## 2018-05-02 LAB
ALBUMIN SERPL-MCNC: 4.2 G/DL (ref 3.5–5.2)
ALBUMIN/GLOBULIN RATIO: ABNORMAL (ref 1–2.5)
ALP BLD-CCNC: 81 U/L (ref 35–104)
ALT SERPL-CCNC: 12 U/L (ref 5–33)
ANION GAP SERPL CALCULATED.3IONS-SCNC: 8 MMOL/L (ref 9–17)
AST SERPL-CCNC: 30 U/L
BILIRUB SERPL-MCNC: 0.35 MG/DL (ref 0.3–1.2)
BUN BLDV-MCNC: 8 MG/DL (ref 6–20)
BUN/CREAT BLD: ABNORMAL (ref 9–20)
CALCIUM SERPL-MCNC: 8.5 MG/DL (ref 8.6–10.4)
CARBAMAZEPINE DATE LAST DOSE: ABNORMAL
CARBAMAZEPINE DOSE AMOUNT: 200
CARBAMAZEPINE DOSE TIME: 35
CARBAMAZEPINE LEVEL: 3.9 UG/ML (ref 4–12)
CHLORIDE BLD-SCNC: 102 MMOL/L (ref 98–107)
CO2: 27 MMOL/L (ref 20–31)
CREAT SERPL-MCNC: 0.81 MG/DL (ref 0.5–0.9)
GFR AFRICAN AMERICAN: >60 ML/MIN
GFR NON-AFRICAN AMERICAN: >60 ML/MIN
GFR SERPL CREATININE-BSD FRML MDRD: ABNORMAL ML/MIN/{1.73_M2}
GFR SERPL CREATININE-BSD FRML MDRD: ABNORMAL ML/MIN/{1.73_M2}
GLUCOSE BLD-MCNC: 110 MG/DL (ref 70–99)
HCG QUANTITATIVE: <1 IU/L
HEPATITIS B SURFACE ANTIGEN: ABNORMAL
HEPATITIS C ANTIBODY: ABNORMAL
HIV AG/AB: ABNORMAL
POTASSIUM SERPL-SCNC: 4.2 MMOL/L (ref 3.7–5.3)
SODIUM BLD-SCNC: 137 MMOL/L (ref 135–144)
TOTAL PROTEIN: 7.7 G/DL (ref 6.4–8.3)

## 2018-05-02 PROCEDURE — 36415 COLL VENOUS BLD VENIPUNCTURE: CPT

## 2018-05-02 PROCEDURE — 80053 COMPREHEN METABOLIC PANEL: CPT

## 2018-05-02 PROCEDURE — 80156 ASSAY CARBAMAZEPINE TOTAL: CPT

## 2018-05-02 PROCEDURE — 6370000000 HC RX 637 (ALT 250 FOR IP): Performed by: NURSE PRACTITIONER

## 2018-05-02 PROCEDURE — 84702 CHORIONIC GONADOTROPIN TEST: CPT

## 2018-05-02 PROCEDURE — 90792 PSYCH DIAG EVAL W/MED SRVCS: CPT | Performed by: REGISTERED NURSE

## 2018-05-02 PROCEDURE — 1240000000 HC EMOTIONAL WELLNESS R&B

## 2018-05-02 RX ADMIN — NICOTINE POLACRILEX 2 MG: 2 GUM, CHEWING BUCCAL at 10:59

## 2018-05-02 RX ADMIN — CARBAMAZEPINE 200 MG: 200 TABLET ORAL at 08:55

## 2018-05-02 RX ADMIN — DOCUSATE SODIUM 100 MG: 100 CAPSULE, LIQUID FILLED ORAL at 21:09

## 2018-05-02 RX ADMIN — DOCUSATE SODIUM 100 MG: 100 CAPSULE, LIQUID FILLED ORAL at 08:55

## 2018-05-02 RX ADMIN — PAROXETINE HYDROCHLORIDE 10 MG: 10 TABLET, FILM COATED ORAL at 08:55

## 2018-05-02 RX ADMIN — CARBAMAZEPINE 200 MG: 200 TABLET ORAL at 21:09

## 2018-05-02 RX ADMIN — TRAZODONE HYDROCHLORIDE 50 MG: 50 TABLET ORAL at 21:09

## 2018-05-02 RX ADMIN — HYDROXYZINE HYDROCHLORIDE 50 MG: 25 TABLET, FILM COATED ORAL at 21:09

## 2018-05-02 RX ADMIN — CARBAMAZEPINE 200 MG: 200 TABLET ORAL at 00:35

## 2018-05-02 RX ADMIN — NICOTINE POLACRILEX 2 MG: 2 GUM, CHEWING BUCCAL at 15:27

## 2018-05-02 RX ADMIN — NICOTINE POLACRILEX 2 MG: 2 GUM, CHEWING BUCCAL at 17:54

## 2018-05-02 ASSESSMENT — LIFESTYLE VARIABLES
HISTORY_ALCOHOL_USE: NO
HISTORY_ALCOHOL_USE: NO

## 2018-05-03 PROCEDURE — 99232 SBSQ HOSP IP/OBS MODERATE 35: CPT | Performed by: REGISTERED NURSE

## 2018-05-03 PROCEDURE — 1240000000 HC EMOTIONAL WELLNESS R&B

## 2018-05-03 PROCEDURE — 6370000000 HC RX 637 (ALT 250 FOR IP): Performed by: NURSE PRACTITIONER

## 2018-05-03 RX ADMIN — CARBAMAZEPINE 200 MG: 200 TABLET ORAL at 08:34

## 2018-05-03 RX ADMIN — DOCUSATE SODIUM 100 MG: 100 CAPSULE, LIQUID FILLED ORAL at 21:13

## 2018-05-03 RX ADMIN — NICOTINE POLACRILEX 2 MG: 2 GUM, CHEWING BUCCAL at 08:52

## 2018-05-03 RX ADMIN — ACETAMINOPHEN 650 MG: 325 TABLET, FILM COATED ORAL at 21:13

## 2018-05-03 RX ADMIN — TRAZODONE HYDROCHLORIDE 50 MG: 50 TABLET ORAL at 21:13

## 2018-05-03 RX ADMIN — HYDROXYZINE HYDROCHLORIDE 50 MG: 25 TABLET, FILM COATED ORAL at 21:13

## 2018-05-03 RX ADMIN — CARBAMAZEPINE 200 MG: 200 TABLET ORAL at 21:13

## 2018-05-03 RX ADMIN — ACETAMINOPHEN 650 MG: 325 TABLET, FILM COATED ORAL at 02:42

## 2018-05-03 RX ADMIN — DOCUSATE SODIUM 100 MG: 100 CAPSULE, LIQUID FILLED ORAL at 08:34

## 2018-05-03 RX ADMIN — NICOTINE POLACRILEX 2 MG: 2 GUM, CHEWING BUCCAL at 18:24

## 2018-05-03 RX ADMIN — NICOTINE POLACRILEX 2 MG: 2 GUM, CHEWING BUCCAL at 06:25

## 2018-05-03 RX ADMIN — NICOTINE POLACRILEX 2 MG: 2 GUM, CHEWING BUCCAL at 21:13

## 2018-05-03 RX ADMIN — PAROXETINE HYDROCHLORIDE 10 MG: 10 TABLET, FILM COATED ORAL at 08:34

## 2018-05-03 ASSESSMENT — PAIN DESCRIPTION - LOCATION: LOCATION: FOOT

## 2018-05-03 ASSESSMENT — PAIN SCALES - GENERAL
PAINLEVEL_OUTOF10: 3
PAINLEVEL_OUTOF10: 3
PAINLEVEL_OUTOF10: 0
PAINLEVEL_OUTOF10: 1

## 2018-05-04 PROCEDURE — 1240000000 HC EMOTIONAL WELLNESS R&B

## 2018-05-04 PROCEDURE — 99232 SBSQ HOSP IP/OBS MODERATE 35: CPT | Performed by: NURSE PRACTITIONER

## 2018-05-04 PROCEDURE — 6370000000 HC RX 637 (ALT 250 FOR IP): Performed by: NURSE PRACTITIONER

## 2018-05-04 RX ORDER — ARIPIPRAZOLE 5 MG/1
5 TABLET ORAL DAILY
Status: DISCONTINUED | OUTPATIENT
Start: 2018-05-04 | End: 2018-05-08 | Stop reason: HOSPADM

## 2018-05-04 RX ADMIN — PAROXETINE HYDROCHLORIDE 10 MG: 10 TABLET, FILM COATED ORAL at 08:21

## 2018-05-04 RX ADMIN — TRAZODONE HYDROCHLORIDE 50 MG: 50 TABLET ORAL at 22:15

## 2018-05-04 RX ADMIN — DOCUSATE SODIUM 100 MG: 100 CAPSULE, LIQUID FILLED ORAL at 08:21

## 2018-05-04 RX ADMIN — DOCUSATE SODIUM 100 MG: 100 CAPSULE, LIQUID FILLED ORAL at 22:15

## 2018-05-04 RX ADMIN — HYDROXYZINE HYDROCHLORIDE 50 MG: 25 TABLET, FILM COATED ORAL at 08:21

## 2018-05-04 RX ADMIN — CARBAMAZEPINE 200 MG: 200 TABLET ORAL at 22:15

## 2018-05-04 RX ADMIN — NICOTINE POLACRILEX 2 MG: 2 GUM, CHEWING BUCCAL at 09:37

## 2018-05-04 RX ADMIN — CARBAMAZEPINE 200 MG: 200 TABLET ORAL at 08:21

## 2018-05-05 PROCEDURE — 99232 SBSQ HOSP IP/OBS MODERATE 35: CPT | Performed by: REGISTERED NURSE

## 2018-05-05 PROCEDURE — 6370000000 HC RX 637 (ALT 250 FOR IP): Performed by: NURSE PRACTITIONER

## 2018-05-05 PROCEDURE — 1240000000 HC EMOTIONAL WELLNESS R&B

## 2018-05-05 PROCEDURE — 6360000002 HC RX W HCPCS: Performed by: REGISTERED NURSE

## 2018-05-05 RX ADMIN — NICOTINE POLACRILEX 2 MG: 2 GUM, CHEWING BUCCAL at 16:32

## 2018-05-05 RX ADMIN — ARIPIPRAZOLE 400 MG: KIT at 18:13

## 2018-05-05 RX ADMIN — CARBAMAZEPINE 200 MG: 200 TABLET ORAL at 08:44

## 2018-05-05 RX ADMIN — ARIPIPRAZOLE 5 MG: 5 TABLET ORAL at 08:44

## 2018-05-05 RX ADMIN — PAROXETINE HYDROCHLORIDE 10 MG: 10 TABLET, FILM COATED ORAL at 08:44

## 2018-05-05 RX ADMIN — DOCUSATE SODIUM 100 MG: 100 CAPSULE, LIQUID FILLED ORAL at 08:44

## 2018-05-06 PROCEDURE — 1240000000 HC EMOTIONAL WELLNESS R&B

## 2018-05-06 PROCEDURE — 6370000000 HC RX 637 (ALT 250 FOR IP): Performed by: NURSE PRACTITIONER

## 2018-05-06 RX ADMIN — ARIPIPRAZOLE 5 MG: 5 TABLET ORAL at 08:36

## 2018-05-06 RX ADMIN — NICOTINE POLACRILEX 2 MG: 2 GUM, CHEWING BUCCAL at 20:45

## 2018-05-06 RX ADMIN — CARBAMAZEPINE 200 MG: 200 TABLET ORAL at 20:45

## 2018-05-06 RX ADMIN — DOCUSATE SODIUM 100 MG: 100 CAPSULE, LIQUID FILLED ORAL at 08:36

## 2018-05-06 RX ADMIN — TRAZODONE HYDROCHLORIDE 50 MG: 50 TABLET ORAL at 20:45

## 2018-05-06 RX ADMIN — HYDROXYZINE HYDROCHLORIDE 50 MG: 25 TABLET, FILM COATED ORAL at 20:45

## 2018-05-06 RX ADMIN — CARBAMAZEPINE 200 MG: 200 TABLET ORAL at 08:36

## 2018-05-06 RX ADMIN — ACETAMINOPHEN 650 MG: 325 TABLET, FILM COATED ORAL at 20:45

## 2018-05-06 RX ADMIN — PAROXETINE HYDROCHLORIDE 10 MG: 10 TABLET, FILM COATED ORAL at 08:36

## 2018-05-06 RX ADMIN — DOCUSATE SODIUM 100 MG: 100 CAPSULE, LIQUID FILLED ORAL at 20:45

## 2018-05-06 ASSESSMENT — PAIN SCALES - GENERAL
PAINLEVEL_OUTOF10: 3
PAINLEVEL_OUTOF10: 1

## 2018-05-06 ASSESSMENT — PAIN DESCRIPTION - LOCATION: LOCATION: HEAD

## 2018-05-07 PROCEDURE — 6370000000 HC RX 637 (ALT 250 FOR IP): Performed by: NURSE PRACTITIONER

## 2018-05-07 PROCEDURE — 99232 SBSQ HOSP IP/OBS MODERATE 35: CPT | Performed by: REGISTERED NURSE

## 2018-05-07 PROCEDURE — 1240000000 HC EMOTIONAL WELLNESS R&B

## 2018-05-07 RX ADMIN — DOCUSATE SODIUM 100 MG: 100 CAPSULE, LIQUID FILLED ORAL at 08:29

## 2018-05-07 RX ADMIN — ARIPIPRAZOLE 5 MG: 5 TABLET ORAL at 08:29

## 2018-05-07 RX ADMIN — CARBAMAZEPINE 200 MG: 200 TABLET ORAL at 22:01

## 2018-05-07 RX ADMIN — CARBAMAZEPINE 200 MG: 200 TABLET ORAL at 08:29

## 2018-05-07 RX ADMIN — NICOTINE POLACRILEX 2 MG: 2 GUM, CHEWING BUCCAL at 08:29

## 2018-05-07 RX ADMIN — TRAZODONE HYDROCHLORIDE 50 MG: 50 TABLET ORAL at 22:01

## 2018-05-07 RX ADMIN — NICOTINE POLACRILEX 2 MG: 2 GUM, CHEWING BUCCAL at 17:51

## 2018-05-07 RX ADMIN — NICOTINE POLACRILEX 2 MG: 2 GUM, CHEWING BUCCAL at 15:10

## 2018-05-07 RX ADMIN — HYDROXYZINE HYDROCHLORIDE 50 MG: 25 TABLET, FILM COATED ORAL at 22:01

## 2018-05-07 RX ADMIN — PAROXETINE HYDROCHLORIDE 10 MG: 10 TABLET, FILM COATED ORAL at 08:29

## 2018-05-07 RX ADMIN — DOCUSATE SODIUM 100 MG: 100 CAPSULE, LIQUID FILLED ORAL at 22:01

## 2018-05-08 VITALS
DIASTOLIC BLOOD PRESSURE: 68 MMHG | RESPIRATION RATE: 14 BRPM | HEART RATE: 72 BPM | BODY MASS INDEX: 20.89 KG/M2 | TEMPERATURE: 97.5 F | WEIGHT: 130 LBS | SYSTOLIC BLOOD PRESSURE: 99 MMHG | HEIGHT: 66 IN

## 2018-05-08 PROCEDURE — 99239 HOSP IP/OBS DSCHRG MGMT >30: CPT | Performed by: REGISTERED NURSE

## 2018-05-08 PROCEDURE — 5130000000 HC BRIDGE APPOINTMENT

## 2018-05-08 PROCEDURE — 6370000000 HC RX 637 (ALT 250 FOR IP): Performed by: NURSE PRACTITIONER

## 2018-05-08 RX ADMIN — PAROXETINE HYDROCHLORIDE 10 MG: 10 TABLET, FILM COATED ORAL at 08:12

## 2018-05-08 RX ADMIN — ARIPIPRAZOLE 5 MG: 5 TABLET ORAL at 08:11

## 2018-05-08 RX ADMIN — NICOTINE POLACRILEX 2 MG: 2 GUM, CHEWING BUCCAL at 08:14

## 2018-05-08 RX ADMIN — DOCUSATE SODIUM 100 MG: 100 CAPSULE, LIQUID FILLED ORAL at 08:12

## 2018-05-08 RX ADMIN — CARBAMAZEPINE 200 MG: 200 TABLET ORAL at 08:12

## 2018-08-02 ENCOUNTER — HOSPITAL ENCOUNTER (OUTPATIENT)
Age: 38
Setting detail: SPECIMEN
Discharge: HOME | End: 2018-08-02
Payer: MEDICAID

## 2018-08-02 ENCOUNTER — TELEPHONE (OUTPATIENT)
Dept: OBGYN | Age: 38
End: 2018-08-02

## 2018-08-02 ENCOUNTER — NURSE ONLY (OUTPATIENT)
Dept: OBGYN | Age: 38
End: 2018-08-02
Payer: MEDICAID

## 2018-08-02 VITALS
SYSTOLIC BLOOD PRESSURE: 106 MMHG | DIASTOLIC BLOOD PRESSURE: 73 MMHG | WEIGHT: 131.4 LBS | HEART RATE: 82 BPM | BODY MASS INDEX: 19.91 KG/M2 | HEIGHT: 68 IN

## 2018-08-02 DIAGNOSIS — Z34.90 PREGNANCY, UNSPECIFIED GESTATIONAL AGE: ICD-10-CM

## 2018-08-02 DIAGNOSIS — N91.2 AMENORRHEA: Primary | ICD-10-CM

## 2018-08-02 DIAGNOSIS — Z34.90 PREGNANCY, UNSPECIFIED GESTATIONAL AGE: Primary | ICD-10-CM

## 2018-08-02 LAB
CONTROL: PRESENT
HCG QUANTITATIVE: 4720 IU/L
PREGNANCY TEST URINE, POC: POSITIVE

## 2018-08-02 PROCEDURE — 81025 URINE PREGNANCY TEST: CPT

## 2018-08-02 PROCEDURE — 36415 COLL VENOUS BLD VENIPUNCTURE: CPT

## 2018-08-02 PROCEDURE — 84702 CHORIONIC GONADOTROPIN TEST: CPT

## 2018-08-02 PROCEDURE — 99211 OFF/OP EST MAY X REQ PHY/QHP: CPT

## 2018-08-02 RX ORDER — BENZTROPINE MESYLATE 1 MG/1
1 TABLET ORAL
Status: ON HOLD | COMMUNITY
End: 2019-04-09 | Stop reason: SDUPTHER

## 2018-08-29 ENCOUNTER — TELEPHONE (OUTPATIENT)
Dept: OBGYN | Age: 38
End: 2018-08-29

## 2018-09-03 ENCOUNTER — HOSPITAL ENCOUNTER (EMERGENCY)
Age: 38
Discharge: HOME | End: 2018-09-03
Attending: EMERGENCY MEDICINE
Payer: MEDICAID

## 2018-09-03 VITALS
SYSTOLIC BLOOD PRESSURE: 122 MMHG | DIASTOLIC BLOOD PRESSURE: 80 MMHG | HEIGHT: 68 IN | OXYGEN SATURATION: 98 % | BODY MASS INDEX: 19.7 KG/M2 | HEART RATE: 99 BPM | RESPIRATION RATE: 15 BRPM | TEMPERATURE: 98.9 F | WEIGHT: 130 LBS

## 2018-09-03 DIAGNOSIS — R51.9 NONINTRACTABLE HEADACHE, UNSPECIFIED CHRONICITY PATTERN, UNSPECIFIED HEADACHE TYPE: Primary | ICD-10-CM

## 2018-09-03 LAB — GLUCOSE BLD-MCNC: 92 MG/DL (ref 65–105)

## 2018-09-03 PROCEDURE — 82947 ASSAY GLUCOSE BLOOD QUANT: CPT

## 2018-09-03 PROCEDURE — 99283 EMERGENCY DEPT VISIT LOW MDM: CPT

## 2018-09-03 PROCEDURE — 6370000000 HC RX 637 (ALT 250 FOR IP): Performed by: EMERGENCY MEDICINE

## 2018-09-03 RX ORDER — ACETAMINOPHEN 325 MG/1
650 TABLET ORAL ONCE
Status: COMPLETED | OUTPATIENT
Start: 2018-09-03 | End: 2018-09-03

## 2018-09-03 RX ADMIN — ACETAMINOPHEN 650 MG: 325 TABLET ORAL at 17:48

## 2018-09-03 ASSESSMENT — ENCOUNTER SYMPTOMS
ABDOMINAL PAIN: 0
VOMITING: 0
SORE THROAT: 0
BACK PAIN: 0
SHORTNESS OF BREATH: 0

## 2018-09-03 ASSESSMENT — PAIN SCALES - GENERAL: PAINLEVEL_OUTOF10: 5

## 2018-09-03 NOTE — ED PROVIDER NOTES
venom; Beeswax; and Wasp venom protein    Home Medications:  Prior to Admission medications    Medication Sig Start Date End Date Taking? Authorizing Provider   benztropine (COGENTIN) 1 MG tablet Take 1 mg by mouth    Historical Provider, MD   lurasidone (LATUDA) 80 MG TABS tablet Take 160 mg by mouth    Historical Provider, MD   carBAMazepine (TEGRETOL) 200 MG tablet Take 200 mg by mouth 2 times daily 5/2/18   Historical Provider, MD   traZODone (DESYREL) 50 MG tablet Take 1 tablet by mouth nightly as needed for Sleep 3/29/18   Brittany Trotter MD   PARoxetine (PAXIL) 10 MG tablet Take 1 tablet by mouth daily for 14 days 3/30/18 4/13/18  Brittany Trotter MD   nicotine polacrilex (NICORETTE) 2 MG gum Take 1 each by mouth as needed for Smoking cessation 11/7/17   Ronald Preciado MD   ARIPiprazole ER (ABILIFY MAINTENA) 400 MG SUSR Inject 400 mg into the muscle every 28 days 7/21/17   Peter Blake MD       REVIEW OF SYSTEMS    (2-9 systems for level 4, 10 or more for level 5)      Review of Systems   Constitutional: Negative for chills and fever. HENT: Negative for sore throat. Eyes: Negative for visual disturbance. Respiratory: Negative for shortness of breath. Cardiovascular: Negative for chest pain. Gastrointestinal: Negative for abdominal pain and vomiting. Genitourinary: Negative for dysuria. Musculoskeletal: Negative for back pain. Skin: Negative for rash. Neurological: Positive for headaches. Negative for dizziness and weakness. Psychiatric/Behavioral: Negative for confusion. PHYSICAL EXAM   (up to 7 for level 4, 8 or more for level 5)      INITIAL VITALS:   /80   Pulse 99   Temp 98.9 °F (37.2 °C) (Oral)   Resp 15   Ht 5' 8\" (1.727 m)   Wt 130 lb (59 kg)   LMP 06/03/2018 (Approximate)   SpO2 98%   BMI 19.77 kg/m²     Physical Exam   Constitutional: She is oriented to person, place, and time. She appears well-developed and well-nourished. No distress.    HENT: 1. Nonintractable headache, unspecified chronicity pattern, unspecified headache type          DISPOSITION / PLAN     DISPOSITION Decision to discharge       PATIENT REFERRED TO:  No follow-up provider specified. DISCHARGE MEDICATIONS:  New Prescriptions    No medications on file       Andrew Monroe MD  Emergency Medicine Resident    (Please note that portions of this note were completed with a voice recognition program.  Efforts were made to edit the dictations but occasionally words are mis-transcribed. )        Andrew Monroe MD  Resident  09/03/18 4894

## 2018-09-03 NOTE — ED NOTES
Patient medicated for pain as ordered, up to restroom with steady gait.      Raissa Lucero RN  09/03/18 0506

## 2018-09-03 NOTE — ED NOTES
Bed: 29  Expected date:   Expected time:   Means of arrival:   Comments:     Fausto Perry RN  09/03/18 8508

## 2018-09-03 NOTE — ED TRIAGE NOTES
Pt presents to ED via EMS with reort of being found in parking lot for some time. Pt has no complaints at this time, NAD noted.

## 2018-10-03 ENCOUNTER — TELEPHONE (OUTPATIENT)
Dept: OBGYN | Age: 38
End: 2018-10-03

## 2018-11-09 ENCOUNTER — TELEPHONE (OUTPATIENT)
Dept: OBGYN | Age: 38
End: 2018-11-09

## 2018-11-14 DIAGNOSIS — Z34.90 PREGNANCY, UNSPECIFIED GESTATIONAL AGE: Primary | ICD-10-CM

## 2018-11-15 ENCOUNTER — TELEPHONE (OUTPATIENT)
Dept: OBGYN | Age: 38
End: 2018-11-15

## 2018-11-15 NOTE — TELEPHONE ENCOUNTER
Call from this patient's  from [place]. She states they did go to pick pt up for her appt and the patient refuses, she still believes she is not preg. Plan to re-scheduled patient and keep attempting to get her her for care in this pregnancy. Pt rescheduled on 11/27/18 at 3pm     Pt has severe emotional health history. GA at this point is unknown. HCG on  8/2/18 awas 4,720.

## 2018-12-06 ENCOUNTER — TELEPHONE (OUTPATIENT)
Dept: OBGYN | Age: 38
End: 2018-12-06

## 2018-12-19 ENCOUNTER — TELEPHONE (OUTPATIENT)
Dept: OBGYN | Age: 38
End: 2018-12-19

## 2018-12-19 NOTE — TELEPHONE ENCOUNTER
According to pt chart, she is receiving mental health services through [facility]. Thus, she can not be referred to Dr. Ismael Patel. Matias Razo is it possibly for you and I to meet or phone conference with [facility] caseworkers to brainstorm?

## 2018-12-20 ENCOUNTER — HOSPITAL ENCOUNTER (INPATIENT)
Age: 38
LOS: 50 days | Discharge: ANOTHER HOSPITAL | DRG: 566 | End: 2019-02-08
Attending: EMERGENCY MEDICINE | Admitting: PSYCHIATRY & NEUROLOGY
Payer: MEDICAID

## 2018-12-20 DIAGNOSIS — F32.1 CURRENT MODERATE EPISODE OF MAJOR DEPRESSIVE DISORDER, UNSPECIFIED WHETHER RECURRENT (HCC): Primary | ICD-10-CM

## 2018-12-20 LAB
ALBUMIN SERPL-MCNC: 3.1 G/DL (ref 3.5–5.2)
ALBUMIN/GLOBULIN RATIO: ABNORMAL (ref 1–2.5)
ALP BLD-CCNC: 69 U/L (ref 35–104)
ALT SERPL-CCNC: 7 U/L (ref 5–33)
ANION GAP SERPL CALCULATED.3IONS-SCNC: 10 MMOL/L (ref 9–17)
AST SERPL-CCNC: 12 U/L
BILIRUB SERPL-MCNC: 0.32 MG/DL (ref 0.3–1.2)
BILIRUBIN DIRECT: 0.1 MG/DL
BILIRUBIN, INDIRECT: 0.22 MG/DL (ref 0–1)
BUN BLDV-MCNC: 6 MG/DL (ref 6–20)
BUN/CREAT BLD: ABNORMAL (ref 9–20)
CALCIUM SERPL-MCNC: 8.1 MG/DL (ref 8.6–10.4)
CHLORIDE BLD-SCNC: 104 MMOL/L (ref 98–107)
CO2: 23 MMOL/L (ref 20–31)
CREAT SERPL-MCNC: 0.59 MG/DL (ref 0.5–0.9)
ETHANOL PERCENT: <0.01 %
ETHANOL: <10 MG/DL
GFR AFRICAN AMERICAN: >60 ML/MIN
GFR NON-AFRICAN AMERICAN: >60 ML/MIN
GFR SERPL CREATININE-BSD FRML MDRD: ABNORMAL ML/MIN/{1.73_M2}
GFR SERPL CREATININE-BSD FRML MDRD: ABNORMAL ML/MIN/{1.73_M2}
GLOBULIN: ABNORMAL G/DL (ref 1.5–3.8)
GLUCOSE BLD-MCNC: 77 MG/DL (ref 70–99)
HCG QUALITATIVE: POSITIVE
HCT VFR BLD CALC: 30 % (ref 36–46)
HEMOGLOBIN: 10.8 G/DL (ref 12–16)
MAGNESIUM: 2 MG/DL (ref 1.6–2.6)
MCH RBC QN AUTO: 36 PG (ref 26–34)
MCHC RBC AUTO-ENTMCNC: 35.9 G/DL (ref 31–37)
MCV RBC AUTO: 100.3 FL (ref 80–100)
NRBC AUTOMATED: ABNORMAL PER 100 WBC
PDW BLD-RTO: 13 % (ref 11.5–14.9)
PLATELET # BLD: 316 K/UL (ref 150–450)
PMV BLD AUTO: 7.5 FL (ref 6–12)
POTASSIUM SERPL-SCNC: 3.5 MMOL/L (ref 3.7–5.3)
RBC # BLD: 2.99 M/UL (ref 4–5.2)
SODIUM BLD-SCNC: 137 MMOL/L (ref 135–144)
TOTAL PROTEIN: 6.7 G/DL (ref 6.4–8.3)
WBC # BLD: 9.6 K/UL (ref 3.5–11)

## 2018-12-20 PROCEDURE — 36415 COLL VENOUS BLD VENIPUNCTURE: CPT

## 2018-12-20 PROCEDURE — 80048 BASIC METABOLIC PNL TOTAL CA: CPT

## 2018-12-20 PROCEDURE — 80076 HEPATIC FUNCTION PANEL: CPT

## 2018-12-20 PROCEDURE — 99285 EMERGENCY DEPT VISIT HI MDM: CPT

## 2018-12-20 PROCEDURE — G0480 DRUG TEST DEF 1-7 CLASSES: HCPCS

## 2018-12-20 PROCEDURE — 1240000000 HC EMOTIONAL WELLNESS R&B

## 2018-12-20 PROCEDURE — 80307 DRUG TEST PRSMV CHEM ANLYZR: CPT

## 2018-12-20 PROCEDURE — 85027 COMPLETE CBC AUTOMATED: CPT

## 2018-12-20 PROCEDURE — 83735 ASSAY OF MAGNESIUM: CPT

## 2018-12-20 PROCEDURE — 84703 CHORIONIC GONADOTROPIN ASSAY: CPT

## 2018-12-20 RX ORDER — DIPHENHYDRAMINE HCL 25 MG
25 TABLET ORAL NIGHTLY PRN
Status: DISCONTINUED | OUTPATIENT
Start: 2018-12-21 | End: 2019-02-08 | Stop reason: HOSPADM

## 2018-12-20 RX ORDER — ACETAMINOPHEN 325 MG/1
650 TABLET ORAL EVERY 4 HOURS PRN
Status: DISCONTINUED | OUTPATIENT
Start: 2018-12-20 | End: 2019-02-08 | Stop reason: HOSPADM

## 2018-12-20 RX ORDER — HYDROXYZINE HYDROCHLORIDE 25 MG/1
25 TABLET, FILM COATED ORAL 3 TIMES DAILY PRN
Status: DISCONTINUED | OUTPATIENT
Start: 2018-12-20 | End: 2019-02-08 | Stop reason: HOSPADM

## 2018-12-20 ASSESSMENT — SLEEP AND FATIGUE QUESTIONNAIRES
SLEEP PATTERN: DIFFICULTY FALLING ASLEEP;DISTURBED/INTERRUPTED SLEEP
AVERAGE NUMBER OF SLEEP HOURS: 4
DO YOU USE A SLEEP AID: YES
DIFFICULTY ARISING: YES
RESTFUL SLEEP: NO
DO YOU HAVE DIFFICULTY SLEEPING: YES
DIFFICULTY STAYING ASLEEP: YES
DIFFICULTY FALLING ASLEEP: YES

## 2018-12-20 ASSESSMENT — PATIENT HEALTH QUESTIONNAIRE - PHQ9: SUM OF ALL RESPONSES TO PHQ QUESTIONS 1-9: 9

## 2018-12-20 ASSESSMENT — PAIN SCALES - GENERAL: PAINLEVEL_OUTOF10: 0

## 2018-12-20 ASSESSMENT — LIFESTYLE VARIABLES: HISTORY_ALCOHOL_USE: YES

## 2018-12-20 NOTE — ED NOTES
Provisional Diagnosis:  Bi-Polar Disorder      Psychosocial and Contextual Factors:   Pt has issues with relationships. Pt has issues with social environment. Pt is pregnant per [employer] report. C-SSRS Summary: X    Patient: X  Family:  Agency: [employer], part of PACT Team.     Present Suicidal Behavior: YONY     Verbal:    Attempt:    Past Suicidal Behavior: YONY     Verbal:    Attempt:    Self-Injurious/Self-Mutilation:    Protective Factors:  Pt has housing with her sister. Pt is linked. Risk Factors:  Pt is not taking any psychiatric medications. Pt is pregnant per [employer] report. Pt is unable to care for herself. Pt is delusional. Pt has poor judgment, insight and coping skills. Clinical Summary:  Pt is a 41year old [facility] American female who presents to the ED on a [place] slip via [employer]. Pt is refusing to talk with RN and SW. Per the Pink slip, Pt had a scheduled psychiatric medication evaluation where it was found that pt is not caring for herself. Pt  is pregnant. Pt is not taking any psychiatric medications. Pt has had no pre-mode care. [place] slip states that pt is not sleeping and hears voices. Per family report, pt was laying on the side walk last night, urinating on herself. Pt is disheveled with poor hygiene. Pt has a history of crack and cannabis abuse. Level of Care Disposition:  Pending medical clearance.

## 2018-12-20 NOTE — ED NOTES
Pt presents to ED on pink slip from [facility]. Pt changed into gown and vitals taken. Pt uncooperative answering any questions.       Gavin Parada RN  12/20/18 0762

## 2018-12-21 PROBLEM — O09.30 NO PRENATAL CARE IN CURRENT PREGNANCY: Status: ACTIVE | Noted: 2018-12-21

## 2018-12-21 PROCEDURE — 87591 N.GONORRHOEAE DNA AMP PROB: CPT

## 2018-12-21 PROCEDURE — 1240000000 HC EMOTIONAL WELLNESS R&B

## 2018-12-21 PROCEDURE — 87491 CHLMYD TRACH DNA AMP PROBE: CPT

## 2018-12-21 RX ORDER — SWAB
1 SWAB, NON-MEDICATED MISCELLANEOUS DAILY
Status: DISCONTINUED | OUTPATIENT
Start: 2018-12-21 | End: 2018-12-21

## 2018-12-21 RX ORDER — SWAB
1 SWAB, NON-MEDICATED MISCELLANEOUS
Status: DISCONTINUED | OUTPATIENT
Start: 2018-12-21 | End: 2019-02-08 | Stop reason: HOSPADM

## 2018-12-21 ASSESSMENT — LIFESTYLE VARIABLES: HISTORY_ALCOHOL_USE: COMMENT

## 2018-12-21 NOTE — PLAN OF CARE
Problem: Altered Mood, Psychotic Behavior:  Goal: Absence of self-harm  Absence of self-harm   Outcome: Ongoing  Pt did not attend Community Meeting at [address] [place] [address] d/ resting in room despite staff invitation to attend.

## 2018-12-21 NOTE — ED PROVIDER NOTES
eMERGENCY dEPARTMENT eNCOUnter    Pt Name: Brandy Fields  MRN: [id number]  [place] 1980  Date of evaluation: 12/20/18  CHIEF COMPLAINT       Chief Complaint   Patient presents with    Psychiatric Evaluation     HISTORY OF PRESENT ILLNESS   Patient presents for psychiatric evaluation. According to the officers who brought her in and she isn't neglecting her personal care. She is pregnant. She has not attended to her pregnancy really. Patient denies any belly or back pain. She has no vaginal bleeding or discharge. She has no chest pain or shortness of breath. She denies any other symptoms. REVIEW OF SYSTEMS     Review of Systems   Psychiatric/Behavioral: Positive for behavioral problems. All other systems reviewed and are negative. PASTMEDICAL HISTORY     Past Medical History:   Diagnosis Date    Anxiety     Asthma     Bipolar 1 disorder ([facility]     Multiple personality ([facility]     Seizures ([facility] [facility]      SURGICAL HISTORY     History reviewed. No pertinent surgical history. CURRENT MEDICATIONS       Previous Medications    ARIPIPRAZOLE ER (ABILIFY MAINTENA) 400 MG SUSR    Inject 400 mg into the muscle every 28 days    BENZTROPINE (COGENTIN) 1 MG TABLET    Take 1 mg by mouth    CARBAMAZEPINE (TEGRETOL) 200 MG TABLET    Take 200 mg by mouth 2 times daily    LURASIDONE (LATUDA) 80 MG TABS TABLET    Take 160 mg by mouth    NICOTINE POLACRILEX (NICORETTE) 2 MG GUM    Take 1 each by mouth as needed for Smoking cessation    PAROXETINE (PAXIL) 10 MG TABLET    Take 1 tablet by mouth daily for 14 days    TRAZODONE (DESYREL) 50 MG TABLET    Take 1 tablet by mouth nightly as needed for Sleep     ALLERGIES     is allergic to bee venom; beeswax; and wasp venom protein. FAMILY HISTORY     indicated that the status of her mother is unknown.       SOCIAL HISTORY       Social History   Substance Use Topics    Smoking status: Current Every Day Smoker     Packs/day: 0.25     Types: Cigarettes, Cigars    Smokeless tobacco: Current User      Comment: Pt accepting of nicotine replacement    Alcohol use No     PHYSICAL EXAM     INITIAL VITALS: /69   Pulse 112   Temp 98.8 °F (37.1 °C)   Resp 16   Wt 130 lb (59 kg)   LMP  (LMP Unknown)   SpO2 100%   BMI 19.77 kg/m²    Physical Exam   Constitutional: She appears well-developed and well-nourished. HENT:   Head: Normocephalic. Eyes: Pupils are equal, round, and reactive to light. Neck: Normal range of motion. Neck supple. Cardiovascular: Normal rate and regular rhythm. Pulmonary/Chest: Effort normal.   Musculoskeletal: Normal range of motion. Neurological: She is alert. Skin: Skin is warm. Nursing note and vitals reviewed. MEDICAL DECISION MAKING:     Patient presents for psychiatric evaluaton. Patient will be admitted to psychiatry. ED Course as of Dec 20 2018   Thu Dec 20, 2018   1945 Patient is medically clear for psychiatry. [BK]      ED Course User Index  [BK] Douglas Rashid MD         Procedures    DIAGNOSTIC RESULTS   EKG:All EKG's are interpreted by the Emergency Department Physician who either signs or Co-signs this chart in the absence of a cardiologist.      RADIOLOGY:All plain film, CT, MRI, and formal ultrasound images (except ED bedside ultrasound) are read by the radiologist, see reports below, unless otherwisenoted in MDM or here. No orders to display     LABS: All lab results were reviewed by myself, and all abnormals are listed below.   Labs Reviewed   CBC - Abnormal; Notable for the following:        Result Value    RBC 2.99 (*)     Hemoglobin 10.8 (*)     Hematocrit 30.0 (*)     .3 (*)     MCH 36.0 (*)     All other components within normal limits   BASIC METABOLIC PANEL W/ REFLEX TO MG FOR LOW K - Abnormal; Notable for the following:     Calcium 8.1 (*)     Potassium 3.5 (*)     All other components within normal limits   HEPATIC FUNCTION PANEL - Abnormal; Notable for the following:     Alb 3.1 (*)     All other components within normal limits   ETHANOL   HCG, SERUM, QUALITATIVE   URINE DRUG SCREEN   HCG, SERUM, QUALITATIVE   MAGNESIUM     EMERGENCY DEPARTMENTCOURSE:         Vitals:    Vitals:    12/20/18 1739   BP: 139/69   Pulse: 112   Resp: 16   Temp: 98.8 °F (37.1 °C)   SpO2: 100%   Weight: 130 lb (59 kg)       The patient was given the following medications while in the emergency department:  No orders of the defined types were placed in this encounter. CONSULTS:  None    FINAL IMPRESSION    No diagnosis found. DISPOSITION/PLAN   DISPOSITION      Admit to psychiatry. PATIENT REFERRED TO:  No follow-up provider specified.   DISCHARGE MEDICATIONS:  New Prescriptions    No medications on file     Arabella Lopez MD  Attending Emergency Physician                    Ania Mcmullen MD  12/20/18 2021

## 2018-12-21 NOTE — PROGRESS NOTES

## 2018-12-21 NOTE — PLAN OF CARE
No  Hallucinations:  Auditory (Comment) (\"voices\")  Delusions: No  Memory:Normal: No  Memory: Confabulation, Poor Remote, Poor Recent  Insight and Judgment: No  Insight and Judgment: Poor Judgment, Poor Insight, Unmotivated  Present Suicidal Ideation: No  Present Homicidal Ideation: No    EDUCATION:   Learner Progress Toward Treatment Goals: reviewed group plans and strategies for care    Method:group therapy, medication compliance, individualized assessments and care planning    Outcome: needs reinforcement    PATIENT GOALS: to be discussed with patient within 72 hours    PLAN/TREATMENT RECOMMENDATIONS:     continue group therapy , medications compliance, goal setting, individualized assessments and care, continue to monitor pt on unit      SHORT-TERM GOALS:   Time frame for Short-Term Goals: 5-7 days    LONG-TERM GOALS:  Time frame for Long-Term Goals: 6 months  Members Present in Team Meeting: See Signature Sheet    CARY Noyola  Goal: Absence of self-harm  Absence of self-harm   Outcome: Ongoing
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
## 2018-12-21 NOTE — PLAN OF CARE
Problem: Altered Mood, Psychotic Behavior:  Goal: Able to demonstrate trust by eating, participating in treatment and following staff's direction  Able to demonstrate trust by eating, participating in treatment and following staff's direction   Outcome: Ongoing  Patient did eat in the dayroom this morning with peers. Pt denies thoughts of self harm and is agreeable to seeking out should thoughts of self harm arise. Safe environment maintained. Q15 minute checks for safety cont per unit policy. Will cont to monitor for safety and provides support and reassurance as needed. Goal: Absence of self-harm  Absence of self-harm   Outcome: Ongoing  No self harm noted this shift. Patient agrees to seek staff out if negative thoughts arise. Will continue to monitor Q15 minute and intermittently.

## 2018-12-21 NOTE — PROGRESS NOTES
Psychiatric Admission Note         Ivanna Nicholas is a 41 y.o. female who was admitted from the ED where she was brought on a pink slip by [facility] due to patient being psychotic, not caring for herself, around 6 months pregnant. She refuses to speak with me today, yells at me to go away. She appears disheveled, poor hygiene, laying in bed all day. Allergies:  Bee venom; Beeswax; and Wasp venom protein     History of Substance Abuse     Denied in ED    Past Psychiatric History     Patient Reports noncompliance with outpatient psychiatric care. Reported history of psychiatric inpatient hospitalizations. Unclear history of suicide attempts. Family History of psychiatric disorders    Family history: positive for psychosis      Medical History     Past Medical History:   Diagnosis Date    Anxiety     Asthma     Bipolar 1 disorder ([address]     Multiple personality ([address]     Seizures ([facility]         Mental Status  Pt. Was uncooperative, not oriented. Gaurav Barrow Appearance and hygiene weredisheveled, poor hygiene . Mood was angry. Affect was irritable Thought process was disorganized. Patient minimized symptoms but appears to be responding to internal stimuli. Patient denied suicidal ideations. Patient denied homicidal ideations . Patient's gross cognitive functions were impaired. Insight and judgement were poor. Both recent and remote memory were impaired. Psychomotor status was without abnormality     Diagnostic Impression    Schizoaffective disorder      Medications   prenatal vitamin  1 tablet Oral Dinner    lurasidone  40 mg Oral Dinner     nicotine polacrilex, acetaminophen, diphenhydrAMINE, hydrOXYzine    Treatment Plan:    1. Admit to inpatient psychiatric treatment  2. Supportive therapy with medication management. Reviewed risks and benefits as well as potential side effects with patient. Will start Latuda, ordered OB consult. 3. Therapeutic activities and groups  4.  Follow up at community mental health center after symptoms stabilized    Estimated length of stay: 5-7 days    Yaquelin Landis MD  Psychiatrist.

## 2018-12-21 NOTE — PLAN OF CARE
Problem: Altered Mood, Psychotic Behavior:  Goal: Able to demonstrate trust by eating, participating in treatment and following staff's direction  Able to demonstrate trust by eating, participating in treatment and following staff's direction   Outcome: Ongoing  Pt did not attend music trivia skills group at [phone] despite staff invitation to attend.

## 2018-12-21 NOTE — CARE COORDINATION
Ortiz  called and stated concerns of pts pregnancy.  stated pt is somewhere around 6.5 months pregnant. Has completed one ultrasound and is not taking prenatals or any other follow up as recommended.  added pt has consistently stated she is not pregnant.

## 2018-12-21 NOTE — ED NOTES
SW consulted with Dr.S John Arciniega. Pt accepted for an inpatient admission to the [facility] for safety and stabilization.

## 2018-12-21 NOTE — CARE COORDINATION
BHI Biopsychosocial Assessment    Current Level of Psychosocial Functioning     Independent:   Dependent:   Minimal Assist: xx    Comments:      Psychosocial High Risk Factors (check all that apply)    Unable to obtain meds: YONY   Chronic illness/pain: YONY   Substance abuse: xx-per hx   Lack of Family Support: YONY  Financial stress: YONY  Isolation: YONY  Inadequate Community Resources: YONY  Suicide attempt(s): YONY  Not taking medications: X-per [facility]    Victim of crime: YONY  Developmental Delay: YONY  Unable to manage personal needs: YONY  Age 72 or older:   Homeless: xx-per [facility]  No transportation: xx  Readmission within 30 days:   Unemployment: xx  Traumatic Event:     Comments:     Psychiatric Advanced Directives: none reported     Family to Involve in Treatment: YONY    Sexual Orientation: YONY    Patient Strengths: no physical limitations noted    Patient Barriers: poor coping skills, homeless    Opiate Education: yony    CMHC/mental health history: Pinked for [facility]   medication management, group/individual therapies, family meetings, psycho -education, treatment team meetings to assist with stabilization    Initial Discharge Plan: continue medication management via [facility], work with  for housing need, follow up with gynecologist for ongoing pregnancy care. Clinical Summary:      Pt is a 41- year- old [facility] [place] female who presented to the ED on a pink slip from [facility] appointment. PT denied to complete assessment, would not remove blanket from over head. Stated \"what\" when  attempted to complete assessment then refused to continue. PT had food split on dresser and floor and presented with poor hygiene. Information was collected from previous assessment and pink slip. PT is currently pregnant and denies being pregnant. PT does not follow up with gynecologist for ongoing care. PT has completed 1 ultrasound.  PT has a hx of staying in hospitals during pregnancies. Unable to assess if pt is SI, HI, AH, VH or previous suicide attempts.

## 2018-12-22 PROBLEM — F48.9: Status: ACTIVE | Noted: 2018-12-22

## 2018-12-22 PROBLEM — D53.9 MACROCYTIC ANEMIA: Status: ACTIVE | Noted: 2018-12-22

## 2018-12-22 PROBLEM — Z91.199 NONCOMPLIANCE: Status: ACTIVE | Noted: 2018-12-22

## 2018-12-22 PROBLEM — O09.529 ADVANCED MATERNAL AGE IN MULTIGRAVIDA: Status: ACTIVE | Noted: 2018-12-22

## 2018-12-22 PROCEDURE — 1240000000 HC EMOTIONAL WELLNESS R&B

## 2018-12-22 PROCEDURE — 6370000000 HC RX 637 (ALT 250 FOR IP): Performed by: PSYCHIATRY & NEUROLOGY

## 2018-12-22 RX ADMIN — LURASIDONE HYDROCHLORIDE 40 MG: 40 TABLET, FILM COATED ORAL at 17:44

## 2018-12-22 RX ADMIN — Medication 1 TABLET: at 17:44

## 2018-12-22 NOTE — PLAN OF CARE
Problem: Altered Mood, Psychotic Behavior:  Goal: Able to demonstrate trust by eating, participating in treatment and following staff's direction  Able to demonstrate trust by eating, participating in treatment and following staff's direction   Outcome: Ongoing  Patient observed with flat and unstable affect. Withdrawn and isolative. 1:1 interaction provided. Appears anxious and irritable. Patient observed guarded and hesitant to engage. Thought blocking and response lag. Denies any SI, HI, and hallucinations. Appears preoccupied, respondent, and paranoid at times. ADL's are poor, but did shower with multiple staff encouragement. Reorientation provided. Encouraged to notify staff to process thoughts if needed. Discussed the benefits of inpatient treatment and programming expectations. Encouraged to attend groups and socialize with peers. Patient is meal compliant. Remains free from harm and safety maintained. Will continue to monitor and intervene as needed. Goal: Absence of self-harm  Absence of self-harm   Outcome: Ongoing      Problem: Tobacco Use:  Goal: Inpatient tobacco use cessation counseling participation  Inpatient tobacco use cessation counseling participation   Outcome: Ongoing  Education provided. Denies need for smoking cessation products at this time.

## 2018-12-22 NOTE — CARE COORDINATION
Pt declined to attend psycho education at 1000 am despite encouragement. Pt offered 1:1 and refused.

## 2018-12-22 NOTE — PLAN OF CARE
Problem: Altered Mood, Psychotic Behavior:  Goal: Able to demonstrate trust by eating, participating in treatment and following staff's direction  Able to demonstrate trust by eating, participating in treatment and following staff's direction   Outcome: Ongoing  Pt did not participate in Creative Expression / Stress and Relaxation / Leisure Awareness group at 1330 despite staff encouragement.

## 2018-12-22 NOTE — PLAN OF CARE
Problem: Altered Mood, Psychotic Behavior:  Goal: Able to demonstrate trust by eating, participating in treatment and following staff's direction  Able to demonstrate trust by eating, participating in treatment and following staff's direction   Outcome: Ongoing  PT refused all assessment and refused to answer any questions. PT remains disheveled and is urinating on self and refusing to get out of bed or allow staff to assist her in changing clothing or sheets. PT is only coming out of her room for meals and snacks. PT is heard with loud angry self talk yelling at no one there. PT maintained on 15 min safety checks and rounds at irregular intervals.

## 2018-12-22 NOTE — PLAN OF CARE
Problem: Altered Mood, Psychotic Behavior:  Goal: Able to demonstrate trust by eating, participating in treatment and following staff's direction  Able to demonstrate trust by eating, participating in treatment and following staff's direction   Outcome: Ongoing  [facility]  Day 3 Interdisciplinary Treatment Plan NOTE    Review Date & Time: 12/22/2018 4737    Admission Type:   Admission Type:  Involuntary    Reason for admission:  Reason for Admission: Patient admitted for poor hygiene, auditory hallucinations, depression  Estimated Length of Stay: 5-7 days  Estimated Discharge Date Update: to be determined by physician    PATIENT STRENGTHS:  Patient Strengths Strengths: Connection to output provider  Patient Strengths and Limitations:Limitations: Apathetic / unmotivated, Tendency to isolate self, External locus of control  Addictive Behavior:Addictive Behavior  In the past 3 months, have you felt or has someone told you that you have a problem with:  : Eating (too much/too little)  Do you have a history of Chemical Use?: Comment (YONY)  Do you have a history of Alcohol Use?: Comment (YONY)  Do you have a history of Street Drug Abuse?: Comment (YONY)  Histroy of Prescripton Drug Abuse?: Comment (YONY)  Medical Problems:  Past Medical History:   Diagnosis Date    Anxiety     Asthma     [facility] ([facility]     Multiple personality ([facility]     [facility]        Risk:  Fall RiskTotal: 65  Stephen Scale Stephen Scale Score: 22  BVC Total: 0  Change in scores no Changes to plan of Care no    Status EXAM:   Status and Exam  Normal: No  Facial Expression: Avoids Gaze, Flat, Hostile  Affect: Unstable  Level of Consciousness: Lethargic  Mood:Normal: No  Mood: Irritable  Motor Activity:Normal: No  Motor Activity: Decreased  Interview Behavior: Uncooperative/Withdrawn  Preception: [place] to Person, [place] to Time, [place] to Place  Attention:Normal: No  Attention: Unable to Concentrate  Thought Processes: Blocking  Thought Content:Normal: No  Thought Content: Preoccupations  Hallucinations: Unable to assess  Delusions:  (YONY)  Memory:Normal: No  Memory: Poor Recent, Poor Remote  Insight and Judgment: No  Insight and Judgment: Poor Judgment, Poor Insight, Unmotivated, Unrealistic  Present Suicidal Ideation: Other(See comment) (YONY)  Present Homicidal Ideation: Other(See comment) (YONY)    Daily Assessment Last Entry:   Daily Sleep (WDL): Within Defined Limits         Patient Currently in Pain: No  Daily Nutrition (WDL): Within Defined Limits    Patient Monitoring:  Frequency of Checks: 4 times per hour, close    Psychiatric Symptoms:   Depression Symptoms  Depression Symptoms: Increased irritability, Isolative, Loss of interest, Impaired concentration  Anxiety Symptoms  Anxiety Symptoms: Generalized  Lora Symptoms  Lora Symptoms:  (YONY)     Psychosis Symptoms  Delusion Type:  (YONY)    Suicide Risk CSSR-S:  1) Within the past month, have you wished you were dead or wished you could go to sleep and not wake up? : No  2) Have you actually had any thoughts of killing yourself? : No  6) Have you ever done anything, started to do anything, or prepared to do anything to end your life?: No  Change in Result: No Change in Plan of care: No      EDUCATION:   EDUCATION:   Learner Progress Toward Treatment Goals: Reviewed results and recommendations of this team, Reviewed group plan and strategies, Reviewed signs, symptoms and risk of self harm and violent behavior, Reviewed goals and plan of care    Method:small group, individual verbal education    Outcome:Patient refused to attend treatment team meeting. Patient needs reinforcement to obtain goals. PATIENT GOALS:  Short term: Patient refused to attend treatment team meeting. Long term: Patient refused to attend treatment team meeting.      PLAN/TREATMENT RECOMMENDATIONS UPDATE: continue with group therapies, increased socialization, continue planning for after discharge goals, continue with medication compliance    SHORT-TERM GOALS UPDATE:   Time frame for Short-Term Goals: 5-7 days    LONG-TERM GOALS UPDATE:   Time frame for Long-Term Goals: 6 months  Members Present in Team Meeting: See Signature Sheet    CARY Wren  Goal: Absence of self-harm  Absence of self-harm   Outcome: Ongoing

## 2018-12-22 NOTE — H&P
HISTORY and [id number]       NAME:  Sunshine Santos  MRN: [id number]   YOB: 1980   Date: 12/22/2018   Age: 41 y.o. Gender: female     COMPLAINT AND PRESENT HISTORY:      Sunshine Santos is 41 y.o.,  female, admitted because of Schizoaffective Disorder. Pt states that she is 5 weeks pregnant, according to the chart the Pt previously mentioned that she is 6 months pregnant. Pt is delusional paranoid, confused, incoherent at times. Responding to internal stimuli. Patient is a poor historian because of her condition,  Patient admits to having auditory hallucinations hears voices screaming in her head. Patient is unable to care for herself, has poor appetite and concentration, fair sleep. Patient denies any visual hallucinations. Patient has been noncompliant with her medications. Patient was unable to answer questions and the system review and regarding to where she lives her she lives with. Patient denied any current alcohol or substance abuse recently. She has a history previous substance abuse. DIAGNOSTIC RESULTS   Labs:  CBC:   Recent Labs      12/20/18   1835   WBC  9.6   HGB  10.8*   PLT  316     BMP:    Recent Labs      12/20/18   1835   NA  137   K  3.5*   CL  104   CO2  23   BUN  6   CREATININE  0.59   GLUCOSE  77     Hepatic:   Recent Labs      12/20/18   1835   AST  12   ALT  7   BILITOT  0.32   ALKPHOS  71       PAST MEDICAL HISTORY     Past Medical History:   Diagnosis Date    Anxiety     Asthma     B[facility] ([facility]     Multiple personality ([facility]     Seizures ([facility]        Pt denies any history of Diabetes mellitus type 2, hypertension, stroke, heart disease, COPD, GERD, HLD, Cancer, Thyroid disease, Kidney Disease, Hepatitis, TB.    SURGICAL HISTORY     History reviewed. No pertinent surgical history.     FAMILY HISTORY       Family History   Problem Relation Age of Onset    Cancer Mother        SOCIAL HISTORY Social History     Social History    Marital status: Single     Spouse name: N/A    Number of children: N/A    Years of education: N/A     Social History Main Topics    Smoking status: Current Every Day Smoker     Packs/day: 0.25     Types: Cigarettes, Cigars    Smokeless tobacco: Current User      Comment: Pt accepting of nicotine replacement    Alcohol use No    Drug use: No    Sexual activity: Not Asked     Other Topics Concern    None     Social History Narrative    None           REVIEW OF SYSTEMS      Allergies   Allergen Reactions    Bee Venom     Beeswax     Wasp Venom Protein        No current facility-administered medications on file prior to encounter. Current Outpatient Prescriptions on File Prior to Encounter   Medication Sig Dispense Refill    benztropine (COGENTIN) 1 MG tablet Take 1 mg by mouth      lurasidone (LATUDA) 80 MG TABS tablet Take 80 mg by mouth Daily with supper       carBAMazepine (TEGRETOL) 200 MG tablet Take 200 mg by mouth 2 times daily      traZODone (DESYREL) 50 MG tablet Take 1 tablet by mouth nightly as needed for Sleep 14 tablet 0    PARoxetine (PAXIL) 10 MG tablet Take 1 tablet by mouth daily for 14 days 14 tablet 0    nicotine polacrilex (NICORETTE) 2 MG gum Take 1 each by mouth as needed for Smoking cessation 110 each 3    ARIPiprazole ER (ABILIFY MAINTENA) 400 MG SUSR Inject 400 mg into the muscle every 28 days 1 each 0                      General health:  Fairly good. No fever or chills. Skin:  No itching, redness or rash. Head, eyes, ears, nose, throat:  No headache, epistaxis, rhinorrhea hearing loss or sore throat. Neck:  No pain, stiffness or masses. Cardiovascular/Respiratory system:  No chest pain, palpitation, shortness of breath, coughing or expectoration. Gastrointestinal tract: No abdominal pain, nausea, vomiting, diarrhea or constipation. Genitourinary:  No burning on micturition.   No hesitancy, urgency, frequency or discoloration of urine. Locomotor:  No bone or joint pains. No swelling or deformities. Neuropsychiatric:  See HPI. She is unreliable historian. GENERAL PHYSICAL EXAM:     Vitals: /60   Pulse 74   Temp 98.7 °F (37.1 °C) (Oral)   Resp 16   Ht 5' 8\" (1.727 m)   Wt 130 lb (59 kg)   LMP  (LMP Unknown)   SpO2 100%   BMI 19.77 kg/m²  Body mass index is 19.77 kg/m². Pt was examined with a nurse present in the room. GENERAL APPEARANCE:  Reema Church is 41 y.o.,  female, not obese, nourished, conscious, confused. Does not appear to be distress or pain at this time. SKIN:  Warm, dry, no cyanosis or jaundice. HEAD:  Normocephalic, atraumatic, no swelling or tenderness. EYES:  Pupils equal, reactive to light, Conjunctiva is clear, EOMs intact evangelist. eyelids WNL. EARS:  No discharge, no marked hearing loss. NOSE:  No rhinorrhea, epistaxis or septal deformity. THROAT:  Not congested. No ulceration bleeding or discharge. NECK:  No stiffness, trachea central.  No palpable masses or L.N.      CHEST:  Symmetrical and equal on expansion. HEART:  Regular rate and rhythm. S1 > S2, No audible murmurs or gallops. LUNGS:  Equal on expansion, normal breath sounds. No adventitious sounds. ABDOMEN:  Obese. Soft on palpation. large abdominal mass corresponding to a gravid uterusabout 6 months pregnant. No localized tenderness. No guarding or rigidity. No palpable organomegaly. LYMPHATICS:  No palpable Lymphadenopathy. LOCOMOTOR, BACK AND SPINE:  No tenderness or deformities. EXTREMITIES:  Symmetrical, no pretibial edema. Reis sign negative. No discoloration or ulcerations. NEUROLOGIC:  The patient is conscious, alert, oriented, Gait and balance WNL. No apparent focal sensory deficits. No motor deficits, muscle strength equal Evangelist.  No facial droop, tongue protrudes centrally, no slurring of the speech. PROVISIONAL DIAGNOSES:      Active Problems:    Polysubstance abuse (HCC)    Schizoaffective disorder (HCC)    No prenatal care in current pregnancy    Macrocytic anemia    Advanced maternal age in multigravida    Patient does not believe she is pregnant, refusin care    Noncompliance  Resolved Problems:    * No resolved hospital problems.  *      BUBBA DELEON PA-C on 12/22/2018 at 9:15 AM

## 2018-12-23 PROCEDURE — 1240000000 HC EMOTIONAL WELLNESS R&B

## 2018-12-23 PROCEDURE — 6370000000 HC RX 637 (ALT 250 FOR IP): Performed by: PSYCHIATRY & NEUROLOGY

## 2018-12-23 RX ADMIN — DIPHENHYDRAMINE HCL 25 MG: 25 TABLET ORAL at 20:07

## 2018-12-23 RX ADMIN — ACETAMINOPHEN 650 MG: 325 TABLET, FILM COATED ORAL at 20:07

## 2018-12-23 RX ADMIN — HYDROXYZINE HYDROCHLORIDE 25 MG: 25 TABLET, FILM COATED ORAL at 20:07

## 2018-12-23 ASSESSMENT — PAIN DESCRIPTION - PAIN TYPE: TYPE: ACUTE PAIN

## 2018-12-23 ASSESSMENT — PAIN DESCRIPTION - LOCATION: LOCATION: GENERALIZED

## 2018-12-23 ASSESSMENT — PAIN SCALES - GENERAL: PAINLEVEL_OUTOF10: 3

## 2018-12-23 NOTE — PLAN OF CARE
Problem: Altered Mood, Psychotic Behavior:  Goal: Able to demonstrate trust by eating, participating in treatment and following staff's direction  Able to demonstrate trust by eating, participating in treatment and following staff's direction   Outcome: Ongoing  Patient did not participate in Goal Setting / Community Meeting group at 0900 despite staff encouragement.

## 2018-12-23 NOTE — PLAN OF CARE
Problem: Altered Mood, Psychotic Behavior:  Goal: Able to demonstrate trust by eating, participating in treatment and following staff's direction  Able to demonstrate trust by eating, participating in treatment and following staff's direction   Outcome: Ongoing  Patient able to demonstrated trust by eating 100% during meals time,however unable to participated in groups or communicate with peers or staff at present time. Patient has a flat effect;ambulated back and forth to her room, PT encouraged to seek staff for all her needs while on the unit. MC/BC. 15 MIN safety checks  maintained.

## 2018-12-23 NOTE — PLAN OF CARE
Problem: Altered Mood, Psychotic Behavior:  Goal: Absence of self-harm  Absence of self-harm   Outcome: Ongoing  Pt denies thoughts of self harm and is agreeable to seeking out should thoughts of self harm arise. Safe environment maintained. Q15 minute checks for safety cont per unit policy. Will cont to monitor for safety and provides support and reassurance as needed.

## 2018-12-23 NOTE — PLAN OF CARE
Problem: Altered Mood, Psychotic Behavior:  Goal: Able to demonstrate trust by eating, participating in treatment and following staff's direction  Able to demonstrate trust by eating, participating in treatment and following staff's direction   Outcome: Ongoing  Pt sleep and appetite WNL. Pt remains avoidant, aloof, and on non-interactive. She demonstrates self talk. Pt mood is irritable, affect angry, out to milieu for needs only. Pt safety maintained per safety checks every 15 minutes and irregular rounding. Goal: Absence of self-harm  Absence of self-harm   Outcome: Ongoing  Pt denies SI/HI - no self harm has occurred. Pt is medication compliant during the day, receives no scheduled medications at HS. Pt safety maintained per safety checks every 15 minutes and irregular rounding.

## 2018-12-24 PROCEDURE — 6370000000 HC RX 637 (ALT 250 FOR IP): Performed by: PSYCHIATRY & NEUROLOGY

## 2018-12-24 PROCEDURE — 1240000000 HC EMOTIONAL WELLNESS R&B

## 2018-12-24 RX ADMIN — LURASIDONE HYDROCHLORIDE 80 MG: 80 TABLET, FILM COATED ORAL at 17:46

## 2018-12-24 RX ADMIN — Medication 1 TABLET: at 17:46

## 2018-12-24 NOTE — PLAN OF CARE
Problem: Altered Mood, Psychotic Behavior:  Goal: Absence of self-harm  Absence of self-harm   Outcome: Ongoing  Patient remains free from self harm.

## 2018-12-24 NOTE — PLAN OF CARE
Problem: Altered Mood, Psychotic Behavior:  Goal: Able to demonstrate trust by eating, participating in treatment and following staff's direction  Able to demonstrate trust by eating, participating in treatment and following staff's direction   Outcome: Ongoing  Pt did not attend community meeting and goal setting group at [address] [place] [address] despite staff invitation to attend.

## 2018-12-25 PROCEDURE — 1240000000 HC EMOTIONAL WELLNESS R&B

## 2018-12-25 PROCEDURE — 6370000000 HC RX 637 (ALT 250 FOR IP): Performed by: PSYCHIATRY & NEUROLOGY

## 2018-12-25 RX ADMIN — Medication 1 TABLET: at 17:13

## 2018-12-25 RX ADMIN — LURASIDONE HYDROCHLORIDE 80 MG: 80 TABLET, FILM COATED ORAL at 17:13

## 2018-12-25 NOTE — PLAN OF CARE
Problem: Altered Mood, Psychotic Behavior:  Goal: Absence of self-harm  Absence of self-harm   Outcome: Ongoing  Psychoeducation Group Note    Date: 12/25/2018  Start Time: 0900  End Time: 0915    Number Participants in Group:  6    Goal:  Patient will demonstrate increased interpersonal interaction   Topic: Community meeting/ goals group    Discipline Responsible:   OT  AT  [facility]. x RT  Other       Participation Level:     None x Minimal   x Active Listener  Interactive    Monopolizing         Participation Quality:  x Appropriate  Inappropriate          Attentive        Intrusive          Sharing        Resistant          Supportive        Lethargic       Affective:    Congruent  Incongruent x Blunted  Flat    Constricted  Anxious  Elated  Angry    Labile  Depressed  Other         Cognitive:  x Alert x Oriented PPTP     Concentration  G x F  P   Attention Span  G x F  P   Short-Term Memory  G  F  P   Long-Term Memory  G  F  P   ProblemSolving/  Decision Making  G  F  P   Ability to Process  Information  G  F  P      Contributing Factors             Delusional             Hallucinating             Flight of Ideas             Other:       Modes of Intervention:  x Education x Support x Exploration   x Clarifying x Problem Solving  Confrontation   x Socialization  Limit Setting x Reality Testing   x Activity  Movement  Media    Other:            Response to Learning:   Able to verbalize current knowledge/experience    Able to verbalize/acknowledge new learning    Able to retain information    Capable of insight    Able to change behavior   x Progressing to goal    Other:        Comments:

## 2018-12-26 PROCEDURE — 6370000000 HC RX 637 (ALT 250 FOR IP): Performed by: PSYCHIATRY & NEUROLOGY

## 2018-12-26 PROCEDURE — 1240000000 HC EMOTIONAL WELLNESS R&B

## 2018-12-26 RX ADMIN — Medication 1 TABLET: at 17:33

## 2018-12-26 RX ADMIN — LURASIDONE HYDROCHLORIDE 120 MG: 120 TABLET, FILM COATED ORAL at 17:33

## 2018-12-26 NOTE — PLAN OF CARE
Problem: Altered Mood, Psychotic Behavior:  Goal: Able to demonstrate trust by eating, participating in treatment and following staff's direction  Able to demonstrate trust by eating, participating in treatment and following staff's direction   Outcome: Ongoing  Pt did not participate in Therapeutic Group at 1430 despite staff encouragement.

## 2018-12-26 NOTE — PLAN OF CARE
Problem: Altered Mood, Psychotic Behavior:  Goal: Absence of self-harm  Absence of self-harm   Outcome: Ongoing  Pt remains free from self harm

## 2018-12-26 NOTE — PROGRESS NOTES
Patient refused to take Renée Merchant or pre- vitamin yesterday- refused to give nurse any explanation or provide any explanation today. Angrily states she will take meds. She has been irritable, isolative to her room, displaying extremely poor self-care. This is especially concerning given that she is currently pregnant. Displays poor care of ADL's. Displays thought blocking. She barely comes out of her room, lays in bed, doing nothing. Charting and medications reviewed. Will continue titration of Latuda.

## 2018-12-26 NOTE — PLAN OF CARE
Problem: Altered Mood, Psychotic Behavior:  Goal: Able to demonstrate trust by eating, participating in treatment and following staff's direction  Able to demonstrate trust by eating, participating in treatment and following staff's direction   Outcome: Ongoing  Pt did not participate in Therapeutic Leisure Skills Group at 1100 despite staff encouragement.

## 2018-12-26 NOTE — PLAN OF CARE
Problem: Altered Mood, Psychotic Behavior:  Goal: Absence of self-harm  Absence of self-harm   Outcome: Ongoing  Pt did not participate in Communication Skills / Social Skills / Self Awareness group at 1330 despite staff encouragement.

## 2018-12-26 NOTE — PROGRESS NOTES
Patient continues to display severe psychotic symptoms at this time. She displays severe thought blocking and disorganization of thought process. Patient is observed frequently responding to internal stimuli. She continues to isolate to her room, lays in bed most of the day doing nothing. Displays poor insight into pregnancy, denies that she is pregnant. Patient is clearly still unable to care for themself and keep themself safe. Denies side effects to medication regimen. Charting and medications reviewed. Therapeutic support provided. Will continue Latuda.

## 2018-12-26 NOTE — PLAN OF CARE
Problem: Altered Mood, Psychotic Behavior:  Goal: Able to demonstrate trust by eating, participating in treatment and following staff's direction  Able to demonstrate trust by eating, participating in treatment and following staff's direction   Outcome: Ongoing  PSYCHOTHERAPY GROUP NOTE       Date:   12/26/18              Start Time:    10:00am                  End Time: 10:40 am      Number Participants in Group: 6/19      Goals: Pt will demonstrate increased interpersonal interactions.         RT x SW  Nsg  LPN   BHTII  Other       Participation Level:     None  Minimal    Active Listener x Interactive    Monopolizing         Participation Quality:  x Appropriate  Inappropriate     Attentive   Intrusive     Sharing   Resistant     Supportive    Lethargic       Affective:   x Congruent  Incongruent  Blunted  Flat    Constricted  Anxious  Elated  Angry    Labile  Depressed  Other         Cognitive:  x Alert x Oriented PPTS     Concentration G  F  P x   Attention Span G  F  P x   Short-Term Memory G  F  P x   Long-Term Memory G  F  P x   ProblemSolving/  Decision Making G  F  P x   Ability to Process  Information G  F  P x      Contributing Factors             Delusional             Hallucinating             Flight of Ideas             Other: poor concentration       Modes of Intervention:  x Education x Support x Exploration    Clarifying x Problem Solving  Confrontation   x Socialization  Limit Setting  Reality Testing    Activity  Movement  Media    Other:          Response to Learning:  x Able to verbalize current knowledge/experience    Able to verbalize/acknowledge new learning    Able to retain information    Capable of insight    Able to change behavior   x Progressing to goal    Other:

## 2018-12-26 NOTE — PLAN OF CARE
Problem: Altered Mood, Psychotic Behavior:  Goal: Absence of self-harm  Absence of self-harm   Outcome: Ongoing  Pt did not participate in Goal Setting / [employer] group at 2923 despite staff encouragement.

## 2018-12-26 NOTE — PLAN OF CARE
Problem: Altered Mood, Psychotic Behavior:  Goal: Able to demonstrate trust by eating, participating in treatment and following staff's direction  Able to demonstrate trust by eating, participating in treatment and following staff's direction   Outcome: Ongoing    Goal: Absence of self-harm  Absence of self-harm   Outcome: Ongoing  Pt remains free of self harm, Q 15 minute safety checks maintained at irregular interval.

## 2018-12-27 PROCEDURE — 6370000000 HC RX 637 (ALT 250 FOR IP): Performed by: PSYCHIATRY & NEUROLOGY

## 2018-12-27 PROCEDURE — 1240000000 HC EMOTIONAL WELLNESS R&B

## 2018-12-27 RX ADMIN — Medication 1 TABLET: at 17:00

## 2018-12-27 RX ADMIN — NICOTINE POLACRILEX 2 MG: 2 GUM, CHEWING BUCCAL at 12:14

## 2018-12-27 RX ADMIN — LURASIDONE HYDROCHLORIDE 120 MG: 120 TABLET, FILM COATED ORAL at 16:59

## 2018-12-27 NOTE — PLAN OF CARE
Problem: Altered Mood, Psychotic Behavior:  Goal: Able to demonstrate trust by eating, participating in treatment and following staff's direction  Able to demonstrate trust by eating, participating in treatment and following staff's direction   Outcome: Ongoing  Pt did not participate in Goal Setting and Community Meeting Group at [address] [place] [address] despite staff encouragement.

## 2018-12-27 NOTE — PLAN OF CARE
Problem: Altered Mood, Psychotic Behavior:  Goal: Able to demonstrate trust by eating, participating in treatment and following staff's direction  Able to demonstrate trust by eating, participating in treatment and following staff's direction   Outcome: Ongoing  Patient is able to demonstrate trust by eating, patient is not participating treatment team as she unable to hold a reality  based conversation.   Goal: Absence of self-harm  Absence of self-harm   Outcome: Ongoing  Patient has been absent of self harm, patient encouraged to seek out staff if any thoughts of self harm should arise, patient verbally contracts for  Safety q15 min checks for safety remain patient safety maintained

## 2018-12-27 NOTE — PLAN OF CARE
Problem: Altered Mood, Psychotic Behavior:  Goal: Able to demonstrate trust by eating, participating in treatment and following staff's direction  Able to demonstrate trust by eating, participating in treatment and following staff's direction   Outcome: Ongoing  Pt did not participate in Therapeutic Leisure Activity at 1100 despite staff encouragement.

## 2018-12-27 NOTE — PLAN OF CARE
Problem: Altered Mood, Psychotic Behavior:  Goal: Able to demonstrate trust by eating, participating in treatment and following staff's direction  Able to demonstrate trust by eating, participating in treatment and following staff's direction   Outcome: Ongoing  Pt did not participate in Therapeutic Recovery Group at 1430 despite staff encouragement.

## 2018-12-27 NOTE — PROGRESS NOTES
Today, patient refused to remain close. She was exposing private parts. Staff redirection, she was becoming profane and threatening. We have been trying to avoid IM medication due to patient being pregnant. She continues to display severe psychotic symptoms. She is responding to internal stimuli. She displays psychomotor agitation. She displays no insight into illness or role of medications. She does not care for ADLs appropriately. Spends most of her room isolative, engaging in self talk. Charting and medications reviewed. Therapeutic support attempted. Will continue titration of Latuda.

## 2018-12-27 NOTE — PLAN OF CARE
Problem: Altered Mood, Psychotic Behavior:  Goal: Absence of self-harm  Absence of self-harm   Denies wanting to harm self.

## 2018-12-27 NOTE — PLAN OF CARE
Problem: Tobacco Use:  Goal: Inpatient tobacco use cessation counseling participation  Inpatient tobacco use cessation counseling participation   Declined.

## 2018-12-28 PROCEDURE — 1240000000 HC EMOTIONAL WELLNESS R&B

## 2018-12-28 PROCEDURE — 6370000000 HC RX 637 (ALT 250 FOR IP): Performed by: PSYCHIATRY & NEUROLOGY

## 2018-12-28 RX ADMIN — Medication 1 TABLET: at 17:46

## 2018-12-28 RX ADMIN — LURASIDONE HYDROCHLORIDE 120 MG: 120 TABLET, FILM COATED ORAL at 17:46

## 2018-12-28 NOTE — PLAN OF CARE
Problem: Altered Mood, Psychotic Behavior:  Goal: Able to demonstrate trust by eating, participating in treatment and following staff's direction  Able to demonstrate trust by eating, participating in treatment and following staff's direction   Outcome: Ongoing  Pt was seclusive to room most of the day. She would yell out at staff at times. She showered today. She was medication compliant. She ate all meals out in the day room. She did not attend any groups. Goal: Absence of self-harm  Absence of self-harm   Outcome: Ongoing  Pt denied suicidal ideations. She did not have any episodes of self harm.

## 2018-12-28 NOTE — PLAN OF CARE
Problem: Altered Mood, Psychotic Behavior:  Goal: Able to demonstrate trust by eating, participating in treatment and following staff's direction  Able to demonstrate trust by eating, participating in treatment and following staff's direction   Outcome: Ongoing  Pt did not participate in Cognitive Skills Therapeutic Group at  despite staff encouragement.

## 2018-12-28 NOTE — PLAN OF CARE
Problem: Altered Mood, Psychotic Behavior:  Goal: Absence of self-harm  Absence of self-harm   Outcome: Ongoing  Pt did not participate in community meeting/ goals group at [address] [place] [address] despite staff encouragement to attend.

## 2018-12-28 NOTE — PLAN OF CARE
Pt did not attend psychotherapy at 10am despite encouragement. 1:1 offered and pt declined on this date 12/28/18.

## 2018-12-28 NOTE — PLAN OF CARE
Problem: Altered Mood, Psychotic Behavior:  Goal: Able to demonstrate trust by eating, participating in treatment and following staff's direction  Able to demonstrate trust by eating, participating in treatment and following staff's direction   Outcome: Ongoing  Pt did not participate in Cognitive Skills Group at 1100 despite staff encouragement.

## 2018-12-28 NOTE — PLAN OF CARE
Problem: Altered Mood, Psychotic Behavior:  Goal: Absence of self-harm  Absence of self-harm   Outcome: Ongoing  Pt did not participate in social interaction/ self-awareness group at 1330 despite staff encouragement to attend.

## 2018-12-28 NOTE — PLAN OF CARE
Problem: Altered Mood, Psychotic Behavior:  Goal: Able to demonstrate trust by eating, participating in treatment and following staff's direction  Able to demonstrate trust by eating, participating in treatment and following staff's direction   Outcome: Ongoing  [facility]  Week Interdisciplinary Treatment Plan Note     Review Date & Time: 12/28/18 0931    Admission Type:   Admission Type:  Involuntary    Reason for admission:  Reason for Admission: Patient admitted for poor hygiene, auditory hallucinations, depression    Estimated Length of Stay:  8-14 days  Estimated Discharge Date Update:  1/3/18 to be determined by physician    PATIENT STRENGTHS:  Patient Strengths:Strengths: Connection to output provider  Patient Strengths and Limitations:Limitations: Apathetic / unmotivated, Tendency to isolate self, External locus of control  Addictive Behavior:Addictive Behavior  In the past 3 months, have you felt or has someone told you that you have a problem with:  : Eating (too much/too little)  Do you have a history of Chemical Use?: Comment (YONY)  Do you have a history of Alcohol Use?: Comment (YONY)  Do you have a history of Street Drug Abuse?: Comment (YONY)  Histroy of Prescripton Drug Abuse?: Comment (YONY)  Medical Problems:   Past Medical History:   Diagnosis Date    Anxiety     Asthma     Bipolar 1 disorder ([address]     Multiple personality ([address]     Seizures ([address]        Risk:  Fall RiskTotal: 53  Stephen Scale Stephen Scale Score: 22  BVC Total: 0    Change in scores:  No. Changes to plan of Care:  No    Status EXAM:   Status and Exam  Normal: No  Facial Expression: Avoids Gaze, Flat  Affect: Blunt  Level of Consciousness: Alert  Mood:Normal: No  Mood: Suspicious, Depressed, Anhedonia  Motor Activity:Normal: Yes  Motor Activity: Decreased  Interview Behavior: Evasive, Cooperative  Preception: [place] to Person, [place] to Time, [place] to Place, [place] to Situation  Attention:Normal: No  Attention: Distractible  Thought Processes: Blocking  Thought Content:Normal: No  Thought Content: Poverty of Content  Hallucinations: None  Delusions: No  Delusions: Persecution  Memory:Normal:  (YONY)  Memory:  (YONY)  Insight and Judgment: No  Insight and Judgment: Poor Judgment, Poor Insight, Unmotivated  Present Suicidal Ideation: No  Present Homicidal Ideation: No    Daily Assessment Last Entry:   Daily Sleep (WDL): Within Defined Limits         Patient Currently in Pain: Denies  Daily Nutrition (WDL): Within Defined Limits    Patient Monitoring:  Frequency of Checks: 4 times per hour, close    Psychiatric Symptoms:   Depression Symptoms  Depression Symptoms: Isolative, Loss of interest, Impaired concentration, Increased irritability  Anxiety Symptoms  Anxiety Symptoms: Generalized  Lora Symptoms  Lora Symptoms: Poor judgment     Psychosis Symptoms  Delusion Type: No problems reported or observed. Suicide Risk CSSR-S:  1) Within the past month, have you wished you were dead or wished you could go to sleep and not wake up? : No  2) Have you actually had any thoughts of killing yourself? : No  6) Have you ever done anything, started to do anything, or prepared to do anything to end your life?: No  Change in result:  no  Change in plan of care:  no    EDUCATION:   Learner Progress Toward Treatment Goals:  Pt refused treatment team meeting, Reviewed results and recommendations of this team, reviewed group plan and strategies, reviewed signs, symptoms and risk of self harm and violent behavior, reviewed goals and plan of care. Method:  individual education, small group, verbal education. Outcome:  Pt refused treatment team meeting, Pt needs reinforcement to develop and obtain goals.     PATIENT GOALS:  Short term:  Pt refused treatment team meeting  Long term:  Pt refused treatment team meeting    PLAN/TREATMENT RECOMMENDATIONS UPDATE:   Continue with group therapies, education of coping skills   Continue to monitor patient on unit. Medications provided/medication compliance by patient. Continue for plans to obtain long term goals after discharge.     SHORT-TERM GOALS UPDATE:  Time frame for Short-Term Goals:  8-14days     LONG-TERM GOALS UPDATE:  Time frame for Long-Term Goals:  6 months    Members Present in Team Meeting:     CAYR Cardenas  Goal: Absence of self-harm  Absence of self-harm   Outcome: Ongoing      Problem: Tobacco Use:  Goal: Inpatient tobacco use cessation counseling participation  Inpatient tobacco use cessation counseling participation   Outcome: Ongoing

## 2018-12-29 PROCEDURE — 1240000000 HC EMOTIONAL WELLNESS R&B

## 2018-12-29 PROCEDURE — 6370000000 HC RX 637 (ALT 250 FOR IP): Performed by: PSYCHIATRY & NEUROLOGY

## 2018-12-29 RX ADMIN — Medication 1 TABLET: at 17:23

## 2018-12-29 RX ADMIN — LURASIDONE HYDROCHLORIDE 120 MG: 120 TABLET, FILM COATED ORAL at 17:23

## 2018-12-29 NOTE — PLAN OF CARE
Problem: Altered Mood, Psychotic Behavior:  Goal: Able to demonstrate trust by eating, participating in treatment and following staff's direction  Able to demonstrate trust by eating, participating in treatment and following staff's direction   PATIENT DID NOT [address] PROMPTS.

## 2018-12-29 NOTE — PLAN OF CARE
Problem: Altered Mood, Psychotic Behavior:  Goal: Able to demonstrate trust by eating, participating in treatment and following staff's direction  Able to demonstrate trust by eating, participating in treatment and following staff's direction   Outcome: Ongoing  Pt out in the dayroom to eat meals. She was out in the milieu more today. She did not interact with peers. She showered. Refused vitals and physical assessment. Goal: Absence of self-harm  Absence of self-harm   Outcome: Ongoing  Pt did not have any episodes of self harm. She denied current suicidal ideations.

## 2018-12-29 NOTE — PLAN OF CARE
Problem: Altered Mood, Psychotic Behavior:  Goal: Absence of self-harm  Absence of self-harm   PATIENT DID NOT ATTEND SKILLS GROUP WHICH FOCUSED ON STRESS MANAGEMENT DESPITE STAFF ENCOURAGEMENT AND PROMPT

## 2018-12-29 NOTE — PLAN OF CARE
Problem: Altered Mood, Psychotic Behavior:  Goal: Able to demonstrate trust by eating, participating in treatment and following staff's direction  Able to demonstrate trust by eating, participating in treatment and following staff's direction   Outcome: Ongoing  Patient isolative to room. Patient is evasive and offers no insight. Patient continues to have poor hygiene and encouraged by writer to shower. Patient currently resting in bed with eyes closed. Will continue to monitor. Goal: Absence of self-harm  Absence of self-harm   Outcome: Ongoing  Patient remains free from self-harm. 15 minute checks maintained for patient safety. Will continue to monitor and provide support and reassurance as needed.

## 2018-12-30 PROCEDURE — 1240000000 HC EMOTIONAL WELLNESS R&B

## 2018-12-30 PROCEDURE — 6370000000 HC RX 637 (ALT 250 FOR IP): Performed by: PSYCHIATRY & NEUROLOGY

## 2018-12-30 NOTE — PLAN OF CARE
Problem: Altered Mood, Psychotic Behavior:  Goal: Able to demonstrate trust by eating, participating in treatment and following staff's direction  Able to demonstrate trust by eating, participating in treatment and following staff's direction   Outcome: Ongoing  No self harm noted this shift. Will continue to monitor. Pt overheard responding in her room at intervals this shift, out, aloof, and withdrawn in milieu. Pt eating % of meals. Has remained in behavioral control this shift. Safety maintained through Q15 minute and irregular safety checks. Goal: Absence of self-harm  Absence of self-harm   Outcome: Ongoing  No self harm noted this shift. Will continue to monitor. Pt overheard responding in her room at intervals this shift, out, aloof, and withdrawn in milieu. Pt eating % of meals. Has remained in behavioral control this shift. Safety maintained through Q15 minute and irregular safety checks.

## 2018-12-30 NOTE — PLAN OF CARE
Problem: Altered Mood, Psychotic Behavior:  Goal: Absence of self-harm  Absence of self-harm   Patient did not attend goal setting and community meeting from [phone] despite staff encouragement and prompts.

## 2018-12-31 PROCEDURE — 6370000000 HC RX 637 (ALT 250 FOR IP): Performed by: PSYCHIATRY & NEUROLOGY

## 2018-12-31 PROCEDURE — 1240000000 HC EMOTIONAL WELLNESS R&B

## 2018-12-31 RX ADMIN — LURASIDONE HYDROCHLORIDE 160 MG: 80 TABLET, FILM COATED ORAL at 18:18

## 2018-12-31 NOTE — PLAN OF CARE
Problem: Altered Mood, Psychotic Behavior:  Goal: Able to demonstrate trust by eating, participating in treatment and following staff's direction  Able to demonstrate trust by eating, participating in treatment and following staff's direction   Outcome: Ongoing  Pt did not participate in Goal Setting and [employer] [facility] [employer] despite staff encouragement.

## 2018-12-31 NOTE — PLAN OF CARE
Problem: Altered Mood, Psychotic Behavior:  Goal: Able to demonstrate trust by eating, participating in treatment and following staff's direction  Able to demonstrate trust by eating, participating in treatment and following staff's direction   Outcome: Ongoing  Pt did not participate in Cognitive Skills Therapeutic Activity at 1330 despite staff encouragement.

## 2018-12-31 NOTE — PLAN OF CARE
Problem: Altered Mood, Psychotic Behavior:  Goal: Able to demonstrate trust by eating, participating in treatment and following staff's direction  Able to demonstrate trust by eating, participating in treatment and following staff's direction   Outcome: Ongoing  Pt eating on unit. Pt aloof of peers on unit. Pt not interactive. Comes to staff for needs.

## 2018-12-31 NOTE — PLAN OF CARE
Problem: Altered Mood, Psychotic Behavior:  Goal: Absence of self-harm  Absence of self-harm   Outcome: Ongoing  Pt doesn't answer assessment questions. Isolative, no self harming behaviors noted.

## 2018-12-31 NOTE — PLAN OF CARE
Problem: Altered Mood, Psychotic Behavior:  Goal: Able to demonstrate trust by eating, participating in treatment and following staff's direction  Able to demonstrate trust by eating, participating in treatment and following staff's direction   Outcome: Ongoing  Pt comes out of room for meals, otherwise is isolative to room/bed, no morning meds ordered. More controlled. Selectively cooperative.

## 2018-12-31 NOTE — PROGRESS NOTES
She continues to display severe psychotic symptoms. She is responding to internal stimuli. She displays psychomotor agitation. She displays no insight into illness or role of medications. She does not care for ADLs appropriately. Spends most of her room isolative, engaging in self talk. Charting and medications reviewed. Therapeutic support attempted. Will continue titration of Latuda.

## 2018-12-31 NOTE — PLAN OF CARE
Problem: Altered Mood, Psychotic Behavior:  Intervention: Manage a safe environment  Pt declined to attend education group at 10 am despite encouragement.   Pt offered 1:1 and refused

## 2019-01-01 PROCEDURE — 1240000000 HC EMOTIONAL WELLNESS R&B

## 2019-01-01 NOTE — PLAN OF CARE
Problem: Altered Mood, Psychotic Behavior:  Goal: Absence of self-harm  Absence of self-harm   Patient did not participate in skills group which focused on developing a personal coping skills list from [phone] despite staff encouragement and prompts.

## 2019-01-01 NOTE — PLAN OF CARE
Pt did not attend psychoeducational group at 10:00am despite encouragement. 1:1 offered; pt refused on this date 1/1/2019.

## 2019-01-01 NOTE — PLAN OF CARE
Problem: Altered Mood, Psychotic Behavior:  Goal: Able to demonstrate trust by eating, participating in treatment and following staff's direction  Able to demonstrate trust by eating, participating in treatment and following staff's direction   Outcome: Ongoing  Patient is out in dayroom aloof of peers patient is responding to internal stimuli with self talk, pt is controlled and cooperative, denies any suicidal ideations at this time. Patient did not attended night groups.    Goal: Absence of self-harm  Absence of self-harm   Outcome: Ongoing  Patient is free from self harm

## 2019-01-01 NOTE — PLAN OF CARE
Problem: Altered Mood, Psychotic Behavior:  Goal: Able to demonstrate trust by eating, participating in treatment and following staff's direction  Able to demonstrate trust by eating, participating in treatment and following staff's direction   Outcome: Ongoing  Patient is in behavioral control. Patient has been seclusive to self. Patient denies suicidal and homicidal ideation. Patient denies auditory and visual hallucinations, but engages in self talk in the milieu. Patient has engaged in ADL's. Patient has consumed meals and snacks this shift. Patient reports improved sleep. Patient has remained free from harm. Every 15 minute and spontaneous safety checks have been maintained. Goal: Absence of self-harm  Absence of self-harm   Outcome: Ongoing  Patient has remained free from harm. Every 15 minute and spontaneous safety checks have been maintained.     Problem: Tobacco Use:  Goal: Inpatient tobacco use cessation counseling participation  Inpatient tobacco use cessation counseling participation   Outcome: Ongoing

## 2019-01-02 PROCEDURE — 1240000000 HC EMOTIONAL WELLNESS R&B

## 2019-01-02 PROCEDURE — 6370000000 HC RX 637 (ALT 250 FOR IP): Performed by: PSYCHIATRY & NEUROLOGY

## 2019-01-02 RX ADMIN — LURASIDONE HYDROCHLORIDE 160 MG: 80 TABLET, FILM COATED ORAL at 18:08

## 2019-01-02 NOTE — PLAN OF CARE
Problem: Altered Mood, Psychotic Behavior:  Goal: Able to demonstrate trust by eating, participating in treatment and following staff's direction  Able to demonstrate trust by eating, participating in treatment and following staff's direction   She resists extended contact allowing physical assessment but otherwise guarded & aloof.

## 2019-01-02 NOTE — PLAN OF CARE
Problem: Altered Mood, Psychotic Behavior:  Goal: Able to demonstrate trust by eating, participating in treatment and following staff's direction  Able to demonstrate trust by eating, participating in treatment and following staff's direction   Outcome: Ongoing  Pt comes out to the day area for meals but is not interactive with staff or peers.   Goal: Absence of self-harm  Absence of self-harm   Outcome: Ongoing  No self harm noted

## 2019-01-02 NOTE — PLAN OF CARE
Problem: Altered Mood, Psychotic Behavior:  Goal: Absence of self-harm  Absence of self-harm   Outcome: Ongoing  Pt did not participate in Creative Expression / Stress and Relaxation / Positive Coping Skills group at 1440 despite staff encouragement.

## 2019-01-02 NOTE — PLAN OF CARE
Problem: Altered Mood, Psychotic Behavior:  Goal: Able to demonstrate trust by eating, participating in treatment and following staff's direction  Able to demonstrate trust by eating, participating in treatment and following staff's direction   Outcome: Ongoing  Pt did not participate in Positive Thinking Cognitive Skills Group at 1330 despite staff encouragement.

## 2019-01-02 NOTE — PLAN OF CARE
Problem: Altered Mood, Psychotic Behavior:  Intervention: Group therapy to identify positive coping skills  Pt declined to attend psychotherapy at 1000 am despite encouragement. Pt offered 1:1 and refused.

## 2019-01-02 NOTE — PLAN OF CARE
Problem: Tobacco Use:  Goal: Inpatient tobacco use cessation counseling participation  Inpatient tobacco use cessation counseling participation   Declines.

## 2019-01-03 PROCEDURE — 1240000000 HC EMOTIONAL WELLNESS R&B

## 2019-01-03 PROCEDURE — 6370000000 HC RX 637 (ALT 250 FOR IP): Performed by: PSYCHIATRY & NEUROLOGY

## 2019-01-03 RX ADMIN — LURASIDONE HYDROCHLORIDE 160 MG: 80 TABLET, FILM COATED ORAL at 17:56

## 2019-01-03 NOTE — PLAN OF CARE
Problem: Altered Mood, Psychotic Behavior:  Goal: Able to demonstrate trust by eating, participating in treatment and following staff's direction  Able to demonstrate trust by eating, participating in treatment and following staff's direction   Outcome: Ongoing  Psychoeducation Group Note    Date: 1/3/19  Start Time: 1100  End Time: 1140    Number Participants in Group:  2    Goal:  Patient will demonstrate increased interpersonal interaction. Topic:  Therapeutic Leisure Skills Group    Discipline Responsible:   OT  AT  [facility]. X RT  Other       Participation Level:    X None  Minimal    Active Listener  Interactive    Monopolizing         Participation Quality:  X Appropriate  Inappropriate          Attentive        Intrusive          Sharing        Resistant          Supportive        Lethargic       Affective:    Congruent  Incongruent  Blunted  Flat   X Constricted  Anxious  Elated  Angry    Labile  Depressed  Other  Bright       Cognitive:  X Alert X Oriented PPTP     Concentration  G X F  P   Attention Span  G X F  P   Short-Term Memory  G  F  P   Long-Term Memory  G  F  P   ProblemSolving/  Decision Making  G  F  P   Ability to Process  Information  G  F  P      Contributing Factors             Delusional             Hallucinating             Flight of Ideas             Other:       Modes of Intervention:  X Education  Support X Exploration    Clarifying X Problem Solving  Confrontation   X Socialization X Limit Setting  Reality Testing   X Activity  Movement  Media    Other:            Response to Learning:   Able to verbalize current knowledge/experience    Able to verbalize/acknowledge new learning    Able to retain information    Capable of insight    Able to change behavior   X Progressing to goal    Other:        Comments: Pt was present in group area but did not participate in activity nor interact with peers.

## 2019-01-03 NOTE — PLAN OF CARE
Problem: Altered Mood, Psychotic Behavior:  Goal: Absence of self-harm  Absence of self-harm   Outcome: Ongoing  Pt did not participate in community meeting/ goals group at [address] [place] [address] despite staff encouragement to attend.

## 2019-01-03 NOTE — PLAN OF CARE
Problem: Altered Mood, Psychotic Behavior:  Goal: Absence of self-harm  Absence of self-harm   Doesn't respond to my questions re: self harm but no behaviors reflect this.

## 2019-01-03 NOTE — PLAN OF CARE
Problem: Altered Mood, Psychotic Behavior:  Goal: Able to demonstrate trust by eating, participating in treatment and following staff's direction  Able to demonstrate trust by eating, participating in treatment and following staff's direction   Outcome: Ongoing  Pt eats meals in the day area and attended group this morning. Pt also able to ask for needs appropriately.   Goal: Absence of self-harm  Absence of self-harm   Outcome: Ongoing  No self harm noted

## 2019-01-03 NOTE — PLAN OF CARE
Problem: Altered Mood, Psychotic Behavior:  Goal: Absence of self-harm  Absence of self-harm   Outcome: Ongoing  Pt did not participate in leisure/ cognitive skills group at 1330 despite staff encouragement to attend.

## 2019-01-03 NOTE — PROGRESS NOTES
Patient continues to display severe psychotic symptoms at this time. She displays severe thought blocking and disorganization of thought process. Patient is observed frequently responding to internal stimuli- engaging in self-talk. . She continues to report experiencing auditory hallucinations. Displays poor insight. Patient is clearly still unable to care for themself and keep themself safe. She does not independently care for ADL's. Shows very poor insight into illness, role of medications. . Charting and medications reviewed. Will continue Latuda at 160 mg qdinner- unfortunately I am unable to use any stronger antipsychotic medication due to pregnancy.

## 2019-01-03 NOTE — PLAN OF CARE
Problem: Altered Mood, Psychotic Behavior:  Intervention: Group therapy to identify positive coping skills  Psychotherapy Group Note    Date: 01/03/2019  Start Time: 100  End Time: 1045    Number Participants in Group:  5/8    Goal:  Patient will demonstrate increased interpersonal interaction   Topic: [place] [facility]    Discipline Responsible:   OT  AT x SW  Nsg.  RT  Other       Participation Level:     None  Minimal   x Active Listener x Interactive    Monopolizing         Participation Quality:  x Appropriate  Inappropriate   x       Attentive        Intrusive   x       Sharing        Resistant   x       Supportive        Lethargic       Affective:   x Congruent  Incongruent  Blunted  Flat    Constricted  Anxious  Elated  Angry    Labile  Depressed  Other         Cognitive:  x Alert x Oriented PPTP     Concentration  G  F x P   Attention Span  G  F x P   Short-Term Memory  G  F x P   Long-Term Memory  G  F x P   ProblemSolving/  Decision Making  G  F x P   Ability to Process  Information  G  F x P      Contributing Factors             Delusional             Hallucinating             Flight of Ideas             Other:       Modes of Intervention:  x Education x Support  Exploration   x Clarifying x Problem Solving  Confrontation    Socialization  Limit Setting  Reality Testing    Activity  Movement  Media    Other:            Response to Learning:   Able to verbalize current knowledge/experience    Able to verbalize/acknowledge new learning    Able to retain information    Capable of insight    Able to change behavior    Progressing to goal    Other:        Comments:

## 2019-01-03 NOTE — PLAN OF CARE
Problem: Tobacco Use:  Goal: Inpatient tobacco use cessation counseling participation  Inpatient tobacco use cessation counseling participation   Ignores my attempts. Comments: Ignored my attempts.

## 2019-01-04 PROCEDURE — 1240000000 HC EMOTIONAL WELLNESS R&B

## 2019-01-04 PROCEDURE — 6370000000 HC RX 637 (ALT 250 FOR IP): Performed by: PSYCHIATRY & NEUROLOGY

## 2019-01-04 RX ORDER — HALOPERIDOL 5 MG
5 TABLET ORAL EVERY 8 HOURS PRN
Status: DISCONTINUED | OUTPATIENT
Start: 2019-01-04 | End: 2019-01-05

## 2019-01-04 RX ORDER — HALOPERIDOL 5 MG/ML
5 INJECTION INTRAMUSCULAR EVERY 8 HOURS PRN
Status: DISCONTINUED | OUTPATIENT
Start: 2019-01-04 | End: 2019-01-05

## 2019-01-04 RX ADMIN — Medication 1 TABLET: at 17:33

## 2019-01-04 RX ADMIN — HYDROXYZINE HYDROCHLORIDE 25 MG: 25 TABLET, FILM COATED ORAL at 11:05

## 2019-01-04 RX ADMIN — LURASIDONE HYDROCHLORIDE 160 MG: 80 TABLET, FILM COATED ORAL at 17:33

## 2019-01-04 RX ADMIN — DIPHENHYDRAMINE HCL 25 MG: 25 TABLET ORAL at 21:11

## 2019-01-04 RX ADMIN — NICOTINE POLACRILEX 2 MG: 2 GUM, CHEWING BUCCAL at 11:00

## 2019-01-04 RX ADMIN — HYDROXYZINE HYDROCHLORIDE 25 MG: 25 TABLET, FILM COATED ORAL at 03:39

## 2019-01-04 RX ADMIN — HYDROXYZINE HYDROCHLORIDE 25 MG: 25 TABLET, FILM COATED ORAL at 21:11

## 2019-01-04 NOTE — PLAN OF CARE
Problem: Altered Mood, Psychotic Behavior:  Goal: Able to demonstrate trust by eating, participating in treatment and following staff's direction  Able to demonstrate trust by eating, participating in treatment and following staff's direction   Outcome: Ongoing  PSYCHOEDUCATION GROUP NOTE    Date: 1/4/2019    Start Time: 1330  End Time: 1445    Number Participants in Group:  10/16    Goal:  Patient will demonstrate increased interpersonal interaction   Topic: Creative Expression     Discipline Responsible:   OT  AT    Ns. x RT MHP Other       Participation Level:     None x Minimal    Active Listener  Interactive    Monopolizing         Participation Quality:  x Appropriate  Inappropriate          Attentive        Intrusive          Sharing        Resistant          Supportive        Lethargic       Affective:    Congruent  Incongruent  Blunted x Flat    Constricted  Anxious  Elated  Angry    Labile  Depressed  Other         Cognitive:  x Alert  Oriented PPTP     Concentration  G x F  P   Attention Span  G x F  P   Short-Term Memory  G x F  P   Long-Term Memory  G x F  P   ProblemSolving/  Decision Making  G x F  P   Ability to Process  Information  G x F  P      Contributing Factors             Delusional             Hallucinating             Flight of Ideas             Other:       Modes of Intervention:  x Education  Support  Exploration    Clarifying x Problem Solving  Confrontation   x Socialization  Limit Setting x Reality Testing   x Activity  Movement  Media    Other:            Response to Learning:   Able to verbalize current knowledge/experience    Able to verbalize/acknowledge new learning    Able to retain information    Capable of insight    Able to change behavior   x Progressing to goal    Other:        Comments:

## 2019-01-04 NOTE — PLAN OF CARE
Problem: Altered Mood, Psychotic Behavior:  Goal: Able to demonstrate trust by eating, participating in treatment and following staff's direction  Able to demonstrate trust by eating, participating in treatment and following staff's direction   Outcome: Ongoing  [facility]  Week Interdisciplinary Treatment Plan Note     Review Date & Time: 1/4/19 9:25AM    Admission Type:   Admission Type: Involuntary    Reason for admission:  Reason for Admission: Patient admitted for poor hygiene, auditory hallucinations, depression    Estimated Length of Stay :  5-7 days  Estimated Discharge Date Update: to be determined by physician    PATIENT STRENGTHS:  Patient Strengths:Strengths: Connection to output provider  Patient Strengths and Limitations:Limitations: Apathetic / unmotivated, Tendency to isolate self, External locus of control  Addictive Behavior:Addictive Behavior  In the past 3 months, have you felt or has someone told you that you have a problem with:  : Eating (too much/too little)  Do you have a history of Chemical Use?: Comment (YONY)  Do you have a history of Alcohol Use?: Comment (YONY)  Do you have a history of Street Drug Abuse?: Comment (YONY)  Histroy of Prescripton Drug Abuse?: Comment (YONY)  Medical Problems:   Past Medical History:   Diagnosis Date    Anxiety     Asthma     Bipolar 1 disorder ([facility]     Multiple personality ([facility]     Seizures ([facility] [address] [facility]        Risk:  Fall RiskTotal: 53  Stephen Scale Stephen Scale Score: 22  BVC Total: 0    Change in scores no.  Changes to plan of Care no    Status EXAM:   Status and Exam  Normal: No  Facial Expression: Avoids Gaze  Affect: Blunt  Level of Consciousness: Alert  Mood:Normal: No  Mood: Suspicious  Motor Activity:Normal: Yes  Motor Activity: Decreased  Interview Behavior: Evasive  Preception: [place] to Person, [place] to Time, [place] to Place, [place] to Situation  Attention:Normal: No  Attention: Distractible  Thought Processes: Blocking  Thought Content:Normal: No  Thought Content: Poverty of Content  Hallucinations: Auditory (Comment) (self taked noted )  Delusions: No  Delusions: Persecution  Memory:Normal: No  Memory: Poor Recent, Poor Remote  Insight and Judgment: No  Insight and Judgment: Poor Judgment, Poor Insight, Unmotivated  Present Suicidal Ideation: No  Present Homicidal Ideation: No    Daily Assessment Last Entry:   Daily Sleep (WDL): Exceptions to WDL         Patient Currently in Pain: Denies  Daily Nutrition (WDL): Within Defined Limits    Patient Monitoring:  Frequency of Checks: 4 times per hour, close    Psychiatric Symptoms:   Depression Symptoms  Depression Symptoms: Impaired concentration, Isolative  Anxiety Symptoms  Anxiety Symptoms: No problems reported or observed. Lora Symptoms  Lora Symptoms: No problems reported or observed. Psychosis Symptoms  Delusion Type: No problems reported or observed. Suicide Risk CSSR-S:  1) Within the past month, have you wished you were dead or wished you could go to sleep and not wake up? : No  2) Have you actually had any thoughts of killing yourself? : No  6) Have you ever done anything, started to do anything, or prepared to do anything to end your life?: No  Change in Result no Change in Plan of care no    EDUCATION:   Learner Progress Toward Treatment Goals: Reviewed results and recommendations of this team, Reviewed group plan and strategies, Reviewed signs, symptoms and risk of self harm and violent behavior, Reviewed goals and plan of care    Method: individual education, small group, verbal education    Outcome: verbalized understanding, but needs reinforcement to obtain goals    PATIENT GOALS:  Short term: Keep to myself. Long term: Continue with outpatient services at [facility]. PLAN/TREATMENT RECOMMENDATIONS UPDATE:   Continue with group therapies, education of coping skills   Continue to monitor patient on unit. Medications provided/ medication compliance by patient.   Continue for plans to obtain long term goals after discharge.     SHORT-TERM GOALS UPDATE:  Time frame for Short-Term Goals: 8-14days     LONG-TERM GOALS UPDATE:  Time frame for Long-Term Goals: 6 months  Members Present in Team Meeting: See Signature Sheet    Delmy Acosta, MSW, LSW

## 2019-01-04 NOTE — PLAN OF CARE
Problem: Altered Mood, Psychotic Behavior:  Goal: Able to demonstrate trust by eating, participating in treatment and following staff's direction  Able to demonstrate trust by eating, participating in treatment and following staff's direction   Outcome: Ongoing  PSYCHOEDUCATION GROUP NOTE    Date: 1/4/2019    Start Time: 1100  End Time: 1135    Number Participants in Group:  11/16    Goal:  Patient will demonstrate increased interpersonal interaction   Topic: Cognitive Skills    Discipline Responsible:   OT  AT  [facility]. x RT MHP Other       Participation Level:    x None  Minimal    Active Listener  Interactive    Monopolizing         Participation Quality:   Appropriate  Inappropriate          Attentive        Intrusive          Sharing x       Resistant          Supportive        Lethargic       Affective:    Congruent  Incongruent  Blunted x Flat    Constricted  Anxious  Elated  Angry    Labile  Depressed  Other         Cognitive:  x Alert x Oriented PPTP     Concentration  G  F x P   Attention Span  G  F x P   Short-Term Memory  G x F  P   Long-Term Memory  G x F  P   ProblemSolving/  Decision Making  G  F x P   Ability to Process  Information  G  F x P      Contributing Factors             Delusional             Hallucinating             Flight of Ideas             Other:       Modes of Intervention:  x Education  Support  Exploration    Clarifying x Problem Solving  Confrontation   x Socialization  Limit Setting x Reality Testing   x Activity  Movement  Media    Other:            Response to Learning:   Able to verbalize current knowledge/experience    Able to verbalize/acknowledge new learning    Able to retain information    Capable of insight    Able to change behavior   x Progressing to goal    Other:        Comments:     Patient attended group but did not engage in activity. Patient was in and out of group frequently.

## 2019-01-05 PROCEDURE — 1240000000 HC EMOTIONAL WELLNESS R&B

## 2019-01-05 PROCEDURE — 6370000000 HC RX 637 (ALT 250 FOR IP): Performed by: PSYCHIATRY & NEUROLOGY

## 2019-01-05 RX ADMIN — Medication 1 TABLET: at 17:54

## 2019-01-05 RX ADMIN — LURASIDONE HYDROCHLORIDE 160 MG: 80 TABLET, FILM COATED ORAL at 17:54

## 2019-01-05 NOTE — PLAN OF CARE
Psychoeducational Group Note    Date: 1/5/18  Start Time: 10:00am  End Time: 11:00am    Number Participants in Group:  10    Goal:  Patient will demonstrate increased interpersonal interaction   Topic: Cognitive Distortions     Discipline Responsible:   OT  AT X SW  Nsg.  RT  Other       Participation Level:     None X Minimal    Active Listener  Interactive    Monopolizing         Participation Quality:  X Appropriate X Inappropriate   X       Attentive        Intrusive          Sharing X       Resistant          Supportive        Lethargic       Affective:   X Congruent  Incongruent  Blunted  Flat    Constricted  Anxious  Elated  Angry    Labile  Depressed  Other         Cognitive:  X Alert X Oriented PPTP     Concentration X G  F  P   Attention Span X G  F  P   Short-Term Memory X G  F  P   Long-Term Memory X G  F  P   ProblemSolving/  Decision Making X G  F  P   Ability to Process  Information X G  F  P     N/A  Contributing Factors             Delusional             Hallucinating             Flight of Ideas             Other:       Modes of Intervention:  X Education X Support X Exploration   X Clarifying X Problem Solving  Confrontation    Socialization  Limit Setting  Reality Testing    Activity  Movement  Media    Other:            Response to Learning:   Able to verbalize current knowledge/experience    Able to verbalize/acknowledge new learning   X Able to retain information    Capable of insight   X Able to change behavior   X Progressing to goal    Other:        Comments:

## 2019-01-05 NOTE — PLAN OF CARE
Problem: Altered Mood, Psychotic Behavior:  Goal: Able to demonstrate trust by eating, participating in treatment and following staff's direction  Able to demonstrate trust by eating, participating in treatment and following staff's direction   Outcome: Ongoing  Patient irritable and refused to talk to staff during 1:1 time. Goal: Absence of self-harm  Absence of self-harm   Outcome: Ongoing  Patient refused to talk during 1:1 time. Patient yelled\" no, no, go away\". Problem: Tobacco Use:  Goal: Inpatient tobacco use cessation counseling participation  Inpatient tobacco use cessation counseling participation   Outcome: Ongoing  Patient does not participate in tobacco cessation counseling.

## 2019-01-05 NOTE — PLAN OF CARE
Problem: Altered Mood, Psychotic Behavior:  Goal: Able to demonstrate trust by eating, participating in treatment and following staff's direction  Able to demonstrate trust by eating, participating in treatment and following staff's direction   Outcome: Ongoing  PT not active in own care. PT is irritable with staff. PT stated \"im tired of being here fuck you mother fuckers\". Goal: Absence of self-harm  Absence of self-harm   Outcome: Ongoing  PT absent of self harm.

## 2019-01-05 NOTE — PLAN OF CARE
Psychoeducational  Group Note    Date: 1/5/19  Start Time: 9:00am  End Time: 9:15am    Number Participants in Group:  10    Goal:  Patient will demonstrate increased interpersonal interaction   Topic: Goal Setting    Discipline Responsible:   OT  AT X SW  Nsg.  RT  Other       Participation Level:     None  Minimal    Active Listener  Interactive    Monopolizing         Participation Quality:  X Appropriate  Inappropriate   X       Attentive        Intrusive   X       Sharing        Resistant          Supportive        Lethargic       Affective:   X Congruent  Incongruent  Blunted  Flat    Constricted  Anxious  Elated  Angry    Labile  Depressed  Other         Cognitive:  X Alert X Oriented PPTP     Concentration X G  F  P   Attention Span X G  F  P   Short-Term Memory X G  F  P   Long-Term Memory X G  F  P   ProblemSolving/  Decision Making X G  F  P   Ability to Process  Information X G  F  P     N/A  Contributing Factors             Delusional             Hallucinating             Flight of Ideas             Other:       Modes of Intervention:  X Education  Support X Exploration   X Clarifying  Problem Solving  Confrontation    Socialization  Limit Setting  Reality Testing    Activity  Movement  Media    Other:            Response to Learning:   Able to verbalize current knowledge/experience    Able to verbalize/acknowledge new learning   X Able to retain information    Capable of insight    Able to change behavior   X Progressing to goal    Other:        Comments: Pt unable to verbalize goal at this time. Pt suspicious when writer asked for her name.

## 2019-01-05 NOTE — PROGRESS NOTES
Patient continues to be extremely agitated, irritable, poor impulse control, accusing me of stealing her belongings. She continues to deny that she is pregnant, has been refusing prenatal vitamins. She refuses to care for ADL's, is very inappropriate in her behavior- often exposing her private parts in the day area. She has no insight into illness, role of treatment. Will continue Latuda at 160 mg qdinner- unfortunately I am unable to use any stronger antipsychotic medication due to pregnancy.

## 2019-01-06 PROCEDURE — 6370000000 HC RX 637 (ALT 250 FOR IP): Performed by: PSYCHIATRY & NEUROLOGY

## 2019-01-06 PROCEDURE — 1240000000 HC EMOTIONAL WELLNESS R&B

## 2019-01-06 RX ADMIN — Medication 1 TABLET: at 17:29

## 2019-01-06 RX ADMIN — LURASIDONE HYDROCHLORIDE 160 MG: 80 TABLET, FILM COATED ORAL at 17:28

## 2019-01-06 RX ADMIN — NICOTINE POLACRILEX 2 MG: 2 GUM, CHEWING BUCCAL at 21:42

## 2019-01-06 RX ADMIN — NICOTINE POLACRILEX 2 MG: 2 GUM, CHEWING BUCCAL at 19:50

## 2019-01-06 NOTE — FLOWSHEET NOTE
01/05/19 1945   Vital Signs   Temp (refused)   Temp Source Oral   Heart Rate Source Monitor   patient refused vitals at this time.

## 2019-01-06 NOTE — PROGRESS NOTES
Wellness Group Note    Date: 1/6/19  Start Time: 1600  End Time: 9926    Number Participants in Group:  10/16  Topic:  Last Day  Discipline Responsible: BHT    Pt came to group and left as soon as discussion began.

## 2019-01-06 NOTE — PLAN OF CARE
Problem: Altered Mood, Psychotic Behavior:  Goal: Able to demonstrate trust by eating, participating in treatment and following staff's direction  Able to demonstrate trust by eating, participating in treatment and following staff's direction   Outcome: Ongoing  Patient is irritable, evasive and not cooperative with 1:1 talk time. Isolative to room and out for needs only. Patient is suspicious. Only accepting of food or items when she request but refuses with offering from staff. Goal: Absence of self-harm  Absence of self-harm   Outcome: Ongoing  Denies suicidal and homicidal ideations.

## 2019-01-06 NOTE — PLAN OF CARE
Problem: Altered Mood, Psychotic Behavior:  Goal: Absence of self-harm  Absence of self-harm   Outcome: Ongoing  Patient irritable at 1:1 time; refused to talk to staff.

## 2019-01-06 NOTE — PLAN OF CARE
Problem: Altered Mood, Psychotic Behavior:  Goal: Absence of self-harm  Absence of self-harm   Outcome: Ongoing  Patient evasive upon 1:1 time. Patient stated she did not want to talk. Problem: Tobacco Use:  Goal: Inpatient tobacco use cessation counseling participation  Inpatient tobacco use cessation counseling participation   Outcome: Ongoing  Patient does not participate in the tobacco cessation participation counseling.

## 2019-01-06 NOTE — PLAN OF CARE
Pt did not attend psychoeducational group at 1:30pm today despite encouragement. 1:1 offered; pt declined on this date 1/6/2019.

## 2019-01-06 NOTE — PROGRESS NOTES
Pt did not attend 0900 community meeting/ goals  group d/t resting in room despite staff invitation to attend.

## 2019-01-07 PROCEDURE — 6370000000 HC RX 637 (ALT 250 FOR IP): Performed by: PSYCHIATRY & NEUROLOGY

## 2019-01-07 PROCEDURE — 1240000000 HC EMOTIONAL WELLNESS R&B

## 2019-01-07 RX ADMIN — NICOTINE POLACRILEX 2 MG: 2 GUM, CHEWING BUCCAL at 17:23

## 2019-01-07 RX ADMIN — NICOTINE POLACRILEX 2 MG: 2 GUM, CHEWING BUCCAL at 07:05

## 2019-01-07 RX ADMIN — Medication 1 TABLET: at 17:21

## 2019-01-07 RX ADMIN — LURASIDONE HYDROCHLORIDE 160 MG: 80 TABLET, FILM COATED ORAL at 17:22

## 2019-01-07 NOTE — PROGRESS NOTES
Pharmacy Med Education Group Note    Date: 01/07  Start Time: 1330  End Time: 5954    Number Participants in Group:  11    Goal:  Patient will demonstrate an understanding of the medications intended purpose and possible adverse effects  Topic: [facility] for Pharmacy Med Ed Group    Discipline Responsible:     OT  AT  [facility].  RT     X Other       Participation Level:     None  Minimal      X Active Listener    X Interactive    Monopolizing         Participation Quality:    X Appropriate  Inappropriate     X       Attentive        Intrusive          Sharing        Resistant          Supportive        Lethargic       Affective:     X Congruent  Incongruent  Blunted  Flat    Constricted  Anxious  Elated  Angry    Labile  Depressed  Other         Cognitive:    X Alert  Oriented PPTP     Concentration   X G  F  P   Attention Span   X G  F  P   Short-Term Memory   X G  F  P   Long-Term Memory  G  F  P   ProblemSolving/  Decision Making  G  F  P   Ability to Process  Information   X G  F  P      Contributing Factors             Delusional             Hallucinating             Flight of Ideas             Other:       Modes of Intervention:    X Education   X Support  Exploration    Clarifying  Problem Solving  Confrontation    Socialization  Limit Setting  Reality Testing    Activity  Movement  Media    Other:            Response to Learning:    X Able to verbalize current knowledge/experience    Able to verbalize/acknowledge new learning    Able to retain information    Capable of insight    Able to change behavior    Progressing to goal    Other:        Comments:

## 2019-01-07 NOTE — PLAN OF CARE
Problem: Altered Mood, Psychotic Behavior:  Goal: Absence of self-harm  Absence of self-harm   Outcome: Ongoing  Pt did not attend RT group at 1100 d/t resting in room despite staff invitation to attend.

## 2019-01-07 NOTE — PLAN OF CARE
Problem: Altered Mood, Psychotic Behavior:  Goal: Able to demonstrate trust by eating, participating in treatment and following staff's direction  Able to demonstrate trust by eating, participating in treatment and following staff's direction   Outcome: Ongoing  Patient is accepting of snacks and fluids this evening. Patient did go to group but was did not participate. Accepting of requested medications. Goal: Absence of self-harm  Absence of self-harm   Outcome: Ongoing  Denies suicidal and homicidal ideations.

## 2019-01-07 NOTE — PROGRESS NOTES
Patient again refused to speak with me. She at times acknowledges my presence, yells profane threats towards me, then refuses to interact further. She has been intermittently agitated with staff, throwing food and other belongings in the day area. She continues to display non reality based thinking. She continues to verbalize that she is not pregnant despite objective evidence to the contrary. She continues to refuse prenatal vitamins. She continues to display severe psychotic symptoms, engaging in self talk, responding internal stimuli. There are no apparent side effects to medication. This is a very difficult situation given she has about 7 months pregnant, has not been getting any prenatal care, medications are limited due to pregnancy, is not currently showing any ability to care for self outside of structured setting. Charting and medications reviewed. Will continue Latuda.

## 2019-01-07 NOTE — PLAN OF CARE
Problem: Altered Mood, Psychotic Behavior:  Goal: Able to demonstrate trust by eating, participating in treatment and following staff's direction  Able to demonstrate trust by eating, participating in treatment and following staff's direction   Outcome: Ongoing    Goal: Absence of self-harm  Absence of self-harm   Outcome: Ongoing  Patient has bee absent of self harm patient is encouraged to seek out, staff if any thoughts of self harm should arise, q15min checks for safety remain patient safety maintained

## 2019-01-08 PROCEDURE — 6370000000 HC RX 637 (ALT 250 FOR IP): Performed by: PSYCHIATRY & NEUROLOGY

## 2019-01-08 PROCEDURE — 1240000000 HC EMOTIONAL WELLNESS R&B

## 2019-01-08 RX ADMIN — Medication 1 TABLET: at 16:44

## 2019-01-08 RX ADMIN — LURASIDONE HYDROCHLORIDE 160 MG: 80 TABLET, FILM COATED ORAL at 16:44

## 2019-01-08 RX ADMIN — NICOTINE POLACRILEX 2 MG: 2 GUM, CHEWING BUCCAL at 09:07

## 2019-01-08 NOTE — PLAN OF CARE
Problem: Altered Mood, Psychotic Behavior:  Goal: Able to demonstrate trust by eating, participating in treatment and following staff's direction  Able to demonstrate trust by eating, participating in treatment and following staff's direction   Outcome: Ongoing  PT remains isolative to self and suspicious of staff and peers. Goal: Absence of self-harm  Absence of self-harm   Outcome: Ongoing  PT is absent of self harm.

## 2019-01-08 NOTE — PLAN OF CARE
Problem: Altered Mood, Psychotic Behavior:  Goal: Able to demonstrate trust by eating, participating in treatment and following staff's direction  Able to demonstrate trust by eating, participating in treatment and following staff's direction   Outcome: Ongoing  Pt did not attend Therapeutic Recreation at 1100 d/t resting in room despite staff invitation to attend.

## 2019-01-08 NOTE — PLAN OF CARE
Problem: Altered Mood, Psychotic Behavior:  Goal: Able to demonstrate trust by eating, participating in treatment and following staff's direction  Able to demonstrate trust by eating, participating in treatment and following staff's direction   Patient will eat her meals but does not demonstrate trust with staff for any other reason q15 min checks for safety remain patient safety maintained   Goal: Absence of self-harm  Absence of self-harm   Outcome: Ongoing  Patient has been free of self harm, patient encouraged to seek out staff if any thoughts of self harm should arise, q15min checks for safety remain patient safety maintained.

## 2019-01-09 PROCEDURE — 1240000000 HC EMOTIONAL WELLNESS R&B

## 2019-01-09 PROCEDURE — 6370000000 HC RX 637 (ALT 250 FOR IP): Performed by: PSYCHIATRY & NEUROLOGY

## 2019-01-09 RX ADMIN — LURASIDONE HYDROCHLORIDE 160 MG: 80 TABLET, FILM COATED ORAL at 17:10

## 2019-01-09 RX ADMIN — Medication 1 TABLET: at 17:10

## 2019-01-09 RX ADMIN — NICOTINE POLACRILEX 2 MG: 2 GUM, CHEWING BUCCAL at 19:11

## 2019-01-09 RX ADMIN — NICOTINE POLACRILEX 2 MG: 2 GUM, CHEWING BUCCAL at 18:46

## 2019-01-09 RX ADMIN — ACETAMINOPHEN 650 MG: 325 TABLET, FILM COATED ORAL at 18:46

## 2019-01-09 ASSESSMENT — PAIN SCALES - GENERAL
PAINLEVEL_OUTOF10: 4
PAINLEVEL_OUTOF10: 0

## 2019-01-09 NOTE — PLAN OF CARE
Problem: Altered Mood, Psychotic Behavior:  Goal: Able to demonstrate trust by eating, participating in treatment and following staff's direction  Able to demonstrate trust by eating, participating in treatment and following staff's direction   Outcome: Ongoing  Pt did not attend Community Meeting at 5950 d/t resting in room despite staff invitation to attend.

## 2019-01-09 NOTE — PROGRESS NOTES
Patient again refused to speak with me today. She continues to display very disorganized thinking and behavior. She was lying in bed in the middle of the afternoon, no sheets, blanket pulled over her head, refused to remove blanket. With verbal prompting, patient began yelling in a  profane manner. Her room is a complete disaster, food and belongings everywhere. She shows no care of hygiene are ADLs. She continues to have no insight into illness, pregnancy, need for prenatal care. She has been compliant with taking prescribed Latuda, only medication able to give patient due to pregnancy. However, due to patient's severity of illness, there is minimal benefit in terms of reduction of psychosis. Patient shows no ability to care for herself in any reasonable means outside of hospital setting. Charting and medications reviewed. There is no alternative to keeping patient in hospital from a safety perspective.

## 2019-01-09 NOTE — PLAN OF CARE
Problem: Altered Mood, Psychotic Behavior:  Goal: Able to demonstrate trust by eating, participating in treatment and following staff's direction  Able to demonstrate trust by eating, participating in treatment and following staff's direction   Outcome: Ongoing  Pt declined to attend psychotherapy at 1000 am despite encouragement. Pt offered 1:1 and refused.

## 2019-01-09 NOTE — PLAN OF CARE
Problem: Altered Mood, Psychotic Behavior:  Goal: Able to demonstrate trust by eating, participating in treatment and following staff's direction  Able to demonstrate trust by eating, participating in treatment and following staff's direction   Outcome: Ongoing  Patient refused 1:1 time with staff. Patient stated she \"is not doing it\". Patient irritable when approached by staff. Patient witnessed eating meals and is medication compliant. Goal: Absence of self-harm  Absence of self-harm   Outcome: Ongoing  Patient refused 1:1 time with staff. Patient stated she \"is not doing it\". Problem: Tobacco Use:  Goal: Inpatient tobacco use cessation counseling participation  Inpatient tobacco use cessation counseling participation   Outcome: Ongoing  Patient does not participate in tobacco cessation counseling.

## 2019-01-09 NOTE — PLAN OF CARE
Problem: Altered Mood, Psychotic Behavior:  Goal: Able to demonstrate trust by eating, participating in treatment and following staff's direction  Able to demonstrate trust by eating, participating in treatment and following staff's direction   Outcome: Not Met This Shift  PT is suspicious of staff. PT refused vitals. PT is irritable when approached by staff. Writer approached staff for 1:1 talk time and patient stated \"dont fucking talk to me\". Goal: Absence of self-harm  Absence of self-harm   Outcome: Ongoing  PT is absent of self harm.

## 2019-01-09 NOTE — PLAN OF CARE
Problem: Altered Mood, Psychotic Behavior:  Goal: Able to demonstrate trust by eating, participating in treatment and following staff's direction  Able to demonstrate trust by eating, participating in treatment and following staff's direction   Outcome: Ongoing  Pt did not attend Therapeutic Recreation at 1330 d/t resting in room despite staff invitation to attend.

## 2019-01-10 PROCEDURE — 6370000000 HC RX 637 (ALT 250 FOR IP): Performed by: PSYCHIATRY & NEUROLOGY

## 2019-01-10 PROCEDURE — 1240000000 HC EMOTIONAL WELLNESS R&B

## 2019-01-10 RX ADMIN — NICOTINE POLACRILEX 2 MG: 2 GUM, CHEWING BUCCAL at 10:59

## 2019-01-10 RX ADMIN — NICOTINE POLACRILEX 2 MG: 2 GUM, CHEWING BUCCAL at 11:46

## 2019-01-10 RX ADMIN — LURASIDONE HYDROCHLORIDE 160 MG: 80 TABLET, FILM COATED ORAL at 17:22

## 2019-01-10 RX ADMIN — Medication 1 TABLET: at 17:22

## 2019-01-10 NOTE — PLAN OF CARE
Problem: Altered Mood, Psychotic Behavior:  Goal: Absence of self-harm  Absence of self-harm   Outcome: Ongoing  Pt did not participate in leisure/ mental health education group at 1100 despite staff encouragement to attend.

## 2019-01-10 NOTE — PLAN OF CARE
Problem: Altered Mood, Psychotic Behavior:  Goal: Able to demonstrate trust by eating, participating in treatment and following staff's direction  Able to demonstrate trust by eating, participating in treatment and following staff's direction   Outcome: Ongoing  Psychotherapy Group Note    Date: 1/10/2019  Start Time: 10:00AM  End Time: 10:45AM    Number Participants in Group:  12/17    Goal:  Patient will demonstrate increased interpersonal interaction   Topic: [facility]    Discipline Responsible:   OT  AT x SW  Nsg.  RT  Other       Participation Level:     None  Minimal   x Active Listener x Interactive    Monopolizing         Participation Quality:   Appropriate  Inappropriate   x       Attentive        Intrusive   x       Sharing        Resistant   x       Supportive        Lethargic       Affective:   x Congruent  Incongruent  Blunted  Flat    Constricted  Anxious  Elated  Angry    Labile  Depressed  Other         Cognitive:  x Alert x Oriented PPTP     Concentration  G x F  P   Attention Span  G x F  P   Short-Term Memory  G x F  P   Long-Term Memory  G x F  P   Problem Solving/  Decision Making  G x F  P   Ability to Process  Information  G x F  P      Contributing Factors             Delusional             Hallucinating             Flight of Ideas             Other:       Modes of Intervention:  x Education x Support x Exploration   x Clarifying x Problem Solving x Confrontation   x Socialization x Limit Setting x Reality Testing    Activity  Movement  Media    Other:            Response to Learning:  x Able to verbalize current knowledge/experience   x Able to verbalize/acknowledge new learning   x Able to retain information    Capable of insight    Able to change behavior   x Progressing to goal    Other:        Comments:  Pt was an active listener for group discussion.

## 2019-01-10 NOTE — FLOWSHEET NOTE
*Patient participated in the [address] [place] [address], but left early       01/10/19 1359   Encounter Summary   Services provided to: Patient   Referral/Consult From: Rounding   Continue Visiting (1/10/19)   Complexity of Encounter Low   Length of Encounter 30 minutes   Spiritual Assessment Completed Yes   Spiritual/Anabaptist   Type Spiritual support   Assessment Calm; Approachable   Intervention Active listening   Outcome Receptive

## 2019-01-10 NOTE — PLAN OF CARE
Problem: Altered Mood, Psychotic Behavior:  Goal: Able to demonstrate trust by eating, participating in treatment and following staff's direction  Able to demonstrate trust by eating, participating in treatment and following staff's direction   Outcome: Ongoing  Patient appetite appears appropriate,  Out at desk for snack and beverages. Goal: Absence of self-harm  Absence of self-harm   Outcome: Ongoing  Patient will only answer \"yes or no\" to assessment questions. Patient answered \"no\" to question of self harm. Patient would not open eyes and acknowledge staff was in room at time of assessment. Pt. Remains on q15 min checks and frequent spontaneous checks throughout shift. Pt. Safety maintained.

## 2019-01-10 NOTE — PLAN OF CARE
Problem: Altered Mood, Psychotic Behavior:  Goal: Able to demonstrate trust by eating, participating in treatment and following staff's direction  Able to demonstrate trust by eating, participating in treatment and following staff's direction   Outcome: Ongoing  Patient declined 1:1 time. Staff witnessed patient eating at meals and participating in snack time. Goal: Absence of self-harm  Absence of self-harm   Outcome: Ongoing  Patient decline to talk at 1:1 time. Stated \" go away\" when engaged by staff member. Staff safety checks every 15 minutes. Problem: Tobacco Use:  Goal: Inpatient tobacco use cessation counseling participation  Inpatient tobacco use cessation counseling participation   Outcome: Ongoing  Patient has participated in tobacco cessation counseling by chewing nicotine gum.

## 2019-01-11 PROCEDURE — 1240000000 HC EMOTIONAL WELLNESS R&B

## 2019-01-11 PROCEDURE — 6370000000 HC RX 637 (ALT 250 FOR IP): Performed by: PSYCHIATRY & NEUROLOGY

## 2019-01-11 RX ADMIN — Medication 1 TABLET: at 17:47

## 2019-01-11 RX ADMIN — NICOTINE POLACRILEX 2 MG: 2 GUM, CHEWING BUCCAL at 16:09

## 2019-01-11 RX ADMIN — LURASIDONE HYDROCHLORIDE 160 MG: 80 TABLET, FILM COATED ORAL at 17:47

## 2019-01-11 ASSESSMENT — PAIN SCALES - GENERAL: PAINLEVEL_OUTOF10: 0

## 2019-01-11 NOTE — PLAN OF CARE
Problem: Altered Mood, Psychotic Behavior:  Goal: Able to demonstrate trust by eating, participating in treatment and following staff's direction  Able to demonstrate trust by eating, participating in treatment and following staff's direction   Outcome: Ongoing  Pt did not attend RT group at 1330 d/t resting in room despite staff invitation to attend.

## 2019-01-11 NOTE — PLAN OF CARE
Problem: Altered Mood, Psychotic Behavior:  Goal: Absence of self-harm  Absence of self-harm   Outcome: Ongoing  Pt did not attend RT group at 1430 d/t resting in room despite staff invitation to attend.

## 2019-01-11 NOTE — PLAN OF CARE
Problem: Altered Mood, Psychotic Behavior:  Goal: Absence of self-harm  Absence of self-harm   Outcome: Ongoing  PSYCHOEDUCATION GROUP NOTE    Date: 1/11/19  Start Time: 1100  End Time: 1130    Number Participants in Group:  10/16    Goal:  Patient will demonstrate increased interpersonal interaction   Topic: Stress Management/Problem Solving    Discipline Responsible:   OT  AT  [facility]. x RT MHP Other       Participation Level:     None x Minimal    Active Listener  Interactive    Monopolizing         Participation Quality:  x Appropriate  Inappropriate          Attentive        Intrusive          Sharing        Resistant          Supportive        Lethargic       Affective:    Congruent  Incongruent x Blunted  Flat    Constricted  Anxious  Elated  Angry    Labile  Depressed  Other         Cognitive:  x Alert  Oriented PPTP     Concentration  G  F x P   Attention Span  G  F x P   Short-Term Memory  G  F x P   Long-Term Memory  G  F x P   ProblemSolving/  Decision Making  G  F x P   Ability to Process  Information  G  F x P      Contributing Factors             Delusional             Hallucinating             Flight of Ideas             Other:       Modes of Intervention:  x Education x Support x Exploration    Clarifying x Problem Solving  Confrontation    Socialization  Limit Setting x Reality Testing   x Activity  Movement  Media    Other:            Response to Learning:   Able to verbalize current knowledge/experience    Able to verbalize/acknowledge new learning    Able to retain information    Capable of insight    Able to change behavior   x Progressing to goal    Other:        Comments:

## 2019-01-11 NOTE — PLAN OF CARE
Problem: Altered Mood, Psychotic Behavior:  Goal: Absence of self-harm  Absence of self-harm   Outcome: Ongoing  PSYCHOEDUCATION GROUP NOTE    Date: 1/11/18  Start Time: 0900  End Time: 0915    Number Participants in Group:  11/16    Goal:  Patient will demonstrate increased interpersonal interaction   Topic: Community Meeting     Discipline Responsible:   OT  AT  [facility]. x RT MHP Other       Participation Level:     None  Minimal   x Active Listener x Interactive    Monopolizing         Participation Quality:  x Appropriate  Inappropriate   x       Attentive        Intrusive   x       Sharing        Resistant          Supportive        Lethargic       Affective:    Congruent  Incongruent  Blunted  Flat   x Constricted  Anxious  Elated  Angry    Labile  Depressed  Other         Cognitive:  x Alert x Oriented PPTP     Concentration x G  F  P   Attention Span x G  F  P   Short-Term Memory x G  F  P   Long-Term Memory x G  F  P   ProblemSolving/  Decision Making x G  F  P   Ability to Process  Information x G  F  P      Contributing Factors             Delusional             Hallucinating             Flight of Ideas             Other:       Modes of Intervention:  x Education x Support x Exploration    Clarifying x Problem Solving  Confrontation   x Socialization  Limit Setting x Reality Testing   x Activity  Movement  Media    Other:            Response to Learning:   Able to verbalize current knowledge/experience    Able to verbalize/acknowledge new learning   x Able to retain information    Capable of insight    Able to change behavior   x Progressing to goal    Other:        Comments:

## 2019-01-11 NOTE — CARE COORDINATION
Pt's ACT Team Ortiz AGUILAR, An Martínez, states pt sees [facility] and they are willing to see pt, on unit, if pt stays until delivery. Writer spoke to Kat [facility] for [facility] office states they cannot see pt, as they are only able to see [place]'s pts not [place]'s pts. Kat suggested pt's admitting doctor put in a OBGYN consult for [employer].

## 2019-01-11 NOTE — PLAN OF CARE
Problem: Altered Mood, Psychotic Behavior:  Intervention: Group therapy to identify positive coping skills  [id number]  Week Interdisciplinary Treatment Plan Note     Review Date & Time: 1/11/2019  0935    Admission Type:   Admission Type: Involuntary    Reason for admission:  Reason for Admission: Patient admitted for poor hygiene, auditory hallucinations, depression    Estimated Length of Stay :  5-7 days  Estimated Discharge Date Update: to be determined by physician    PATIENT STRENGTHS:  Patient Strengths:Strengths: Connection to output provider  Patient Strengths and Limitations:Limitations: Apathetic / unmotivated, Tendency to isolate self, External locus of control  Addictive Behavior:Addictive Behavior  In the past 3 months, have you felt or has someone told you that you have a problem with:  : Eating (too much/too little)  Do you have a history of Chemical Use?: Comment (YONY)  Do you have a history of Alcohol Use?: Comment (YONY)  Do you have a history of Street Drug Abuse?: Comment (YONY)  Histroy of Prescripton Drug Abuse?: Comment (YONY)  Medical Problems:   Past Medical History:   Diagnosis Date    Anxiety     Asthma     Bipolar 1 disorder ([facility]     Multiple personality ([facility]     Seizures ([facility]        Risk:  Fall RiskTotal: 53  Stephen Scale Stephen Scale Score: 22  BVC Total: 1    Change in scores no. Changes to plan of Care no    Status EXAM:   Status and Exam  Normal: No  Facial Expression: Avoids Gaze  Affect: Blunt  Level of Consciousness: Alert  Mood:Normal: No  Mood: Anxious, Irritable  Motor Activity:Normal: No  Motor Activity: Decreased  Interview Behavior: Irritable, Evasive  Preception: [place] to Person, [place] to Place  Attention:Normal: No  Attention: Unable to Concentrate  Thought Processes: Blocking  Thought Content:Normal: No  Thought Content: Paranoia, Poverty of Content  Hallucinations:  Auditory (Comment)  Delusions: Yes  Delusions: Persecution  Memory:Normal: Team Meeting: See Signature Sheet    Marie Whitten, LSLORETA

## 2019-01-11 NOTE — PLAN OF CARE
Problem: Altered Mood, Psychotic Behavior:  Goal: Able to demonstrate trust by eating, participating in treatment and following staff's direction  Able to demonstrate trust by eating, participating in treatment and following staff's direction   Outcome: Ongoing  Patient has been seclusive to her room in bed this evening. Patient is irritable and refused assessments. Patient states she has been eating and sleeping okay. Patient has been behaviorally controlled this evening. Patient admits to auditory hallucinations.

## 2019-01-12 PROCEDURE — 6370000000 HC RX 637 (ALT 250 FOR IP): Performed by: PSYCHIATRY & NEUROLOGY

## 2019-01-12 PROCEDURE — 1240000000 HC EMOTIONAL WELLNESS R&B

## 2019-01-12 RX ADMIN — LURASIDONE HYDROCHLORIDE 160 MG: 80 TABLET, FILM COATED ORAL at 16:27

## 2019-01-12 RX ADMIN — Medication 1 TABLET: at 16:27

## 2019-01-12 ASSESSMENT — PAIN SCALES - GENERAL: PAINLEVEL_OUTOF10: 0

## 2019-01-12 NOTE — PLAN OF CARE
Problem: Altered Mood, Psychotic Behavior:  Goal: Able to demonstrate trust by eating, participating in treatment and following staff's direction  Able to demonstrate trust by eating, participating in treatment and following staff's direction   Outcome: Ongoing  Patient is encouraged and accepting of fluids and snacks. Goal: Absence of self-harm  Absence of self-harm   Outcome: Ongoing  Patient denies thoughts of self harm at this time. Patient agrees to seek out staff if they begin having thoughts of harming self or they need to talk.  Q15min safety checks continue

## 2019-01-12 NOTE — PLAN OF CARE
Problem: Altered Mood, Psychotic Behavior:  Goal: Able to demonstrate trust by eating, participating in treatment and following staff's direction  Able to demonstrate trust by eating, participating in treatment and following staff's direction   Outcome: Ongoing  Psychotherapy Group Note    Date: 1/12/2019  Start Time: 10:00AM  End Time: 10:45AM    Number Participants in Group:  9/13    Goal:  Patient will demonstrate increased interpersonal interaction   Topic: [facility]    Discipline Responsible:   OT  AT x SW  Nsg.  RT  Other       Participation Level:    x None  Minimal    Active Listener  Interactive    Monopolizing         Participation Quality:   Appropriate  Inappropriate          Attentive x       Intrusive          Sharing        Resistant          Supportive        Lethargic       Affective:    Congruent  Incongruent  Blunted  Flat    Constricted  Anxious  Elated x Angry    Labile  Depressed  Other         Cognitive:  x Alert x Oriented PPTP     Concentration  G  F x P   Attention Span  G  F x P   Short-Term Memory  G  F x P   Long-Term Memory  G  F x P   Problem Solving/  Decision Making  G  F x P   Ability to Process  Information  G  F x P      Contributing Factors             Delusional             Hallucinating             Flight of Ideas             Other:       Modes of Intervention:  x Education x Support x Exploration   x Clarifying x Problem Solving x Confrontation   x Socialization x Limit Setting x Reality Testing    Activity  Movement  Media    Other:            Response to Learning:   Able to verbalize current knowledge/experience    Able to verbalize/acknowledge new learning    Able to retain information    Capable of insight    Able to change behavior   x Progressing to goal    Other:        Comments: Pt did not participate in group, arrived late and left early after greeting another pt with profanities.

## 2019-01-12 NOTE — FLOWSHEET NOTE
01/11/19 2132   Assessment   Charting Type Shift assessment   Neurological   Neuro (WDL) WDL   Level of Consciousness 0   Orientation Level Oriented to person   [place] Coma Scale   Eye Opening 4   Best Verbal Response 5   Best Motor Response 6   [place] Coma Scale Score 15   NIH/MNHISS Stroke Scale   NIH/MNIHSS Stroke Scale Assessed No   Patient refuses physical assessment at this time, patient out to the tv area, no complaints of pain at this time, patient is pregnant.

## 2019-01-12 NOTE — PROGRESS NOTES
OB Resident Progress Note     Attempted to ask patient if she would like FHT evaluated while in psychiatric unit, however, patient unwilling to respond to resident. RN informed resident that patient is not admitting to pregnancy and is not yet ready to see OBGYN. Please inform OBGYN if patient is agreeable to exam/testing at any point.        Tawana Flowers  OB/GYN Resident, PGY2  Pager: [phone]  [facility]  01/12/19  7:35 AM

## 2019-01-13 PROCEDURE — 6370000000 HC RX 637 (ALT 250 FOR IP): Performed by: PSYCHIATRY & NEUROLOGY

## 2019-01-13 PROCEDURE — 1240000000 HC EMOTIONAL WELLNESS R&B

## 2019-01-13 RX ADMIN — NICOTINE POLACRILEX 2 MG: 2 GUM, CHEWING BUCCAL at 17:37

## 2019-01-13 RX ADMIN — LURASIDONE HYDROCHLORIDE 160 MG: 80 TABLET, FILM COATED ORAL at 18:45

## 2019-01-13 RX ADMIN — NICOTINE POLACRILEX 2 MG: 2 GUM, CHEWING BUCCAL at 08:55

## 2019-01-13 RX ADMIN — Medication 1 TABLET: at 18:45

## 2019-01-13 ASSESSMENT — PAIN SCALES - GENERAL: PAINLEVEL_OUTOF10: 0

## 2019-01-13 NOTE — PROGRESS NOTES
Pt did not attend Therapeutic Recreation at 1330 despite staff invitation to attend. During group therapy in the day area, patient became verbally threatening towards this Claria Nuria stating she was going to break this writers jaw. Unit staff attempted to redirect and this writer relocated group to the back group room.

## 2019-01-13 NOTE — PLAN OF CARE
Problem: Altered Mood, Psychotic Behavior:  Goal: Able to demonstrate trust by eating, participating in treatment and following staff's direction  Able to demonstrate trust by eating, participating in treatment and following staff's direction   Outcome: Ongoing  Patient out in dayroom accepting of food and beverage,  Patient is not accepting of redirection from staff. Staff attempted to tidy up patients room, patient begame agitated when staff removed curdled milk and clothes soiled in urine. Patient began yelling at staff \"get the hell out of my room before I beat your ass\". Staff was not able to put clean linens on patients bed and patient was unwilling to change into clean clothes. Goal: Absence of self-harm  Absence of self-harm   Outcome: Ongoing  No self harm observed this shift. Pt. Remains on q15 min checks and frequent spontaneous checks throughout shift. Pt. Safety maintained.

## 2019-01-14 PROCEDURE — 1240000000 HC EMOTIONAL WELLNESS R&B

## 2019-01-14 PROCEDURE — 6370000000 HC RX 637 (ALT 250 FOR IP): Performed by: PSYCHIATRY & NEUROLOGY

## 2019-01-14 RX ADMIN — NICOTINE POLACRILEX 2 MG: 2 GUM, CHEWING BUCCAL at 17:52

## 2019-01-14 RX ADMIN — LURASIDONE HYDROCHLORIDE 160 MG: 80 TABLET, FILM COATED ORAL at 17:52

## 2019-01-14 RX ADMIN — Medication 1 TABLET: at 17:52

## 2019-01-14 RX ADMIN — NICOTINE POLACRILEX 2 MG: 2 GUM, CHEWING BUCCAL at 10:26

## 2019-01-14 NOTE — PLAN OF CARE
Problem: Altered Mood, Psychotic Behavior:  Goal: Able to demonstrate trust by eating, participating in treatment and following staff's direction  Able to demonstrate trust by eating, participating in treatment and following staff's direction   Outcome: Ongoing  Patient is unwilling to allow staff to assist in cleaning up room, or laundering soiled clothes. Patient did take shower,  Was unwilling to use soap. Patient was uncooperative with physical assessment. Goal: Absence of self-harm  Absence of self-harm   Outcome: Ongoing  Pt denies thoughts of self harm and is agreeable to seeking out should thoughts of self harm arise. Safe environment maintained. Q15 minute checks for safety cont per unit policy. Will cont to monitor for safety and provides support and reassurance as needed. Problem: Tobacco Use:  Goal: Inpatient tobacco use cessation counseling participation  Inpatient tobacco use cessation counseling participation   Outcome: Ongoing  Education given on smoking cessation, pt was not of information received.

## 2019-01-14 NOTE — PLAN OF CARE
Problem: Altered Mood, Psychotic Behavior:  Goal: Able to demonstrate trust by eating, participating in treatment and following staff's direction  Able to demonstrate trust by eating, participating in treatment and following staff's direction   Outcome: Met This Shift  Pt is eating well, occasionally trying to take food from the soiled trays, extra snacks provided. Does not attend groups or interact with staff or peers. Out in dayroom frequently, does not like to be approached or spoken to, becomes agitated when asked questions. Goal: Absence of self-harm  Absence of self-harm   Outcome: Met This Shift  No attempts to do self-harm this shift.

## 2019-01-14 NOTE — PLAN OF CARE
Problem: Altered Mood, Psychotic Behavior:  Goal: Able to demonstrate trust by eating, participating in treatment and following staff's direction  Able to demonstrate trust by eating, participating in treatment and following staff's direction   Outcome: Ongoing  PSYCHOEDUCATION GROUP NOTE    Date: 1/14/19  Start Time: 0900  End Time: 0915    Number Participants in Group:  12/16    Goal:  Patient will demonstrate increased interpersonal interaction   Topic: Community Meeting     Discipline Responsible:   OT  AT    Ns. x RT MHP Other       Participation Level:     None x Minimal   x Active Listener  Interactive    Monopolizing         Participation Quality:  x Appropriate  Inappropriate          Attentive        Intrusive          Sharing        Resistant          Supportive        Lethargic       Affective:    Congruent  Incongruent  Blunted  Flat   x Constricted  Anxious  Elated  Angry    Labile  Depressed  Other         Cognitive:  x Alert  Oriented PPTP     Concentration  G x F  P   Attention Span  G x F  P   Short-Term Memory  G x F  P   Long-Term Memory  G x F  P   ProblemSolving/  Decision Making  G x F  P   Ability to Process  Information  G x F  P      Contributing Factors             Delusional             Hallucinating             Flight of Ideas             Other:       Modes of Intervention:  x Education x Support x Exploration   x Clarifying x Problem Solving  Confrontation   x Socialization  Limit Setting x Reality Testing    Activity  Movement  Media    Other:            Response to Learning:   Able to verbalize current knowledge/experience    Able to verbalize/acknowledge new learning    Able to retain information    Capable of insight    Able to change behavior   x Progressing to goal    Other:        Comments:

## 2019-01-14 NOTE — PROGRESS NOTES
Patient continues to make threatening comments to myself and staff , threatening to [facility] your ass\", \"mess you up\", threatens to punch me in the face. She refuses to acknowledge that she is pregnant, refuses to discuss treatment in the hospital, role of medication- she has been accepting oral Latuda however. She displays no care of ADL's, refuses to let staff help her. Her room is a disaster with soiled clothes and food products everywhere. She continues to be extremely delusional, denying being pregnant, refusing prenatal care. Charting and medications reviewed. Therapeutic support attempted. Aurea Desmond is only medication able to give patient due to pregnancy. However, due to patient's severity of illness, there is minimal benefit in terms of reduction of psychosis. Patient shows no ability to care for herself in any reasonable means outside of hospital setting. Charting and medications reviewed. There is no alternative to keeping patient in hospital from a safety perspective.

## 2019-01-14 NOTE — PROGRESS NOTES
Patient continues to make threatening comments to myself and staff , threatening to [facility] your ass\", \"mess you up\", threatens to punch me in the face. She refuses to acknowledge that she is pregnant, refuses to discuss treatment in the hospital, role of medication- she has been accepting oral Latuda however. She displays no care of ADL's, refuses to let staff help her. Her room is a disaster with soiled clothes and food products everywhere. She continues to be extremely delusional, denying being pregnant, refusing prenatal care. Charting and medications reviewed. Therapeutic support attempted. Lonita Like is only medication able to give patient due to pregnancy. However, due to patient's severity of illness, there is minimal benefit in terms of reduction of psychosis. Patient shows no ability to care for herself in any reasonable means outside of hospital setting. Charting and medications reviewed. There is no alternative to keeping patient in hospital from a safety perspective.

## 2019-01-15 PROCEDURE — 1240000000 HC EMOTIONAL WELLNESS R&B

## 2019-01-15 PROCEDURE — 6370000000 HC RX 637 (ALT 250 FOR IP): Performed by: PSYCHIATRY & NEUROLOGY

## 2019-01-15 RX ADMIN — NICOTINE POLACRILEX 2 MG: 2 GUM, CHEWING BUCCAL at 16:58

## 2019-01-15 RX ADMIN — LURASIDONE HYDROCHLORIDE 160 MG: 80 TABLET, FILM COATED ORAL at 16:57

## 2019-01-15 RX ADMIN — Medication 1 TABLET: at 16:57

## 2019-01-15 RX ADMIN — NICOTINE POLACRILEX 2 MG: 2 GUM, CHEWING BUCCAL at 09:35

## 2019-01-15 NOTE — PROGRESS NOTES
OB/GYN RESIDENT INTERVAL NOTE    Called to [facility] today to see if patient was amenable to seeing Ob/Gyn. Patient initially told RN she was amenable. RN asked patient to go to room to talk privately and patient stated that we could come to her. She is sitting at a table near the windows. Initially patient says \"Hi. \" Introduced self to patient and asked if she would be ok with an ultrasound to check on the baby. The patient became upset at the mention of a child and stated \"I'm not pregnant and I don't want no children. \" Tried to reason with the patient and talk about the ultrasound she had had on 10/6/18 that showed she was 14w5d giving her an HARRIETT of 3/31/19. The patient became escalated at this point yelling \"you need to get the fuck away from me. \" Apologized to the patient for upsetting her and let her know we were only present to help. Patient states \"I don't want your help. \" Asked patient if she had any questions. Patient again asked that we get away from her. Respectfully left patient at this time. Notified RN to inform Ob/Gyn service if the patient changes her mind at any point. Ethics consult placed at this time. Will await further instruction as patient is now 29w2d pregnant without any prenatal care and declining prenatal labs. She also needs an anatomy US. She has been admitted since 12/22/18. She has been compliant w/ PNVs this week.      Marcos Rubinstein, DO, PGY4  Ob/Gyn Resident  Pager: [phone], [address]  1/15/2019, 3:06 PM

## 2019-01-15 NOTE — PLAN OF CARE
Problem: Altered Mood, Psychotic Behavior:  Goal: Able to demonstrate trust by eating, participating in treatment and following staff's direction  Able to demonstrate trust by eating, participating in treatment and following staff's direction   Outcome: Ongoing  PSYCHOEDUCATION GROUP NOTE    Date: 1/15/2019    Start Time: 0900  End Time: 0910    Number Participants in Group:  5/11    Goal:  Patient will demonstrate increased interpersonal interaction   Topic: Community Meeting     Discipline Responsible:   OT  AT    Ns. x RT MHP Other       Participation Level:     None x Minimal    Active Listener  Interactive    Monopolizing         Participation Quality:   Appropriate  Inappropriate          Attentive        Intrusive   x       Sharing        Resistant          Supportive        Lethargic       Affective:    Congruent  Incongruent x Blunted  Flat    Constricted  Anxious  Elated  Angry    Labile  Depressed  Other         Cognitive:  x Alert  Oriented PPTP     Concentration  G  F x P   Attention Span  G  F x P   Short-Term Memory  G x F  P   Long-Term Memory  G x F  P   ProblemSolving/  Decision Making  G  F x P   Ability to Process  Information  G  F x P      Contributing Factors             Delusional             Hallucinating             Flight of Ideas             Other:       Modes of Intervention:  x Education  Support  Exploration   x Clarifying x Problem Solving  Confrontation   x Socialization  Limit Setting x Reality Testing   x Activity  Movement  Media    Other:            Response to Learning:   Able to verbalize current knowledge/experience    Able to verbalize/acknowledge new learning    Able to retain information    Capable of insight    Able to change behavior   x Progressing to goal    Other:        Comments:

## 2019-01-15 NOTE — PLAN OF CARE
Problem: Altered Mood, Psychotic Behavior:  Intervention: Assess psychotic behavior  Attempted to approach pt multiple times to do assessments but she refused. Goal: Able to demonstrate trust by eating, participating in treatment and following staff's direction  Able to demonstrate trust by eating, participating in treatment and following staff's direction   Outcome: Ongoing  Pt is eating well today, asked to take a shower, has been out in milieu most of shift but not interactive with peers or staff. Gets irritable when approached and talked to. OB/GYN residents came to floor after she said she would letr them see her. Upon arrival to unit she refused to let them assess her or do an ultrasound on her. Became agitated and told them to leave. Goal: Absence of self-harm  Absence of self-harm   Outcome: Met This Shift  No attempts to do self-harm this shift.

## 2019-01-15 NOTE — PLAN OF CARE
Problem: Altered Mood, Psychotic Behavior:  Goal: Able to demonstrate trust by eating, participating in treatment and following staff's direction  Able to demonstrate trust by eating, participating in treatment and following staff's direction   Outcome: Ongoing  Patient is accepting of fluids and snack. Refused group participations. Goal: Absence of self-harm  Absence of self-harm   Outcome: Ongoing  Denies suicidal and homicidal ideations.

## 2019-01-15 NOTE — PLAN OF CARE
Problem: Altered Mood, Psychotic Behavior:  Goal: Able to demonstrate trust by eating, participating in treatment and following staff's direction  Able to demonstrate trust by eating, participating in treatment and following staff's direction   Outcome: Ongoing  Psychotherapy Group Note    Date: 1/15/2019  Start Time: 10:00AM  End Time: 10:45AM    Number Participants in Group:  6/12    Goal:  Patient will demonstrate increased interpersonal interaction   Topic: [facility]    Discipline Responsible:   OT  AT x SW  Nsg.  RT  Other       Participation Level:     None  Minimal   x Active Listener x Interactive    Monopolizing         Participation Quality:   Appropriate  Inappropriate   x       Attentive        Intrusive   x       Sharing        Resistant   x       Supportive        Lethargic       Affective:   x Congruent  Incongruent  Blunted  Flat    Constricted  Anxious  Elated  Angry    Labile  Depressed  Other         Cognitive:  x Alert x Oriented PPTP     Concentration  G x F  P   Attention Span  G x F  P   Short-Term Memory  G x F  P   Long-Term Memory  G x F  P   Problem Solving/  Decision Making  G x F  P   Ability to Process  Information  G x F  P      Contributing Factors             Delusional             Hallucinating             Flight of Ideas             Other:       Modes of Intervention:  x Education x Support x Exploration   x Clarifying x Problem Solving x Confrontation   x Socialization x Limit Setting x Reality Testing    Activity  Movement  Media    Other:            Response to Learning:  x Able to verbalize current knowledge/experience   x Able to verbalize/acknowledge new learning   x Able to retain information    Capable of insight    Able to change behavior   x Progressing to goal    Other:        Comments:  Pt was an active listener, shared a coping skill she utilizes.

## 2019-01-16 LAB
ABSOLUTE EOS #: NORMAL K/UL
ABSOLUTE IMMATURE GRANULOCYTE: NORMAL K/UL (ref 0–0.3)
ABSOLUTE LYMPH #: NORMAL K/UL
ABSOLUTE MONO #: NORMAL K/UL
BASOPHILS # BLD: NORMAL %
BASOPHILS ABSOLUTE: NORMAL K/UL (ref 0–0.2)
DIFFERENTIAL TYPE: NORMAL
EOSINOPHILS RELATIVE PERCENT: NORMAL %
HCT VFR BLD CALC: NORMAL %
HEMOGLOBIN: NORMAL G/DL
HEPATITIS B SURFACE ANTIGEN: NORMAL
IMMATURE GRANULOCYTES: NORMAL %
LYMPHOCYTES # BLD: NORMAL %
MCH RBC QN AUTO: NORMAL PG
MCHC RBC AUTO-ENTMCNC: NORMAL G/DL
MCV RBC AUTO: NORMAL FL
MONOCYTES # BLD: NORMAL %
NRBC AUTOMATED: NORMAL PER 100 WBC
PDW BLD-RTO: NORMAL %
PLATELET # BLD: NORMAL K/UL
PLATELET ESTIMATE: NORMAL
PMV BLD AUTO: NORMAL FL
RBC # BLD: NORMAL 10*6/UL
RBC # BLD: NORMAL M/UL
RUBV IGG SER QL: NORMAL IU/ML
SEG NEUTROPHILS: NORMAL %
SEGMENTED NEUTROPHILS ABSOLUTE COUNT: NORMAL K/UL
T. PALLIDUM, IGG: NORMAL
WBC # BLD: NORMAL 10*3/UL
WBC # BLD: NORMAL K/UL

## 2019-01-16 PROCEDURE — 86762 RUBELLA ANTIBODY: CPT

## 2019-01-16 PROCEDURE — 86780 TREPONEMA PALLIDUM: CPT

## 2019-01-16 PROCEDURE — 87340 HEPATITIS B SURFACE AG IA: CPT

## 2019-01-16 PROCEDURE — 85025 COMPLETE CBC W/AUTO DIFF WBC: CPT

## 2019-01-16 PROCEDURE — 1240000000 HC EMOTIONAL WELLNESS R&B

## 2019-01-16 PROCEDURE — 6370000000 HC RX 637 (ALT 250 FOR IP): Performed by: PSYCHIATRY & NEUROLOGY

## 2019-01-16 RX ADMIN — Medication 1 TABLET: at 16:47

## 2019-01-16 RX ADMIN — NICOTINE POLACRILEX 2 MG: 2 GUM, CHEWING BUCCAL at 17:03

## 2019-01-16 RX ADMIN — LURASIDONE HYDROCHLORIDE 160 MG: 80 TABLET, FILM COATED ORAL at 16:46

## 2019-01-16 NOTE — PLAN OF CARE
Problem: Altered Mood, Psychotic Behavior:  Goal: Able to demonstrate trust by eating, participating in treatment and following staff's direction  Able to demonstrate trust by eating, participating in treatment and following staff's direction   Outcome: Ongoing  Pt demonstrate trust by eating. Pt does not attend groups but sit close by. Writer told pt that it was time for her medications and she came to the desk to take them. Goal: Absence of self-harm  Absence of self-harm   Outcome: Ongoing  Pt is free from self harm. Q 15 safety checks continues for pt. Problem: Tobacco Use:  Goal: Inpatient tobacco use cessation counseling participation  Inpatient tobacco use cessation counseling participation   Outcome: Ongoing  Pt has requested  nicorette gum.

## 2019-01-16 NOTE — PROGRESS NOTES
Ob Gyn Interval Note    Patient is still refusing any ob gyn prenatal care. Discussed case with Dr. Kenzie Vines. At this time, Dr. eLta Luna is recommending primary service transfer patient to a tertiary care center for joint inpatient psych and ob care with MFM available. Patient is very high risk due to no prenatal care. [place] would not be safe for her to deliver. Patient needs to be at a higher acuity center. Per Dr. Kenzie Vines, psych is unable to arrange transfer as no outside facility would accept the patient. Will discuss with attendingSammie Juarez All   Ob-Gyn Resident PGY-2  Pager: [phone]

## 2019-01-16 NOTE — PLAN OF CARE
Problem: Altered Mood, Psychotic Behavior:  Goal: Able to demonstrate trust by eating, participating in treatment and following staff's direction  Able to demonstrate trust by eating, participating in treatment and following staff's direction   Outcome: Ongoing  PSYCHOEDUCATION GROUP NOTE    Date: 1/16/19  Start Time: 0900  End Time: 0915    Number Participants in Group:  6/13    Goal:  Patient will demonstrate increased interpersonal interaction   Topic: Community Meeting     Discipline Responsible:   OT  AT  [facility]. x RT MHP Other       Participation Level:     None  Minimal   x Active Listener x Interactive    Monopolizing         Participation Quality:  x Appropriate  Inappropriate   x       Attentive        Intrusive   x       Sharing        Resistant          Supportive        Lethargic       Affective:    Congruent  Incongruent  Blunted  Flat   x Constricted  Anxious  Elated  Angry    Labile  Depressed  Other         Cognitive:  x Alert x Oriented PPTP     Concentration x G  F  P   Attention Span x G  F  P   Short-Term Memory x G  F  P   Long-Term Memory x G  F  P   ProblemSolving/  Decision Making x G  F  P   Ability to Process  Information x G  F  P      Contributing Factors             Delusional             Hallucinating             Flight of Ideas             Other:       Modes of Intervention:  x Education x Support x Exploration   x Clarifying x Problem Solving  Confrontation   x Socialization  Limit Setting x Reality Testing    Activity  Movement  Media    Other:            Response to Learning:   Able to verbalize current knowledge/experience    Able to verbalize/acknowledge new learning   x Able to retain information    Capable of insight    Able to change behavior   x Progressing to goal    Other:        Comments:

## 2019-01-17 PROCEDURE — 86780 TREPONEMA PALLIDUM: CPT

## 2019-01-17 PROCEDURE — 6370000000 HC RX 637 (ALT 250 FOR IP): Performed by: PSYCHIATRY & NEUROLOGY

## 2019-01-17 PROCEDURE — 86762 RUBELLA ANTIBODY: CPT

## 2019-01-17 PROCEDURE — 87340 HEPATITIS B SURFACE AG IA: CPT

## 2019-01-17 PROCEDURE — 1240000000 HC EMOTIONAL WELLNESS R&B

## 2019-01-17 PROCEDURE — 85025 COMPLETE CBC W/AUTO DIFF WBC: CPT

## 2019-01-17 RX ADMIN — NICOTINE POLACRILEX 2 MG: 2 GUM, CHEWING BUCCAL at 14:45

## 2019-01-17 RX ADMIN — Medication 1 TABLET: at 17:18

## 2019-01-17 RX ADMIN — LURASIDONE HYDROCHLORIDE 160 MG: 80 TABLET, FILM COATED ORAL at 17:18

## 2019-01-17 RX ADMIN — NICOTINE POLACRILEX 2 MG: 2 GUM, CHEWING BUCCAL at 17:18

## 2019-01-17 NOTE — PROGRESS NOTES
OB/GYN Resident Interval Note     Called [facility] to ask if patient would be agreeable to consultation/evaluation by OB/GYN. Spoke with RN, [facility] who stated that patient is not agreeable at this time and continues to deny pregnancy. Will continue to attempt to see patient and obtain prenatal labs daily. OB/GYN continues to recommend transfer to tertiary care center with inpatient adult psychiatry and maternal fetal medicine as patient is very high risk due to no prenatal care. OB/GYN hopes to coordinate care plan with psychiatry. Will discuss with attending.      Rayo Lopez DO  Ob/Gyn Resident  Pager: [phone]  1/17/2019 12:32 AM

## 2019-01-17 NOTE — PLAN OF CARE
Problem: Altered Mood, Psychotic Behavior:  Goal: Able to demonstrate trust by eating, participating in treatment and following staff's direction  Able to demonstrate trust by eating, participating in treatment and following staff's direction   Outcome: Ongoing  PSYCHOEDUCATION GROUP NOTE    Date: 1/17/19  Start Time: 0900  End Time: 0915    Number Participants in Group:  4/13    Goal:  Patient will demonstrate increased interpersonal interaction   Topic: Community Meeting     Discipline Responsible:   OT  AT    Ns. x RT MHP Other       Participation Level:     None  Minimal   x Active Listener x Interactive    Monopolizing         Participation Quality:  x Appropriate  Inappropriate          Attentive        Intrusive          Sharing        Resistant          Supportive        Lethargic       Affective:    Congruent  Incongruent x Blunted  Flat    Constricted  Anxious  Elated  Angry    Labile  Depressed  Other         Cognitive:  x Alert  Oriented PPTP     Concentration  G x F  P   Attention Span  G x F  P   Short-Term Memory  G x F  P   Long-Term Memory  G x F  P   ProblemSolving/  Decision Making  G x F  P   Ability to Process  Information  G x F  P      Contributing Factors             Delusional             Hallucinating             Flight of Ideas             Other:       Modes of Intervention:  x Education x Support x Exploration   x Clarifying x Problem Solving  Confrontation   x Socialization  Limit Setting x Reality Testing    Activity  Movement x Media    Other:            Response to Learning:   Able to verbalize current knowledge/experience    Able to verbalize/acknowledge new learning    Able to retain information   x Capable of insight    Able to change behavior   x Progressing to goal    Other:        Comments:

## 2019-01-17 NOTE — PLAN OF CARE
Problem: Altered Mood, Psychotic Behavior:    Goal: Able to demonstrate trust by eating, participating in treatment and following staff's direction  Able to demonstrate trust by eating, participating in treatment and following staff's direction   Outcome: Ongoing  Pt ate meals provided by staff this shift. However pt pt does not participate in treatment, refuses assessments and does not attend group. Goal: Absence of self-harm  Absence of self-harm   Outcome: Ongoing  Pt remains free of harm. Safe environment maintained. Q15 minute checks for safety continued per unit policy. Will continue to monitor for safety and provide support and reassurance as needed. Problem: Altered Mood, Deterioration in Function:    oal: Ability to perform activities of daily living will improve  Ability to perform activities of daily living will improve   Outcome: Ongoing  Pt able to perform activities of daily living, pt took a shower this shift. oal: Able to verbalize reality based thinking  Able to verbalize reality based thinking   Outcome: Ongoing  Pt evesive not very interactive, refusing assessments.

## 2019-01-17 NOTE — PLAN OF CARE
Problem: Altered Mood, Deterioration in Function:  Goal: Able to verbalize reality based thinking  Able to verbalize reality based thinking   Outcome: Ongoing  Pt did not attend RT group at 1430 d/t resting in room despite staff invitation to attend.

## 2019-01-17 NOTE — PLAN OF CARE
Problem: Altered Mood, Psychotic Behavior:  Goal: Able to demonstrate trust by eating, participating in treatment and following staff's direction  Able to demonstrate trust by eating, participating in treatment and following staff's direction   Outcome: Ongoing  Patient was more willing to allow staff to tidy up room. Staff attempted to encourage patient to shower and was clothes,  Patient responded with \"no I'm fine, thank you\". Patient was accepting of physical assessment. Staff provided patient with snack and fluids, patient was accepting. Staff will continue to encourage patient to wash urine soiled clothes. Goal: Absence of self-harm  Absence of self-harm   Outcome: Ongoing  Pt denies thoughts of self harm and is agreeable to seeking out should thoughts of self harm arise. Safe environment maintained. Q15 minute checks for safety cont per unit policy. Will cont to monitor for safety and provides support and reassurance as needed.

## 2019-01-17 NOTE — PLAN OF CARE
Problem: Altered Mood, Deterioration in Function:  Goal: Ability to perform activities of daily living will improve  Ability to perform activities of daily living will improve   Outcome: Ongoing  Psychotherapy Group Note    Date: 1/17/2019  Start Time: 10:00AM  End Time: 10:45AM    Number Participants in Group:  5/13    Goal:  Patient will demonstrate increased interpersonal interaction   Topic: [facility]    Discipline Responsible:   OT  AT x SW  Nsg.  RT  Other       Participation Level:     None  Minimal   x Active Listener x Interactive    Monopolizing         Participation Quality:   Appropriate  Inappropriate   x       Attentive        Intrusive   x       Sharing        Resistant   x       Supportive        Lethargic       Affective:   x Congruent  Incongruent  Blunted  Flat    Constricted  Anxious  Elated  Angry    Labile  Depressed  Other         Cognitive:  x Alert x Oriented PPTP     Concentration  G x F  P   Attention Span  G x F  P   Short-Term Memory  G x F  P   Long-Term Memory  G x F  P   Problem Solving/  Decision Making  G x F  P   Ability to Process  Information  G x F  P      Contributing Factors             Delusional             Hallucinating             Flight of Ideas             Other:       Modes of Intervention:  x Education x Support x Exploration   x Clarifying x Problem Solving x Confrontation   x Socialization x Limit Setting x Reality Testing    Activity  Movement  Media    Other:            Response to Learning:  x Able to verbalize current knowledge/experience   x Able to verbalize/acknowledge new learning   x Able to retain information    Capable of insight    Able to change behavior   x Progressing to goal    Other:        Comments: Pt was an active listener, declined to share thoughts with others.

## 2019-01-17 NOTE — PROGRESS NOTES
Patient is quite  psychotic, agitated, paranoid, and guarded. Overwhelmed with persecutory delusions and hallucinationsStill responding to internal stimuli . /71   Pulse 90   Temp 97.5 °F (36.4 °C) (Oral)   Resp 14   Ht 5' 8\" (1.727 m)   Wt 130 lb (59 kg)   LMP  (LMP Unknown)   SpO2 100%   BMI 19.77 kg/m²     Affect-Superficial  Mood -  Agitated and labile  Thought process -  Disorganized showing looseness of association and tangentiality. Reality testing-Impaired  Cognition -  Impaired  Memory- Recent-Impaired                Remote-Impaired  Orientation-Disoriented                                              Insight-Poor  Judgement-Poor                                                Attempted to develop insight. Medications reviewed. Side effects of medications reviewed and medication education provided. No side effects including akathisia,tremers,dystonia or orthostasis reported or observed. Plan: Stabilization with antipsychotic and mood stabilization medications,group therapy and develop coping skills,discharge to community. Yoselin Westbrook

## 2019-01-18 PROBLEM — B96.89 BACTERIAL VAGINOSIS: Status: ACTIVE | Noted: 2019-01-18

## 2019-01-18 PROBLEM — Z78.9 POOR HISTORIAN: Status: ACTIVE | Noted: 2019-01-18

## 2019-01-18 PROBLEM — N76.0 BACTERIAL VAGINOSIS: Status: ACTIVE | Noted: 2019-01-18

## 2019-01-18 PROBLEM — B37.9 YEAST INFECTION: Status: ACTIVE | Noted: 2019-01-18

## 2019-01-18 PROBLEM — F25.9 SCHIZOAFFECTIVE DISORDER (HCC): Status: RESOLVED | Noted: 2018-05-01 | Resolved: 2019-01-18

## 2019-01-18 LAB
DIRECT EXAM: ABNORMAL
Lab: ABNORMAL
SPECIMEN DESCRIPTION: ABNORMAL
STATUS: ABNORMAL

## 2019-01-18 PROCEDURE — 1240000000 HC EMOTIONAL WELLNESS R&B

## 2019-01-18 PROCEDURE — 6370000000 HC RX 637 (ALT 250 FOR IP): Performed by: PSYCHIATRY & NEUROLOGY

## 2019-01-18 PROCEDURE — 6370000000 HC RX 637 (ALT 250 FOR IP): Performed by: STUDENT IN AN ORGANIZED HEALTH CARE EDUCATION/TRAINING PROGRAM

## 2019-01-18 PROCEDURE — 87660 TRICHOMONAS VAGIN DIR PROBE: CPT

## 2019-01-18 PROCEDURE — 87491 CHLMYD TRACH DNA AMP PROBE: CPT

## 2019-01-18 PROCEDURE — 87591 N.GONORRHOEAE DNA AMP PROB: CPT

## 2019-01-18 PROCEDURE — 87480 CANDIDA DNA DIR PROBE: CPT

## 2019-01-18 PROCEDURE — 87510 GARDNER VAG DNA DIR PROBE: CPT

## 2019-01-18 RX ORDER — FLUCONAZOLE 150 MG/1
150 TABLET ORAL ONCE
Status: COMPLETED | OUTPATIENT
Start: 2019-01-18 | End: 2019-01-18

## 2019-01-18 RX ORDER — ALBUTEROL SULFATE 90 UG/1
2 AEROSOL, METERED RESPIRATORY (INHALATION) EVERY 6 HOURS PRN
Status: DISCONTINUED | OUTPATIENT
Start: 2019-01-18 | End: 2019-02-08 | Stop reason: HOSPADM

## 2019-01-18 RX ORDER — METRONIDAZOLE 500 MG/1
500 TABLET ORAL EVERY 12 HOURS SCHEDULED
Status: DISPENSED | OUTPATIENT
Start: 2019-01-18 | End: 2019-01-25

## 2019-01-18 RX ADMIN — LURASIDONE HYDROCHLORIDE 160 MG: 80 TABLET, FILM COATED ORAL at 17:05

## 2019-01-18 RX ADMIN — NICOTINE POLACRILEX 2 MG: 2 GUM, CHEWING BUCCAL at 13:08

## 2019-01-18 RX ADMIN — FLUCONAZOLE 150 MG: 150 TABLET ORAL at 06:05

## 2019-01-18 RX ADMIN — METRONIDAZOLE 500 MG: 500 TABLET ORAL at 08:18

## 2019-01-18 RX ADMIN — NICOTINE POLACRILEX 2 MG: 2 GUM, CHEWING BUCCAL at 16:10

## 2019-01-18 RX ADMIN — Medication 1 TABLET: at 17:05

## 2019-01-18 ASSESSMENT — PAIN SCALES - GENERAL: PAINLEVEL_OUTOF10: 0

## 2019-01-18 NOTE — PROGRESS NOTES
OBSTETRICAL PROGRESS NOTE   Salvatore Carraminatas    Date: 2019       Time: 1:43 AM   Patient Name: Viktoriya Gary     Patient : 1980  Room/Bed: 0213/0213-01    Admission Date/Time: 2018  5:24 PM        HPI: Viktoriya Gary is a 41 y.o.  at 29w5d admitted to the [facility] for schizoaffective disorder. She was admitted by pink slip from [facility] due to psychosis, not caring for herself, and neglecting pregnancy. OB/GYN was originally consulted on 18 due to no prenatal care, however the patient did not want to be seen at that time. The patient has continuing to deny pregnancy and refusing to be seen by OB/GYN despite multiple attempts. I was notified by the RN today that the patient was agreeable to being seen. When I entered the patient was resting comfortably in bed. Her room is disheveled with pile of dirty linens on the floor and the pateint does not appear to be keeping up with grooming. RN asked if OB/GYN could do an ultrasound of the baby and she pleasantly agreed. The patient reports she has been feeling the baby kick. The patient denies contractions, denies loss of fluid, denies vaginal bleeding. She states she has been eating and drinking well. She denies N/V. She denies vaginal discharge or urinary complaints. When I asked if anything was bothering her, she replied, \"Is there any medicine you can give me to help me breathe better in pregnancy? \" The patient admits to a history of asthma. I asked if the patient if she has an inhaler and she said, \"I haven't taken that in two years. \" During the ultrasound the patient asked, \"Is there anything you can give me to get this baby out of me? I shouldn't have no more kids. \" The patient goes on to express guilt for getting pregnant. I explained to the patient that termination is not offered at this facility and likely not feasible due to advanced gestational age.  I asked if she would be agreeable to adopting the baby out, the patient states she is agreeable to adoption. I attempted to get a medical and obstetric history, however the patient is a very poor historian and her thoughts are very disorganized and difficult to follow. She states this is her second pregnancy, she reports she had a  of a baby boy about 11-14 years ago at [facility] (although per chart review records indicate her first delivery was while she was institutionalized in [place]). The patient is not able to tell me if this was a term pregnancy. When asked if she had any complications during pregnancy she does not clearly provide an answer. The patient is unable to tell me if she has a history of high blood pressure or diabetes in pregnancy. The only medical history she acknowledges is history of asthma. Patient agreed to speculum exam, however during exam patient abruptly said, \"Get off me, get this out of me, I'm done\", and got up from bed and went into bathroom. OB/GYN left room at that time. Pregnancy is complicated by no prenatal care with denial of pregnancy up to this point (per RN the patient will sometimes acknowledge pregnancy, and other time denies pregnancy), schizoaffective disorder (currently on Latuda 160mg QD per psych), asthma, Macrocytic anemia (Hgb 10.8 and .3 on 18), AMA, and tobacco abuse (currenlty using nicorette gum). DATING:  LMP: No LMP recorded (lmp unknown). Patient is pregnant.   Estimated Date of Delivery: 3/31/19   Based on: early ultrasound, at 15 5/7 weeks GA    PREGNANCY RISK FACTORS:  Patient Active Problem List   Diagnosis    Schizoaffective disorder, bipolar type ([address]    Polysubstance abuse ([facility] [facility]    Schizoaffective disorder ([address]    No prenatal care in current pregnancy    Macrocytic anemia    Advanced maternal age in multigravida    Patient does not believe she is pregnant, refusing care    Noncompliance        Steroids Given In This Pregnancy:  no     REVIEW OF SYSTEMS: Constitutional: negative fever, negative chills  HEENT: negative visual disturbances, negative headaches  Respiratory: positive dyspnea, negative cough  Cardiovascular: negative chest pain,  negative palpitations  Gastrointestinal: negative abdominal pain, negative RUQ pain, negative N/V, negative diarrhea, negative constipation  Genitourinary: negative dysuria, negative vaginal discharge  Dermatological: negative rash  Hematologic: negative bruising  Immunologic/Lymphatic: negative recent illness, negative recent sick contact  Musculoskeletal: negative back pain, negative myalgias, negative arthralgias  Neurological:  negative dizziness, negative weakness  Behavior/Psych: negative depression, negative anxiety      OBSTETRICAL HISTORY:   Obstetric History       T0      L0     SAB0   TAB0   Ectopic0   Molar0   Multiple0   Live Births0       # Outcome Date GA Lbr Lio/2nd Weight Sex Delivery Anes PTL Lv   2 Current            1      M Vag-Spont         Obstetric Comments   Her last preg was more than 10 years ago and she was institutionalized during the preg in the state of [place] (from chart review)       PAST MEDICAL HISTORY:   has a past medical history of Anxiety; Asthma; Bipolar 1 disorder ([facility] [address] [facility] Multiple personality ([facility] [address] and Seizures ([facility] [address] PAST SURGICAL HISTORY:   has no past surgical history on file. ALLERGIES:  is allergic to bee venom; beeswax; and wasp venom protein. MEDICATIONS:  Prior to Admission medications    Medication Sig Start Date End Date Taking?  Authorizing Provider   benztropine (COGENTIN) 1 MG tablet Take 1 mg by mouth    Historical Provider, MD   lurasidone (LATUDA) 80 MG TABS tablet Take 80 mg by mouth Daily with supper     Historical Provider, MD   carBAMazepine (TEGRETOL) 200 MG tablet Take 200 mg by mouth 2 times daily 18   Historical Provider, MD   traZODone (DESYREL) 50 MG tablet Take 1 tablet by mouth nightly as needed for Sleep 3/29/18 Resident  1/18/2019, 1:43 AM

## 2019-01-18 NOTE — PROGRESS NOTES
Ob Gyn Progress Note    Discussed case with M attending, Dr. Denny Conner who agrees to see patient for a formal anatomy scan with consult. Per psych, patient will be inpatient until after delivery. Therefore, [facility] and then back to the [facility]. Will arrange this for early next week. Genetic screening such as NIPT or amniocentesis is recommended as patient is AMA. Patient continues to decline all blood work. Will continue to also recommend routine prenatal care. Dr. Elyssa Mason updated.      Claudia Stephenson   Ob-Gyn Resident PGY-2  Pager: [phone]

## 2019-01-18 NOTE — PROGRESS NOTES
OB/GYN Resident Interval Note     Pt labs reviewed. Vaginitis probe positive for BV and yeast. Will order Flagyl 500 mg BID x7d and Diflucan 150 mg PO x1.      Isabel Navarro DO  Ob/Gyn Resident  Pager: [phone]  1/18/2019 2:33 AM

## 2019-01-18 NOTE — PROGRESS NOTES
She continues to be responding to internal stimuli, isolating self and doesn't have any motivation or insight. Thought process is disorganized and poorly productive. Psychomotor activity is retarded and has no motivation . Reviewe with insurance co.physician. /71   Pulse 90   Temp 97.5 °F (36.4 °C) (Oral)   Resp 14   Ht 5' 8\" (1.727 m)   Wt 130 lb (59 kg)   LMP  (LMP Unknown)   SpO2 100%   BMI 19.77 kg/m²     Affect-Superficial  Mood -  Agitated and labile  Thought process -  Disorganized showing looseness of association and tangentiality. Reality testing-Impaired  Cognition -  Impaired  Memory- Recent-Impaired                Remote-Impaired  Orientation-Disoriented                                              Insight-Poor  Judgement-Poor     Encouraged to participate and interact with peers. Attempted to develop insight. Medications reviewed. Side effects of medications reviewed and medication education provided. No side effects including akathisia,tremers,dystonia or orthostasis reported or observed. Plan: Stabilization with antipsychotic and mood stabilization medications,group therapy and develop coping skills,discharge to community. Encouraged to interact with peers  and participate in activities.

## 2019-01-18 NOTE — PLAN OF CARE
Problem: Altered Mood, Psychotic Behavior:  Goal: Able to demonstrate trust by eating, participating in treatment and following staff's direction  Able to demonstrate trust by eating, participating in treatment and following staff's direction   Outcome: Ongoing  Pt out in milieu but is aloof of peers and staff. Pt is medication compliant. Pt consumed 100% of breakfast tray and was given an extra tray due to still complaining of hunger. Pt then continues to rummage through food cart for additional food. Staff redirected pt. Pt is evasive with interview questions. Pt denies suicidal ideations. Pt does not answer questions pertaining to auditory hallucinations but is observed in milieu responding to internal stimuli. Pt is not attending to scheduled programming on the unit. Pt is disheveled and encouraged to tend to hygiene and ADL's. Pt. Remains on q15 min checks and frequent spontaneous checks throughout shift. Pt. Safety maintained.

## 2019-01-18 NOTE — PLAN OF CARE
Problem: Altered Mood, Psychotic Behavior:  Goal: Able to demonstrate trust by eating, participating in treatment and following staff's direction  Able to demonstrate trust by eating, participating in treatment and following staff's direction   Outcome: Ongoing  [facility]  Week Interdisciplinary Treatment Plan Note     Review Date & Time: 1/18/19 9:29AM    Admission Type:   Admission Type: Involuntary    Reason for admission:  Reason for Admission: Patient admitted for poor hygiene, auditory hallucinations, depression    Estimated Length of Stay :  5-7 days  Estimated Discharge Date Update: to be determined by physician    PATIENT STRENGTHS:  Patient Strengths:Strengths: Connection to output provider  Patient Strengths and Limitations:Limitations: Apathetic / unmotivated, Tendency to isolate self, External locus of control  Addictive Behavior:Addictive Behavior  In the past 3 months, have you felt or has someone told you that you have a problem with:  : Eating (too much/too little)  Do you have a history of Chemical Use?: Comment (YONY)  Do you have a history of Alcohol Use?: Comment (YONY)  Do you have a history of Street Drug Abuse?: Comment (YONY)  Histroy of Prescripton Drug Abuse?: Comment (YONY)  Medical Problems:   Past Medical History:   Diagnosis Date    Anxiety     Asthma     Bipolar 1 disorder ([facility]     Multiple personality ([facility] [facility]     Seizures ([facility] [address] [facility]        Risk:  Fall RiskTotal: 73  Stephen Scale Stephen Scale Score: 22  BVC Total: 1    Change in scores no.  Changes to plan of Care no    Status EXAM:   Status and Exam  Normal: No  Facial Expression: Flat  Affect: Unstable  Level of Consciousness: Alert  Mood:Normal: No  Mood: Labile, Suspicious  Motor Activity:Normal: Yes  Motor Activity: Decreased  Interview Behavior: Cooperative, Irritable  Preception: [place] to Person, [place] to Time  Attention:Normal: No  Attention: Distractible  Thought Processes: Circumstantial  Thought Content:Normal: No  Thought Content: Paranoia, Preoccupations  Hallucinations: Auditory (Comment)  Delusions: Yes  Delusions:  (paranoia)  Memory:Normal: No  Memory: Poor Remote  Insight and Judgment: No  Insight and Judgment: Poor Judgment, Poor Insight, Unrealistic  Present Suicidal Ideation: No  Present Homicidal Ideation: No    Daily Assessment Last Entry:   Daily Sleep (WDL): Within Defined Limits         Patient Currently in Pain: Denies  Daily Nutrition (WDL): Within Defined Limits    Patient Monitoring:  Frequency of Checks: 4 times per hour, close    Psychiatric Symptoms:   Depression Symptoms  Depression Symptoms: Isolative  Anxiety Symptoms  Anxiety Symptoms: No problems reported or observed. Lora Symptoms  Lora Symptoms: Poor judgment, Labile     Psychosis Symptoms  Delusion Type: Paranoid    Suicide Risk CSSR-S:  1) Within the past month, have you wished you were dead or wished you could go to sleep and not wake up? : No  2) Have you actually had any thoughts of killing yourself? : No  6) Have you ever done anything, started to do anything, or prepared to do anything to end your life?: No  Change in Result no Change in Plan of care no    EDUCATION:   Learner Progress Toward Treatment Goals: Reviewed results and recommendations of this team, Reviewed group plan and strategies, Reviewed signs, symptoms and risk of self harm and violent behavior, Reviewed goals and plan of care    Method: individual education, small group, verbal education    Outcome: verbalized understanding, but needs reinforcement to obtain goals    PATIENT GOALS:  Short term: Discharge planning. Long term: Maintain housing. PLAN/TREATMENT RECOMMENDATIONS UPDATE:   Continue with group therapies, education of coping skills   Continue to monitor patient on unit. Medications provided/ medication compliance by patient. Continue for plans to obtain long term goals after discharge.     SHORT-TERM GOALS UPDATE:  Time frame for Short-Term Goals: 8-14days     LONG-TERM GOALS UPDATE:  Time frame for Long-Term Goals: 6 months  Members Present in Team Meeting: See Signature Sheet    ANITA Alex, LSW

## 2019-01-18 NOTE — PLAN OF CARE
Problem: Altered Mood, Psychotic Behavior:  Goal: Absence of self-harm  Absence of self-harm   Outcome: Ongoing  Pt did not attend Community Meeting at 0900 d/t resting in room despite staff invitation to attend.

## 2019-01-18 NOTE — PLAN OF CARE
Problem: Altered Mood, Psychotic Behavior:  Goal: Able to demonstrate trust by eating, participating in treatment and following staff's direction  Able to demonstrate trust by eating, participating in treatment and following staff's direction   Outcome: Ongoing  Patient was willing to allow OB resident to assess her well being and the well being of her fetus. Patient was accepting of physical assessment,  And accepting of evening snack. Writer  Attempted to  patients room and wash patients clothes,  Patient responded with \"not now, I'm fine\". Staff will assess throughout night to see if patient will be accepting later in shift. Goal: Absence of self-harm  Absence of self-harm   Outcome: Ongoing  Pt denies thoughts of self harm and is agreeable to seeking out should thoughts of self harm arise. Safe environment maintained. Q15 minute checks for safety cont per unit policy. Will cont to monitor for safety and provides support and reassurance as needed. Problem: Altered Mood, Deterioration in Function:  Goal: Ability to perform activities of daily living will improve  Ability to perform activities of daily living will improve   Outcome: Ongoing  Patient has poor hygiene, needs encouragment to tend to personal hygiene, even then patient refuses at times. Goal: Able to verbalize reality based thinking  Able to verbalize reality based thinking   Outcome: Ongoing  Patient has moments of clarity,  Patient did make mention of pregnancy and stated she does have another child that was [facility] at [facility] sometime between 10-13 years ago.

## 2019-01-19 PROCEDURE — 6370000000 HC RX 637 (ALT 250 FOR IP): Performed by: STUDENT IN AN ORGANIZED HEALTH CARE EDUCATION/TRAINING PROGRAM

## 2019-01-19 PROCEDURE — 6370000000 HC RX 637 (ALT 250 FOR IP): Performed by: PSYCHIATRY & NEUROLOGY

## 2019-01-19 PROCEDURE — 1240000000 HC EMOTIONAL WELLNESS R&B

## 2019-01-19 RX ADMIN — NICOTINE POLACRILEX 2 MG: 2 GUM, CHEWING BUCCAL at 12:03

## 2019-01-19 RX ADMIN — NICOTINE POLACRILEX 2 MG: 2 GUM, CHEWING BUCCAL at 10:10

## 2019-01-19 RX ADMIN — METRONIDAZOLE 500 MG: 500 TABLET ORAL at 20:52

## 2019-01-19 NOTE — PLAN OF CARE
Problem: Altered Mood, Psychotic Behavior:  Goal: Absence of self-harm  Absence of self-harm   Outcome: Ongoing  Psychoeducation Group Note    Date: 1/19/2019  Start Time: 0900  End Time: 0920    Number Participants in Group:  5    Goal:  Patient will demonstrate increased interpersonal interaction   Topic: Community meeting/ goals group    Discipline Responsible:   OT  AT  [facility]. x RT  Other       Participation Level:     None x Minimal    Active Listener  Interactive    Monopolizing         Participation Quality:  x Appropriate  Inappropriate          Attentive        Intrusive          Sharing        Resistant          Supportive        Lethargic       Affective:    Congruent  Incongruent  Blunted  Flat   x Constricted  Anxious  Elated  Angry    Labile  Depressed  Other         Cognitive:  x Alert  Oriented PPTP     Concentration  G x F  P   Attention Span  G x F  P   Short-Term Memory  G  F  P   Long-Term Memory  G  F  P   ProblemSolving/  Decision Making  G  F  P   Ability to Process  Information  G  F  P      Contributing Factors             Delusional             Hallucinating             Flight of Ideas             Other:       Modes of Intervention:  x Education x Support x Exploration   x Clarifying x Problem Solving  Confrontation   x Socialization  Limit Setting x Reality Testing    Activity  Movement  Media    Other:            Response to Learning:   Able to verbalize current knowledge/experience    Able to verbalize/acknowledge new learning    Able to retain information    Capable of insight    Able to change behavior   x Progressing to goal    Other:        Comments:

## 2019-01-19 NOTE — PLAN OF CARE
Problem: Altered Mood, Deterioration in Function:  Goal: Able to verbalize reality based thinking  Able to verbalize reality based thinking   Outcome: Ongoing  Pt declined to attend psychotherapy at 1000 am despite encouragement. Pt offered 1:1 and refused.

## 2019-01-19 NOTE — PLAN OF CARE
Problem: Altered Mood, Deterioration in Function:  Goal: Ability to perform activities of daily living will improve  Ability to perform activities of daily living will improve   Outcome: Ongoing  Patient has poor hygiene and is resistive to staff assistance. Irritable upon approach, suspicious and paranoid. Goal: Able to verbalize reality based thinking  Able to verbalize reality based thinking   Outcome: Ongoing  Irritable and evasive, uncooperative with care and staff assistance, denies auditory hallucination but does appear to be responding to internal stimuli. Problem: Falls - Risk of:  Goal: Will remain free from falls  Will remain free from falls   Outcome: Ongoing  Remains free from falls. Non skid footwear with ambulation. Bed in low position.

## 2019-01-19 NOTE — PLAN OF CARE
Problem: Altered Mood, Psychotic Behavior:  Goal: Absence of self-harm  Absence of self-harm   Outcome: Ongoing  Pt did not participate in creative expression/ recovery group at 1330 despite staff encouragement to attend.

## 2019-01-19 NOTE — PLAN OF CARE
Problem: Altered Mood, Psychotic Behavior:  Intervention: Assess psychotic behavior  Pt refuses meds, won't allow room to be cleaned, verbally aggressive towards staff upon approach. Goal: Able to demonstrate trust by eating, participating in treatment and following staff's direction  Able to demonstrate trust by eating, participating in treatment and following staff's direction   Outcome: Met This Shift  Pt is eating well today, has refused her meds today. Becomes agitated when approached and states \"leave me the fuck alone --- get away from me---- I don't want your meds. \"   Goal: Absence of self-harm  Absence of self-harm   Outcome: Met This Shift  No attempts to do self-harm this shift. Problem: Falls - Risk of:  Goal: Will remain free from falls  Will remain free from falls   Outcome: Met This Shift  Pt remains free of falls and verbalizes understanding of individual fall risks. Pt wearing non skid footwear and encouraged to seek out staff for any assistance needed.

## 2019-01-20 PROCEDURE — 6370000000 HC RX 637 (ALT 250 FOR IP): Performed by: STUDENT IN AN ORGANIZED HEALTH CARE EDUCATION/TRAINING PROGRAM

## 2019-01-20 PROCEDURE — 6370000000 HC RX 637 (ALT 250 FOR IP): Performed by: PSYCHIATRY & NEUROLOGY

## 2019-01-20 PROCEDURE — 1240000000 HC EMOTIONAL WELLNESS R&B

## 2019-01-20 RX ADMIN — NICOTINE POLACRILEX 2 MG: 2 GUM, CHEWING BUCCAL at 20:53

## 2019-01-20 RX ADMIN — LURASIDONE HYDROCHLORIDE 160 MG: 80 TABLET, FILM COATED ORAL at 18:17

## 2019-01-20 RX ADMIN — NICOTINE POLACRILEX 2 MG: 2 GUM, CHEWING BUCCAL at 17:41

## 2019-01-20 RX ADMIN — METRONIDAZOLE 500 MG: 500 TABLET ORAL at 09:07

## 2019-01-20 RX ADMIN — METRONIDAZOLE 500 MG: 500 TABLET ORAL at 20:55

## 2019-01-20 RX ADMIN — Medication 1 TABLET: at 18:17

## 2019-01-20 NOTE — PROGRESS NOTES
OB/GYN Resident Interval Progress Note    Patient seen with RN at bedside, introduced by RN as OB/GYN resident. Patient laying awake in bed comfortably, turned away from [address]. Patient refused to acknowledge RN or writer despite multiple attempts asking if she was okay with evaluation. Patient continues to refuse prenatal blood work and [employer]. Plan to continue to request OB/GYN evaluation and obtain prenatal labs daily. Recommend continue prenatal vitamins. Will discuss with MFM regarding consult and anatomy ultrasound on 1/22/19.      Teresa Kumar,   Ob/Gyn Resident PGY2  [facility]  1/20/2019 2:15 AM

## 2019-01-20 NOTE — PLAN OF CARE
Problem: Altered Mood, Psychotic Behavior:  Goal: Absence of self-harm  Absence of self-harm   Outcome: Ongoing  Pt did not participate in leisure/ creative expression group at 1330 despite staff encouragement to attend.

## 2019-01-20 NOTE — PROGRESS NOTES
Still rambling incoherently, out of touch with reality. Personal hygiene improving  but continues to be very bizzare, still paranoid and hallucinating. /62   Pulse 67   Temp 97.9 °F (36.6 °C) (Oral)   Resp 16   Ht 5' 8\" (1.727 m)   Wt 130 lb (59 kg)   LMP  (LMP Unknown)   SpO2 100%   BMI 19.77 kg/m²      Affect-Superficial  Mood -  Agitated and labile  Thought process -  Disorganized showing looseness of association and tangentiality. Reality testing-Impaired  Cognition -  Impaired  Memory- Recent-Impaired                Remote-Impaired  Orientation-Disoriented                                              Insight-Poor  Judgement-Poor                                                Attempted to challenge his delusions and develop insight. Reality orientation attempted  Medications reviewed. Side effects of medications reviewed and medication education provided  No side effects including akathisia,tremers,dystonia or orthostasis reported or observed. Plan: Stabilization with antipsychotic and mood stabilization medications,group therapy and develop  Coping skills,discharge to community. Encouraged to interact with peers  and participate in activities. Anthony Irizarry

## 2019-01-20 NOTE — PLAN OF CARE
Problem: Altered Mood, Psychotic Behavior:  Goal: Absence of self-harm  Absence of self-harm   Outcome: Ongoing  Pt did not participate in community meeting/ goals group at 0900 despite staff encouragement to attend.

## 2019-01-20 NOTE — CARE COORDINATION
Writer attempted to meet with patient on this date regarding any thoughts on discharge planning. Pt states she doesn't know where she will be discharged to, as she is homeless. Pt denies being pregnant and doesn't want to talk about anything at this time. Pt appears agitated, labile, disoriented, lacks insight and coping skills and has impaired cognition.     Discharge Planning Note:     Pt follows up at [place], [address] [place] [address] Rd: ACT Team, Sherrie Mejia  Pt is homeless and has no safe discharge plan at this time  [employer] provider is Maria Elena Wen

## 2019-01-20 NOTE — PLAN OF CARE
Problem: Altered Mood, Psychotic Behavior:  Goal: Able to demonstrate trust by eating, participating in treatment and following staff's direction  Able to demonstrate trust by eating, participating in treatment and following staff's direction   Outcome: Met This Shift  Pt is eating meals well and taking snacks between meals. Does not follow direction well, refused vital signs & labs this a.m. When approached she becomes agitated and curses at staff & tells them to go away & leave her alone. Goal: Absence of self-harm  Absence of self-harm   Outcome: Met This Shift  No attempts to do self-harm this shift. Problem: Altered Mood, Deterioration in Function:  Goal: Ability to perform activities of daily living will improve  Ability to perform activities of daily living will improve   Outcome: Ongoing  Offered use of shower but pt declined. Encouraged to do ADL's     Problem: Falls - Risk of:  Goal: Will remain free from falls  Will remain free from falls   Outcome: Met This Shift  No injury sustained this shift.

## 2019-01-21 LAB
C TRACH DNA GENITAL QL NAA+PROBE: NEGATIVE
N. GONORRHOEAE DNA: NEGATIVE

## 2019-01-21 PROCEDURE — 6370000000 HC RX 637 (ALT 250 FOR IP): Performed by: STUDENT IN AN ORGANIZED HEALTH CARE EDUCATION/TRAINING PROGRAM

## 2019-01-21 PROCEDURE — 6370000000 HC RX 637 (ALT 250 FOR IP): Performed by: PSYCHIATRY & NEUROLOGY

## 2019-01-21 PROCEDURE — 1240000000 HC EMOTIONAL WELLNESS R&B

## 2019-01-21 RX ADMIN — METRONIDAZOLE 500 MG: 500 TABLET ORAL at 08:50

## 2019-01-21 RX ADMIN — METRONIDAZOLE 500 MG: 500 TABLET ORAL at 22:33

## 2019-01-21 RX ADMIN — LURASIDONE HYDROCHLORIDE 160 MG: 80 TABLET, FILM COATED ORAL at 17:35

## 2019-01-21 RX ADMIN — NICOTINE POLACRILEX 2 MG: 2 GUM, CHEWING BUCCAL at 17:35

## 2019-01-21 RX ADMIN — Medication 1 TABLET: at 17:35

## 2019-01-21 RX ADMIN — NICOTINE POLACRILEX 2 MG: 2 GUM, CHEWING BUCCAL at 11:05

## 2019-01-21 ASSESSMENT — PAIN SCALES - GENERAL: PAINLEVEL_OUTOF10: 0

## 2019-01-21 NOTE — PLAN OF CARE
Problem: Altered Mood, Deterioration in Function:  Goal: Ability to perform activities of daily living will improve  Ability to perform activities of daily living will improve   PATIENT DID NOT ATTEND WELLNESS GROUP WHICH FOCUSED ON DECREASING STRESS LEVELS FROM [id number] DESPITE STAFF ENCOURAGEMENT AND PROMPTS.

## 2019-01-21 NOTE — PLAN OF CARE
Problem: Altered Mood, Psychotic Behavior:  Goal: Able to demonstrate trust by eating, participating in treatment and following staff's direction  Able to demonstrate trust by eating, participating in treatment and following staff's direction   Outcome: Ongoing  PSYCHOEDUCATION GROUP NOTE    Date: 1/21/2019    Start Time: 1330  End Time: 1415    Number Participants in Group:  7/14    Goal:  Patient will demonstrate increased interpersonal interaction   Topic: Cognitive Skills     Discipline Responsible:   OT  AT  [facility]. x RT MHP Other       Participation Level:     None  Minimal   x Active Listener x Interactive    Monopolizing         Participation Quality:  x Appropriate  Inappropriate          Attentive        Intrusive   x       Sharing        Resistant          Supportive        Lethargic       Affective:    Congruent  Incongruent x Blunted  Flat    Constricted  Anxious  Elated  Angry    Labile  Depressed  Other         Cognitive:  x Alert x Oriented PPTP     Concentration  G x F  P   Attention Span  G x F  P   Short-Term Memory  G x F  P   Long-Term Memory  G x F  P   ProblemSolving/  Decision Making  G x F  P   Ability to Process  Information  G x F  P      Contributing Factors             Delusional             Hallucinating             Flight of Ideas             Other:       Modes of Intervention:  x Education  Support  Exploration    Clarifying x Problem Solving  Confrontation   x Socialization  Limit Setting x Reality Testing   x Activity  Movement  Media    Other:            Response to Learning:   Able to verbalize current knowledge/experience    Able to verbalize/acknowledge new learning    Able to retain information    Capable of insight    Able to change behavior   x Progressing to goal    Other:        Comments: Patient attended group and participated but left early due to an unknown reason.

## 2019-01-21 NOTE — PLAN OF CARE
Problem: Altered Mood, Psychotic Behavior:  Goal: Able to demonstrate trust by eating, participating in treatment and following staff's direction  Able to demonstrate trust by eating, participating in treatment and following staff's direction   Outcome: Ongoing  PT has been eating well. Pt does not attend groups but will sit off to the side when the group is going on. Pt will come to the desk when ask to come get medications. Goal: Absence of self-harm  Absence of self-harm   Outcome: Ongoing  Pt has not tried to harm self. Pt will not respond to writer when asked if she wanted to hurt self or anyone else. Problem: Altered Mood, Deterioration in Function:  Goal: Ability to perform activities of daily living will improve  Ability to perform activities of daily living will improve   Outcome: Ongoing  Pt will shower and ask for the things she needs to shower with. Goal: Able to verbalize reality based thinking  Able to verbalize reality based thinking   Outcome: Ongoing  Pt does vocalize the things she needs like food or when she wants to shower. But, then continues with self talk.

## 2019-01-21 NOTE — PLAN OF CARE
Problem: Altered Mood, Deterioration in Function:  Goal: Able to verbalize reality based thinking  Able to verbalize reality based thinking   Outcome: Ongoing  Psychotherapy Group Note    Date: 1//21/2019  Start Time: 10:00AM  End Time: 10:45AM    Number Participants in Group:  7/16    Goal:  Patient will demonstrate increased interpersonal interaction   Topic: Mobile Psychotherapy Group    Discipline Responsible:   OT  AT x SW  Nsg.  RT  Other       Participation Level:     None  Minimal   x Active Listener x Interactive    Monopolizing         Participation Quality:   Appropriate  Inappropriate   x       Attentive        Intrusive   x       Sharing        Resistant   x       Supportive        Lethargic       Affective:   x Congruent  Incongruent  Blunted  Flat    Constricted  Anxious  Elated  Angry    Labile  Depressed  Other         Cognitive:  x Alert x Oriented PPTP     Concentration  G x F  P   Attention Span  G x F  P   Short-Term Memory  G x F  P   Long-Term Memory  G x F  P   Problem Solving/  Decision Making  G x F  P   Ability to Process  Information  G x F  P      Contributing Factors             Delusional             Hallucinating             Flight of Ideas             Other:       Modes of Intervention:  x Education x Support x Exploration   x Clarifying x Problem Solving x Confrontation   x Socialization x Limit Setting x Reality Testing    Activity  Movement  Media    Other:            Response to Learning:  x Able to verbalize current knowledge/experience   x Able to verbalize/acknowledge new learning   x Able to retain information    Capable of insight    Able to change behavior   x Progressing to goal    Other:        Comments: Pt was an active listener

## 2019-01-21 NOTE — PLAN OF CARE
Problem: Altered Mood, Deterioration in Function:  Goal: Able to verbalize reality based thinking  Able to verbalize reality based thinking   Outcome: Ongoing  Pt did not attend RT group at 1100 d/t resting in room despite staff invitation to attend.

## 2019-01-21 NOTE — PROGRESS NOTES
OB/GYN  Resident Progress Note    Nakul Flood is a 41 y.o. female  at [address] [facility] Day: 35    Subjective:   Patient has been seen and examined. Patient is resting comfortably in bed. When I entered room smells strongly of urine. Pt greeted me pleasantly. When I asked her how she is feeling she states she has chills and is feeling her nerves. I attempted to ask if she is feeling contractions or abdominal pain, she says she has contractions in her head and heart. She denies HA, but reports she feels her nerves. She does not answer clearly if she has vaginal bleeding or leakage of fluid. She reports she feels her baby kicking. Objective:   Vitals:  Vitals:    19 1945 1915 19   BP:  111/62  122/78   Pulse:  67  78   Resp: 14 14 16    Temp:  97.9 °F (36.6 °C)     TempSrc:  Oral     SpO2:       Weight:       Height:             Fetal Heart Tones by Doppler: 154 bpm     Physical Exam:  General appearance:  no apparent distress, alert and cooperative  Neurologic:  alert, oriented, normal speech, no focal findings or movement disorder noted  Abdomen:  soft, gravid and non-tender, no signs of chorio, no signs of abruption, no abdominal scars  Extremities:  no calf tenderness, non edematous    DATA:  Labs:   No results found for this or any previous visit (from the past 24 hour(s)). Assessment/Plan:  Nakul Flood is a 41 y.o. female  at 30w1d IUP              - GBS unknown / Rh unknown / R unknown              - No indication for GBS prophylaxis unless delivery imminent    - No s/s PTL at this time    - FHTs 154bpm by Doppler               - GC/C pending               - Patient has yet to agree to prenatal labs, will continue to attempt to obtain labs daily               - Continue PNV daily               - Patient will need M referral for anatomy US and likely fetal echocardiogram due to exposure to psychiatric medications. Dr. Elizabeth Martinez notified.  Will Schizoaffective disorder, bipolar type ([facility] [address] 09/11/2014       Will discuss with attending.      Anila Lujan DO  Ob/Gyn Resident  [facility], [address]  1/21/2019, 6:29 AM

## 2019-01-21 NOTE — PLAN OF CARE
Problem: Altered Mood, Deterioration in Function:  Goal: Able to verbalize reality based thinking  Able to verbalize reality based thinking   Outcome: Ongoing  PSYCHOEDUCATION GROUP NOTE    Date: 1/21/19  Start Time: 0900  End Time: 0915    Number Participants in Group:  9/16    Goal:  Patient will demonstrate increased interpersonal interaction   Topic: Community Meeting     Discipline Responsible:   OT  AT  [facility]. x RT MHP Other       Participation Level:     None  Minimal   x Active Listener  Interactive    Monopolizing         Participation Quality:   Appropriate  Inappropriate          Attentive        Intrusive          Sharing x       Resistant          Supportive        Lethargic       Affective:    Congruent  Incongruent x Blunted  Flat    Constricted  Anxious  Elated  Angry    Labile  Depressed  Other         Cognitive:  x Alert x Oriented PPTP     Concentration  G  F  P   Attention Span  G  F  P   Short-Term Memory  G  F  P   Long-Term Memory  G  F  P   ProblemSolving/  Decision Making  G  F  P   Ability to Process  Information  G  F  P      Contributing Factors             Delusional             Hallucinating             Flight of Ideas             Other:       Modes of Intervention:  x Education x Support x Exploration   x Clarifying x Problem Solving  Confrontation   x Socialization  Limit Setting x Reality Testing    Activity  Movement  Media    Other:            Response to Learning:   Able to verbalize current knowledge/experience    Able to verbalize/acknowledge new learning    Able to retain information    Capable of insight    Able to change behavior   x Progressing to goal    Other:        Comments: Pt was in group area but did not participate.

## 2019-01-22 PROCEDURE — 1240000000 HC EMOTIONAL WELLNESS R&B

## 2019-01-22 PROCEDURE — 6370000000 HC RX 637 (ALT 250 FOR IP): Performed by: STUDENT IN AN ORGANIZED HEALTH CARE EDUCATION/TRAINING PROGRAM

## 2019-01-22 PROCEDURE — 6370000000 HC RX 637 (ALT 250 FOR IP): Performed by: PSYCHIATRY & NEUROLOGY

## 2019-01-22 RX ADMIN — NICOTINE POLACRILEX 2 MG: 2 GUM, CHEWING BUCCAL at 17:06

## 2019-01-22 RX ADMIN — METRONIDAZOLE 500 MG: 500 TABLET ORAL at 23:09

## 2019-01-22 RX ADMIN — NICOTINE POLACRILEX 2 MG: 2 GUM, CHEWING BUCCAL at 09:03

## 2019-01-22 RX ADMIN — METRONIDAZOLE 500 MG: 500 TABLET ORAL at 08:48

## 2019-01-22 RX ADMIN — LURASIDONE HYDROCHLORIDE 160 MG: 80 TABLET, FILM COATED ORAL at 17:06

## 2019-01-22 RX ADMIN — Medication 1 TABLET: at 17:06

## 2019-01-22 RX ADMIN — ACETAMINOPHEN 650 MG: 325 TABLET, FILM COATED ORAL at 17:06

## 2019-01-22 ASSESSMENT — PAIN SCALES - GENERAL
PAINLEVEL_OUTOF10: 0
PAINLEVEL_OUTOF10: 3

## 2019-01-22 ASSESSMENT — PAIN - FUNCTIONAL ASSESSMENT
PAIN_FUNCTIONAL_ASSESSMENT: 0-10
PAIN_FUNCTIONAL_ASSESSMENT: 0-10

## 2019-01-22 NOTE — PROGRESS NOTES
OB/GYN Resident Interval Note     [facility] called to say pt changed her mind and was agreeable to being seen. I went to see the pt and she was lying in bed wrapped in blankets, when I asked if I could listen to her baby's heartbeat she responded, \"No get out of my room. \"     Anila Lujan DO  Ob/Gyn Resident  Pager: [phone]  1/22/2019 6:27 AM

## 2019-01-22 NOTE — PLAN OF CARE
Problem: Altered Mood, Deterioration in Function:  Goal: Able to verbalize reality based thinking  Able to verbalize reality based thinking   Outcome: Ongoing  Psychoeducation Group Note    Date: 1/22/19  Start Time: 1100  End Time: 1140    Number Participants in Group:  4    Goal:  Patient will demonstrate increased interpersonal interaction. Topic:  Therapeutic Leisure Skills Group    Discipline Responsible:   OT  AT  [facility]. X RT  Other       Participation Level:     None X Minimal    Active Listener  Interactive    Monopolizing         Participation Quality:  X Appropriate  Inappropriate          Attentive        Intrusive          Sharing        Resistant          Supportive        Lethargic       Affective:    Congruent  Incongruent  Blunted  Flat    Constricted  Anxious  Elated  Angry   X Labile  Depressed  Other  Bright       Cognitive:  X Alert X Oriented PPTP     Concentration  G  F X P   Attention Span  G  F X P   Short-Term Memory  G  F  P   Long-Term Memory  G  F  P   ProblemSolving/  Decision Making  G  F  P   Ability to Process  Information  G  F  P      Contributing Factors             Delusional             Hallucinating             Flight of Ideas             Other:       Modes of Intervention:  X Education  Support X Exploration    Clarifying X Problem Solving  Confrontation   X Socialization X Limit Setting  Reality Testing   X Activity  Movement  Media    Other:            Response to Learning:   Able to verbalize current knowledge/experience    Able to verbalize/acknowledge new learning    Able to retain information    Capable of insight    Able to change behavior   X Progressing to goal    Other:        Comments: Pt was present in group area for duration of group. Pt observed activtiy for 10 minutes but did not participate in group. Pt had minimal interaction with staff/peers during group.

## 2019-01-22 NOTE — PLAN OF CARE
Problem: Altered Mood, Psychotic Behavior:  Goal: Able to demonstrate trust by eating, participating in treatment and following staff's direction  Able to demonstrate trust by eating, participating in treatment and following staff's direction   Outcome: Ongoing  Patient accepting of food provided by staff. Patient was minimally tolerant of physical assessment completed this shift,  Refused vital signs and responded with \"I'm fine to questions related to how she was feeling physically\". Goal: Absence of self-harm  Absence of self-harm   Outcome: Ongoing  Pt denies thoughts of self harm and is agreeable to seeking out should thoughts of self harm arise. Safe environment maintained. Q15 minute checks for safety cont per unit policy. Will cont to monitor for safety and provides support and reassurance as needed. Problem: Altered Mood, Deterioration in Function:  Goal: Ability to perform activities of daily living will improve  Ability to perform activities of daily living will improve   Outcome: Ongoing  Patients hygiene is poor this shift,  encouragement given to shower and change clothes d/t the strong urine smell. Patient was not accepting of encouragement. Goal: Able to verbalize reality based thinking  Able to verbalize reality based thinking   Outcome: Ongoing  Patient does not appear to be aware of situation that has brought her to inpatient psych,  Patient stated \"I'm going home to my sisters\". Writer asked patient about pregnancy and how she was feeling today, patient responded with \"I'm not pregnant, get out of her, I'm fine\". Patient was accepting of assessment up until writer asked about pregnancy after that point patient refused all care offered. Pt. Remains on q15 min checks and frequent spontaneous checks throughout shift. Pt. Safety maintained.      Problem: Falls - Risk of:  Goal: Will remain free from falls  Will remain free from falls   Outcome: Ongoing  Pt remains free of falls and verbalizes understanding of individual fall risks. Pt wearing non skid footwear and encouraged to seek out staff for any assistance needed.

## 2019-01-22 NOTE — PROGRESS NOTES
OB/GYN Resident Interval Note    Called [facility] to ask if patient was accepting to being seen by OB/GYN. When patient was asked if she could be seen she replied, \"No get out of here. \"     OB/GYN will continue to attempt to see pt and obtain prenatal labs daily. Please call with any questions.      Kayode Henao DO  Ob/Gyn Resident  Pager: [phone]  1/22/2019 6:01 AM

## 2019-01-22 NOTE — PLAN OF CARE
Problem: Altered Mood, Deterioration in Function:  Goal: Able to verbalize reality based thinking  Able to verbalize reality based thinking   Outcome: Ongoing  Psychoeducation Group Note    Date: 1/22/19  Start Time: 0900  End Time: 0930    Number Participants in Group:  7    Goal:  Patient will demonstrate increased interpersonal interaction. Topic:  Goal Setting and [employer]    Discipline Responsible:   OT  AT  [facility]. X RT  Other       Participation Level:     None X Minimal    Active Listener  Interactive    Monopolizing         Participation Quality:  X Appropriate  Inappropriate          Attentive        Intrusive          Sharing        Resistant          Supportive        Lethargic       Affective:    Congruent  Incongruent  Blunted  Flat    Constricted  Anxious  Elated  Angry   X Labile  Depressed  Other  Bright       Cognitive:  X Alert X Oriented PPTP     Concentration  G  F X P   Attention Span  G  F X P   Short-Term Memory  G  F X P   Long-Term Memory  G  F X P   ProblemSolving/  Decision Making  G  F X P   Ability to Process  Information  G  F X P      Contributing Factors             Delusional             Hallucinating             Flight of Ideas             Other:       Modes of Intervention:  X Education X Support X Exploration   X Clarifying X Problem Solving  Confrontation    Socialization  Limit Setting  Reality Testing   X Activity  Movement  Media    Other:            Response to Learning:  X Able to verbalize current knowledge/experience    Able to verbalize/acknowledge new learning    Able to retain information    Capable of insight    Able to change behavior   X Progressing to goal    Other:        Comments: Pt developed daily goal to talk to the doctor about discharge. Pt was irritable and wandered in and out of group area.

## 2019-01-23 LAB
ABO/RH: NORMAL
ABSOLUTE EOS #: 0.1 K/UL (ref 0–0.4)
ABSOLUTE IMMATURE GRANULOCYTE: ABNORMAL K/UL (ref 0–0.3)
ABSOLUTE LYMPH #: 2.1 K/UL (ref 1–4.8)
ABSOLUTE MONO #: 0.6 K/UL (ref 0.1–1.3)
ANTIBODY SCREEN: NEGATIVE
ARM BAND NUMBER: NORMAL
BASOPHILS # BLD: 0 % (ref 0–2)
BASOPHILS ABSOLUTE: 0 K/UL (ref 0–0.2)
BLOOD BANK COMMENT: NORMAL
DIFFERENTIAL TYPE: ABNORMAL
EOSINOPHILS RELATIVE PERCENT: 1 % (ref 0–4)
EXPIRATION DATE: NORMAL
HAV IGM SER IA-ACNC: NONREACTIVE
HCT VFR BLD CALC: 31.3 % (ref 36–46)
HEMOGLOBIN: 10.5 G/DL (ref 12–16)
HEPATITIS B CORE IGM ANTIBODY: NONREACTIVE
HEPATITIS B SURFACE ANTIGEN: NONREACTIVE
HEPATITIS B SURFACE ANTIGEN: NONREACTIVE
HEPATITIS C ANTIBODY: NONREACTIVE
HIV AG/AB: NONREACTIVE
IMMATURE GRANULOCYTES: ABNORMAL %
LYMPHOCYTES # BLD: 21 % (ref 24–44)
MCH RBC QN AUTO: 34.7 PG (ref 26–34)
MCHC RBC AUTO-ENTMCNC: 33.7 G/DL (ref 31–37)
MCV RBC AUTO: 103 FL (ref 80–100)
MONOCYTES # BLD: 6 % (ref 1–7)
NRBC AUTOMATED: ABNORMAL PER 100 WBC
PDW BLD-RTO: 12.9 % (ref 11.5–14.9)
PLATELET # BLD: 335 K/UL (ref 150–450)
PLATELET ESTIMATE: ABNORMAL
PMV BLD AUTO: 7.6 FL (ref 6–12)
RBC # BLD: 3.04 M/UL (ref 4–5.2)
RBC # BLD: ABNORMAL 10*6/UL
RUBV IGG SER QL: 436.9 IU/ML
SEG NEUTROPHILS: 72 % (ref 36–66)
SEGMENTED NEUTROPHILS ABSOLUTE COUNT: 7 K/UL (ref 1.3–9.1)
T. PALLIDUM, IGG: NONREACTIVE
WBC # BLD: 9.8 K/UL (ref 3.5–11)
WBC # BLD: ABNORMAL 10*3/UL

## 2019-01-23 PROCEDURE — 86762 RUBELLA ANTIBODY: CPT

## 2019-01-23 PROCEDURE — 36415 COLL VENOUS BLD VENIPUNCTURE: CPT

## 2019-01-23 PROCEDURE — 86850 RBC ANTIBODY SCREEN: CPT

## 2019-01-23 PROCEDURE — 86901 BLOOD TYPING SEROLOGIC RH(D): CPT

## 2019-01-23 PROCEDURE — 86780 TREPONEMA PALLIDUM: CPT

## 2019-01-23 PROCEDURE — 6370000000 HC RX 637 (ALT 250 FOR IP): Performed by: STUDENT IN AN ORGANIZED HEALTH CARE EDUCATION/TRAINING PROGRAM

## 2019-01-23 PROCEDURE — 86900 BLOOD TYPING SEROLOGIC ABO: CPT

## 2019-01-23 PROCEDURE — 1240000000 HC EMOTIONAL WELLNESS R&B

## 2019-01-23 PROCEDURE — 6370000000 HC RX 637 (ALT 250 FOR IP): Performed by: PSYCHIATRY & NEUROLOGY

## 2019-01-23 PROCEDURE — 85025 COMPLETE CBC W/AUTO DIFF WBC: CPT

## 2019-01-23 PROCEDURE — 80074 ACUTE HEPATITIS PANEL: CPT

## 2019-01-23 PROCEDURE — 87340 HEPATITIS B SURFACE AG IA: CPT

## 2019-01-23 PROCEDURE — 83036 HEMOGLOBIN GLYCOSYLATED A1C: CPT

## 2019-01-23 PROCEDURE — 87389 HIV-1 AG W/HIV-1&-2 AB AG IA: CPT

## 2019-01-23 RX ADMIN — METRONIDAZOLE 500 MG: 500 TABLET ORAL at 19:40

## 2019-01-23 RX ADMIN — Medication 1 TABLET: at 19:41

## 2019-01-23 RX ADMIN — HYDROXYZINE HYDROCHLORIDE 25 MG: 25 TABLET, FILM COATED ORAL at 19:41

## 2019-01-23 RX ADMIN — NICOTINE POLACRILEX 2 MG: 2 GUM, CHEWING BUCCAL at 13:37

## 2019-01-23 RX ADMIN — NICOTINE POLACRILEX 2 MG: 2 GUM, CHEWING BUCCAL at 12:09

## 2019-01-23 RX ADMIN — LURASIDONE HYDROCHLORIDE 160 MG: 80 TABLET, FILM COATED ORAL at 17:51

## 2019-01-23 RX ADMIN — ACETAMINOPHEN 650 MG: 325 TABLET, FILM COATED ORAL at 19:40

## 2019-01-23 RX ADMIN — METRONIDAZOLE 500 MG: 500 TABLET ORAL at 08:01

## 2019-01-23 RX ADMIN — NICOTINE POLACRILEX 2 MG: 2 GUM, CHEWING BUCCAL at 08:01

## 2019-01-23 ASSESSMENT — PAIN SCALES - GENERAL
PAINLEVEL_OUTOF10: 0
PAINLEVEL_OUTOF10: 3
PAINLEVEL_OUTOF10: 1

## 2019-01-23 NOTE — PLAN OF CARE
Problem: Altered Mood, Psychotic Behavior:  Goal: Absence of self-harm  Absence of self-harm   Outcome: Ongoing  Psychoeducation Group Note    Date: 1/23/2019  Start Time: 1330  End Time: 1405    Number Participants in Group:  6    Goal:  Patient will demonstrate increased interpersonal interaction   Topic: Leisure/ self-disclosure     Discipline Responsible:   OT  AT  [facility]. x RT  Other       Participation Level:     None x Minimal    Active Listener  Interactive    Monopolizing         Participation Quality:  x Appropriate  Inappropriate          Attentive        Intrusive          Sharing        Resistant          Supportive        Lethargic       Affective:    Congruent  Incongruent  Blunted x Flat    Constricted  Anxious  Elated  Angry    Labile  Depressed  Other         Cognitive:  x Alert x Oriented PPTP     Concentration  G x F  P   Attention Span  G x F  P   Short-Term Memory  G  F  P   Long-Term Memory  G  F  P   ProblemSolving/  Decision Making  G  F  P   Ability to Process  Information  G  F  P      Contributing Factors             Delusional             Hallucinating             Flight of Ideas             Other:       Modes of Intervention:  x Education x Support x Exploration   x Clarifying x Problem Solving  Confrontation   x Socialization  Limit Setting x Reality Testing   x Activity  Movement  Media    Other:            Response to Learning:   Able to verbalize current knowledge/experience    Able to verbalize/acknowledge new learning    Able to retain information    Capable of insight    Able to change behavior   x Progressing to goal    Other:        Comments:

## 2019-01-23 NOTE — PLAN OF CARE
Problem: Altered Mood, Deterioration in Function:  Goal: Ability to perform activities of daily living will improve  Ability to perform activities of daily living will improve   Outcome: Ongoing  Psychotherapy Group Note    Date: 1/23/2019  Start Time: 10:00AM  End Time: 10:45AM    Number Participants in Group:  5/14    Goal:  Patient will demonstrate increased interpersonal interaction   Topic: [facility] [facility]    Discipline Responsible:   OT  AT x SW  Nsg.  RT  Other       Participation Level:     None  Minimal   x Active Listener x Interactive    Monopolizing         Participation Quality:   Appropriate  Inappropriate   x       Attentive        Intrusive   x       Sharing        Resistant   x       Supportive        Lethargic       Affective:   x Congruent  Incongruent  Blunted  Flat    Constricted  Anxious  Elated  Angry    Labile  Depressed  Other         Cognitive:  x Alert x Oriented PPTP     Concentration  G x F  P   Attention Span  G x F  P   Short-Term Memory  G x F  P   Long-Term Memory  G x F  P   Problem Solving/  Decision Making  G x F  P   Ability to Process  Information  G x F  P      Contributing Factors             Delusional             Hallucinating             Flight of Ideas             Other:       Modes of Intervention:  x Education x Support x Exploration   x Clarifying x Problem Solving x Confrontation   x Socialization x Limit Setting x Reality Testing    Activity  Movement  Media    Other:            Response to Learning:  x Able to verbalize current knowledge/experience   x Able to verbalize/acknowledge new learning   x Able to retain information    Capable of insight    Able to change behavior   x Progressing to goal    Other:        Comments:

## 2019-01-23 NOTE — PROGRESS NOTES
OB/GYN  Resident Progress Note    Justina Ovalle is a 41 y.o. female  at 20 [facility] Day: 35      Subjective:   Notifed by [facility] RN that patient agreed to being seen. When I arrive on unit, pt is sitting at a table watching TV. I say hello and pt replies, \"Ok, I'm ready. \" Patient walks to her room and lays on bed. She denies any abdominal pain. When I ask how she feels, she says, \"cold and wet all over\" while rubbing her hands and arms. I asked the patient if she is having vaginal bleeding or leakage of fluid she cuts me off abruptly and says, \"No, I don't have none of that. \" I asked the pt if she is feeling the baby kicking and she looks away and does not respond. I assess FHTs and patient immediately stands up and walks out of room and goes back to watching TV in the common area. Objective:   Vitals:  Vitals:    19 2000 19 0845 19 0853 19 194   BP: 122/78 (!) 94/50     Pulse: 78 82     Resp:  14 14 14   Temp:  98.4 °F (36.9 °C)     TempSrc:       SpO2:  100%     Weight:       Height:             Fetal Heart Tones by doppler: 152bpm     Physical Exam:  General appearance: altert, minimally cooperative. Pt got up immediately from bed and left her room after FHTs obtained. Unable to examine. Assessment/Plan:  Justina Oavlle is a 41 y.o. female  at 32w2d               - GBS unknown / Rh unknown / R unknown              - No indication for GBS prophylaxis unless delivery imminent               - No s/s PTL at this time               - FHTs 152bpm by Doppler               - Patient has yet to agree to prenatal labs, will continue to attempt to obtain labs daily               - Continue PNV daily               - Patient will need M referral for anatomy US and likely fetal echocardiogram due to exposure to psychiatric medications. Discussed with Dr. Bert Nicole who is willing to do US. OB/GYN will discuss arranging transport with chaperone with Psych attending today.             - OB/GYN continues to recommend transfer to a facility with both inpatient adult psychiatry and MFM if possible, however these facilities seem to be very limited.               - Ethics consult placed due to difficulty providing prenatal care due to patient continuous refusal.      BV               - Continue Flagyl 500 mg BID      Candida vaginitis               - S/P Diflucan 150mg PO x1     Schizoaffective disorder              - Pt currently on Latuda 160 mg daily              - Management per primary      No prenatal care with denial of pregnancy up to this point               - Will continue to attempt to obtain prenatal labs               - Will attempt to coordinate MFM evaluation      Asthma              - VSS               - No s/s of acute asthma exacerbation               - Continue Albuterol rescue inhaler PRN     Macrocytic Anemia              - Hgb 10.8 and .3 on 12/12/18              - B12 and Folate levels not available     AMA              - NIPT recommended, pt currently refusing all lab draws     Tobacco abuse              - Currently using nicorette gum     Patient Active Problem List    Diagnosis Date Noted    Bacterial vaginosis 01/18/2019    Yeast infection 01/18/2019    Poor historian 01/18/2019    Fetal drug exposure (latuda) 01/18/2019    Macrocytic anemia 12/22/2018    Advanced maternal age in multigravida 12/22/2018    Patient does not believe she is pregnant, refusing care 12/22/2018    Noncompliance 12/22/2018    No prenatal care in current pregnancy 12/21/2018     [facility] Note:     [facility] [id number]  Patient has repeatedly declined exams and to be seen by ED      Polysubstance abuse ([facility] [address] 03/23/2018    Schizoaffective disorder, bipolar type ([facility] [address] 09/11/2014       Will discuss with attending.      Isabel Navarro DO  Ob/Gyn Resident  [facility], [address]  1/23/2019, 7:43 AM

## 2019-01-23 NOTE — PLAN OF CARE
Problem: Altered Mood, Psychotic Behavior:  Goal: Able to demonstrate trust by eating, participating in treatment and following staff's direction  Able to demonstrate trust by eating, participating in treatment and following staff's direction   Outcome: Ongoing  Patient is visibly responding to internal stimuli as  Evidenced by her gestures and self talk. She was on the edge of a group activity and became verbally and nearly physically aggressive when another peer was singing. She was redirectable to her room. She has taken her medications, but has refused one on one time and physical assessments. She makes random statements about her pregnancy. She refused some blood draws as well. Hygiene is very poor. Appetite is adequate.

## 2019-01-23 NOTE — PROGRESS NOTES
OB/GYN Resident Interval Note     Called [facility] to see if patient would be agreeable to OB evaluation. Pt refused to acknowledge nurses. Informed by RN that pt would not likely be willing to be seen at this time. Asked RN to alert OB if pt changes her mind or if they have any questions/concerns. Will continue to attempt labs daily. Recommend transfer to [place]. V's for JESSICA/ Kirby Moore and consult when patient is accepting of prenatal care.      Jonh Kaur, DO  Ob/Gyn Resident  Pager: [phone]  1/23/2019 6:49 AM

## 2019-01-23 NOTE — PLAN OF CARE
Problem: Altered Mood, Psychotic Behavior:  Goal: Able to demonstrate trust by eating, participating in treatment and following staff's direction  Able to demonstrate trust by eating, participating in treatment and following staff's direction   Outcome: Ongoing  Pt did not attend therapeutic recreation at 1430 d/t resting in room despite staff invitation to attend.

## 2019-01-23 NOTE — PLAN OF CARE
Problem: Altered Mood, Psychotic Behavior:  Goal: Able to demonstrate trust by eating, participating in treatment and following staff's direction  Able to demonstrate trust by eating, participating in treatment and following staff's direction   Outcome: Ongoing  Patient refused vital signs and nightly dose of medication. Patient makes request as needed. Not accepting of assistance with personal care or cleaning of room. Goal: Absence of self-harm  Absence of self-harm   Outcome: Ongoing  Denies suicidal and homicidal ideations. Problem: Falls - Risk of:  Goal: Will remain free from falls  Will remain free from falls   Outcome: Ongoing  Patient remains free from falls. Non skid footwear with ambulation. Bed in low position.

## 2019-01-24 LAB
ESTIMATED AVERAGE GLUCOSE: 71 MG/DL
HBA1C MFR BLD: 4.1 % (ref 4–6)

## 2019-01-24 PROCEDURE — 6370000000 HC RX 637 (ALT 250 FOR IP): Performed by: PSYCHIATRY & NEUROLOGY

## 2019-01-24 PROCEDURE — 1240000000 HC EMOTIONAL WELLNESS R&B

## 2019-01-24 PROCEDURE — 6370000000 HC RX 637 (ALT 250 FOR IP): Performed by: STUDENT IN AN ORGANIZED HEALTH CARE EDUCATION/TRAINING PROGRAM

## 2019-01-24 RX ADMIN — NICOTINE POLACRILEX 2 MG: 2 GUM, CHEWING BUCCAL at 10:47

## 2019-01-24 RX ADMIN — NICOTINE POLACRILEX 2 MG: 2 GUM, CHEWING BUCCAL at 08:37

## 2019-01-24 RX ADMIN — Medication 1 TABLET: at 17:24

## 2019-01-24 RX ADMIN — LURASIDONE HYDROCHLORIDE 160 MG: 80 TABLET, FILM COATED ORAL at 17:24

## 2019-01-24 RX ADMIN — METRONIDAZOLE 500 MG: 500 TABLET ORAL at 08:31

## 2019-01-24 NOTE — FLOWSHEET NOTE
01/24/19 1433   Encounter Summary   Services provided to: Patient   Referral/Consult From: Rounding   Continue Visiting (1/24/19)   Complexity of Encounter Low   Length of Encounter 30 minutes   Spiritual Assessment Completed Yes   Spiritual/Pentecostalism   Type Spiritual support   Assessment Calm; Approachable   Intervention Active listening;Sustaining presence/ Ministry of presence   Outcome Receptive

## 2019-01-24 NOTE — PROGRESS NOTES
OB/GYN Resident Interval Note      Called [facility] to see if patient would be agreeable to OB evaluation. Pt refusing to be seen this AM. Will continue to try to evaluate daily. Plan to attempt to arrange MFM appointment at [facility]. V's. Will discuss transport options/availability with Psych today. Please call with any questions.       Anila Lujan DO  Ob/Gyn Resident  Pager: [phone]  1/24/2019 6:09 AM

## 2019-01-24 NOTE — PLAN OF CARE
Problem: Altered Mood, Psychotic Behavior:  Goal: Absence of self-harm  Absence of self-harm   Outcome: Ongoing  Patient is irritable. Paranoid. Does not follow direction. Refuses assessments. Verbally threatening staff.  15 minute checks maintained for safety

## 2019-01-24 NOTE — PLAN OF CARE
Problem: Altered Mood, Psychotic Behavior:  Goal: Able to demonstrate trust by eating, participating in treatment and following staff's direction  Able to demonstrate trust by eating, participating in treatment and following staff's direction   Outcome: Ongoing  Patient accepting of snacks and fluids. Irritable and evasive with staff. Disheveled and with poor self care. Refused assistance multiple time with cleaning room and suggestion of shower. Goal: Absence of self-harm  Absence of self-harm   Outcome: Ongoing  Denies suicidal and homicidal ideations. Problem: Falls - Risk of:  Goal: Will remain free from falls  Will remain free from falls   Outcome: Ongoing  Remains free from falls. Non skid footwear with ambulation. Bed in low position.

## 2019-01-25 PROCEDURE — 1240000000 HC EMOTIONAL WELLNESS R&B

## 2019-01-25 PROCEDURE — 6370000000 HC RX 637 (ALT 250 FOR IP): Performed by: PSYCHIATRY & NEUROLOGY

## 2019-01-25 RX ADMIN — LURASIDONE HYDROCHLORIDE 160 MG: 80 TABLET, FILM COATED ORAL at 17:05

## 2019-01-25 RX ADMIN — NICOTINE POLACRILEX 2 MG: 2 GUM, CHEWING BUCCAL at 18:31

## 2019-01-25 RX ADMIN — Medication 1 TABLET: at 17:05

## 2019-01-25 NOTE — PROGRESS NOTES
OB/GYN Resident Interval Note     Attempted to see patient. Asked if I could do and ultrasound to see her baby. Patient yelled to leave her alone and get out of her room.      Kayode Henao,   Ob/Gyn Resident  Pager: [phone]  1/25/2019 6:45 AM

## 2019-01-25 NOTE — PROGRESS NOTES
Patient continues to refuse to speak with me. She begins yelling profane threats every time I attempt to speak with her. She refuses to care for ADL's, continues to refuse to acknowledge that she is pregnant or allow proper prenatal care. Psychosis is limiting insight and judgement and preventing her from being able to appropriately care for herself and baby. Limited in terms of medication choices due to pregnancy- [place].

## 2019-01-25 NOTE — PLAN OF CARE
Problem: Altered Mood, Psychotic Behavior:  Intervention: Group therapy to identify positive coping skills  Psychotherapy Group Note    Date: 1/25/2019  Start Time: 1000  End Time: 1045    Number Participants in Group:  9/13    Goal:  Patient will demonstrate increased interpersonal interaction   Topic: [facility]    Discipline Responsible:   OT  AT X SW  Nsg.  RT  Other       Participation Level:     None  Minimal    Active Listener  Interactive   x Monopolizing         Participation Quality:   Appropriate x Inappropriate          Attentive x       Intrusive          Sharing x       Resistant          Supportive        Lethargic       Affective:    Congruent x Incongruent x Blunted   Flat    Constricted  Anxious  Elated  x Angry   x Labile  Depressed  Other          Cognitive:  X Alert X Oriented PPTP     Concentration  G  F x P   Attention Span  G  F x P   Short-Term Memory  G  F x P   Long-Term Memory  G  F x P   ProblemSolving/  Decision Making  G  F x P   Ability to Process  Information  G  F x P      Contributing Factors             Delusional             Hallucinating   x          Flight of Ideas             Other:       Modes of Intervention:  X Education X Support X Exploration   X Clarifying X Problem Solving X Confrontation   X Socialization X Limit Setting X Reality Testing    Activity  Movement  Media    Other:            Response to Learning:   Able to verbalize current knowledge/experience    Able to verbalize/acknowledge new learning    Able to retain information    Capable of insight    Able to change behavior    Progressing to goal   x Other: Pt left group early after verbally attacking people in group       Comments:

## 2019-01-25 NOTE — PROGRESS NOTES
OB/GYN Resident Interval Note     Called [facility] to check on patient. Was informed by RN that she was sleeping comfortably and she did not want to wake her up. Writer asked RN if the patient had a legal guardian or was in charge of her own medical decisions. RN informed me that pt does not currently have a legal guardian. RN stated she thought there had been an Ethics meeting regarding this issue. OB/GYN consulted Ethics on 1/15/19 but has not heard from them. Informed RN that OB/GYN would like to be notified of Ethics meeting/involvement. Also asked if OB/GYN could be notified when Psych attending rounds to inform them of MFM appointment and need to arrange transport. RN reports she will relay information to day shift nurse.      Earlene Muñoz DO  Ob/Gyn Resident  Pager: [phone]  1/24/2019 10:23 PM

## 2019-01-25 NOTE — PROGRESS NOTES
[facility] Ethics Consultation Form  Report to Gemini Osullivan    MR# [id number]     Date of Consult: 2019   Date of Admission: 2018 . Referral Source OB/GYN Service  Unit [facility]  Room # 0213/0213-01    Medical Background: The patient is a 41 y.o. female who has a past medical history of Anxiety; Asthma; Bipolar 1 disorder ([address] Multiple personality ([address] and Seizures ([address] . Ad[facility]. Schizoaffective disorder ([address] [F25.9]  Schizoaffective disorder ([address] [F25.9] was the admission diagnosis. Of Note; \"ultrasound of 10/6/18 that showed she 14w5d pregnant\"    What are the requester's ethical concerns? Concerns expressed that d/t patient's behavioral issues she continues to refuse pre-mode care and many days denies being pregnant. What are his/her recommendations? Ethics consultation for \"further instruction\"   What decisions need to be made? Plan of care for health and safety of patient and fetus. How soon do they need to be made? Reasonable to current care        Patient and family wishes, values and goals: Patient is currently often in denial. Question if patient can return to reasonable capacity when other medications from psychiatry could be safely implemented to so express her wishes. Process (conversation with patient, family, doctor, nurse, care coordinator, ethics committee member, pastoral care, consultants, risk, others):  [facility] management/staff input with conversation of Ethics Committee members and Risk Management. Conversation of benefits and burdens in this very complex case. Concerns of team expressed of her high elopement risk shown by past behavior, documented history of violence expressed toward staff and documented refusal of treatment in many instances in regard to possibility of transport to [address] [place] [address] for treatment.  Risk clarifies that guardianship will involve expert evaluation form from psychiatry, finding an  (other?) to act as guardian and decision of  (court) to determine lack of competence. Then concern express that patient could still refuse treatment. Serious concerns expressed from this team of use of restraints for routine treatment/exam even in the case of a guardian being in place. Team acknowledges that restraints may very well be required emergently in this case but there are risk concerns around other use of such. Recommendations:  Conversation between Psychiatry and OB/GYN ongoing for safety and health. Continue current efforts at compliance for patient and fetus health & safety      Comments: Thank you very much for the consult and allowing this committee to participate in the very complicated case. Risk Management and Ethics Committee will continue to follow along with you.     Electronically signed by Darshan Osorio on 1/25/2019 at 1:26 PM

## 2019-01-25 NOTE — PLAN OF CARE
Problem: Altered Mood, Deterioration in Function:  Goal: Able to verbalize reality based thinking  Able to verbalize reality based thinking   Outcome: Ongoing  Patient refuses majority of assessments. No medications schedhuled at this time. Patient is labile. Irritable. Non compliant and unable to follow direction. 15 minute checks maintained for patient safety.

## 2019-01-25 NOTE — PLAN OF CARE
Problem: Altered Mood, Psychotic Behavior:  Goal: Able to demonstrate trust by eating, participating in treatment and following staff's direction  Able to demonstrate trust by eating, participating in treatment and following staff's direction   Outcome: Ongoing  Pt continues to be uncooperative with care. OB doctor came and attempted to do assessment and ultrasound, pt refused. Pt also refused night time medication. Pt won't even allow staff to  dirty linens and clothes on the floor. When staff attempted to  stuff from the floor, pt yelled \"What are you doing?!  Get out of here!!\"  Goal: Absence of self-harm  Absence of self-harm   Outcome: Ongoing  Pt remains free of harm. Safe environment maintained. Q15 minute checks for safety continued per unit policy. Will continue to monitor for safety and provide support and reassurance as needed. Problem: Falls - Risk of:  Goal: Will remain free from falls  Will remain free from falls   Outcome: Ongoing  No falls reported or observed as of this documentation. Fall precaution continued and maintained. Pt wearing non skid footwear and encouraged to seek out staff for any assistance needed.

## 2019-01-25 NOTE — PROGRESS NOTES
stillbirth. The patient needs MFM consultation. We feel very strongly that this patient would be best served at a facility with both inpatient adult psychiatry and Maternal Fetal Medicine in-house. Plan to discuss this and the plan of care with this patient with Psychiatry attending and Ethics.      Rekha Bennett DO  Ob/Gyn Resident  Pager: [phone]  1/24/2019 8:31 PM

## 2019-01-25 NOTE — PLAN OF CARE
Problem: Altered Mood, Psychotic Behavior:  Goal: Able to demonstrate trust by eating, participating in treatment and following staff's direction  Able to demonstrate trust by eating, participating in treatment and following staff's direction   Outcome: Ongoing  [facility]  Week Interdisciplinary Treatment Plan Note     Review Date & Time: 1/25/2019 0930    Admission Type:   Admission Type: Involuntary    Reason for admission:  Reason for Admission: Patient admitted for poor hygiene, auditory hallucinations, depression    Estimated Length of Stay :  5-7 days  Estimated Discharge Date Update: to be determined by physician    PATIENT STRENGTHS:  Patient Strengths:Strengths: Connection to output provider  Patient Strengths and Limitations:Limitations: Apathetic / unmotivated, Tendency to isolate self, External locus of control  Addictive Behavior:Addictive Behavior  In the past 3 months, have you felt or has someone told you that you have a problem with:  : Eating (too much/too little)  Do you have a history of Chemical Use?: Comment (YONY)  Do you have a history of Alcohol Use?: Comment (YONY)  Do you have a history of Street Drug Abuse?: Comment (YONY)  Histroy of Prescripton Drug Abuse?: Comment (YONY)  Medical Problems:   Past Medical History:   Diagnosis Date    Anxiety     Asthma     B[facility] 1 disorder [facility]     Multiple personality ([facility]     [facility]        Risk:  Fall RiskTotal: 94  Stephen Scale Stephen Scale Score: 22  BVC Total: 2    Change in scores no.  Changes to plan of Care no    Status EXAM:   Status and Exam  Normal: No  Facial Expression: Avoids Gaze, Hostile  Affect: Unstable  Level of Consciousness: Alert  Mood:Normal: No  Mood: Anxious, Suspicious, Irritable  Motor Activity:Normal: No  Motor Activity: Increased  Interview Behavior: Cooperative  Preception: [place] to Person  Attention:Normal: No  Attention: Distractible, Unable to Concentrate  Thought Processes: Blocking  Thought Content:Normal: No  Thought Content: Preoccupations, Poverty of Content, Delusions  Hallucinations: Unable to assess  Delusions: Yes  Delusions: Persecution  Memory:Normal: No  Memory: Poor Recent, Poor Remote  Insight and Judgment: No  Insight and Judgment: Poor Judgment, Poor Insight, Unmotivated  Present Suicidal Ideation: No  Present Homicidal Ideation: No    Daily Assessment Last Entry:   Daily Sleep (WDL): Within Defined Limits         Patient Currently in Pain: Denies  Daily Nutrition (WDL): Within Defined Limits    Patient Monitoring:  Frequency of Checks: 4 times per hour, close    Psychiatric Symptoms:   Depression Symptoms  Depression Symptoms: Feelings of helplessness, Feelings of hopelessess, Isolative, Loss of interest  Anxiety Symptoms  Anxiety Symptoms: Generalized  Lora Symptoms  Lora Symptoms: Labile, Increased energy, Pressured speech     Psychosis Symptoms  Delusion Type: Paranoid, Persecutory    Suicide Risk CSSR-S:  1) Within the past month, have you wished you were dead or wished you could go to sleep and not wake up? : No  2) Have you actually had any thoughts of killing yourself? : No  6) Have you ever done anything, started to do anything, or prepared to do anything to end your life?: No  Change in Result no Change in Plan of care no    EDUCATION:   Learner Progress Toward Treatment Goals: Reviewed results and recommendations of this team, Reviewed group plan and strategies, Reviewed signs, symptoms and risk of self harm and violent behavior, Reviewed goals and plan of care    Method: individual education, small group, verbal education    Outcome: verbalized understanding, but needs reinforcement to obtain goals    PATIENT GOALS:  Short term: \"To have visits\"  Long term: \"To stop moving so fast\"    PLAN/TREATMENT RECOMMENDATIONS UPDATE:   Continue with group therapies, education of coping skills   Continue to monitor patient on unit.   Medications provided/ medication compliance by patient. Continue for plans to obtain long term goals after discharge.     SHORT-TERM GOALS UPDATE:  Time frame for Short-Term Goals: 8-14days     LONG-TERM GOALS UPDATE:  Time frame for Long-Term Goals: 6 months  Members Present in Team Meeting: See Signature Sheet    Lamont Nunez

## 2019-01-26 PROCEDURE — 6370000000 HC RX 637 (ALT 250 FOR IP): Performed by: PSYCHIATRY & NEUROLOGY

## 2019-01-26 PROCEDURE — 1240000000 HC EMOTIONAL WELLNESS R&B

## 2019-01-26 RX ADMIN — NICOTINE POLACRILEX 2 MG: 2 GUM, CHEWING BUCCAL at 11:42

## 2019-01-26 RX ADMIN — Medication 1 TABLET: at 17:06

## 2019-01-26 RX ADMIN — LURASIDONE HYDROCHLORIDE 160 MG: 80 TABLET, FILM COATED ORAL at 17:06

## 2019-01-26 ASSESSMENT — PAIN SCALES - GENERAL: PAINLEVEL_OUTOF10: 0

## 2019-01-26 NOTE — PLAN OF CARE
Problem: Altered Mood, Psychotic Behavior:  Goal: Able to demonstrate trust by eating, participating in treatment and following staff's direction  Able to demonstrate trust by eating, participating in treatment and following staff's direction   Outcome: Ongoing  Pt continues to be uncooperative with care. Continues to refuse assessment. Continues to be hostile towards staff. Goal: Absence of self-harm  Absence of self-harm   Outcome: Ongoing  Pt remains free of harm. Safe environment maintained. Q15 minute checks for safety continued per unit policy. Will continue to monitor for safety and provide support and reassurance as needed.        Problem: Falls - Risk of:  Goal: Will remain free from falls  Will remain free from falls   Outcome: Ongoing  No falls reported or observed as of this documentation. Fall precaution continued and maintained.   Pt wearing non skid footwear and encouraged to seek out staff for any assistance needed.

## 2019-01-26 NOTE — PLAN OF CARE
Problem: Altered Mood, Deterioration in Function:  Goal: Able to verbalize reality based thinking  Able to verbalize reality based thinking   Outcome: Ongoing  1:1 with pt x ten minutes. Pt encouraged to attend unit programming and interact with peers and staff. Pt also encouraged to tend to hygiene and ADLs. Pt encouraged to discuss feelings with staff and feedback and reassurance provided. Pt denies thoughts of self harm and is agreeable to seeking out should thoughts of self harm arise. Safe environment maintained. Q15 minute checks for safety cont per unit policy. Will cont to monitor for safety and provides support and reassurance as needed. Pt is evasive and guarded with staff. Irritable with staff et peers. Did shower with encouragement. Pt refused most of physical assessment.

## 2019-01-26 NOTE — PROGRESS NOTES
PSYCHOEDUCATION GROUP NOTE    Date:   1/26/2019 Start Time: 0920  End Time: 0950    Number Participants in Group:  6    Goal:  Patient will demonstrate increased interpersonal interaction   Topic: community meeting / goal setting    Discipline Responsible:   OT  AT  [facility]. X RT MHP Other       Participation Level:     None x Minimal    Active Listener  Interactive    Monopolizing         Participation Quality:   Appropriate  Inappropriate          Attentive        Intrusive          Sharing x       Resistant          Supportive        Lethargic       Affective:    Congruent  Incongruent x Blunted  Flat    Constricted  Anxious  Elated  Angry    Labile  Depressed  Other  bright       Cognitive:  X Alert  Oriented PPTP     Concentration  G  F x P   Attention Span  G  F x P   Short-Term Memory  G  F  P   Long-Term Memory  G  F  P   ProblemSolving/  Decision Making  G  F x P   Ability to Process  Information  G  F x P      Contributing Factors             Delusional   x          Hallucinating             Flight of Ideas             Other: slow to process       Modes of Intervention:  x Education x Support x Exploration    Clarifying x Problem Solving  Confrontation   x Socialization  Limit Setting  Reality Testing   x Activity  Movement  Media    Other:            Response to Learning:  X Able to verbalize current knowledge/experience   X Able to verbalize/acknowledge new learning   X Able to retain information   X Capable of insight    Able to change behavior   X Progressing to goal    Other:        Comments pt sat on periphery of group.  tp was unable to identify goals

## 2019-01-26 NOTE — PROGRESS NOTES
OB Resident Progress Note     Vitals:    01/21/19 0845 01/23/19 0800 01/23/19 1930 01/24/19 0915   BP:  (!) 98/55 (!) 104/50 (!) 95/46   Pulse:  79 85 87   Resp:  18 14 14   Temp:  97.6 °F (36.4 °C)  97.9 °F (36.6 °C)   TempSrc:  Oral  Oral   SpO2: 100%      Weight:       Height:             Patient declining OB evaluation this morning. Please notify OB if patient become amendable. 1h GTT ordered.        Low Mon  OB/GYN Resident, PGY2  Pager: [phone]  [facility]  01/26/19  7:41 AM

## 2019-01-27 PROCEDURE — 1240000000 HC EMOTIONAL WELLNESS R&B

## 2019-01-27 PROCEDURE — 6370000000 HC RX 637 (ALT 250 FOR IP): Performed by: PSYCHIATRY & NEUROLOGY

## 2019-01-27 RX ADMIN — Medication 1 TABLET: at 16:58

## 2019-01-27 RX ADMIN — LURASIDONE HYDROCHLORIDE 160 MG: 80 TABLET, FILM COATED ORAL at 16:58

## 2019-01-27 RX ADMIN — NICOTINE POLACRILEX 2 MG: 2 GUM, CHEWING BUCCAL at 11:16

## 2019-01-27 ASSESSMENT — PAIN SCALES - GENERAL: PAINLEVEL_OUTOF10: 0

## 2019-01-27 NOTE — PLAN OF CARE
Problem: Altered Mood, Psychotic Behavior:  Goal: Able to demonstrate trust by eating, participating in treatment and following staff's direction  Able to demonstrate trust by eating, participating in treatment and following staff's direction   Outcome: Ongoing  Pt did not attend Community Meeting at [address] d/t resting in room despite staff invitation to attend.

## 2019-01-27 NOTE — PLAN OF CARE
Problem: Altered Mood, Psychotic Behavior:  Goal: Absence of self-harm  Absence of self-harm   Outcome: Ongoing  Free of harm this shift 15 m inute safety checks continue

## 2019-01-27 NOTE — PLAN OF CARE
Problem: Altered Mood, Deterioration in Function:  Goal: Able to verbalize reality based thinking  Able to verbalize reality based thinking   Outcome: Ongoing  Pt declined to attend psychoeducation at 1000 am despite encouragement. Pt offered 1:1 and refused.

## 2019-01-27 NOTE — PLAN OF CARE
Problem: Altered Mood, Psychotic Behavior:  Goal: Able to demonstrate trust by eating, participating in treatment and following staff's direction  Able to demonstrate trust by eating, participating in treatment and following staff's direction   Outcome: Ongoing  Pt continues to be labile, hostile towards staff. Isolative to room  this shift, but comes out of room for snacks and other needs. Pt demonstrate trust by eating snacks provided by staff. However, pt continues to be uncooperative with care. Continues to refuse assessment.     Goal: Absence of self-harm  Absence of self-harm   Outcome: Ongoing  Pt remains free of harm.  Safe environment maintained. Q15 minute checks for safety continued per unit policy.  Will continue to monitor for safety and provide support and reassurance as needed.        Problem: Falls - Risk of:  Goal: Will remain free from falls  Will remain free from falls   Outcome: Ongoing  No falls reported or observed as of this documentation.  Fall precaution continued and maintained.  Pt wearing non skid footwear and encouraged to seek out staff for any assistance needed.

## 2019-01-27 NOTE — PROGRESS NOTES
Vitals:    01/23/19 0800 01/23/19 1930 01/24/19 0915 01/26/19 1945   BP: (!) 98/55 (!) 104/50 (!) 95/46    Pulse:   87    Resp: 18 14 14 14   Temp:   97.9 °F (36.6 °C)    TempSrc: Oral  Oral Oral   SpO2:       Weight:       Height:           Per RN, patient would not respond to her when she was asked if it was okay for ob to evaluate her. Rec to notify us if patient becomes responsive and amendable. Thank you.     Casey Duffy   Ob-Gyn Resident PGY-2  Pager: [phone]

## 2019-01-27 NOTE — PLAN OF CARE
Problem: Altered Mood, Psychotic Behavior:  Goal: Able to demonstrate trust by eating, participating in treatment and following staff's direction  Able to demonstrate trust by eating, participating in treatment and following staff's direction   Outcome: Ongoing  Patient denies 1:1 with writer telling Cherelle Norris to Tristan" patient has no am  Medications  Is isolative to room out for meals and needs only  Patient maintains good appetite and showered this shift also washed laundry successfully  safety maintained wit 15 minute viisual safety checks per unit protocol

## 2019-01-28 PROCEDURE — 6370000000 HC RX 637 (ALT 250 FOR IP): Performed by: PSYCHIATRY & NEUROLOGY

## 2019-01-28 PROCEDURE — 1240000000 HC EMOTIONAL WELLNESS R&B

## 2019-01-28 PROCEDURE — 6370000000 HC RX 637 (ALT 250 FOR IP): Performed by: STUDENT IN AN ORGANIZED HEALTH CARE EDUCATION/TRAINING PROGRAM

## 2019-01-28 RX ORDER — FERROUS SULFATE 325(65) MG
325 TABLET ORAL 2 TIMES DAILY WITH MEALS
Status: DISCONTINUED | OUTPATIENT
Start: 2019-01-28 | End: 2019-02-08 | Stop reason: HOSPADM

## 2019-01-28 RX ADMIN — Medication 1 TABLET: at 17:13

## 2019-01-28 RX ADMIN — NICOTINE POLACRILEX 2 MG: 2 GUM, CHEWING BUCCAL at 08:34

## 2019-01-28 RX ADMIN — NICOTINE POLACRILEX 2 MG: 2 GUM, CHEWING BUCCAL at 17:13

## 2019-01-28 RX ADMIN — FERROUS SULFATE TAB 325 MG (65 MG ELEMENTAL FE) 325 MG: 325 (65 FE) TAB at 17:13

## 2019-01-28 RX ADMIN — HYDROXYZINE HYDROCHLORIDE 25 MG: 25 TABLET, FILM COATED ORAL at 17:13

## 2019-01-28 RX ADMIN — LURASIDONE HYDROCHLORIDE 160 MG: 80 TABLET, FILM COATED ORAL at 17:13

## 2019-01-28 ASSESSMENT — PAIN SCALES - GENERAL: PAINLEVEL_OUTOF10: 0

## 2019-01-28 NOTE — PROGRESS NOTES
Patient again refused to speak with me today. She was laying in bed, acknowledged my attempt to speak with her, rolled over and refused to acknowledge my presence further. Her room is very poorly maintained, displays very poor care of ADLs including hygiene. Staff report at times patient urinates in her bed. She displays very poor insight and judgment, continues to refuse most aspects of prenatal care. Every time I try to address with patient, she refuses to acknowledge being pregnant. I spoke with OB doctor and  of this hospital today in regards to OB's desire to have patient transferred out of this hospital.  I explained at length where my options and limitations are. Charting and medications reviewed. Will continue [facility] as this is only antipsychotic I can give patient this time.

## 2019-01-28 NOTE — PROGRESS NOTES
Ob gyn interval note    1hr gtt was previously ordered but appears to be cancelled. Will order another 1hr gtt. Prenatal labs completed on 1/23/19. Hgb 10.5. Will start iron. Patient is B positive. Rubella immune. Hep B NR. T pall NR. HIV NR.  H/H and UA with reflex to culture ordered. Dr. Reggie Lopez attempting to coordinate care with psych and ethics and risk management for plan of care going forward. Patient is best suited to be transferred to a tertiary center with both high risk ob and in patient psych unit. Attempting to coordinate care with [facility] in [facility] Consult A Doctor. Dr. Marialuisa Ly updated.      Vitals:    01/23/19 1930 01/24/19 0915 01/26/19 1945 01/27/19 1945   BP: (!) 104/50 (!) 95/46     Resp:  14 14 14   TempSrc:  Oral Oral Oral   SpO2:       Weight:       Height:             Carmen Magdaleno   Ob-Gyn Resident PGY-2  Pager: [phone]

## 2019-01-28 NOTE — PLAN OF CARE
Problem: Altered Mood, Psychotic Behavior:  Goal: Able to demonstrate trust by eating, participating in treatment and following staff's direction  Able to demonstrate trust by eating, participating in treatment and following staff's direction   Outcome: Ongoing  Pt did not attend Community Meeting at 0900 despite staff invitation to attend.

## 2019-01-28 NOTE — CARE COORDINATION
Writer received phone call from supervisor regarding guardianship for pt. Writer stated she knows pt has a sister, but pt is was unwilling to sign CAMILLE. Writer attempted, again, to have pt signed CAM[place]LE so writer could speak to pt's sister, Francine Garcia, pt states Marilu Sloan is not my emergency contact and I am not going back to that house\". Writer expressed that pt's family may be concerned with where pt is, as she has been in the hospital for a long time, and pt stated, Benedict Malik is my emergency contact\". When writer asked pt for Archie's information pt stated, \"No, you can talk to [facility]", and ended conversation. Writer spoke with Tasha Sorensen, ACT Team, [phone], who asked if pt's had anyone besides sister listed as an emergency contact. Miguel Nogueira stated her mother, Everett Jacksonr, [phone]. Writer stated that was the same number she had for pt's sister. Writer was told that pt lives with her sister and mother and pt receives SSI and pays her sister $500/month for rent. Miguel Nogueira stated pt has been on the ACT Team since 2013 and she knows a lot of information about pt. Writer was told pt had a previous pregnancy, 7 or 8 years ago and pt had to stay at [facility], for her pregnany, until delivery. Miguel Nogueira states she believes pt went the full 9 months. Pt's child now resides with pt's cousin, in [place], and has no contact. Miguel Nogueira stated pt's sister has been trying to contact pt, but has been unsuccessful, as Walter, doesn't have pt's 4 digit code. Miguel Nogueira states pt's sister is willing to adopt the baby once pt has given birth, but sister lives in the same house as pt. Writer asked about guardianship, Silvia Matthews was informed that Deepthi Nunez used to have a PTA fund for such cases and was willing to pay for guardianship before 2015. However, the family stopped the guardianship, and stated they would pursue it, but never followed through.   Writer was told the household is very chaotic and unstable.

## 2019-01-28 NOTE — PROGRESS NOTES
Pharmacy Med Education Group Note    Date: 01/28  Start Time: 1330  End Time: 1400    Number Participants in Group:  8    Goal:  Patient will demonstrate an understanding of the medications intended purpose and possible adverse effects  Topic: [place] for Pharmacy Med Ed Group    Discipline Responsible:     OT  AT  [facility].  RT     X Other       Participation Level:     None X Minimal       Active Listener     Interactive    Monopolizing         Participation Quality:     Appropriate  Inappropriate            Attentive        Intrusive          Sharing X       Resistant          Supportive        Lethargic       Affective:      Congruent  Incongruent  Blunted  Flat    Constricted  Anxious  Elated  Angry    Labile  Depressed  Other         Cognitive:    X Alert  Oriented PPTP     Concentration    G  F X P   Attention Span    G  F X P   Short-Term Memory    G  F  P   Long-Term Memory  G  F  P   ProblemSolving/  Decision Making  G  F  P   Ability to Process  Information    G  F  P      Contributing Factors             Delusional             Hallucinating             Flight of Ideas             Other:       Modes of Intervention:    X Education   X Support  Exploration    Clarifying  Problem Solving  Confrontation    Socialization  Limit Setting  Reality Testing    Activity  Movement  Media    Other:            Response to Learning:     Able to verbalize current knowledge/experience    Able to verbalize/acknowledge new learning    Able to retain information    Capable of insight    Able to change behavior    Progressing to goal    Other:        Comments:   Patient left group ~5 min after starting & returned ~5 min prior to group ending

## 2019-01-29 ENCOUNTER — APPOINTMENT (OUTPATIENT)
Dept: ULTRASOUND IMAGING | Age: 39
DRG: 566 | End: 2019-01-29
Payer: MEDICAID

## 2019-01-29 PROCEDURE — 1240000000 HC EMOTIONAL WELLNESS R&B

## 2019-01-29 PROCEDURE — 76805 OB US >/= 14 WKS SNGL FETUS: CPT

## 2019-01-29 PROCEDURE — 6370000000 HC RX 637 (ALT 250 FOR IP): Performed by: PSYCHIATRY & NEUROLOGY

## 2019-01-29 PROCEDURE — 6370000000 HC RX 637 (ALT 250 FOR IP): Performed by: STUDENT IN AN ORGANIZED HEALTH CARE EDUCATION/TRAINING PROGRAM

## 2019-01-29 RX ADMIN — FERROUS SULFATE TAB 325 MG (65 MG ELEMENTAL FE) 325 MG: 325 (65 FE) TAB at 17:16

## 2019-01-29 RX ADMIN — NICOTINE POLACRILEX 2 MG: 2 GUM, CHEWING BUCCAL at 12:37

## 2019-01-29 RX ADMIN — LURASIDONE HYDROCHLORIDE 160 MG: 80 TABLET, FILM COATED ORAL at 17:16

## 2019-01-29 RX ADMIN — Medication 1 TABLET: at 17:16

## 2019-01-29 ASSESSMENT — PAIN SCALES - GENERAL: PAINLEVEL_OUTOF10: 0

## 2019-01-29 NOTE — PLAN OF CARE
Problem: Altered Mood, Psychotic Behavior:  Goal: Absence of self-harm  Absence of self-harm   Outcome: Ongoing  Pt did not participate in recovery/ social interaction group at 1430 despite staff encouragement to attend.

## 2019-01-29 NOTE — PROGRESS NOTES
[facility] contacted to get Fetal Anatomy Ultrasound scheduled. RN states pt is not cooperating at this time.  Uls will try again in PM.   [id number]

## 2019-01-29 NOTE — PROGRESS NOTES
OB/GYN Resident Interval Note     Notified by [facility] staff that OB/GYN [facility] to [facility] formal anatomy US and consult was denied due to patient's high elopement risk. Patient has appointment scheduled with [facility] 2/5/19. OB/GYN instructed to order an US here at [facility] . Called Radiology Dept to see if any ultrasonographers here are trained in obstetric detailed anatomy US as this is usually a special training. Informed [facility] that OB/GYN residents are not qualified to perform or interpret anatomy US as this is usually done by a trained ultrasonographer and fellowship trained Maternal Fetal Medicine specialist. OB/GYN resident has completed a limited bedside transabdominal US on 1/18/19. Results shown below. LIMITED BEDSIDE US 1/18/19:  Position: Cephalic  Placental Location: posterior  Fetal Heart Tones: Present, 132 bpm  Fetal Movement: Present  Amniotic Fluid Index/Volume: 9.68cm  Estimated Fetal Weight:  2 lbs 9oz      Will discuss with attending.      Shawn Gentile DO  Ob/Gyn Resident  Pager: [phone]  1/28/2019 7:44 PM

## 2019-01-29 NOTE — PLAN OF CARE
Problem: Altered Mood, Psychotic Behavior:  Goal: Absence of self-harm  Absence of self-harm   Outcome: Ongoing  Patient denies any thoughts of self harm at this time. Patient is irritable and isolative to room. Non compliant with care. 15 minute check maintained for safety.

## 2019-01-29 NOTE — PLAN OF CARE
Problem: Altered Mood, Psychotic Behavior:  Goal: Able to demonstrate trust by eating, participating in treatment and following staff's direction  Able to demonstrate trust by eating, participating in treatment and following staff's direction   Outcome: Ongoing  Pt continues to be labile, hostile towards staff.  Isolative to room  this shift, but comes out of room for snacks and other needs. Pt demonstrate trust by eating snacks provided by staff, but continues to be uncooperative with care. Continues to refuse assessment.     Goal: Absence of self-harm  Absence of self-harm   Outcome: Ongoing  Pt remains free of harm.  Safe environment maintained. Q15 minute checks for safety continued per unit policy.  Will continue to monitor for safety and provide support and reassurance as needed.        Problem: Falls - Risk of:  Goal: Will remain free from falls  Will remain free from falls   Outcome: Ongoing  No falls reported or observed as of this documentation.  Fall precaution continued and maintained.  Pt wearing non skid footwear and encouraged to seek out staff for any assistance needed.

## 2019-01-29 NOTE — PLAN OF CARE
Problem: Altered Mood, Psychotic Behavior:  Goal: Absence of self-harm  Absence of self-harm   Outcome: Ongoing  Pt did not participate in leisure group at 1100 despite staff encouragement to attend.

## 2019-01-29 NOTE — PROGRESS NOTES
OB/GYN Resident Interval Note     Called [facility] to see if pt would be agreeable to being seen by OB/GYN, informed by Estrellita Forbes RN that she is sleeping and she did not want to wake her. Informed RN to call OB if pt wakes up and agrees to evaluation.      Ezekiel Mcgee DO  Ob/Gyn Resident  Pager: [phone]  1/29/2019 5:13 AM

## 2019-01-29 NOTE — PLAN OF CARE
Problem: Altered Mood, Psychotic Behavior:  Goal: Absence of self-harm  Absence of self-harm   Outcome: Ongoing  Pt did not participate in [facility] informational group at 0900 despite staff encouragement to attend

## 2019-01-30 PROCEDURE — 1240000000 HC EMOTIONAL WELLNESS R&B

## 2019-01-30 PROCEDURE — 6370000000 HC RX 637 (ALT 250 FOR IP): Performed by: STUDENT IN AN ORGANIZED HEALTH CARE EDUCATION/TRAINING PROGRAM

## 2019-01-30 PROCEDURE — 6370000000 HC RX 637 (ALT 250 FOR IP): Performed by: PSYCHIATRY & NEUROLOGY

## 2019-01-30 RX ADMIN — FERROUS SULFATE TAB 325 MG (65 MG ELEMENTAL FE) 325 MG: 325 (65 FE) TAB at 18:45

## 2019-01-30 RX ADMIN — Medication 1 TABLET: at 18:45

## 2019-01-30 RX ADMIN — LURASIDONE HYDROCHLORIDE 160 MG: 80 TABLET, FILM COATED ORAL at 18:45

## 2019-01-30 NOTE — PLAN OF CARE
Problem: Altered Mood, Psychotic Behavior:  Goal: Absence of self-harm  Absence of self-harm   Outcome: Ongoing  Pt did not attend Therapeutic Recreation at 1000 d/t resting in room despite staff invitation to attend.

## 2019-01-30 NOTE — PLAN OF CARE
Problem: Altered Mood, Psychotic Behavior:  Goal: Absence of self-harm  Absence of self-harm   Outcome: Ongoing  Pt. Denies any suicidal thoughts at this time and agrees to seek out staff if thoughts arise. Safety maintained per 15 min rounding .

## 2019-01-30 NOTE — PROGRESS NOTES
OB/GYN Resident Interval Note     Called [facility] to see if pt agreeable to being seen. Spoke with RN, [place] who asked pt if OB could see her. She yelled at her and said no. Limited anatomy US completed yesterday. No abnormalities seen. Posterior/L placenta, EFW 1546g, JADEN 12.3cm, 3VC, normal insertion. Will continue to try to see pt daily.      Kayode Henao DO  Ob/Gyn Resident  Pager: [phone]  1/30/2019 6:12 AM

## 2019-01-30 NOTE — PLAN OF CARE
Problem: Altered Mood, Psychotic Behavior:  Goal: Able to demonstrate trust by eating, participating in treatment and following staff's direction  Able to demonstrate trust by eating, participating in treatment and following staff's direction   Outcome: Ongoing  Patient at desk frequently for snacks preferably fruit. Patient was tolerable of physical assessment this evening. Patient has remained in room, isolative to room. Goal: Absence of self-harm  Absence of self-harm   Outcome: Ongoing  Pt denies thoughts of self harm and is agreeable to seeking out should thoughts of self harm arise. Safe environment maintained. Q15 minute checks for safety cont per unit policy. Will cont to monitor for safety and provides support and reassurance as needed. Problem: Falls - Risk of:  Goal: Will remain free from falls  Will remain free from falls   Outcome: Ongoing  Pt remains free of falls and verbalizes understanding of individual fall risks. Pt wearing non skid footwear and encouraged to seek out staff for any assistance needed.

## 2019-01-31 PROCEDURE — 1240000000 HC EMOTIONAL WELLNESS R&B

## 2019-01-31 PROCEDURE — 6370000000 HC RX 637 (ALT 250 FOR IP): Performed by: PSYCHIATRY & NEUROLOGY

## 2019-01-31 PROCEDURE — 6370000000 HC RX 637 (ALT 250 FOR IP): Performed by: STUDENT IN AN ORGANIZED HEALTH CARE EDUCATION/TRAINING PROGRAM

## 2019-01-31 RX ADMIN — NICOTINE POLACRILEX 2 MG: 2 GUM, CHEWING BUCCAL at 16:05

## 2019-01-31 RX ADMIN — LURASIDONE HYDROCHLORIDE 160 MG: 80 TABLET, FILM COATED ORAL at 16:05

## 2019-01-31 RX ADMIN — Medication 1 TABLET: at 16:05

## 2019-01-31 RX ADMIN — FERROUS SULFATE TAB 325 MG (65 MG ELEMENTAL FE) 325 MG: 325 (65 FE) TAB at 16:05

## 2019-01-31 NOTE — PROGRESS NOTES
OB/GYN Resident Note     Called [facility], spoke with Jordan Head who asked the pt if she would be seen by OB this morning. Pt is not agreeable to being seen at this time. Please call OB/GYN if pt changes her mind.      Eladio Bernal, DO  Ob/Gyn Resident  Pager: [phone]  1/31/2019 5:04 AM

## 2019-01-31 NOTE — PLAN OF CARE
Problem: Altered Mood, Deterioration in Function:  Goal: Ability to perform activities of daily living will improve  Ability to perform activities of daily living will improve   Outcome: Ongoing  Pt did not attend Community Meeting at 0900 d/t resting in room despite staff invitation to attend.

## 2019-01-31 NOTE — PLAN OF CARE
Problem: Altered Mood, Psychotic Behavior:  Goal: Able to demonstrate trust by eating, participating in treatment and following staff's direction  Able to demonstrate trust by eating, participating in treatment and following staff's direction   Patient eats meals without difficulty. Patient frequently requesting extra food. Patient does not participate in group therapy. Patient does not follow staff direction. Patient is suspicious of staff and is non cooperative with assessments. Problem: Falls - Risk of:  Goal: Will remain free from falls  Will remain free from falls   Patient has been free of falls os far thus shift.

## 2019-01-31 NOTE — PLAN OF CARE
Problem: Altered Mood, Psychotic Behavior:  Goal: Able to demonstrate trust by eating, participating in treatment and following staff's direction  Able to demonstrate trust by eating, participating in treatment and following staff's direction   Outcome: Ongoing  Out in the day area at beginning of shift, then went to room and has been sleeping as of this documentation. Pt comes out of room for snacks and other needs. Pt demonstrate trust by eating snacks provided by staff, but continues to be uncooperative with care. Continues to refuse assessment. Pt continues to be labile, hostile towards staff.      Goal: Absence of self-harm  Absence of self-harm   Outcome: Ongoing  Pt remains free of harm.  Safe environment maintained. Q15 minute checks for safety continued per unit policy. Will continue to monitor for safety and provide support and reassurance as needed.      Problem: Altered Mood, Deterioration in Function:  Goal: Ability to perform activities of daily living will improve  Ability to perform activities of daily living will improve   Outcome: Ongoing  Pt able to perform activities of daily living, pt took a shower earlier in the afternoon. Goal: Able to verbalize reality based thinking  Able to verbalize reality based thinking   Outcome: Ongoing  Pt not able to verbalize reality based thinking. Pt would come to the desk and ask for the same request repeatedly, when staff already told her multiple times that we do not have the item she was requesting. During 1:1 talk time, pt only answers few  \"Yes or No\" questions. Pt would not answer most questions and only say \"I'm okay. \"  And when asked for permission to do physical assessment, pt yelled \"No! Get out of my room! \"    Problem: Falls - Risk of:  Goal: Will remain free from falls  Will remain free from falls   Outcome: Ongoing  No falls reported or observed as of this documentation. Fall precaution continued and maintained.   Pt wearing non skid footwear

## 2019-02-01 PROCEDURE — 6370000000 HC RX 637 (ALT 250 FOR IP): Performed by: STUDENT IN AN ORGANIZED HEALTH CARE EDUCATION/TRAINING PROGRAM

## 2019-02-01 PROCEDURE — 6370000000 HC RX 637 (ALT 250 FOR IP): Performed by: PSYCHIATRY & NEUROLOGY

## 2019-02-01 PROCEDURE — 1240000000 HC EMOTIONAL WELLNESS R&B

## 2019-02-01 RX ADMIN — NICOTINE POLACRILEX 2 MG: 2 GUM, CHEWING BUCCAL at 12:15

## 2019-02-01 RX ADMIN — Medication 1 TABLET: at 16:50

## 2019-02-01 RX ADMIN — FERROUS SULFATE TAB 325 MG (65 MG ELEMENTAL FE) 325 MG: 325 (65 FE) TAB at 16:50

## 2019-02-01 RX ADMIN — LURASIDONE HYDROCHLORIDE 160 MG: 80 TABLET, FILM COATED ORAL at 16:50

## 2019-02-01 NOTE — PLAN OF CARE
Problem: Altered Mood, Deterioration in Function:  Goal: Ability to perform activities of daily living will improve  Ability to perform activities of daily living will improve   Outcome: Ongoing  Pt did not attend RT group at 1330 d/t resting in room despite staff invitation to attend.

## 2019-02-01 NOTE — PLAN OF CARE
Problem: Altered Mood, Psychotic Behavior:  Goal: Able to demonstrate trust by eating, participating in treatment and following staff's direction  Able to demonstrate trust by eating, participating in treatment and following staff's direction   Outcome: Ongoing  PSYCHOEDUCATION GROUP NOTE    Date: 2/1/19  Start Time: 1100  End Time: 1130    Number Participants in Group:  9/14    Goal:  Patient will demonstrate increased interpersonal interaction   Topic: Leisure Awareness/Socialization     Discipline Responsible:   OT  AT  [facility]. x RT [facility] Other       Participation Level:     None  Minimal   x Active Listener  Interactive    Monopolizing         Participation Quality:   Appropriate  Inappropriate          Attentive        Intrusive          Sharing x       Resistant          Supportive        Lethargic       Affective:    Congruent  Incongruent x Blunted  Flat    Constricted  Anxious  Elated  Angry    Labile  Depressed  Other         Cognitive:  x Alert  Oriented PPTP     Concentration  G  F  P   Attention Span  G  F  P   Short-Term Memory  G  F  P   Long-Term Memory  G  F  P   ProblemSolving/  Decision Making  G  F  P   Ability to Process  Information  G  F  P      Contributing Factors             Delusional             Hallucinating             Flight of Ideas             Other:       Modes of Intervention:   Education x Support x Exploration    Clarifying x Problem Solving  Confrontation   x Socialization  Limit Setting x Reality Testing   x Activity  Movement  Media    Other:            Response to Learning:   Able to verbalize current knowledge/experience    Able to verbalize/acknowledge new learning    Able to retain information    Capable of insight    Able to change behavior   x Progressing to goal    Other:        Comments: Pt was in group but did not wish to participate.

## 2019-02-01 NOTE — CARE COORDINATION
Writer spoke with Laly Garciaisabela, pt's sister, [phone], CAMILLE on file, who stated that she has been worried about pt. Writer explained to [place] that pt has been seen by OB and was told that baby is doing well. Misaelsharona was pleased to hear this and explained that she would like to find out the sex of the baby and requested pt have an ultrasound, as Misaelsharona would like to adopt the baby when they're born. Writer asked [place] if she had considered becoming guardian over pt, and she asked how much it cost. Writer explained that the cost is about $800 dollars and educated Frieda on the process of having a psychiatrist complete the Expert Evaluation and having to filed the paperwork at [employer]. Frieda stated she was definitely interested in guardianship and asked if writer could speak to psychiatrist about completing EE. Writer stated she would. Writer asked if pt would have the money to pay for guardianship and she stated, \"I will have to\". Writer expressed pt's discharge plan is to stay until baby is born, as the doctor feels pt is a risk to herself and her unborn baby. Frieda agreed and stated she had been calling [employer] for months before pt was admitted as [place] couldn't talk pt into getting prenatal care and pt was denying she was pregnant. Frieda stated pt has a 15year old son, who lives with her cousin in [place], and when pt was pregnant with him she was at [facility] the duration of her pregnancy and was transported to [facility] when she went into labor.   Writer stated she would get the EE paperwork to pt's psychiatrist and ask him to fill it out and Frieda states she will be in on Monday, 2/4/19, to  the paperwork and see pt between 12:00 pm and 1:00pm.

## 2019-02-01 NOTE — PLAN OF CARE
Problem: Altered Mood, Psychotic Behavior:  Intervention: Group therapy to identify positive coping skills  [facility]  Day 3 Interdisciplinary Treatment Plan NOTE    Review Date & Time: 2/1/19 0943    Admission Type:   Admission Type: Involuntary    Reason for admission:  Reason for Admission: Patient admitted for poor hygiene, auditory hallucinations, depression  Estimated Length of Stay: 5-7 days  Estimated Discharge Date Update: to be determined by physician    PATIENT STRENGTHS:  Patient Strengths Strengths: Connection to output provider  Patient Strengths and Limitations:Limitations: Apathetic / unmotivated, Tendency to isolate self, External locus of control  Addictive Behavior:Addictive Behavior  In the past 3 months, have you felt or has someone told you that you have a problem with:  : Eating (too much/too little)  Do you have a history of Chemical Use?: Comment (YONY)  Do you have a history of Alcohol Use?: Comment (YONY)  Do you have a history of Street Drug Abuse?: Comment (YONY)  Histroy of Prescripton Drug Abuse?: Comment (YONY)  Medical Problems:  Past Medical History:   Diagnosis Date    Anxiety     Asthma     Bipolar 1 disorder ([facility]     Multiple personality ([facility]     Seizures ([facility]        Risk:  Fall RiskTotal: 86  Stephen Scale Stephen Scale Score: 22  BVC Total: 3  Change in scores no Changes to plan of Care no    Status EXAM:   Status and Exam  Normal: No  Facial Expression: Flat, Worried  Affect: Unstable  Level of Consciousness: Alert  Mood:Normal: No  Mood: Anxious, Suspicious, Labile, Irritable  Motor Activity:Normal: No  Motor Activity: Decreased  Interview Behavior: Evasive, Irritable, Impulsive  Preception: [place] to Person  Attention:Normal: No  Attention: Distractible, Unable to Concentrate  Thought Processes: Blocking  Thought Content:Normal: No  Thought Content: Delusions, Paranoia  Hallucinations:  Auditory (Comment) (pt denies but observed responding)  Delusions: for Long-Term Goals: 6 months  Members Present in Team Meeting: See [address], SALLY

## 2019-02-01 NOTE — PLAN OF CARE
Problem: Altered Mood, Psychotic Behavior:  Goal: Absence of self-harm  Absence of self-harm   Outcome: Ongoing  Patient refuses 1:1 talk time. Patient is irritable. Non compliant. 15 minute checks maintained for safety.

## 2019-02-02 PROCEDURE — 1240000000 HC EMOTIONAL WELLNESS R&B

## 2019-02-02 ASSESSMENT — PAIN SCALES - GENERAL: PAINLEVEL_OUTOF10: 0

## 2019-02-02 NOTE — PLAN OF CARE
Problem: Altered Mood, Deterioration in Function:  Goal: Ability to perform activities of daily living will improve  Ability to perform activities of daily living will improve   Patient requests to take showers but remains malodorous. Patient appears unkept. Goal: Able to verbalize reality based thinking  Able to verbalize reality based thinking   Patient remains confused. Patient unsure of time and does not have insight into situation which brought and keeps patient in the hospital. Patient at times refused to believe she is pregnant and at other times is accepting of prenatal care. Problem: Falls - Risk of:  Goal: Will remain free from falls  Will remain free from falls   Patient has been free of falls this shift. Goal: Absence of physical injury  Absence of physical injury   Patient has not reported injuries.  Patient refused physical assessment

## 2019-02-02 NOTE — PLAN OF CARE
Problem: Altered Mood, Deterioration in Function:  Goal: Able to verbalize reality based thinking  Able to verbalize reality based thinking   Outcome: Ongoing  Pt did not attend community meeting and goal setting skills group at [place] [id number] despite staff invitation to attend.

## 2019-02-02 NOTE — PLAN OF CARE
Problem: Altered Mood, Deterioration in Function:  Goal: Ability to perform activities of daily living will improve  Ability to perform activities of daily living will improve   Outcome: Ongoing  Patient refused 1:1 time. Irritable at times. Isolative to remove and out for needs only. Refused assessments despite multiple attempts and education. Problem: Falls - Risk of:  Goal: Will remain free from falls  Will remain free from falls   Outcome: Ongoing  Pt remains free of falls and verbalizes understanding of individual fall risks. Pt wearing non skid footwear and encouraged to seek out staff for any assistance needed.

## 2019-02-02 NOTE — PLAN OF CARE
Problem: Altered Mood, Psychotic Behavior:  Goal: Able to demonstrate trust by eating, participating in treatment and following staff's direction  Able to demonstrate trust by eating, participating in treatment and following staff's direction   Outcome: Ongoing  Psychoeducation Group Note    Date: 2/2/2019  Start Time: 10:00AM  End Time: 10:45AM    Number Participants in Group:  7/12    Goal:  Patient will demonstrate increased interpersonal interaction   Topic: [place] [facility]    Discipline Responsible:   OT  AT x SW  Nsg.  RT  Other       Participation Level:     None x Minimal   x Active Listener  Interactive    Monopolizing         Participation Quality:   Appropriate  Inappropriate   x       Attentive        Intrusive          Sharing        Resistant          Supportive        Lethargic       Affective:   x Congruent  Incongruent  Blunted  Flat    Constricted  Anxious  Elated  Angry    Labile  Depressed  Other         Cognitive:  x Alert x Oriented PPTP     Concentration  G x F  P   Attention Span  G  F x P   Short-Term Memory  G x F  P   Long-Term Memory  G  F x P   Problem Solving/  Decision Making  G  F x P   Ability to Process  Information  G  F x P      Contributing Factors             Delusional             Hallucinating             Flight of Ideas             Other:       Modes of Intervention:  x Education x Support x Exploration   x Clarifying x Problem Solving x Confrontation   x Socialization x Limit Setting x Reality Testing    Activity  Movement  Media    Other:            Response to Learning:  x Able to verbalize current knowledge/experience    Able to verbalize/acknowledge new learning    Able to retain information    Capable of insight    Able to change behavior   x Progressing to goal    Other:        Comments: Pt was an active listener in group discussion.

## 2019-02-02 NOTE — PLAN OF CARE
Problem: Altered Mood, Psychotic Behavior:  Goal: Able to demonstrate trust by eating, participating in treatment and following staff's direction  Able to demonstrate trust by eating, participating in treatment and following staff's direction   Outcome: Ongoing  Psychotherapy Group Note    Date: 2/2/2019  Start Time: 1:30PM  End Time: 2:15PM    Number Participants in Group:  7/12    Goal:  Patient will demonstrate increased interpersonal interaction   Topic: Psychoeducation Group-Social Skills    Discipline Responsible:   OT  AT x SW  Nsg.  RT  Other       Participation Level:     None  Minimal   x Active Listener x Interactive    Monopolizing         Participation Quality:   Appropriate  Inappropriate   x       Attentive        Intrusive   x       Sharing        Resistant   x       Supportive        Lethargic       Affective:   x Congruent  Incongruent  Blunted  Flat    Constricted  Anxious  Elated  Angry    Labile  Depressed  Other         Cognitive:  x Alert x Oriented PPTP     Concentration  G x F  P   Attention Span  G x F  P   Short-Term Memory  G x F  P   Long-Term Memory  G x F  P   Problem Solving/  Decision Making  G x F  P   Ability to Process  Information  G x F  P      Contributing Factors             Delusional             Hallucinating             Flight of Ideas             Other:       Modes of Intervention:  x Education x Support x Exploration   x Clarifying x Problem Solving x Confrontation   x Socialization x Limit Setting x Reality Testing    Activity  Movement  Media    Other:            Response to Learning:  x Able to verbalize current knowledge/experience   x Able to verbalize/acknowledge new learning   x Able to retain information    Capable of insight    Able to change behavior   x Progressing to goal    Other:

## 2019-02-02 NOTE — PROGRESS NOTES
OB/GYN Resident Progress Note    Brandy Fields is a 41 y.o. female  at [address] Day: 39    Subjective:   Notified by RN that patient is agreeable to be seen. Patient has been seen and examined. Patient laying comfortably in bed eating candy and snacks. Patient is pleasant and greets me appropriately. Patient complaining of some abdominal cramping earlier in the day and said she had some diarrhea earlier. When asked if she was feeling contractions or tightening in her abdomen, patient responds \"I've been having contractions in my head\" and points to the back of her head, but reports her head nor abdomen does not hurt at this time. Denies fevers, chills, HA, VC, CP, SOB, RUQ pain, or N/V. The patient reports fetal movement is present, denies contractions, denies loss of fluid, denies vaginal bleeding. Patient very pleasant throughout encounter and thanks me for coming to see her and listen to fetal heart tones. Patient still having tangential thoughts but very cooperative at this time.      Objective:   Vitals:  Vitals:    19 1930 19 1945 19 0933 19   Resp: 14 14 14 14   TempSrc:       SpO2:       Weight:       Height:           Fetal Heart Tones: 140s (via doppler)    Physical Exam:  General appearance:  no apparent distress, alert and cooperative  Neurologic:  alert, oriented, normal speech, no focal findings or movement disorder noted  Lungs:  No increased work of breathing, good air exchange, clear to auscultation bilaterally, no crackles or wheezing  Heart:  regular rate and rhythm and no murmur    Abdomen:  soft, gravid, non-tender, no right upper quadrant tenderness, no CVA tenderness, uterus non-tender, no signs of abruption and no signs of chorioamnionitis, no abdominal scars  Extremities:  no calf tenderness, non-edematous      DATA:  Narrative   EXAMINATION:   TRANSABDOMINAL SECOND/THIRD TRIMESTER OBSTETRIC PELVIC ULTRASOUND WITH COLOR   DOPPLER FLOW     2019 11:31 am       COMPARISON:   None       HISTORY:   ORDERING SYSTEM PROVIDED HISTORY: 3rd trimester anatomy ultrasound; no   prenatal care; H/O schizoaffective dz on Latuda       FINDINGS:       GENERAL OBSERVATIONS:       PREGNANCY:   Single       CARDIAC ACTIVITY:  Yes       FETAL HEART RATE: 159 beats per minute       FETAL BODY & LIMB MOVEMENTS:  Yes       FETAL POSITION:  Cephalic       PLACENTA LOCATION:  Posterior and left       JADEN:   12.3 cm.           FETAL ANATOMY:       CEREBRAL VENTRICLES:  Limited evaluation; the patient would not lie on her   back.  Grossly normal.       CHOROID PLEXUS:  Limited evaluation but grossly normal.       CEREBELLI:  Limited evaluation but grossly normal       POSTERIOR FOSSA:  Limited evaluation but grossly normal.       UPPER LIP/FACE: Grossly normal but positioning suboptimal.       4-CHAMBER HEART:  Normal       OUTFLOW TRACTS: Normal       STOMACH:  Normal       KIDNEYS:  Normal       CORD INSERTION:  Normal       3-VESSEL CORD:  Normal       URINARY BLADDER:  Normal       SPINE:  Normal       4 LIMBS: Normal           ESTIMATED FETAL AGE:       BY LMP:  Unknown       CURRENT US:  30 weeks 6 days       ESTIMATED FETAL WEIGHT:   1546 grams,  not calculatedpercentile           MEASUREMENTS:       BPD:  8.03 cm       HEAD CIRCUMFERENCE:   26.18 cm       ABD. CIRCUMFERENCE:  25.38 cm       FEMUR LENGTH:  5.9 cm       CERVICAL LENGTH:  Not measured.               Impression   A single live intrauterine pregnancy with estimated gestational age of 32   weeks 6 days by ultrasound.  The estimated fetal weight is 1546 grams.  EDC   by ultrasound is 3 April 2019.  Limited visualization of the fetal cranial   structures due to positioning.        Assessment/Plan:  Farzaneh Wolfe is a 41 y.o. female  at [place] [address]   - GBS unknown / Rh positive / Rubella immune   - No indication for GBS prophylaxis unless delivery imminent    - Prenatal labs obtained 19   - Patient has not yet completed 1 hr GTT, HgbA1c 4.1 (19)   - Limited anatomy ultrasound 19, no abnormalities seen    - Request for formal MFM consult at [facility] denied as patient too unstable for transport and increased flight risk    - No s/s of  labor at this time   - FHTs 140s by doppler   - Recommend continue PNV daily    - Recommend Tdap and influenza vaccination    - Will continue to attempt to see patient daily     Schizoaffective disorder    - Management per primary    - Currently on Latuda 160 mg PO qd     Asthma   - Stable, on Albuterol inhaler PRN     Macrocytic anemia   - Hgb 10.5 (19)   - Folate and Vitamin B12 levels unavailable    - Continue oral iron supplementation    - Clinically asymptomatic     AMA   - Recommend NIPT; patient previously declined genetic testing     Bacterial Vaginosis   - S/P Flagyl -19    Candida Vaginitis   - S/P diflucan 19    Tobacco abuse   - Currently using nicorette gum       Patient Active Problem List    Diagnosis Date Noted    No prenatal care in current pregnancy 2018     Priority: High     Overview Note:     [facility] on 18  Patient has repeatedly declined exams and to be seen by ED      Polysubstance abuse ([facility] [address] 2018     Priority: High    Schizoaffective disorder, bipolar type ([facility] [address] 2014     Priority: High    Bacterial vaginosis 2019    Yeast infection 2019    Poor historian 2019    Fetal drug exposure (latuda) 2019    Macrocytic anemia 2018    Advanced maternal age in multigravida 2018    Patient does not believe she is pregnant, refusing care 2018    Noncompliance 2018       Will discuss with attending.      Teresa Kumar DO  Ob/Gyn Resident  [facility], [address]  2019, 4:08 AM

## 2019-02-03 PROCEDURE — 1240000000 HC EMOTIONAL WELLNESS R&B

## 2019-02-03 PROCEDURE — 6370000000 HC RX 637 (ALT 250 FOR IP): Performed by: PSYCHIATRY & NEUROLOGY

## 2019-02-03 RX ADMIN — NICOTINE POLACRILEX 2 MG: 2 GUM, CHEWING BUCCAL at 15:38

## 2019-02-03 ASSESSMENT — PAIN SCALES - GENERAL: PAINLEVEL_OUTOF10: 0

## 2019-02-03 NOTE — PLAN OF CARE
Problem: Altered Mood, Psychotic Behavior:  Goal: Able to demonstrate trust by eating, participating in treatment and following staff's direction  Able to demonstrate trust by eating, participating in treatment and following staff's direction   Outcome: Ongoing  Pt did not participate in Social Skills Group at 1330 due to resting in room and choosing to not attend.

## 2019-02-03 NOTE — PLAN OF CARE
Problem: Altered Mood, Psychotic Behavior:  Goal: Able to demonstrate trust by eating, participating in treatment and following staff's direction  Able to demonstrate trust by eating, participating in treatment and following staff's direction   Patient eats meals. Patient does not participate in treatment. Patient does not follow staff direction. Goal: Absence of self-harm  Absence of self-harm   Patient has not reported any self harm and refuses assessment     Problem: Altered Mood, Deterioration in Function:  Goal: Ability to perform activities of daily living will improve  Ability to perform activities of daily living will improve   Patient showers but hygiene is poor. Patient urinates on clothing.  Patient is malodorous     Problem: Falls - Risk of:  Goal: Will remain free from falls  Will remain free from falls   Patient has been free of falls os far this shift to knowledge of writer

## 2019-02-03 NOTE — PROGRESS NOTES
Called [facility] at approximately 0600 (spoke with [facility]) and now (spoke with Terri Collins) to see if patient agreeable to being seen by OB/GYN. Notified that patient asleep both times and prefers not to wake patient as she usually declines being seen if awoken. Instructed RN to notify OB if she awakes and would like to see OB.      Chuck Siu, PGY3  OB-GYN Resident

## 2019-02-03 NOTE — PLAN OF CARE
Problem: Altered Mood, Psychotic Behavior:  Goal: Absence of self-harm  Absence of self-harm   Outcome: Ongoing  Pt did not participate in Goal Setting / [employer] group at 0890 despite staff encouragement.

## 2019-02-03 NOTE — PLAN OF CARE
Problem: Altered Mood, Deterioration in Function:  Goal: Ability to perform activities of daily living will improve  Ability to perform activities of daily living will improve   Outcome: Ongoing  Pt declined to attend psychotherapy at 1000 am despite encouragement. Pt offered 1:1 and refused.

## 2019-02-03 NOTE — PLAN OF CARE
Problem: Altered Mood, Psychotic Behavior:  Goal: Able to demonstrate trust by eating, participating in treatment and following staff's direction  Able to demonstrate trust by eating, participating in treatment and following staff's direction   Outcome: Ongoing  Patient has been calm, controlled, med complaint. Accepting of 1:1 talk time with staff. 1:1 with pt x ten minutes. Pt encouraged to attend unit programming and interact with peers and staff. Pt also encouraged to tend to hygiene and ADLs. Pt encouraged to discuss feelings with staff and feedback and reassurance provided. Goal: Absence of self-harm  Absence of self-harm   Outcome: Ongoing  Patient denies suicidal and homicidal thoughts. Patient denies auditory and visual hallucinations. Patient is taking meds and eating well. No self harm noted this shift. Patient agrees to seek staff out if negative thoughts arise. Will continue to monitor Q15 minute and intermittently. Problem: Falls - Risk of:  Goal: Will remain free from falls  Will remain free from falls   Outcome: Ongoing  Pt remains free of falls and verbalizes understanding of individual fall risks. Pt wearing non skid footwear and encouraged to seek out staff for any assistance needed.

## 2019-02-04 PROCEDURE — 1240000000 HC EMOTIONAL WELLNESS R&B

## 2019-02-04 PROCEDURE — 6370000000 HC RX 637 (ALT 250 FOR IP): Performed by: PSYCHIATRY & NEUROLOGY

## 2019-02-04 RX ADMIN — NICOTINE POLACRILEX 2 MG: 2 GUM, CHEWING BUCCAL at 10:21

## 2019-02-04 ASSESSMENT — PAIN SCALES - GENERAL: PAINLEVEL_OUTOF10: 0
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
## 2019-02-04 NOTE — PROGRESS NOTES
She was actually more pleasant with me today for the first time in quite a while. She said she was doing \"ok\". She then said she was having \"head contractions\", when I suggested she might be referring to uterine contractions, she again began to get upset, denying that she is pregnant. She continues to refuse to care for ADL's, is extremely disheveled, at times urinates on herself. Charting and medications reviewed. Therapeutic support provided. Psychosis is limiting insight and judgement and preventing her from being able to appropriately care for herself and baby. Limited in terms of medication choices due to pregnancy- Latuda.

## 2019-02-04 NOTE — PLAN OF CARE
Problem: Altered Mood, Psychotic Behavior:  Goal: Able to demonstrate trust by eating, participating in treatment and following staff's direction  Able to demonstrate trust by eating, participating in treatment and following staff's direction   Outcome: Ongoing  Patient not accepting of 1:1 time. Refused all assessments this evening. Isolative to room and out for needs only. Patient appears to be responding to internal stimuli and self talk is present.

## 2019-02-04 NOTE — PROGRESS NOTES
OB Resident Progress Note     Called [facility] at 0615 and asked if Milo March was amendable to OB exam, per RN, patient is asleep and will not be woken to speak to OB. Denies concerns. 1h GTT re-odered as it has been cancelled multiple times. Will continue to offer gDM testing to patient despite her continued noncompliance.       Please inform OB if patient is agreeable to exam.      Sherrie Jacobs  OB/GYN Resident, PGY2  Pager: [phone] [facility]  02/04/19  6:19 AM

## 2019-02-04 NOTE — PLAN OF CARE
Problem: Altered Mood, Psychotic Behavior:  Goal: Able to demonstrate trust by eating, participating in treatment and following staff's direction  Able to demonstrate trust by eating, participating in treatment and following staff's direction   Outcome: Ongoing  Pt did not attend Community Meeting at 7504 d/t resting in room despite staff invitation to attend.

## 2019-02-04 NOTE — PLAN OF CARE
Problem: Altered Mood, Deterioration in Function:  Goal: Ability to perform activities of daily living will improve  Ability to perform activities of daily living will improve   Outcome: Ongoing  Pt did not attend RT group at 1100 d/t resting in room despite staff invitation to attend.

## 2019-02-05 PROCEDURE — 6370000000 HC RX 637 (ALT 250 FOR IP): Performed by: PSYCHIATRY & NEUROLOGY

## 2019-02-05 PROCEDURE — 6370000000 HC RX 637 (ALT 250 FOR IP): Performed by: STUDENT IN AN ORGANIZED HEALTH CARE EDUCATION/TRAINING PROGRAM

## 2019-02-05 PROCEDURE — 6360000002 HC RX W HCPCS: Performed by: PSYCHIATRY & NEUROLOGY

## 2019-02-05 PROCEDURE — 1240000000 HC EMOTIONAL WELLNESS R&B

## 2019-02-05 RX ORDER — HALOPERIDOL 5 MG/ML
10 INJECTION INTRAMUSCULAR ONCE
Status: COMPLETED | OUTPATIENT
Start: 2019-02-05 | End: 2019-02-05

## 2019-02-05 RX ORDER — DIPHENHYDRAMINE HYDROCHLORIDE 50 MG/ML
50 INJECTION INTRAMUSCULAR; INTRAVENOUS ONCE
Status: COMPLETED | OUTPATIENT
Start: 2019-02-05 | End: 2019-02-05

## 2019-02-05 RX ADMIN — HALOPERIDOL LACTATE 10 MG: 5 INJECTION, SOLUTION INTRAMUSCULAR at 14:58

## 2019-02-05 RX ADMIN — NICOTINE POLACRILEX 2 MG: 2 GUM, CHEWING BUCCAL at 15:46

## 2019-02-05 RX ADMIN — FERROUS SULFATE TAB 325 MG (65 MG ELEMENTAL FE) 325 MG: 325 (65 FE) TAB at 16:50

## 2019-02-05 RX ADMIN — NICOTINE POLACRILEX 2 MG: 2 GUM, CHEWING BUCCAL at 17:01

## 2019-02-05 RX ADMIN — LURASIDONE HYDROCHLORIDE 160 MG: 80 TABLET, FILM COATED ORAL at 17:01

## 2019-02-05 RX ADMIN — DIPHENHYDRAMINE HYDROCHLORIDE 50 MG: 50 INJECTION, SOLUTION INTRAMUSCULAR; INTRAVENOUS at 14:58

## 2019-02-05 RX ADMIN — NICOTINE POLACRILEX 2 MG: 2 GUM, CHEWING BUCCAL at 11:46

## 2019-02-05 RX ADMIN — Medication 1 TABLET: at 17:03

## 2019-02-05 NOTE — PROGRESS NOTES
OB Resident Progress Note     Spoke to Vince who states patient is sleeping at this time and not amendable to exam.  Patient screamed at RN who entered room to ask patient if she would see OB this morning. RN states they will call OB if patient is agreeable to exam at a later time.        Patricia Montez  OB/GYN Resident, PGY2  Pager: [phone]  [facility]  02/05/19  5:13 AM
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
## 2019-02-05 NOTE — PROGRESS NOTES
Patient again refused to speak with me today. She was laying in bed, with very poor hygiene, noticeable smell of urine in her room, she again has been refusing to see OB doctors. She spends much of the day lying in bed, at other times is in day area aloof, engaging in self-talk. She does not independently care for ADLs. Charting and medications reviewed. Therapeutic support attempted. She has refused Latuda the past two days, gives no reason for refusal.    I completed expert eval paperwork for sister to pursue guardianship.

## 2019-02-05 NOTE — PLAN OF CARE
Problem: Altered Mood, Psychotic Behavior:  Goal: Absence of self-harm  Absence of self-harm   Outcome: Ongoing  Remains free from self harm at this time, [id number] safety checks maintained, patient in pleasant mood this evening, allowed writer to listen to heart and lungs, as well as answered some assessment questions during 1:1 time, denies hallucinations, denies suicidal/homicidal ideations, ate snack during evening    Problem: Falls - Risk of:  Goal: Will remain free from falls  Will remain free from falls   Outcome: Ongoing  Pt remains free of falls and verbalizes understanding of individual fall risks. Pt wearing non skid footwear and encouraged to seek out staff for any assistance needed.

## 2019-02-06 PROCEDURE — 6370000000 HC RX 637 (ALT 250 FOR IP): Performed by: STUDENT IN AN ORGANIZED HEALTH CARE EDUCATION/TRAINING PROGRAM

## 2019-02-06 PROCEDURE — 6370000000 HC RX 637 (ALT 250 FOR IP): Performed by: PSYCHIATRY & NEUROLOGY

## 2019-02-06 PROCEDURE — 1240000000 HC EMOTIONAL WELLNESS R&B

## 2019-02-06 RX ADMIN — NICOTINE POLACRILEX 2 MG: 2 GUM, CHEWING BUCCAL at 12:25

## 2019-02-06 RX ADMIN — NICOTINE POLACRILEX 2 MG: 2 GUM, CHEWING BUCCAL at 19:52

## 2019-02-06 RX ADMIN — Medication 1 TABLET: at 17:12

## 2019-02-06 RX ADMIN — HYDROXYZINE HYDROCHLORIDE 25 MG: 25 TABLET, FILM COATED ORAL at 23:23

## 2019-02-06 RX ADMIN — DIPHENHYDRAMINE HCL 25 MG: 25 TABLET ORAL at 23:23

## 2019-02-06 RX ADMIN — LURASIDONE HYDROCHLORIDE 160 MG: 80 TABLET, FILM COATED ORAL at 17:12

## 2019-02-06 RX ADMIN — NICOTINE POLACRILEX 2 MG: 2 GUM, CHEWING BUCCAL at 22:14

## 2019-02-06 RX ADMIN — ALBUTEROL SULFATE 2 PUFF: 90 AEROSOL, METERED RESPIRATORY (INHALATION) at 08:17

## 2019-02-06 RX ADMIN — FERROUS SULFATE TAB 325 MG (65 MG ELEMENTAL FE) 325 MG: 325 (65 FE) TAB at 08:18

## 2019-02-06 RX ADMIN — NICOTINE POLACRILEX 2 MG: 2 GUM, CHEWING BUCCAL at 07:14

## 2019-02-06 RX ADMIN — FERROUS SULFATE TAB 325 MG (65 MG ELEMENTAL FE) 325 MG: 325 (65 FE) TAB at 17:19

## 2019-02-06 RX ADMIN — ALBUTEROL SULFATE 2 PUFF: 90 AEROSOL, METERED RESPIRATORY (INHALATION) at 17:19

## 2019-02-06 ASSESSMENT — PAIN SCALES - GENERAL: PAINLEVEL_OUTOF10: 0
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
## 2019-02-06 NOTE — PROGRESS NOTES
denied by ethics/risk management as patient too unstable for transport and increased flight risk    - Still recommend formal MFM consult and Ultrasound when patient clinically stable; please notify when ethics, risk management and psych in agreeance when ok to transport patient so appointment can be set              - No s/s of  labor at this time              - FHTs 130s by doppler              - Recommend continue PNV daily               - Recommend Tdap and influenza vaccination; reordered today and patient states she will take it               - Will continue to attempt to see patient daily      Schizoaffective disorder               - Management per primary               - Currently on Latuda 160 mg PO qd (it is not known if [place] will harm your unborn baby.  Talk to your health care provider about the risk to your unborn baby)   - Please treat patient accordingly (except Category D & X drugs), organogenesis is completed and need to weigh the risks vs benefits of additional medication; Patient status much improved with haldol       Asthma              - Stable, on Albuterol inhaler PRN      Macrocytic anemia              - Hgb 10.5 (19)              - Folate and Vitamin B12 levels unavailable               - Continue oral iron supplementation               - Clinically asymptomatic      AMA              - Recommend NIPT; patient previously declined genetic testing however agreeable today   - [facility]     Bacterial Vaginosis              - S/P Flagyl -19     Candida Vaginitis              - S/P diflucan 19     Tobacco abuse              - Currently using nicorette gum       Patient Active Problem List    Diagnosis Date Noted    Bacterial vaginosis 2019    Yeast infection 2019    Poor historian 2019    Fetal drug exposure (latuda) 2019    Macrocytic anemia 2018    Advanced maternal age in multigravida 12/22/2018    Patient does not believe she is pregnant, refusing care 12/22/2018    Noncompliance 12/22/2018    No prenatal care in current pregnancy 12/21/2018     Overview Note:     [facility] 4720 on 8/20/18  Patient has repeatedly declined exams and to be seen by ED      Polysubstance abuse ([facility] [address] 03/23/2018    Schizoaffective disorder, bipolar type ([facility] [address] 09/11/2014       Will discuss with attending.      Akin Arias DO  Ob/Gyn Resident  [facility], [address]  2/6/2019, 8:02 AM
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
## 2019-02-06 NOTE — PLAN OF CARE
Problem: Altered Mood, Psychotic Behavior:  Goal: Able to demonstrate trust by eating, participating in treatment and following staff's direction  Able to demonstrate trust by eating, participating in treatment and following staff's direction   Outcome: Ongoing  Refused all evening care, resting in her room, no needs expressed  Goal: Absence of self-harm  Absence of self-harm   Outcome: Ongoing  Remains free from self harm, [id number] safety checks maintained      Problem: Falls - Risk of:  Goal: Will remain free from falls  Will remain free from falls   Outcome: Ongoing  Pt remains free of falls and verbalizes understanding of individual fall risks. Pt wearing non skid footwear and encouraged to seek out staff for any assistance needed.

## 2019-02-07 VITALS
WEIGHT: 130 LBS | RESPIRATION RATE: 14 BRPM | DIASTOLIC BLOOD PRESSURE: 66 MMHG | BODY MASS INDEX: 19.7 KG/M2 | HEART RATE: 102 BPM | TEMPERATURE: 98 F | SYSTOLIC BLOOD PRESSURE: 111 MMHG | HEIGHT: 68 IN | OXYGEN SATURATION: 98 %

## 2019-02-07 PROCEDURE — 6370000000 HC RX 637 (ALT 250 FOR IP): Performed by: STUDENT IN AN ORGANIZED HEALTH CARE EDUCATION/TRAINING PROGRAM

## 2019-02-07 PROCEDURE — 6360000002 HC RX W HCPCS: Performed by: PSYCHIATRY & NEUROLOGY

## 2019-02-07 PROCEDURE — 90686 IIV4 VACC NO PRSV 0.5 ML IM: CPT | Performed by: PSYCHIATRY & NEUROLOGY

## 2019-02-07 PROCEDURE — 1240000000 HC EMOTIONAL WELLNESS R&B

## 2019-02-07 PROCEDURE — G0008 ADMIN INFLUENZA VIRUS VAC: HCPCS | Performed by: PSYCHIATRY & NEUROLOGY

## 2019-02-07 PROCEDURE — 90715 TDAP VACCINE 7 YRS/> IM: CPT | Performed by: PSYCHIATRY & NEUROLOGY

## 2019-02-07 PROCEDURE — 90471 IMMUNIZATION ADMIN: CPT | Performed by: PSYCHIATRY & NEUROLOGY

## 2019-02-07 PROCEDURE — 6370000000 HC RX 637 (ALT 250 FOR IP): Performed by: PSYCHIATRY & NEUROLOGY

## 2019-02-07 RX ADMIN — INFLUENZA A VIRUS A/MICHIGAN/45/2015 X-275 (H1N1) ANTIGEN (FORMALDEHYDE INACTIVATED), INFLUENZA A VIRUS A/SINGAPORE/INFIMH-16-0019/2016 IVR-186 (H3N2) ANTIGEN (FORMALDEHYDE INACTIVATED), INFLUENZA B VIRUS B/PHUKET/3073/2013 ANTIGEN (FORMALDEHYDE INACTIVATED), AND INFLUENZA B VIRUS B/MARYLAND/15/2016 BX-69A ANTIGEN (FORMALDEHYDE INACTIVATED) 0.5 ML: 15; 15; 15; 15 INJECTION, SUSPENSION INTRAMUSCULAR at 09:50

## 2019-02-07 RX ADMIN — ALBUTEROL SULFATE 2 PUFF: 90 AEROSOL, METERED RESPIRATORY (INHALATION) at 18:55

## 2019-02-07 RX ADMIN — TETANUS TOXOID, REDUCED DIPHTHERIA TOXOID AND ACELLULAR PERTUSSIS VACCINE, ADSORBED 0.5 ML: 5; 2.5; 8; 8; 2.5 SUSPENSION INTRAMUSCULAR at 09:49

## 2019-02-07 RX ADMIN — NICOTINE POLACRILEX 2 MG: 2 GUM, CHEWING BUCCAL at 17:02

## 2019-02-07 RX ADMIN — LURASIDONE HYDROCHLORIDE 160 MG: 80 TABLET, FILM COATED ORAL at 17:34

## 2019-02-07 RX ADMIN — FERROUS SULFATE TAB 325 MG (65 MG ELEMENTAL FE) 325 MG: 325 (65 FE) TAB at 17:33

## 2019-02-07 RX ADMIN — FERROUS SULFATE TAB 325 MG (65 MG ELEMENTAL FE) 325 MG: 325 (65 FE) TAB at 08:16

## 2019-02-07 RX ADMIN — Medication 1 TABLET: at 17:35

## 2019-02-07 RX ADMIN — ALBUTEROL SULFATE 2 PUFF: 90 AEROSOL, METERED RESPIRATORY (INHALATION) at 08:15

## 2019-02-07 RX ADMIN — NICOTINE POLACRILEX 2 MG: 2 GUM, CHEWING BUCCAL at 11:39

## 2019-02-07 RX ADMIN — NICOTINE POLACRILEX 2 MG: 2 GUM, CHEWING BUCCAL at 08:16

## 2019-02-07 ASSESSMENT — PAIN SCALES - GENERAL: PAINLEVEL_OUTOF10: 0

## 2019-02-07 NOTE — PLAN OF CARE
Problem: Altered Mood, Psychotic Behavior:  Goal: Absence of self-harm  Absence of self-harm   Outcome: Ongoing  Pt did not participate in leisure group at 1430 despite staff encouragement to attend.

## 2019-02-07 NOTE — PROGRESS NOTES
OB Resident Progress Note     Called [facility] and spoke to RN who states Wili De La Cruz is refusing to be seen by anyone this morning and is cussing every out. Patient aggressive with staff currently. Please page OB if patient agreeable to exam at a later time.      Larry All   Ob-Gyn Resident PGY-2  Pager: [phone]

## 2019-02-07 NOTE — PLAN OF CARE
Psychotherapy Group Note    Date: 2/7/2019  Start Time: 10:00am  End Time: 10:48am    Number Participants in Group:  7    Goal:  Patient will demonstrate increased interpersonal interaction   Topic: [facility]    Discipline Responsible:   OT  AT X SW  Nsg.  RT  Other       Participation Level:     None  Minimal    Active Listener X Interactive    Monopolizing         Participation Quality:  X Appropriate  Inappropriate   X       Attentive        Intrusive   X       Sharing        Resistant   X       Supportive        Lethargic       Affective:   X Congruent  Incongruent  Blunted  Flat    Constricted  Anxious  Elated  Angry    Labile  Depressed  Other         Cognitive:  X Alert X Oriented PPTP     Concentration X G  F  P   Attention Span X G  F  P   Short-Term Memory X G  F  P   Long-Term Memory X G  F  P   ProblemSolving/  Decision Making X G  F  P   Ability to Process  Information X G  F  P      Contributing Factors             Delusional             Hallucinating             Flight of Ideas             Other:        Modes of Intervention:   Education X Support X Exploration   X Clarifying X Problem Solving X Confrontation    Socialization  Limit Setting  Reality Testing    Activity  Movement  Media    Other:            Response to Learning:  X Able to verbalize current knowledge/experience   X Able to verbalize/acknowledge new learning   X Able to retain information   X Capable of insight   X Able to change behavior   X Progressing to goal    Other:        Comments:

## 2019-02-07 NOTE — FLOWSHEET NOTE
*Patient participated in the [address] [place] [address]       02/07/19 1408   Encounter Summary   Services provided to: Patient   Referral/Consult From: Rounding   Continue Visiting (2/7/19)   Complexity of Encounter Low   Length of Encounter 30 minutes   Spiritual Assessment Completed Yes   Spiritual/Advent   Type Spiritual support   Assessment Calm; Approachable   Intervention Active listening   Outcome Receptive; Expressed gratitude

## 2019-02-07 NOTE — PLAN OF CARE
Problem: Altered Mood, Psychotic Behavior:  Goal: Able to demonstrate trust by eating, participating in treatment and following staff's direction  Able to demonstrate trust by eating, participating in treatment and following staff's direction   Outcome: Ongoing  Out in the day area, watching TV. Up at the desk,  requesting a lot of stuff, but pleasant when asking.  Pt demonstrate trust by eating snacks provided by staff, somewhat cooperative with care, refused full physical assessment. Pleasant with staff this shift.     Goal: Absence of self-harm  Absence of self-harm   Outcome: Ongoing  Pt remains free of harm.  Safe environment maintained. Q15 minute checks for safety continued per unit policy. Will continue to monitor for safety and provide support and reassurance as needed.       Problem: Altered Mood, Deterioration in Function:  Goal: Ability to perform activities of daily living will improve  Ability to perform activities of daily living will improve   Outcome: Ongoing  Pt able to perform activities of daily living, pt took a shower earlier in the afternoon. Goal: Able to verbalize reality based thinking  Able to verbalize reality based thinking   Outcome: Ongoing  Pt's thoughts are clearer this shift.  Pt in better mood,  No yelling. Pt somewhat cooperative.   Allowed vitals check, but pleasantly refused full physical assessment.     Problem: Falls - Risk of:  Goal: Will remain free from falls  Will remain free from falls   Outcome: Ongoing  No falls reported or observed as of this documentation.  Fall precaution continued and maintained.  Pt wearing non skid footwear and encouraged to seek out staff for any assistance needed.

## 2019-02-07 NOTE — PLAN OF CARE
Problem: Altered Mood, Psychotic Behavior:  Goal: Absence of self-harm  Absence of self-harm   Outcome: Ongoing  Psychoeducation Group Note    Date: 2/7/2019  Start Time: 0900  End Time: 0925    Number Participants in Group:  7    Goal:  Patient will demonstrate increased interpersonal interaction   Topic: Community meeting/ goals group    Discipline Responsible:   OT  AT  [facility]. x RT  Other       Participation Level:     None  Minimal   x Active Listener x Interactive    Monopolizing         Participation Quality:  x Appropriate  Inappropriate   x       Attentive        Intrusive   x       Sharing        Resistant          Supportive        Lethargic       Affective:    Congruent  Incongruent x Blunted  Flat    Constricted  Anxious  Elated  Angry    Labile  Depressed  Other         Cognitive:  x Alert x Oriented PPTP     Concentration  G x F  P   Attention Span  G x F  P   Short-Term Memory  G x F  P   Long-Term Memory  G x F  P   ProblemSolving/  Decision Making  G x F  P   Ability to Process  Information  G x F  P      Contributing Factors             Delusional             Hallucinating             Flight of Ideas             Other:       Modes of Intervention:  x Education x Support x Exploration   x Clarifying x Problem Solving  Confrontation   x Socialization  Limit Setting x Reality Testing   x Activity  Movement  Media    Other:            Response to Learning:  x Able to verbalize current knowledge/experience   x Able to verbalize/acknowledge new learning   x Able to retain information    Capable of insight    Able to change behavior   x Progressing to goal    Other:        Comments:

## 2019-02-07 NOTE — PROGRESS NOTES
Resident Progress Note    Michel Newell is a 41 y.o. female  at [facility] 13 Day: 48    Subjective:   Patient has been seen and examined. Patient walks to room, lays on bed and states \"I dont need you\" \"use that speaker thing\" \"theres no baby in there\". Dopplers of FHT obtained at 150bpm.  The patient reports fetal movement is present, denies contractions, denies loss of fluid, denies vaginal bleeding. Denies HA, vision changes, fevers, chills. Before finishing asking questions, patient got up and states \"im done\" and walks out the door.        Objective:   Vitals:  Vitals:    19 1345 19 1945 19 1800 19   BP: (!) 108/59  (!) 100/55 111/66   Pulse: 81  95 102   Resp:  14 14 14   Temp:   97.9 °F (36.6 °C) 98 °F (36.7 °C)   TempSrc:   Oral Oral   SpO2:       Weight:       Height:             Fetal Heart Tones: 150 via doppler    Physical Exam:  Declined exam     Assessment/Plan:  Michel Newell is a 41 y.o. female  at [address] [place] [address]              - GBS unknown / Rh positive / Rubella immune              - No indication for GBS prophylaxis unless delivery imminent               - Prenatal labs obtained 19   - S/P Tdap and Influenza vaccination 19              - Patient drank the glucose drink yesterday for the 1hr GTT however refused her lab draw at the 1 hour soren, HgbA1c 4.1 (19)              - Limited anatomy ultrasound 19, no abnormalities seen               - Request for formal MFM consult at [facility] denied by ethics/risk management as patient too unstable for transport and increased flight risk               - Still recommend formal MFM consult and Ultrasound when patient clinically stable; please notify when ethics, risk management and psych in agreeance when ok to transport patient so appointment can be set              - No s/s of  labor at this time              - FHTs 150s by doppler              - Recommend continue PNV daily             - Will continue to attempt to see patient daily      Schizoaffective disorder               - Management per primary               - Currently on Latuda 160 mg PO qd               - Please treat patient accordingly (except Category D & X drugs), organogenesis is completed and need to weigh the risks vs benefits of additional medication     Asthma              - Stable, on Albuterol inhaler PRN      Macrocytic anemia              - Hgb 10.5 (1/23/19)              - Folate and Vitamin B12 levels unavailable               - Continue oral iron supplementation               - Clinically asymptomatic      AMA              - Recommend NIPT              - NIPT kit to be  from [facility] to [address] [place] [address]     Bacterial Vaginosis              - S/P Flagyl 1/18-1/25/19     Candida Vaginitis              - S/P diflucan 1/18/19     Tobacco abuse              - Currently using nicorette gum       Patient Active Problem List    Diagnosis Date Noted    Bacterial vaginosis 01/18/2019    Yeast infection 01/18/2019    Poor historian 01/18/2019    Fetal drug exposure (latuda) 01/18/2019    Macrocytic anemia 12/22/2018    Advanced maternal age in multigravida 12/22/2018    Patient does not believe she is pregnant, refusing care 12/22/2018    Noncompliance 12/22/2018    No prenatal care in current pregnancy 12/21/2018     Overview Note:     [facility] on 8/20/18  Patient has repeatedly declined exams and to be seen by ED      Polysubstance abuse ([facility] 03/23/2018    Schizoaffective disorder, bipolar type ([facility] 09/11/2014       Will discuss with attending.      Akin Arias DO  Ob/Gyn Resident  [facility] [id number]  2/7/2019, 3:25 PM

## 2019-02-07 NOTE — PLAN OF CARE
Problem: Altered Mood, Psychotic Behavior:  Goal: Able to demonstrate trust by eating, participating in treatment and following staff's direction  Able to demonstrate trust by eating, participating in treatment and following staff's direction   Outcome: Ongoing  Pt is demonstrating trust by eating and follow some directions of staff. Pt does take her prescribed medications has ordered. Goal: Absence of self-harm  Absence of self-harm   Outcome: Ongoing  Pt denies wanting to cause self harm. Safety is maintained at this time. Problem: Altered Mood, Deterioration in Function:  Goal: Ability to perform activities of daily living will improve  Ability to perform activities of daily living will improve   Outcome: Ongoing  Pt appearance is di shelved. Pt ask for things to take a shower but didn't shower. Goal: Able to verbalize reality based thinking  Able to verbalize reality based thinking   Outcome: Ongoing      Problem: Falls - Risk of:  Goal: Will remain free from falls  Will remain free from falls   Outcome: Ongoing  Pt is free from falls. Pt wear nonskid sock when walking on the unit. Goal: Absence of physical injury  Absence of physical injury   Outcome: Ongoing  Pt is free from physical injury.

## 2019-02-08 ENCOUNTER — HOSPITAL ENCOUNTER (OUTPATIENT)
Age: 39
Discharge: PSYCHIATRIC OR SUBSTANCE USE FACILITY | DRG: 566 | End: 2019-02-08
Attending: OBSTETRICS & GYNECOLOGY | Admitting: OBSTETRICS & GYNECOLOGY
Payer: MEDICAID

## 2019-02-08 ENCOUNTER — HOSPITAL ENCOUNTER (INPATIENT)
Age: 39
LOS: 45 days | Discharge: ANOTHER HOSPITAL | DRG: 566 | End: 2019-03-25
Attending: PSYCHIATRY & NEUROLOGY | Admitting: PSYCHIATRY & NEUROLOGY
Payer: MEDICAID

## 2019-02-08 VITALS — DIASTOLIC BLOOD PRESSURE: 58 MMHG | RESPIRATION RATE: 16 BRPM | SYSTOLIC BLOOD PRESSURE: 100 MMHG | HEART RATE: 77 BPM

## 2019-02-08 PROBLEM — O09.92 HRP (HIGH RISK PREGNANCY), SECOND TRIMESTER: Status: ACTIVE | Noted: 2019-02-08

## 2019-02-08 PROBLEM — F25.9 SCHIZOAFFECTIVE DISORDER (HCC): Status: ACTIVE | Noted: 2019-02-08

## 2019-02-08 LAB
DIRECT EXAM: ABNORMAL
Lab: ABNORMAL
SPECIMEN DESCRIPTION: ABNORMAL
STATUS: ABNORMAL

## 2019-02-08 PROCEDURE — 87591 N.GONORRHOEAE DNA AMP PROB: CPT

## 2019-02-08 PROCEDURE — 87081 CULTURE SCREEN ONLY: CPT

## 2019-02-08 PROCEDURE — 87480 CANDIDA DNA DIR PROBE: CPT

## 2019-02-08 PROCEDURE — 99213 OFFICE O/P EST LOW 20 MIN: CPT

## 2019-02-08 PROCEDURE — 87491 CHLMYD TRACH DNA AMP PROBE: CPT

## 2019-02-08 PROCEDURE — 87660 TRICHOMONAS VAGIN DIR PROBE: CPT

## 2019-02-08 PROCEDURE — 87510 GARDNER VAG DNA DIR PROBE: CPT

## 2019-02-08 PROCEDURE — 6370000000 HC RX 637 (ALT 250 FOR IP): Performed by: NURSE PRACTITIONER

## 2019-02-08 PROCEDURE — 76818 FETAL BIOPHYS PROFILE W/NST: CPT

## 2019-02-08 PROCEDURE — 1240000000 HC EMOTIONAL WELLNESS R&B

## 2019-02-08 RX ORDER — ACETAMINOPHEN 500 MG
1000 TABLET ORAL EVERY 6 HOURS PRN
Status: DISCONTINUED | OUTPATIENT
Start: 2019-02-08 | End: 2019-02-08 | Stop reason: HOSPADM

## 2019-02-08 RX ORDER — HYDROXYZINE HYDROCHLORIDE 25 MG/1
25 TABLET, FILM COATED ORAL 3 TIMES DAILY PRN
Status: DISCONTINUED | OUTPATIENT
Start: 2019-02-08 | End: 2019-03-25 | Stop reason: HOSPADM

## 2019-02-08 RX ORDER — ALBUTEROL SULFATE 90 UG/1
2 AEROSOL, METERED RESPIRATORY (INHALATION) EVERY 6 HOURS PRN
Status: DISCONTINUED | OUTPATIENT
Start: 2019-02-08 | End: 2019-02-08

## 2019-02-08 RX ORDER — FERROUS SULFATE 325(65) MG
325 TABLET ORAL 2 TIMES DAILY WITH MEALS
Status: DISCONTINUED | OUTPATIENT
Start: 2019-02-08 | End: 2019-03-25 | Stop reason: HOSPADM

## 2019-02-08 RX ORDER — ACETAMINOPHEN 325 MG/1
650 TABLET ORAL EVERY 4 HOURS PRN
Status: DISCONTINUED | OUTPATIENT
Start: 2019-02-08 | End: 2019-02-08

## 2019-02-08 RX ORDER — DIPHENHYDRAMINE HCL 25 MG
25 TABLET ORAL NIGHTLY PRN
Status: CANCELLED | OUTPATIENT
Start: 2019-02-08

## 2019-02-08 RX ORDER — SWAB
1 SWAB, NON-MEDICATED MISCELLANEOUS
Status: DISCONTINUED | OUTPATIENT
Start: 2019-02-08 | End: 2019-03-25 | Stop reason: HOSPADM

## 2019-02-08 RX ORDER — ACETAMINOPHEN 325 MG/1
650 TABLET ORAL EVERY 4 HOURS PRN
Status: CANCELLED | OUTPATIENT
Start: 2019-02-08

## 2019-02-08 RX ORDER — HYDROXYZINE HYDROCHLORIDE 25 MG/1
25 TABLET, FILM COATED ORAL 3 TIMES DAILY PRN
Status: CANCELLED | OUTPATIENT
Start: 2019-02-08

## 2019-02-08 RX ORDER — DIPHENHYDRAMINE HCL 25 MG
25 TABLET ORAL NIGHTLY PRN
Status: DISCONTINUED | OUTPATIENT
Start: 2019-02-08 | End: 2019-02-21

## 2019-02-08 RX ORDER — SWAB
1 SWAB, NON-MEDICATED MISCELLANEOUS
Status: DISCONTINUED | OUTPATIENT
Start: 2019-02-08 | End: 2019-02-08

## 2019-02-08 RX ORDER — DIPHENHYDRAMINE HCL 25 MG
25 TABLET ORAL NIGHTLY PRN
Status: DISCONTINUED | OUTPATIENT
Start: 2019-02-08 | End: 2019-02-08

## 2019-02-08 RX ORDER — FERROUS SULFATE 325(65) MG
325 TABLET ORAL 2 TIMES DAILY WITH MEALS
Status: DISCONTINUED | OUTPATIENT
Start: 2019-02-08 | End: 2019-02-08

## 2019-02-08 RX ORDER — ALBUTEROL SULFATE 90 UG/1
2 AEROSOL, METERED RESPIRATORY (INHALATION) EVERY 6 HOURS PRN
Status: CANCELLED | OUTPATIENT
Start: 2019-02-08

## 2019-02-08 RX ORDER — ALBUTEROL SULFATE 90 UG/1
2 AEROSOL, METERED RESPIRATORY (INHALATION) EVERY 6 HOURS PRN
Status: DISCONTINUED | OUTPATIENT
Start: 2019-02-08 | End: 2019-03-25 | Stop reason: HOSPADM

## 2019-02-08 RX ORDER — FERROUS SULFATE 325(65) MG
325 TABLET ORAL 2 TIMES DAILY WITH MEALS
Status: CANCELLED | OUTPATIENT
Start: 2019-02-08

## 2019-02-08 RX ORDER — ACETAMINOPHEN 325 MG/1
650 TABLET ORAL EVERY 4 HOURS PRN
Status: DISCONTINUED | OUTPATIENT
Start: 2019-02-08 | End: 2019-03-25 | Stop reason: HOSPADM

## 2019-02-08 RX ORDER — SWAB
1 SWAB, NON-MEDICATED MISCELLANEOUS
Status: CANCELLED | OUTPATIENT
Start: 2019-02-08

## 2019-02-08 RX ORDER — HYDROXYZINE HYDROCHLORIDE 25 MG/1
25 TABLET, FILM COATED ORAL 3 TIMES DAILY PRN
Status: DISCONTINUED | OUTPATIENT
Start: 2019-02-08 | End: 2019-02-08

## 2019-02-08 RX ADMIN — NICOTINE POLACRILEX 2 MG: 2 GUM, CHEWING BUCCAL at 12:26

## 2019-02-08 RX ADMIN — Medication 1 TABLET: at 18:00

## 2019-02-08 RX ADMIN — NICOTINE POLACRILEX 2 MG: 2 GUM, CHEWING BUCCAL at 15:46

## 2019-02-08 RX ADMIN — LURASIDONE HYDROCHLORIDE 160 MG: 80 TABLET, FILM COATED ORAL at 18:00

## 2019-02-08 RX ADMIN — FERROUS SULFATE TAB 325 MG (65 MG ELEMENTAL FE) 325 MG: 325 (65 FE) TAB at 18:00

## 2019-02-08 RX ADMIN — NICOTINE POLACRILEX 2 MG: 2 GUM, CHEWING BUCCAL at 10:04

## 2019-02-08 RX ADMIN — ALBUTEROL SULFATE 2 PUFF: 90 AEROSOL, METERED RESPIRATORY (INHALATION) at 10:04

## 2019-02-08 ASSESSMENT — SLEEP AND FATIGUE QUESTIONNAIRES
DO YOU USE A SLEEP AID: NO
AVERAGE NUMBER OF SLEEP HOURS: 6
RESTFUL SLEEP: NO
DIFFICULTY ARISING: NO
DIFFICULTY STAYING ASLEEP: YES
DO YOU HAVE DIFFICULTY SLEEPING: YES
AVERAGE NUMBER OF SLEEP HOURS: 6
DO YOU HAVE DIFFICULTY SLEEPING: YES
DO YOU USE A SLEEP AID: NO
RESTFUL SLEEP: NO
SLEEP PATTERN: DISTURBED/INTERRUPTED SLEEP;RESTLESSNESS
SLEEP PATTERN: DISTURBED/INTERRUPTED SLEEP;RESTLESSNESS
DIFFICULTY STAYING ASLEEP: YES
DIFFICULTY FALLING ASLEEP: NO
DIFFICULTY FALLING ASLEEP: NO
DIFFICULTY ARISING: NO

## 2019-02-08 ASSESSMENT — PATIENT HEALTH QUESTIONNAIRE - PHQ9: SUM OF ALL RESPONSES TO PHQ QUESTIONS 1-9: 9

## 2019-02-08 ASSESSMENT — PAIN SCALES - GENERAL
PAINLEVEL_OUTOF10: 0

## 2019-02-08 ASSESSMENT — LIFESTYLE VARIABLES
HISTORY_ALCOHOL_USE: NO
HISTORY_ALCOHOL_USE: COMMENT

## 2019-02-08 NOTE — BH NOTE
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 Memory    G  F  x P   ProblemSolving/  Decision Making    G  F   x P   Ability to Process  Information    G    F  x P      Contributing Factors     x          Delusional     x          Hallucinating     x          Flight of Ideas             Other:       Modes of Intervention:    x Education   x Support    Exploration     x Clarifying   x Problem Solving  Confrontation     x Socialization   x Limit Setting  Reality Testing    Activity  Movement  Media    Other:            Response to Learning:     Able to verbalize current knowledge/experience      Able to verbalize/acknowledge new learning      Able to retain information      Capable of insight      Able to change behavior     x Progressing to goal    Other:        Comments:
Psychoeducation Group Note    Date: 1/19/19 Start Time: 1600 End Time: 1630    Number Participants in Group:  8    Goal:  Patient will demonstrate increased interpersonal interaction   Topic: Coping Skills    Discipline Responsible:   OT  AT    X Nsg.  RT  Other       Participation Level:     None X Minimal    Active Listener  Interactive    Monopolizing         Participation Quality:  X Appropriate  Inappropriate          Attentive        Intrusive          Sharing        Resistant          Supportive        Lethargic       Affective:    Congruent  Incongruent  Blunted X Flat    Constricted  Anxious  Elated  Angry    Labile  Depressed  Other         Cognitive:   Alert X Oriented PPTP     Concentration  G  F X P   Attention Span  G  F X P   Short-Term Memory  G  F X P   Long-Term Memory  G  F X P   ProblemSolving/  Decision Making  G  F X P   Ability to Process  Information  G  F X P      Contributing Factors             Delusional             Hallucinating             Flight of Ideas             Other:       Modes of Intervention:  X Education  Support  Exploration    Clarifying  Problem Solving  Confrontation   X Socialization  Limit Setting  Reality Testing    Activity  Movement  Media    Other:            Response to Learning:   Able to verbalize current knowledge/experience    Able to verbalize/acknowledge new learning    Able to retain information    Capable of insight    Able to change behavior    Progressing to goal   X Other:        Comments:   Sat away from peers and colored picture and wrote on group paper. Did not share what she wrote.
Psychoeducation Group Note    Date: 1/22/2019  Start Time: 1600  End Time: 1625    Number Participants in Group:  7    Goal:  Patient will demonstrate increased interpersonal interaction   Topic: Short term goals and dental hygiene    Discipline Responsible:   OT  AT   x Nsg.  RT  Other       Participation Level:    x None  Minimal    Active Listener  Interactive    Monopolizing         Participation Quality:   Appropriate  Inappropriate          Attentive        Intrusive          Sharing x       Resistant          Supportive        Lethargic       Affective:    Congruent  Incongruent x Blunted x Flat    Constricted  Anxious  Elated  Angry    Labile  Depressed  Other         Cognitive:  x Alert  Oriented PPTP     Concentration x G  F  P   Attention Span x G  F  P   Short-Term Memory x G  F  P   Long-Term Memory x G  F  P   ProblemSolving/  Decision Making x G  F  P   Ability to Process  Information x G  F  P      Contributing Factors             Delusional             Hallucinating             Flight of Ideas             Other:       Modes of Intervention:   Education  Support  Exploration    Clarifying  Problem Solving  Confrontation    Socialization  Limit Setting  Reality Testing    Activity  Movement  Media    Other:            Response to Learning:   Able to verbalize current knowledge/experience    Able to verbalize/acknowledge new learning    Able to retain information    Capable of insight    Able to change behavior   x Progressing to goal    Other:        Comments: Pt came to group therapy this evening, but due to internal stimuli, was unable to participate appropriately at this time.
Psychoeducation Group Note   Date:  Start Time:  End Time:  1/3/18 [id number]  Number Participants in Group:  6  Goal: Patient will demonstrate increased interpersonal interaction   Topic:   Discipline Responsible:    OT   AT   [facility].   RT  x BT    Participation Level:    None  x Minimal      Active Listener    Interactive     Monopolizing      Participation Quality:    Appropriate   Inappropriate     Attentive   Intrusive      Sharing   Resistant     Supportive  x Lethargic    Affective:     Congruent   Incongruent   Blunted   Flat    x Constricted   Anxious   Elated   Angry     Labile   Depressed   Other      Cognitive:   x Alert   Oriented PPTP      Concentration   G   F  x P    Attention Span    G   F  x P    Short-Term Memory    G   F  x P    Long-Term Memory   G   F  x P    ProblemSolving/   Decision Making   G   F  x P    Ability to Process   Information   G   F  x P       Contributing Factors     Delusional     Hallucinating     Flight of Ideas     Other:    Modes of Intervention:    x Education   Support   Exploration     Clarifying   Problem Solving   Confrontation     Socialization   Limit Setting   Reality Testing     Activity   Movement   Media     Other:        Response to Learning:     Able to verbalize current knowledge/experience     Able to verbalize/acknowledge new learning     Able to retain information     Capable of insight     Able to change behavior     Progressing to goal     Other:    Comments:
Pt appears to have soiled herself, smells of stool, approached with clean pull-up and pants and she became agitated and verbally aggressive toward writer. Stated to leave her alone, verbal threats of harm, and using vulgar language. Items placed in her room for her use.
Pt attended 1430 Open Rec, watching TV
Pt c/o difficulty breathing,  asked for inhaler. After an hour wants inhaler again. Explained to pt, inhaler is ordered every 2 hours as needed. Pt agreed with explanation. Lung sounds clear on all fields, SpO2 98%. /55, Pulse 95. Pt given fluids at this time. Encouraged to drink more fluids.
Pt can be heard several times throughout the night responding to internal stimuli. Yelling out and cursing at internal stimuli. staff will continue to monitor for safety and provide support as needed.
Pt declined offer to join in afternoon wellness group, resting in bed.
Pt declines invitation to join in afternoon wellness group, sat in dayroom by herself.
Pt did not attend 1100 nursing group despite encouragement to attend.
Pt did not attend 16 group despite encouragement to attend.
Pt did not attend 1600 Critical Thinking and Education group despite staff encouragement.
Pt did not participate  In Feedback group at 1600 due to refused.
Pt did not participate  In wellness group at 1600 even with staff encouragement.
Pt did not participate  In wrap up group at 20:30 due to resting in room after much encouragement from staff.
Pt did not participate in 1600 Wellness Group despite encouragement and prompts from staff.
Pt did not participate in 1600 wellness group due to resting in room and choosing not to attend.
Pt did not participate in Cognitive Skills Group at 1330 due to pt was initially in shower, then later came to sit in group area but was aloof et preoccupied et did not answer/attend group when encouraged.
Pt did not participate in Cognitive Skills Group at 1430 due to pt was resting in room and declined to attend group when encouraged.
Pt did not participate in Wellness Group at 1600. Patient stated they were not interested in attending. Staff explained benefits of participation and offered encouragement to attend.
Pt did not participate in open recreation group today.
Pt does not attend afternoon wellness group.
Pt given specimen cup and instructions for providing urine sample.
Pt keeps on asking for sanitary pad. When pt was asked if she is bleeding, pt got upset and said \"Don't be questioning me like that, just give the damn pad! \". OB Resident paged, states she will be coming to the unit to see pt.
Pt participated in [address].
Pt refused Labs this morning.
Pt refused STAT labs this AM. Writer tried to get labs again and pt refused this afternoon.
Pt refused vitals
Pt. did not attend 4:00 wellness group despite staff encouragement.
Pt. did not attend 4:00 wellness group despite staff encouragement.
RN REVIEWED  T DOCUMENTATION.
RN has reviewed LPN charting for this shift.
RN has reviewed LPN documentation for this shift.
RN has reviewed LPN documentation for this shift.
RN has reviewed LPN documentation.
RN has reviewed T II documentation for this shift.
RN has reviewed all charting and assessments performed by LPN.
RN paged OB resident and made aware of code violet, patient picking up weighted chair, and the injections that were received. To call back if patient is expressing any discomfort or asking to be seen.
RN reviewed LPN charting.
RN reviewed LPN documentation
RN verified LPN documentation
Refusal Note      Patient refused oral dose of flagyl.
Reviewed LPN charting for this shift.
Reviewed T charting
Reviewed all BHT  & LPN charting.
Staff offered to change and  patients room,  Patient yelled at [facility] and refused bedding change.   Patient was accepting of Banana and ice water
Wrap up group began at 2030 and Patient in  and patient participated about how his day went.
Writer Asked patient to take underwear off head, patient said \"no\" \" I don't give a fuck\" and sat back down.
Writer spoke with patient in room regarding pain. Patient states \" my pain to my stomach is sharp and I think my water broke\". Facial grimacing noted with sighing sound. Patient reports that she had a large amount of fluid come out earlier today and a small amount of fluid this evening. RN notified.
Recognizing danger situations (included triggers and roadblocks)                    ( )  Coping skills (new ways to manage stress, exercise, relaxation techniques, changing routine, distraction)                                                           ( )  Basic information about quitting (benefits of quitting, techniques in how to quit, available resources  ( ) Referral for counseling faxed to [facility]                                           (x) Patient refused counseling  ( ) Patient has not smoked in the last 30 days    Metabolic Screening:    No results found for: LABA1C    No results for input(s): CHOL, TRIG, HDL, LDLCALC, LABVLDL in the last 72 hours. Body mass index is 19.77 kg/m². BP Readings from Last 2 Encounters:   12/20/18 136/60   09/03/18 122/80           Pt admitted with followings belongings:  Dentures: None  Vision - Corrective Lenses: None  Hearing Aid: None  Jewelry: None  Body Piercings Removed: N/A  Clothing: Undergarments (Comment), Jacket / coat, Shirt, Footwear, Socks, Other (Comment) (belt)  Were All Patient Medications Collected?: Not Applicable  Other Valuables: None     Valuables placed in safe in security envelope, number:  \[id number]\. Patient's home medications were updated by last admission. Patient oriented to surroundings and program expectations and copy of patient rights given. Received admission packet:  Yes. Consents reviewed, signed No. Refused Yes. Patient verbalize understanding:  YONY. Patient education on precautions: Yes    Pt admitted involuntary from [facility]. Pt complains of voices screaming at her. Pt has poor hygiene and ADL's. Pt states she has a lot of depression. Pt wanded and changed into hospital gown for safety. Pt states she has poor sleep and appetite. Pt follows up with [facility]. Pt denies any drugs or alcohol.                     Alexus Velazquez LPN

## 2019-02-08 NOTE — PROGRESS NOTES

## 2019-02-08 NOTE — BH NOTE
PSYCHOEDUCATION GROUP NOTE    Date: 2/8/19  Start Time: 1600  End Time: 1635    Number Participants in Group:  7    Goal:  Patient will demonstrate increased interpersonal interaction   Topic: Communication     Discipline Responsible:   OT  AT   x Nsg.  RT MHP Other       Participation Level:     None  Minimal   x Active Listener x Interactive    Monopolizing         Participation Quality:  x Appropriate  Inappropriate          Attentive        Intrusive          Sharing        Resistant          Supportive        Lethargic       Affective:    Congruent x Incongruent  Blunted  Flat    Constricted x Anxious  Elated  Angry    Labile  Depressed  Other         Cognitive:  x Alert  Oriented PPTP     Concentration  G  F x P   Attention Span  G  F x P   Short-Term Memory  G  F x P   Long-Term Memory  G  F x P   ProblemSolving/  Decision Making  G  F x P   Ability to Process  Information  G  F x P      Contributing Factors             Delusional             Hallucinating   x          Flight of Ideas             Other:       Modes of Intervention:   Education  Support  Exploration    Clarifying  Problem Solving  Confrontation   x Socialization  Limit Setting  Reality Testing    Activity  Movement  Media    Other:            Response to Learning:   Able to verbalize current knowledge/experience    Able to verbalize/acknowledge new learning    Able to retain information    Capable of insight    Able to change behavior   x Progressing to goal    Other:        Comments:

## 2019-02-08 NOTE — PROGRESS NOTES
Patient stated, \"I'm having contractions. The pain is sharp and my water broke earlier I think. \" Dr. Felton Vidales notified of patient's statements. Orders received to have patient discharged and readmitted to [facility] for further testing. Perla Valenzuela notified and order received to discharge readmit patient to [facility] with psych to follow. House supervisor aware. Charge nurse notified.

## 2019-02-08 NOTE — PLAN OF CARE
Problem: Anger Management/Homicidal Ideation:  Goal: Absence of angry outbursts  Absence of angry outbursts   Outcome: Ongoing  Pt did not attend RT group at 1430 d/t resting in room despite staff invitation to attend.

## 2019-02-08 NOTE — FLOWSHEET NOTE
Patient admitted to room 178 from [facility] per bed. Here with c/o leaking fluid. Denies vaginal bleeding. Denies N/V/D. Denies fever/chills. Relates of + fetal movement. Assisted into bed, Siderails up x2. Call light in reach. Bed in low position. Oriented to room, surroundings, call system and plan of care. Patient verbalizes understanding. EFM applied and monitor test completed/passed.

## 2019-02-08 NOTE — PLAN OF CARE
Problem: Anger Management/Homicidal Ideation:  Goal: Absence of angry outbursts  Absence of angry outbursts   Outcome: Ongoing  Pt is free from angry outburst at this time. Problem: Risk of Harm:  Goal: Ability to remain free from injury will improve  Ability to remain free from injury will improve   Outcome: Ongoing  Pt is free from harm. Safety is maintained at this time. Problem: Falls - Risk of:  Goal: Will remain free from falls  Will remain free from falls   Outcome: Ongoing  Pt is free from falls at this time.

## 2019-02-08 NOTE — BH NOTE
`[facility]  Admission Note     Admission Type:   Admission Type: Involuntary (Signed in.)    Reason for admission:  Reason for Admission: Patient was sent to [address] [place] [address] for assessment. PATIENT STRENGTHS:  Strengths: Connection to output provider    Patient Strengths and Limitations:  Limitations: Apathetic / unmotivated, Tendency to isolate self    Addictive Behavior:   Addictive Behavior  In the past 3 months, have you felt or has someone told you that you have a problem with:  : Eating (too much/too little)  Do you have a history of Chemical Use?: Comment (YONY)  Do you have a history of Alcohol Use?: Comment (YONY)  Do you have a history of Street Drug Abuse?: Comment (YONY)  Histroy of Prescripton Drug Abuse?: Comment (YONY)    Medical Problems:   Past Medical History:   Diagnosis Date    Anxiety     Asthma     Bipolar 1 disorder (HCC)     Multiple [facility] ([facility]     Seizures ([facility]        Status EXAM:  Status and Exam  Normal: No  Facial Expression: Flat  Affect: Unstable  Level of Consciousness: Alert  Mood:Normal: No  Mood: Anxious  Motor Activity:Normal: No  Motor Activity: Increased  Interview Behavior: Cooperative  Preception: [place] to Person, Kenrick Schlichter to Time, [place] to Place  Attention:Normal: No  Attention: Distractible  Thought Processes: Flt.of Ideas  Thought Content:Normal: No  Thought Content: Preoccupations, Poverty of Content, Paranoia  Hallucinations: None  Delusions: Yes  Delusions:  Other(See Comment) (paranoid)  Memory:Normal: No  Memory: Confabulation, Poor Recent, Poor Remote  Insight and Judgment: No  Insight and Judgment: Poor Insight, Poor Judgment  Present Suicidal Ideation: No  Present Homicidal Ideation: No    Tobacco Screening:  Practical Counseling, on admission, soren X, if applicable and completed (first 3 are required if patient doesn't refuse):            ( )  Recognizing danger situations (included triggers and roadblocks) ( )  Coping skills (new ways to manage stress, exercise, relaxation techniques, changing routine, distraction)                                                           ( )  Basic information about quitting (benefits of quitting, techniques in how to quit, available resources  ( ) Referral for counseling faxed to [facility]                                             (X) Patient refused counseling  ( ) Patient has not smoked in the last 30 days    Metabolic Screening:    Lab Results   Component Value Date    LABA1C 4.1 01/23/2019       No results for input(s): CHOL, TRIG, HDL, LDLCALC, LABVLDL in the last 72 hours. Body mass index is 19.77 kg/m². BP Readings from Last 2 Encounters:   02/08/19 (!) 100/58   02/06/19 111/66           Pt admitted with followings belongings:  Dentures: None  Vision - Corrective Lenses: None  Hearing Aid: None  Jewelry: None  Body Piercings Removed: N/A  Clothing: Undergarments (Comment), Jacket / coat, Shirt, Footwear, Socks, Other (Comment) (belt)  Were All Patient Medications Collected?: Not Applicable  Other Valuables: Other (Comment) (lighter.)     Valuables placed in safe in security envelope, number:  \[id number]\. Patient's home medications were none. Patient oriented to surroundings and program expectations and copy of patient rights given. Received admission packet:  refused. Consents reviewed, signed in but refused rest of paperwork at this time. Patient verbalize understanding:  yes. Patient education on precautions: yes    Patient was admitted back to [facility] from being assessed by Labor and Delivery. Patient was on [place] unit and was complaining of sharp painful contractions and stated to the nurses that her water had broke earlier in shift and was leaking. Labor and Delivery recommended patient be admitted for testing to make sure patient was not in labor. Patient then sent back to psych unit.  Patient did agree to sign at this time but nothing else.                    Munir Boyd RN

## 2019-02-08 NOTE — H&P
HISTORY and [id number]       NAME:  Matthew Daniels  MRN: [id number]   YOB: 1980   Date: 2019   Age: 41 y.o. Gender: female     COMPLAINT AND PRESENT HISTORY:                Matthew Daniels is 41 y.o. , female, admitted because of Schizoaffective Disorder. Pt is  at 32w5d C/O of contractions and leaking of fluid, OB. Gyn consult obtained. Pt is delusional paranoid, confused, incoherent at times. Responding to internal stimuli. Patient is a poor historian because of her condition,  Patient admits to having auditory hallucinations hears voices screaming in her head. Patient is unable to care for herself, has poor appetite and concentration, fair sleep. Patient Has no insight into his situation doesn't even think that she is pregnant. Compliance with her medications is unknown. She has a history previous substance abuse. DIAGNOSTIC RESULTS   Labs:    PAST MEDICAL HISTORY     Past Medical History:   Diagnosis Date    Anxiety     Asthma     [facility]     Multiple personality ([facility]     [facility]        SURGICAL HISTORY     No past surgical history on file.     FAMILY HISTORY       Family History   Problem Relation Age of Onset    Cancer Mother        SOCIAL HISTORY       Social History     Social History    Marital status: Single     Spouse name: N/A    Number of children: N/A    Years of education: N/A     Social History Main Topics    Smoking status: Current Every Day Smoker     Packs/day: 0.25     Types: Cigarettes, Cigars    Smokeless tobacco: Current User      Comment: Pt accepting of nicotine replacement    Alcohol use No    Drug use: No    Sexual activity: Not on file     Other Topics Concern    Not on file     Social History Narrative    No narrative on file           REVIEW OF SYSTEMS      Allergies   Allergen Reactions    Bee Venom     Beeswax     Wasp Venom Protein        No current facility-administered medications on file prior to encounter. Current Outpatient Prescriptions on File Prior to Encounter   Medication Sig Dispense Refill    benztropine (COGENTIN) 1 MG tablet Take 1 mg by mouth      lurasidone (LATUDA) 80 MG TABS tablet Take 80 mg by mouth Daily with supper       carBAMazepine (TEGRETOL) 200 MG tablet Take 200 mg by mouth 2 times daily      traZODone (DESYREL) 50 MG tablet Take 1 tablet by mouth nightly as needed for Sleep 14 tablet 0    PARoxetine (PAXIL) 10 MG tablet Take 1 tablet by mouth daily for 14 days 14 tablet 0    nicotine polacrilex (NICORETTE) 2 MG gum Take 1 each by mouth as needed for Smoking cessation 110 each 3    ARIPiprazole ER (ABILIFY MAINTENA) 400 MG SUSR Inject 400 mg into the muscle every 28 days 1 each 0                      General health:  Fair. Neuropsychiatric:  See HPI. She is an unreliable historian, incoherent. GENERAL PHYSICAL EXAM:     Vitals: BP (!) 111/57   Pulse 99   Resp 15   Ht 5' 8\" (1.727 m)   LMP  (LMP Unknown)   BMI 19.77 kg/m²  Body mass index is 19.77 kg/m². Pt was examined with a nurse present in the room. GENERAL APPEARANCE:  Isabela Trevin is 41 y.o., [place] American  female, not obese, nourished, conscious, alert, confused. Does not appear to be distress or pain at this time. SKIN:  Warm, dry, no cyanosis or jaundice. HEAD:  Normocephalic, atraumatic, no swelling or tenderness. EYES:  Pupils equal, reactive to light, Conjunctiva is clear, EOMs intact adrianne. eyelids WNL. EARS:  No discharge, no marked hearing loss. NOSE:  No rhinorrhea, epistaxis or septal deformity. THROAT:  Not congested. No ulceration bleeding or discharge. NECK:  No stiffness, trachea central.  No palpable masses or L.N.      CHEST:  Symmetrical and equal on expansion. HEART:  Regular rate and rhythm. S1 > S2, No audible murmurs or gallops. LUNGS:  Equal on expansion, normal breath sounds.   No adventitious sounds. ABDOMEN:  Obese. Soft on palpation. No localized tenderness. No guarding or rigidity. No palpable organomegaly. LYMPHATICS:  No palpable cervical Lymphadenopathy. LOCOMOTOR, BACK AND SPINE:  No tenderness or deformities. EXTREMITIES:  Symmetrical, no pretibial edema. Reis sign negative. No discoloration or ulcerations. NEUROLOGIC:  The patient is conscious, alert, confused Cranial nerves exam could not not be fully examined. No apparent focal motor deficits. Muscle strength equal Evangelist. No facial droop, tongue protrudes centrally, no slurring of the speech. PROVISIONAL DIAGNOSES:      Active Problems:    Schizoaffective disorder ([facility]  Resolved Problems:    * No resolved hospital problems.  *      BUBBA DELEON PA-C on 2/8/2019 at 3:47 PM

## 2019-02-08 NOTE — CARE COORDINATION
[facility] Biopsychosocial Assessment     Current Level of Psychosocial Functioning      Independent:   Dependent:   Minimal Assist: x     Comments:  Pt states she lives with her mother, and sister, CAMILLE Ruiz on file, [phone] and will return there upon discharge. Psychosocial High Risk Factors (check all that apply)     Unable to obtain meds:   Chronic illness/pain:   Substance abuse:   Lack of Family Support:  Financial stress:  Isolation:   Inadequate Community Resources:   Suicide attempt(s):   Not taking medications:    Victim of crime:   Developmental Delay:   Unable to manage personal needs:x   Age 72 or older:   Homeless:   No transportation:   Readmission within 30 days: x  Unemployment: x  Traumatic Event: x     Comments: Pt is currently 32 weeks pregnant and \"don't want the baby\". Psychiatric Advanced Directives: none reported      Family to Involve in Treatment: SisterCeleste ROI on file     Sexual Orientation: YONY     Patient Strengths: [place] [facility], medication compliant, family support, insurance, stable housing, legal income     Patient Barriers: poor coping skills, not medication compliant when out of hospital, poor insight     Opiate Education: N/A     CMHC/mental health history: CAMILLE Alcantar on file, ACT Team, CM is [facility]   medication management, group/individual therapies, family meetings, psycho -education, treatment team meetings to assist with stabilization     Initial Discharge Plan: Follow up with Miya Boyd, return home, follow up with gynecologist for ongoing pregnancy care. Clinical Summary:      Pt is a 41year old  female who presented to the [facility] from obstetrics on a [place]. Pt states her appetite has been increased and she has been sleeping less. Pt states that she is medication compliant at this time. Pt is linked to [facility], [address] [place] [address] on file, and does have a CM.  Pt states that he lives with her mother and sister, Roberto Taylor, whom are semi supportive, CORAL on file. Pt states that xhe does have a form of legal income and  receives $771/Month in SSI. Pt states she has a payee. Pt states that she does not have a Hx of AoD use. Pt states that she does not have abuse issues from her past. Pt states that she does have MI in her family. Pt states that she has [employer]. Pt states that she has not graduated from . Pt states that she is currently not having AH, VH, SI and HI. Pt states that she has not attempted suicide. Pt appears unstable and incongruent mood and affect. Pt avoids gaze. Pt's thought content presents with paranoia. Pt appears hopeless and helpless and has poor insight and poor judgement.

## 2019-02-08 NOTE — PLAN OF CARE
Problem: Anger Management/Homicidal Ideation:  Goal: Absence of angry outbursts  Absence of angry outbursts   Outcome: Ongoing  [facility]  Initial Interdisciplinary Treatment Plan NO      Original treatment plan Date & Time: 2/8/19 0930    Admission Type:  Admission Type: Involuntary (Signed in.)    Reason for admission:   Reason for Admission: Patient was sent to [address] [place] [address] for assessment. Estimated Length of Stay:  5-7days  Estimated Discharge Date: to be determined by physician    PATIENT STRENGTHS:  Patient Strengths:Strengths: Connection to output provider  Patient Strengths and Limitations:Limitations: Apathetic / unmotivated, Tendency to isolate self  Addictive Behavior: Addictive Behavior  In the past 3 months, have you felt or has someone told you that you have a problem with:  : Eating (too much/too little)  Do you have a history of Chemical Use?: Comment (YONY)  Do you have a history of Alcohol Use?: Comment (YONY)  Do you have a history of Street Drug Abuse?: Comment (YONY)  Histroy of Prescripton Drug Abuse?: Comment (YONY)  Medical Problems:  Past Medical History:   Diagnosis Date    Anxiety     Asthma     Bipolar 1 disorder ([facility]     Multiple personality ([facility]     [facility]      Status EXAM:Status and Exam  Normal: No  Facial Expression: Flat  Affect: Unstable  Level of Consciousness: Alert  Mood:Normal: No  Mood: Anxious  Motor Activity:Normal: No  Motor Activity: Increased  Interview Behavior: Cooperative  Preception: [place] to Person, [facility] to Time, [place] to Place  Attention:Normal: No  Attention: Distractible  Thought Processes: Flt.of Ideas  Thought Content:Normal: No  Thought Content: Preoccupations, Poverty of Content, Paranoia  Hallucinations: None  Delusions: Yes  Delusions:  Other(See Comment) (paranoid)  Memory:Normal: No  Memory: Confabulation, Poor Recent, Poor Remote  Insight and Judgment: No  Insight and Judgment: Poor Insight, Poor Judgment  Present Suicidal Ideation: No  Present Homicidal Ideation: No    EDUCATION:   Learner Progress Toward Treatment Goals: reviewed group plans and strategies for care    Method:group therapy, medication compliance, individualized assessments and care planning    Outcome: needs reinforcement    PATIENT GOALS: to be discussed with patient within 72 hours    PLAN/TREATMENT RECOMMENDATIONS:     continue group therapy , medications compliance, goal setting, individualized assessments and care, continue to monitor pt on unit      SHORT-TERM GOALS:   Time frame for Short-Term Goals: 5-7 days    LONG-TERM GOALS:  Time frame for Long-Term Goals: 6 months  Members Present in Team Meeting: See [address], [address]

## 2019-02-08 NOTE — H&P
OBSTETRICAL HISTORY [address]    Date: 2019       Time: 3:01 AM   Patient Name: Daria Reddy     Patient : 1980  Room/Bed: [facility]/9198-    Admission Date/Time: 2019  2:39 AM      CC: leaking of fluid and contractions (resolved)    HPI: Daria Reddy is a 41 y.o.  at 32w5d who presents from [facility] due to complaints of contractions and leaking of fluid that started earlier today. Currently she denies any contractions or pain. She reports leaking of fluid earlier but denies any currently. She denies any vaginal bleeding and reports positive movement. She denies any fevers, chills, HA, N/V/D, CP, RUQ pain, SOB, increased swelling, or dysuria. Pregnancy is complicated by no prenatal care, noncompliance, AMA, fetal drug exposure (latuda), poor historian, anemia, schizoaffective disorder, bipolar disorder     DATING:  LMP: No LMP recorded (lmp unknown). Patient is pregnant.   Estimated Date of Delivery: 3/31/19   Based on: 14w4d     PREGNANCY RISK FACTORS:  Patient Active Problem List   Diagnosis    Schizoaffective disorder, bipolar type ([facility]    Polysubstance abuse ([facility]    No prenatal care in current pregnancy    Macrocytic anemia    Advanced maternal age in multigravida    Patient does not believe she is pregnant, refusing care    Noncompliance    Bacterial vaginosis    Yeast infection    Poor historian    Fetal drug exposure (latuda)    HRP (high risk pregnancy), second trimester        Steroids Given In This Pregnancy:  no     REVIEW OF SYSTEMS:   Constitutional: negative fever, negative chills  HEENT: negative visual disturbances, negative headaches  Respiratory: negative dyspnea, negative cough  Cardiovascular: negative chest pain,  negative palpitations  Gastrointestinal: positive abdominal pain--resolved, negative RUQ pain, negative N/V, negative diarrhea, negative constipation  Genitourinary: negative dysuria, negative See García MD       FAMILY HISTORY:  family history includes Cancer in her mother. SOCIAL HISTORY:   reports that she has been smoking Cigarettes and Cigars. She has been smoking about 0.25 packs per day. She uses smokeless tobacco. She reports that she does not drink alcohol or use drugs. VITALS:  Vitals:    02/08/19 0233   BP: (!) 100/58   Pulse: 77   Resp: 16       PHYSICAL EXAM:  Fetal Heart Monitor:  Baseline Heart Rate 140, moderate variability, present accelerations, absent decelerations  [place]: contractions, none    General appearance:  no apparent distress, alert and cooperative  Neurologic:  alert, oriented, normal speech, no focal findings or movement disorder noted  Lungs:  No increased work of breathing, good air exchange, clear to auscultation bilaterally, no crackles or wheezing  Heart:  regular rate and rhythm and no murmur    Abdomen:  soft, gravid, non-tender, no right upper quadrant tenderness, no CVA tenderness, uterus non-tender, no signs of abruption and no signs of chorioamnionitis  Extremities:  no calf tenderness, non edematous, DTR's: normal    Pelvic Exam:   Speculum: Normal appearing external genitalia, vulva and vagina without edema, erythema or masses, no gross  blood, cervix visually closed with thickness, no pooling      Cervix Check: Closed dilated,thick, and high     DATA:  Membranes Ruptured: No  Fern: negative  Nitrazine: Negative  Valsalva/Pooling: absent  Vaginal Bleeding: absent    LIMITED BEDSIDE US/BPP  Position: Cephalic  Placental Location: posterior  Fetal Heart Tones: Present  Fetal Movement: Present  Fetal Tone: Present  Fetal Breathing: Present   Amniotic Fluid Index/Volume: 20.21 cm  Estimated Fetal Weight:  4 lbs 6 oz    PRENATAL LAB RESULTS:   Blood Type/Rh: B pos  Antibody Screen: negative  Hemoglobin, Hematocrit, Platelets: 26.7 / 43.1 / 335  Rubella: immune  T.  Pallidum, IgG: non-reactive   Hepatitis B Surface Antigen: non-reactive   HIV: non-reactive   Sickle Cell Screen: not done  Gonorrhea: negative  Chlamydia: negative  Urine culture: not done    1 hour Glucose Tolerance Test: ordered but not done     Group B Strep: not done  Cystic Fibrosis Screen: not available  First Trimester Screen: not available  MSAFP/Multiple Markers: not available  Non-Invasive Prenatal Testing: not available  Anatomy US: limited but posterior. 3vc. Normal    ASSESSMENT & PLAN:  Adali Dee is a 41 y.o. female  at [address] [place] [address],[address]   - GBS unknown / Rh positive / R immune   - No indication for GBS prophylaxis until delivery is imminent    - GBS collected and sent    - Prenatal labs obtained 19   - S/p tdap/influenza vaccine 19   - Patient refused 1hr gtt, Hgb A1C 4.1 on 19   - Limited anatomy US 19 with no abnormalities seen   - Request for formal MFM consult at [facility] denied by ethics/risk management as patient too unstable for transport and increased flight risk               - Still recommend formal MFM consult and Ultrasound when patient clinically stable; please notify when ethics, risk management and psych in agreeance when ok to transport patient so appointment can be set              - No s/s of  labor at this time              - Recommend continue PNV daily     - Patient needs  testing. Twice weekly NST and once weekly BPPs              - Will continue to attempt to see patient daily once back at [facility]    Leaking of fluid and contractions--resolved   - VSS. Afebrile. Patient is currently asymptomatic and denies any complaints    - FHT reactive with no contractions on toco   - BPP performed and  with JADEN of 20.21cm   - LBUS: posterior placenta, cephalic presentation, 4lb 5oz 32w6d, consistent with GA   - Speculum: cervix visually closed, no pooling of fluid or bleeding.  Minimum thin discharge in vault that appears physiologic   - SVE: closed, thick, and high    - Nitrazine and ferning negative   - GC/C and vaginitis probe collected and sent   - UA ordered but patient declined    - Low suspicion for  labor or ROM. Medically stable for discharge to [facility]. Precautions given to patient     No prenatal care/Noncompliance   - Rec  testing     AMA    - NIPT recommended. - Kit to be  from [employer]     Fetal drug exposure (latuda)     Poor historian     Anemia   - Hgb 10.5 on 19   - Continue iron supplement    - Clinically asymptomatic     Schizoaffective disorder, bipolar disorder    - Management per psych   - patient will be readmitted to [facility]. Patient amendable to plan    - Currently on Latuda 160mg po    - Please treat patient accordingly (except cat D and X drugs), organogenesis is already completed    Asthma--controlled     Hx tobacco use   - Nicorette gum         Patient Active Problem List    Diagnosis Date Noted    Fetal drug exposure (latuda) 2019     Priority: High    Advanced maternal age in multigravida 2018     Priority: High    Patient does not believe she is pregnant, refusing care 2018     Priority: High    No prenatal care in current pregnancy 2018     Priority: High     BHCG 4720 on 18  Patient has repeatedly declined exams and to be seen by ED      Schizoaffective disorder, bipolar type ([facility] 2014     Priority: High    Bacterial vaginosis 2019     Priority: Medium    Yeast infection 2019     Priority: Medium    Poor historian 2019     Priority: Medium    Macrocytic anemia 2018     Priority: Medium    Noncompliance 2018     Priority: Medium    Polysubstance abuse ([facility] 2018     Priority: Medium    HRP (high risk pregnancy), second trimester 2019       Plan discussed with Dr. Eamon Shah, who is agreeable. Steroids given this admission: No    Risks, benefits, alternatives and possible complications have been discussed in detail with the patient.  Admission, and post admission procedures and expectations

## 2019-02-08 NOTE — PLAN OF CARE
Problem: Altered Mood, Psychotic Behavior:  Goal: Able to demonstrate trust by eating, participating in treatment and following staff's direction  Able to demonstrate trust by eating, participating in treatment and following staff's direction   Outcome: Ongoing  Patient accepting of fluids, meals and snack this evening. Goal: Absence of self-harm  Absence of self-harm   Outcome: Ongoing  Denies suicidal and homicidal ideations. Problem: Falls - Risk of:  Goal: Will remain free from falls  Will remain free from falls   Outcome: Ongoing  Remains free from falls. Non skid footwear with ambulation. Bed in low position.

## 2019-02-09 PROBLEM — F25.9 SCHIZOAFFECTIVE DISORDER (HCC): Chronic | Status: ACTIVE | Noted: 2019-02-08

## 2019-02-09 PROCEDURE — 1240000000 HC EMOTIONAL WELLNESS R&B

## 2019-02-09 PROCEDURE — 6370000000 HC RX 637 (ALT 250 FOR IP): Performed by: NURSE PRACTITIONER

## 2019-02-09 RX ADMIN — NICOTINE POLACRILEX 2 MG: 2 GUM, CHEWING BUCCAL at 16:46

## 2019-02-09 RX ADMIN — LURASIDONE HYDROCHLORIDE 160 MG: 80 TABLET, FILM COATED ORAL at 16:46

## 2019-02-09 RX ADMIN — NICOTINE POLACRILEX 2 MG: 2 GUM, CHEWING BUCCAL at 09:06

## 2019-02-09 RX ADMIN — FERROUS SULFATE TAB 325 MG (65 MG ELEMENTAL FE) 325 MG: 325 (65 FE) TAB at 16:46

## 2019-02-09 RX ADMIN — Medication 1 TABLET: at 16:46

## 2019-02-09 NOTE — PROGRESS NOTES
OBGYN Resident Interval Note    Called [facility] and spoke with RN. RN states that patient is refusing to be seen by OB this morning. Please page OB resident on call if patient is agreeable to exam at a later time or has any obstetrical complaints.      David Raygoza DO  OBGYN Resident, PGY-4  Pager: [phone]  6:39 AM

## 2019-02-09 NOTE — PLAN OF CARE
Problem: Risk of Harm:  Goal: Ability to remain free from injury will improve  Ability to remain free from injury will improve   Outcome: Ongoing  Patient denies any thoughts of self harm. Denies hallucinations. Uncooperative. Refuses select medications. Refuses assessments. Labile. 15 minute checks maintained for safety.

## 2019-02-09 NOTE — PLAN OF CARE
Problem: Anger Management/Homicidal Ideation:  Intervention: Assess homicidal risk  [facility]  Week Interdisciplinary Treatment Plan Note     Review Date & Time: 2/9/19 3649    Admission Type:   Admission Type: Involuntary (Signed in.)    Reason for admission:  Reason for Admission: Patient was sent to [address] [place] [address] and [facility] for assessment. Estimated Length of Stay :  5-7 days  Estimated Discharge Date Update: to be determined by physician    PATIENT STRENGTHS:  Patient Strengths:Strengths: Connection to output provider  Patient Strengths and Limitations:Limitations: Unrealistic self-view, Hopeless about future, Apathetic / unmotivated, Tendency to isolate self  Addictive Behavior:Addictive Behavior  In the past 3 months, have you felt or has someone told you that you have a problem with:  : None  Do you have a history of Chemical Use?: No  Do you have a history of Alcohol Use?: No  Do you have a history of Street Drug Abuse?: No  Histroy of Prescripton Drug Abuse?: No  Medical Problems:   Past Medical History:   Diagnosis Date    Anxiety     Asthma     Bipolar 1 disorder ([facility]     Multiple personality ([facility]     Seizures ([facility]        Risk:  Fall RiskTotal: 94  Stephen Scale Stephen Scale Score: 22  BVC Total: 0    Change in scores no. Changes to plan of Care no    Status EXAM:   Status and Exam  Normal: No  Facial Expression: Expressionless, Flat  Affect: Incongruent, Unstable  Level of Consciousness: Alert  Mood:Normal: No  Mood: Anxious, Irritable  Motor Activity:Normal: No  Motor Activity: Decreased  Interview Behavior: Impulsive, Irritable  Preception: [place] to Person, [place] to Time, [place] to Place, [place] to Situation  Attention:Normal: No  Attention: Distractible, Unable to Concentrate  Thought Processes: Flt.of Ideas, Loose Assoc., Blocking  Thought Content:Normal: No  Thought Content: Paranoia  Hallucinations: None  Delusions: No  Delusions:  Other(See Comment)  Memory:Normal: Yes  Memory: Confabulation, Poor Recent, Poor Remote  Insight and Judgment: No  Insight and Judgment: Poor Judgment, Poor Insight, Unmotivated, Unrealistic  Present Suicidal Ideation: No  Present Homicidal Ideation: No    Daily Assessment Last Entry:   Daily Sleep (WDL): Within Defined Limits         Patient Currently in Pain: Denies  Daily Nutrition (WDL): Within Defined Limits    Patient Monitoring:  Frequency of Checks: 4 times per hour, close    Psychiatric Symptoms:   Depression Symptoms  Depression Symptoms: Isolative, Loss of interest, Impaired concentration, Increased irritability  Anxiety Symptoms  Anxiety Symptoms: Generalized  Lora Symptoms  Lora Symptoms: Flight of ideas, Pressured speech, Poor judgment     Psychosis Symptoms  Delusion Type: Somatic    Suicide Risk CSSR-S:  1) Within the past month, have you wished you were dead or wished you could go to sleep and not wake up? : No  2) Have you actually had any thoughts of killing yourself? : No  6) Have you ever done anything, started to do anything, or prepared to do anything to end your life?: No  Change in Result no Change in Plan of care no    EDUCATION:   Learner Progress Toward Treatment Goals: Reviewed results and recommendations of this team, Reviewed group plan and strategies, Reviewed signs, symptoms and risk of self harm and violent behavior, Reviewed goals and plan of care    Method: individual education, small group, verbal education    Outcome: verbalized understanding, but needs reinforcement to obtain goals    PATIENT GOALS:  Short term: \"Decrease temper tantrums\"  Long term: To follow up with [facility]    PLAN/TREATMENT RECOMMENDATIONS UPDATE:   Continue with group therapies, education of coping skills   Continue to monitor patient on unit. Medications provided/ medication compliance by patient. Continue for plans to obtain long term goals after discharge.     SHORT-TERM GOALS UPDATE:  Time frame for Short-Term

## 2019-02-09 NOTE — BH NOTE
Department of Psychiatry  Attending Progress Note  Chief Complaint: Schizoaffective disorder ([address]     SUBJECTIVE:    Patient is pacing around the unit aimlessly. She has poor eye contact and poor rapport. She has delusions of persecution and delusions of reference. She is hearing voices. OBJECTIVE    Physical  BP (!) 111/57   Pulse 99   Resp 14   Ht 5' 8\" (1.727 m)   LMP  (LMP Unknown)   BMI 19.77 kg/m²      Mental Status Evaluation:    She has persecutory delusions. She had delusions about people trying to kill her and delusions of reference. She has thought withdrawal and thought insertion. She has homicidal thoughts and plans to beat up everybody. She has restricted mood and affect. She has no insight. Her judgment is impaired. She is oriented to time place and person. Her memory to recent and remote and immediate events are within normal limits       Recent Results (from the past 72 hour(s))   VAGINITIS DNA PROBE    Collection Time: 02/08/19  7:03 AM   Result Value Ref Range    Specimen Description . VAGINA     Special Requests NOT REPORTED     Direct Exam POSITIVE for Candida sp. (A)     Direct Exam NEGATIVE for Gardnerella vaginalis     Direct Exam NEGATIVE for Trichomonas vaginalis     Direct Exam       Method of testing is a DNA probe intended for detection and identification of    Direct Exam        Candida species, Gardnerella vaginalis, and Trichomonas vaginalis nucleic acid    Direct Exam        in vaginal fluid specimens from patients with symptoms of vaginitis/vaginosis.     Status FINAL 02/08/2019        Review of systems  Constitutional:  negative for chills, fevers, sweats  Respiratory:  negative for cough, dyspnea on exertion, hemoptysis, shortness of breath, wheezing  Cardiovascular:  negative for chest pain, chest pressure/discomfort, lower extremity edema, palpitations  Gastrointestinal:  negative for abdominal pain, constipation, diarrhea, nausea,

## 2019-02-09 NOTE — PLAN OF CARE
Problem: Anger Management/Homicidal Ideation:  Goal: Absence of angry outbursts  Absence of angry outbursts   Outcome: Ongoing  No angry outbursts noted this evening    Problem: Risk of Harm:  Goal: Ability to remain free from injury will improve  Ability to remain free from injury will improve   Outcome: Ongoing  Patient refused most assessments and vitals. States, \"no, I'm fine I don't need that done. \" when asked about getting vitals or assessments

## 2019-02-09 NOTE — BH NOTE
PSYCHOEDUCATION GROUP NOTE    Date: 2/9/2019  Start Time: 1600  End Time: 1640    Number Participants in Group:  10/15    Goal:  Patient will demonstrate increased interpersonal interaction   Topic: ABC's of Coping    Discipline Responsible:   OT  AT   x Nsg.  RT MHP Other       Participation Level:     None x Minimal    Active Listener  Interactive    Monopolizing         Participation Quality:   Appropriate  Inappropriate          Attentive        Intrusive          Sharing        Resistant          Supportive xx       Lethargic       Affective:    Congruent  Incongruent  Blunted  Flat    Constricted  Anxious  Elated  Angry    Labile  Depressed  Other         Cognitive:  x Alert  Oriented PPTP     Concentration  G  F x P   Attention Span  G  F x P   Short-Term Memory  G  F  P   Long-Term Memory  G  F  P   ProblemSolving/  Decision Making  G  F x P   Ability to Process  Information  G  F x P      Contributing Factors             Delusional             Hallucinating             Flight of Ideas             Other:       Modes of Intervention:   Education x Support  Exploration    Clarifying x Problem Solving  Confrontation    Socialization  Limit Setting  Reality Testing   x Activity  Movement  Media    Other:            Response to Learning:   Able to verbalize current knowledge/experience    Able to verbalize/acknowledge new learning    Able to retain information    Capable of insight    Able to change behavior    Progressing to goal    Other:        Comments: Sat of to side of group activity and answered one question during group.

## 2019-02-09 NOTE — PLAN OF CARE
Problem: Anger Management/Homicidal Ideation:  Goal: Absence of angry outbursts  Absence of angry outbursts   Outcome: Ongoing  Pt did not attend Therapeutic Recreation at 1100 d/t resting in room despite staff invitation to attend.

## 2019-02-10 PROCEDURE — 6370000000 HC RX 637 (ALT 250 FOR IP): Performed by: NURSE PRACTITIONER

## 2019-02-10 PROCEDURE — 1240000000 HC EMOTIONAL WELLNESS R&B

## 2019-02-10 RX ADMIN — Medication 1 TABLET: at 17:59

## 2019-02-10 RX ADMIN — FERROUS SULFATE TAB 325 MG (65 MG ELEMENTAL FE) 325 MG: 325 (65 FE) TAB at 17:59

## 2019-02-10 RX ADMIN — NICOTINE POLACRILEX 2 MG: 2 GUM, CHEWING BUCCAL at 11:30

## 2019-02-10 RX ADMIN — LURASIDONE HYDROCHLORIDE 160 MG: 80 TABLET, FILM COATED ORAL at 17:59

## 2019-02-10 NOTE — PLAN OF CARE
Problem: Anger Management/Homicidal Ideation:  Goal: Absence of angry outbursts  Absence of angry outbursts   Outcome: Ongoing  PsychoEducation Group Note    Date: 2/10/2019  Start Time: 10:00AM  End Time: 10:45AM    Number Participants in Group:  7    Goal:  Patient will demonstrate increased interpersonal interaction   Topic: [place] Psych[facility] Group    Discipline Responsible:   OT  AT x SW  Nsg.  RT  Other       Participation Level:     None  Minimal   x Active Listener  Interactive    Monopolizing         Participation Quality:   Appropriate  Inappropriate   x       Attentive        Intrusive          Sharing        Resistant          Supportive        Lethargic       Affective:    Congruent  Incongruent x Blunted  Flat    Constricted  Anxious  Elated  Angry    Labile  Depressed  Other         Cognitive:  x Alert x Oriented PPTP     Concentration  G  F x P   Attention Span  G  F x P   Short-Term Memory  G  F x P   Long-Term Memory  G  F x P   Problem Solving/  Decision Making  G  F x P   Ability to Process  Information  G  F x P      Contributing Factors             Delusional             Hallucinating             Flight of Ideas             Other:       Modes of Intervention:  x Education x Support x Exploration   x Clarifying x Problem Solving x Confrontation   x Socialization x Limit Setting x Reality Testing    Activity  Movement  Media    Other:            Response to Learning:  x Able to verbalize current knowledge/experience   x Able to verbalize/acknowledge new learning   x Able to retain information    Capable of insight    Able to change behavior   x Progressing to goal    Other:

## 2019-02-10 NOTE — BH NOTE
Department of Psychiatry  Attending Progress Note  Chief Complaint: Schizoaffective disorder [facility])     SUBJECTIVE:    Patient is currently pregnant. She is also under ObGyn care. She has no insight. She is unable to take care of her basic needs. She wanders around the unit aimlessly. She does not interact with anybody. OBJECTIVE    Physical  /63   Pulse 98   Temp 98 °F (36.7 °C) (Oral)   Resp 14   Ht 5' 8\" (1.727 m)   LMP  (LMP Unknown)   BMI 19.77 kg/m²      Mental Status Evaluation:  She feels that people are watching her and monitoring her closely. She complains of trouble with sleeping. She complains of inability to interact with people. She is guarded and withdrawn. She has a restricted mood and affect. She is oriented to time place and person. Her memory to recent and remote and immediate events are within normal limits    Her judgment and insight are impaired. Treatment and plan:           Recent Results (from the past 72 hour(s))   VAGINITIS DNA PROBE    Collection Time: 02/08/19  7:03 AM   Result Value Ref Range    Specimen Description . VAGINA     Special Requests NOT REPORTED     Direct Exam POSITIVE for Candida sp. (A)     Direct Exam NEGATIVE for Gardnerella vaginalis     Direct Exam NEGATIVE for Trichomonas vaginalis     Direct Exam       Method of testing is a DNA probe intended for detection and identification of    Direct Exam        Candida species, Gardnerella vaginalis, and Trichomonas vaginalis nucleic acid    Direct Exam        in vaginal fluid specimens from patients with symptoms of vaginitis/vaginosis. Status FINAL 02/08/2019    STREP B SCREEN, VAGINAL / RECTAL    Collection Time: 02/08/19  7:03 AM   Result Value Ref Range    Specimen Description . VAGINA     Special Requests NOT REPORTED     Culture CULTURE IN PROGRESS     Status Pending        Review of systems  Constitutional:  negative for chills, fevers, sweats  Respiratory:  negative for cough, dyspnea on exertion, hemoptysis, shortness of breath, wheezing  Cardiovascular:  negative for chest pain, chest pressure/discomfort, lower extremity edema, palpitations  Gastrointestinal:  negative for abdominal pain, constipation, diarrhea, nausea, vomiting  Neurological:  negative for dizziness, headache      Medications  Current Facility-Administered Medications   Medication Dose Route Frequency Provider Last Rate Last Dose    acetaminophen (TYLENOL) tablet 650 mg  650 mg Oral Q4H PRN Siva Linnette, APRN - CNP        hydrOXYzine (ATARAX) tablet 25 mg  25 mg Oral TID PRN Siva Linnette, APRN - CNP        albuterol sulfate  (90 Base) MCG/ACT inhaler 2 puff  2 puff Inhalation Q6H PRN Siva Linnette, APRN - CNP   2 puff at 02/08/19 1004    diphenhydrAMINE (BENADRYL) tablet 25 mg  25 mg Oral Nightly PRN Siva Linnette, APRN - CNP        ferrous sulfate tablet 325 mg  325 mg Oral BID WC Siva Linnette, APRN - CNP   325 mg at 02/09/19 1646    lurasidone (LATUDA) tablet 160 mg  160 mg Oral Dinner Siva Linnette, APRN - CNP   160 mg at 02/09/19 1646    nicotine polacrilex (NICORETTE) gum 2 mg  2 mg Oral PRN Siva Linnette, APRN - CNP   2 mg at 02/09/19 1646    prenatal vitamin 28-0.8 MG tablet 1 tablet  1 tablet Oral Dinner Siva Linnette, APRN - CNP   1 tablet at 02/09/19 1646         ferrous sulfate  325 mg Oral BID WC    lurasidone  160 mg Oral Dinner    prenatal vitamin  1 tablet Oral Dinner       ASSESSMENT  Schizoaffective disorder (HCC)     Patient's Response to Treatment: positive    PLAN  Dragon voice recognition software used in portions of this document. Continue current management. Supportive therapy was given.

## 2019-02-10 NOTE — PLAN OF CARE
Problem: Anger Management/Homicidal Ideation:  Goal: Absence of angry outbursts  Absence of angry outbursts   Outcome: Ongoing  Patient denies all; states that she just wants to sleep; writer asked about baby and patient asked 'my baby?' than said that baby is fine'; no  Outburst thus far on shift; staff continues to monitor and care, checking on patient every 15 minutes and randomly to ensure safety. Problem: Risk of Harm:  Goal: Ability to remain free from injury will improve  Ability to remain free from injury will improve   Outcome: Ongoing  Patient denies desire to harm self; staff continues to monitor and care, providing safety via protocol and random checks.

## 2019-02-10 NOTE — PLAN OF CARE
Problem: Anger Management/Homicidal Ideation:  Goal: Absence of angry outbursts  Absence of angry outbursts   Outcome: Ongoing  PSYCHOEDUCATION GROUP NOTE    Date: 2/10/19  Start Time: 1335  End Time: 1405    Number Participants in Group:  9    Goal:  Patient will demonstrate increased interpersonal interaction   Topic: Music trivia    Discipline Responsible:   OT  AT  [facility]. x RT [facility]HP Other       Participation Level:     None x Minimal    Active Listener  Interactive    Monopolizing         Participation Quality:  x Appropriate  Inappropriate          Attentive        Intrusive          Sharing x       Resistant          Supportive        Lethargic       Affective:    Congruent  Incongruent  Blunted  Flat    Constricted  Anxious  Elated  Angry   x Labile  Depressed  Other         Cognitive:  x Alert  Oriented PPTP     Concentration  G  F x P   Attention Span  G  F x P   Short-Term Memory  G  F x P   Long-Term Memory  G  F  P   ProblemSolving/  Decision Making  G  F  P   Ability to Process  Information  G  F  P      Contributing Factors             Delusional             Hallucinating             Flight of Ideas             Other:       Modes of Intervention:  x Education x Support x Exploration    Clarifying x Problem Solving  Confrontation   x Socialization  Limit Setting  Reality Testing   x Activity  Movement  Media    Other:            Response to Learning:  x Able to verbalize current knowledge/experience    Able to verbalize/acknowledge new learning    Able to retain information    Capable of insight    Able to change behavior   x Progressing to goal    Other:        Comments: Pt attended group and observed but did not participate.

## 2019-02-10 NOTE — PROGRESS NOTES
OB/GYN Resident Interval Note     Called [facility] to ask if patient was willing to be seen by OB/GYN. Patient not accepting of evaluation at this time. Instructed [facility] to call OB with any questions/concerns. Plan to start  testing this week for the remanider of pregnancy with NSTs  and NST/BPP .      Anila Lujan DO  Ob/Gyn Resident  Pager: [phone]  2/10/2019 6:43 AM

## 2019-02-10 NOTE — PLAN OF CARE
Problem: Anger Management/Homicidal Ideation:  Goal: Absence of angry outbursts  Absence of angry outbursts   Outcome: Ongoing  Patient remains flat and expressionless. Irritable. Shows some insight into pregnancy at select moments. Paranoid. Suspicious. Refuses assessments. 15 minute checks maintained for safety.

## 2019-02-11 LAB
C TRACH DNA GENITAL QL NAA+PROBE: NEGATIVE
CULTURE: NORMAL
Lab: NORMAL
N. GONORRHOEAE DNA: NEGATIVE
SPECIMEN DESCRIPTION: NORMAL
SPECIMEN DESCRIPTION: NORMAL

## 2019-02-11 PROCEDURE — 6370000000 HC RX 637 (ALT 250 FOR IP): Performed by: NURSE PRACTITIONER

## 2019-02-11 PROCEDURE — 1240000000 HC EMOTIONAL WELLNESS R&B

## 2019-02-11 RX ADMIN — NICOTINE POLACRILEX 2 MG: 2 GUM, CHEWING BUCCAL at 12:30

## 2019-02-11 NOTE — BH NOTE
Writer called OB in regarding pt and the discontinuing of the OB consult. OB stated that they will still monitor fetal heart tones daily, and  testing twice weekly (Monday and Thursday), and ultrasound on . Even though the discontinued the Consult they will still monitor the patient.

## 2019-02-11 NOTE — PLAN OF CARE
Problem: Anger Management/Homicidal Ideation:  Goal: Absence of angry outbursts  Absence of angry outbursts   Outcome: Ongoing  Pt did not attend Therapeutic Recreation at 1430 d/t resting in room despite staff invitation to attend.

## 2019-02-11 NOTE — PLAN OF CARE
Problem: Anger Management/Homicidal Ideation:  Goal: Absence of angry outbursts  Absence of angry outbursts   Outcome: Ongoing  Patient denies all; states that she just wants to sleep, and she will see her OB doctor in the morning; no outburst thus far on shift; staff continues to monitor and care, checking on patient every 15 minutes and randomly to ensure safety. Problem: Risk of Harm:  Goal: Ability to remain free from injury will improve  Ability to remain free from injury will improve   Outcome: Ongoing  Patient denies any intentions to harm self; staff continues to monitor and care, providing safety via protocol and random checks.

## 2019-02-11 NOTE — PLAN OF CARE
Problem: Anger Management/Homicidal Ideation:  Goal: Absence of angry outbursts  Absence of angry outbursts   Outcome: Ongoing  Pt did not attend RT group at 1100 d/t resting in room despite staff invitation to attend.

## 2019-02-11 NOTE — BH NOTE
Department of Psychiatry  Attending Progress Note  Chief Complaint: Schizoaffective disorder ([address]     SUBJECTIVE:    Patient is lying down in her bed. She is talking to herself. She is irritable and angry. She started screaming for no reason. She has no insight. Her judgment is impaired. She needs help to take care of herself. She wanders around the unit.   OBJECTIVE    Physical  /63   Pulse 98   Temp 98 °F (36.7 °C) (Oral)   Resp 14   Ht 5' 8\" (1.727 m)   LMP  (LMP Unknown)   BMI 19.77 kg/m²      Mental Status Evaluation:  Orientation: alertness: alert   Mood:. constricted      Affect:  blunted      Appearance:  disheveled   Activity:  Psychomotor Agitation   Gait/Posture: Normal   Speech:  normal pitch and normal volume   Thought Process:  blocked   Thought Content:  She has delusions and hallucinations   Sensorium:  She is oriented to person and place   Cognition:  grossly intact   Memory: intact   Insight:  limited   Judgment: limited   Suicidal Ideations: denies suicidal ideation   Homicidal Ideations: Negative for homicidal ideation      Medication Side Effects: absent       Attention Span attention span appeared shorter than expected for age     Medications  Current Facility-Administered Medications   Medication Dose Route Frequency Provider Last Rate Last Dose    acetaminophen (TYLENOL) tablet 650 mg  650 mg Oral Q4H PRN Marilu Gonzales APRN - CNP        hydrOXYzine (ATARAX) tablet 25 mg  25 mg Oral TID PRN Marilu Gonzales APRN - CNP        albuterol sulfate  (90 Base) MCG/ACT inhaler 2 puff  2 puff Inhalation Q6H PRN Marilu Gonzales APRN - CNP   2 puff at 02/08/19 1004    diphenhydrAMINE (BENADRYL) tablet 25 mg  25 mg Oral Nightly PRN Marilu Gonzales, APRN - CNP        ferrous sulfate tablet 325 mg  325 mg Oral BID WC Marilu Gonzales APRN - CNP   325 mg at 02/10/19 9019    lurasidone (LATUDA) tablet 160 mg  160 mg Oral Dinner Marilu Gonzales APRN - CNP   160 mg at 02/10/19

## 2019-02-11 NOTE — PROGRESS NOTES
OBGYN Resident Interval Note    Writer called and spoke with RN taking care of patient. Patient is scheduled to receive NST on labor and delivery unit today. Patient declines to have tracing performed. Writer informed RN to call the unit if the patient changes her mind and in agreeable or has any obstetrical complaints.      Pretty Dang DO  OBGYN Resident, PGY-4  Pager: [phone]  3:38 PM

## 2019-02-11 NOTE — PLAN OF CARE
Problem: Anger Management/Homicidal Ideation:  Goal: Absence of angry outbursts  Absence of angry outbursts   Outcome: Ongoing  Pt comes to the desk to ask for candy and when told we don't have candy she has angry outburst.    Problem: Risk of Harm:  Goal: Ability to remain free from injury will improve  Ability to remain free from injury will improve   Outcome: Ongoing  Pt is free from harm. Safety is maintained at this time. Problem: Falls - Risk of:  Goal: Will remain free from falls  Will remain free from falls   Outcome: Ongoing  Pt remains free from falls at this time. Goal: Absence of physical injury  Absence of physical injury   Outcome: Ongoing  Pt is absent of physical injury.

## 2019-02-11 NOTE — PROGRESS NOTES
OB Resident Progress Note     Called I and was informed patient is sleeping and states she will not be seen at this time. Patient was previously stating she would be willing to see OB this morning, however, she has changed her mind. Informed RN that patient is to receive  testing today. RN agreeable to informing patient of this when she is awake. Please let OB know if patient is agreeable to exam at a later time.        Roman Bonner  OB/GYN Resident, PGY2  Pager: [phone] [address]   19  7:25 AM

## 2019-02-12 PROBLEM — O09.92 HRP (HIGH RISK PREGNANCY), SECOND TRIMESTER: Status: RESOLVED | Noted: 2019-02-08 | Resolved: 2019-02-12

## 2019-02-12 PROBLEM — Z3A.33 33 WEEKS GESTATION OF PREGNANCY: Status: ACTIVE | Noted: 2019-02-12

## 2019-02-12 PROCEDURE — 1240000000 HC EMOTIONAL WELLNESS R&B

## 2019-02-12 NOTE — PLAN OF CARE
Problem: Anger Management/Homicidal Ideation:  Goal: Absence of angry outbursts  Absence of angry outbursts   Outcome: Ongoing  Patient had no angry outbursts at this time. Patient has been isolative to room. Patient is free of self harm at this time. Patient agrees to seek out staff if thoughts to harm self arise. Staff will provide support and reassurance as needed. Safety checks maintained every 15 minutes.

## 2019-02-12 NOTE — PROGRESS NOTES
OB Resident Progress Note     Writer called [facility] to discuss NST with RN. Patient states she \"will not come right now. \"  Please continue to encourage compliance with  testing.        Bird Draper  OB/GYN Resident, PGY2  Pager: [phone] 965 [facility]  19  7:31 PM

## 2019-02-12 NOTE — PLAN OF CARE
Problem: Risk of Harm:  Goal: Ability to remain free from injury will improve  Ability to remain free from injury will improve   Outcome: Ongoing  Patient remains free from injury at this time. (2) probably inadequate

## 2019-02-12 NOTE — BH NOTE
Department of Psychiatry  Attending Progress Note  Chief Complaint: Schizoaffective disorder ([address]     SUBJECTIVE:    Patient is wandering around the unit. She has negative symptoms. She has poor speech output. She has poor eye contact. She has poor rapport. She feels that people are trying to harm her and hurt her. She has been experiencing auditory hallucinations.   OBJECTIVE    Physical  /63   Pulse 98   Temp 98 °F (36.7 °C) (Oral)   Resp 14   Ht 5' 8\" (1.727 m)   LMP  (LMP Unknown)   BMI 19.77 kg/m²      Mental Status Evaluation:  Orientation: alertness: alert   Mood:. anxious and constricted      Affect:  blunted      Appearance:  age appropriate   Activity:  Psychomotor Retardation   Gait/Posture: Normal   Speech:  delayed, increased latency of response, normal pitch and normal volume   Thought Process:  blocked   Thought Content:  She hasdelusions and hallucinations   Sensorium:  person and place   Cognition:  grossly intact   Memory: intact   Insight:  limited   Judgment: limited   Suicidal Ideations: denies suicidal ideation   Homicidal Ideations: Negative for homicidal ideation      Medication Side Effects: absent       Attention Span attention span appeared shorter than expected for age     Medications  Current Facility-Administered Medications   Medication Dose Route Frequency Provider Last Rate Last Dose    acetaminophen (TYLENOL) tablet 650 mg  650 mg Oral Q4H PRN Bryant Solis, APRN - CNP        hydrOXYzine (ATARAX) tablet 25 mg  25 mg Oral TID PRN Bryant Solis, APRN - CNP        albuterol sulfate  (90 Base) MCG/ACT inhaler 2 puff  2 puff Inhalation Q6H PRN Bryant Solis, APRN - CNP   2 puff at 02/08/19 1004    diphenhydrAMINE (BENADRYL) tablet 25 mg  25 mg Oral Nightly PRN Bryant Solis, APRN - CNP        ferrous sulfate tablet 325 mg  325 mg Oral BID WC Bryant Solis, APRN - CNP   325 mg at 02/10/19 4489    lurasidone (LATUDA) tablet 160 mg  160 mg Oral Dinner Floretta Grumbling, APRN - CNP   160 mg at 02/10/19 1759    nicotine polacrilex (NICORETTE) gum 2 mg  2 mg Oral PRN Kelietta Alexumbling, APRN - CNP   2 mg at 02/11/19 1230    prenatal vitamin 28-0.8 MG tablet 1 tablet  1 tablet Oral Dinner Kelietta Alexumbling, APRN - CNP   1 tablet at 02/10/19 1759         ferrous sulfate  325 mg Oral BID WC    lurasidone  160 mg Oral Dinner    prenatal vitamin  1 tablet Oral Dinner       ASSESSMENT  Schizoaffective disorder ([facility] [address]     Patient's Response to Treatment: positive    PLAN  Dragon voice recognition software used in portions of this document. To continue current management.

## 2019-02-12 NOTE — BH NOTE
Pt did not participate  In coping skills group at 1600 due to resting in room despite staff invitation to attend.

## 2019-02-12 NOTE — PLAN OF CARE
Problem: Anger Management/Homicidal Ideation:  Goal: Absence of angry outbursts  Absence of angry outbursts   Outcome: Ongoing  Patient is irritable at times. Refuses assessments and lab testing. Self talk observed. Flight of ideas. 15 minute checks maintained for safety.

## 2019-02-12 NOTE — PLAN OF CARE
Problem: Anger Management/Homicidal Ideation:  Goal: Absence of angry outbursts  Absence of angry outbursts   Outcome: Ongoing  Pt declined to attend psychotherapy at 1000 am despite encouragement. Pt offered 1:1 and refused.

## 2019-02-13 PROCEDURE — 6370000000 HC RX 637 (ALT 250 FOR IP): Performed by: NURSE PRACTITIONER

## 2019-02-13 PROCEDURE — 1240000000 HC EMOTIONAL WELLNESS R&B

## 2019-02-13 RX ADMIN — NICOTINE POLACRILEX 2 MG: 2 GUM, CHEWING BUCCAL at 12:01

## 2019-02-13 ASSESSMENT — PAIN SCALES - GENERAL: PAINLEVEL_OUTOF10: 0

## 2019-02-13 NOTE — PLAN OF CARE
Problem: Anger Management/Homicidal Ideation:  Goal: Absence of angry outbursts  Absence of angry outbursts   Outcome: Ongoing  Patient is free from any angry outbursts at this time. Selective when she will take medications. Refusing prenatal care. Poor ADLS despite encouragement from staff. 15 minute checks maintained for safety.

## 2019-02-13 NOTE — PROGRESS NOTES
OB/GYN Resident Interval Note     Writer called [facility] to ask if patient is agreeable to NST/BPP at this time. Patient refusing NST/BPP. Will try again tomorrow. Instructed RN to please call L&D unit if patient becomes agreeable at any time or if patient has any obstetric complaints.      Ramonita Higuera DO  Ob/Gyn Resident  Pager: [phone]  2/13/2019 3:45 PM

## 2019-02-13 NOTE — PROGRESS NOTES
OB Resident Progress Note     RN called and states patient is declining  testing today. Please call if she is willing to be seen at a later time.        Olamide Baker  OB/GYN Resident, PGY2  Pager: [phone] 965 [facility]  19  9:40 PM

## 2019-02-13 NOTE — PLAN OF CARE
Problem: Anger Management/Homicidal Ideation:  Goal: Absence of angry outbursts  Absence of angry outbursts   Outcome: Ongoing  PT absent of angry outburst

## 2019-02-13 NOTE — BH NOTE
PSYCHOEDUCATION GROUP NOTE     Date: 2/13/2019                          Start Time: 1600                    End Time: 1700     Number Participants in Group:  7/16       Goal:  Patients will link healthy living and wellness to maintaing a physical and mental                                                                                 health balance or functioning well together in a person. Topic: Healthy Living/ Wellness     Discipline Responsible:    OT   AT   [facility].  RT [facility]HPX Other         Participation Level:                   None   Minimal   x Active Listener x Interactive     Monopolizing             Participation Quality:  x Appropriate   Inappropriate           Attentive         Intrusive           Sharing         Resistant           Supportive         Lethargic         Affective:          x Congruent   Incongruent   Blunted   Flat     Constricted   Anxious   Elated   Angry     Labile   Depressed   Other             Cognitive:  x Alert x Oriented PPTP      Concentration x G   F   P   Attention Span x G   F   P   Short-Term Memory x G   F   P   Long-Term Memory x G   F   P   ProblemSolving/  Decision Making x G   F   P   Ability to Process  Information x G   F   P        Contributing Factors              Delusional              Hallucinating              Flight of Ideas              Other:         Modes of Intervention:  x Education   Support   Exploration     Clarifying x Problem Solving   Confrontation   x Socialization   Limit Setting x Reality Testing   x Activity   Movement   Media     Other:                  Response to Learning:    Able to verbalize current knowledge/experience     Able to verbalize/acknowledge new learning     Able to retain information     Capable of insight     Able to change behavior   x Progressing to goal     Other:          Comments: Pt participated with no problem.

## 2019-02-13 NOTE — BH NOTE
Department of Psychiatry  Attending Progress Note  Chief Complaint: Schizoaffective disorder [facility])     SUBJECTIVE:    Patient is disheveled and has poor personal hygiene. She is not taking care of herself. She is pregnant. She is not willing to let the ObGyn exam in her. She had delusions of persecution. She is responding to internal stimuli. She has no insight. Her judgment is impaired.   OBJECTIVE    Physical  /63   Pulse 98   Temp 98 °F (36.7 °C) (Oral)   Resp 14   Ht 5' 8\" (1.727 m)   LMP  (LMP Unknown)   BMI 19.77 kg/m²      Mental Status Evaluation:  Orientation: alertness: alert   Mood:. angry      Affect:  inappropriate      Appearance:  disheveled   Activity:  Psychomotor Agitation   Gait/Posture: Normal   Speech:  normal pitch and normal volume   Thought Process:  circumstantial   Thought Content:  delusions and hallucinations   Sensorium:  person and place   Cognition:  grossly intact   Memory: intact   Insight:  limited   Judgment: limited   Suicidal Ideations: denies suicidal ideation   Homicidal Ideations: Negative for homicidal ideation      Medication Side Effects: absent       Attention Span attention span appeared shorter than expected for age     Medications  Current Facility-Administered Medications   Medication Dose Route Frequency Provider Last Rate Last Dose    acetaminophen (TYLENOL) tablet 650 mg  650 mg Oral Q4H PRN Roxy Gramajo, APRN - CNP        hydrOXYzine (ATARAX) tablet 25 mg  25 mg Oral TID PRN Roxy Gramajo APRN - CNP        albuterol sulfate  (90 Base) MCG/ACT inhaler 2 puff  2 puff Inhalation Q6H PRN Roxy Gramajo APRN - CNP   2 puff at 02/08/19 1004    diphenhydrAMINE (BENADRYL) tablet 25 mg  25 mg Oral Nightly PRN Roxy Gramajo, APRN - CNP        ferrous sulfate tablet 325 mg  325 mg Oral BID WC Roxy Gramajo APRN - CNP   325 mg at 02/10/19 8529    lurasidone (LATUDA) tablet 160 mg  160 mg Oral Dinner Roxy Gramajo APRN - CNP   160 mg at 02/10/19 1759    nicotine polacrilex (NICORETTE) gum 2 mg  2 mg Oral PRN Floretta Grumbling, APRN - CNP   2 mg at 02/11/19 1230    prenatal vitamin 28-0.8 MG tablet 1 tablet  1 tablet Oral Dinner Floretta Grumbling, APRN - CNP   1 tablet at 02/10/19 1759         ferrous sulfate  325 mg Oral BID WC    lurasidone  160 mg Oral Dinner    prenatal vitamin  1 tablet Oral Dinner       ASSESSMENT  Schizoaffective disorder ([facility] [address]     Patient's Response to Treatment: negative    PLAN  Dragon voice recognition software used in portions of this document. Plan to continue current management and monitor her.

## 2019-02-13 NOTE — PROGRESS NOTES
OB Resident Progress Note     Called [facility] and spoke to RN who states \"that's a definite no this morning\" when asked if Ismael Joseph is willing to be evaluated by OB. Patient is still in need of  testing and should present to labor and delivery when willing for NST/BPP. Please continue to encourage compliance. 1h GTT order remains in place as patient has been noncompliant.        Darrall Primrose  OB/GYN Resident, PGY2  Pager: [phone]  [address] [place] [address]   19  6:25 AM

## 2019-02-14 PROCEDURE — 1240000000 HC EMOTIONAL WELLNESS R&B

## 2019-02-14 PROCEDURE — 6370000000 HC RX 637 (ALT 250 FOR IP): Performed by: NURSE PRACTITIONER

## 2019-02-14 RX ADMIN — NICOTINE POLACRILEX 2 MG: 2 GUM, CHEWING BUCCAL at 17:37

## 2019-02-14 NOTE — BH NOTE
All assessments and charting done by LPN reviewed.     Electronically signed by Kyaw Odom RN on 2/14/2019 at 6:07 AM

## 2019-02-14 NOTE — PLAN OF CARE
Problem: Anger Management/Homicidal Ideation:  Goal: Absence of angry outbursts  Absence of angry outbursts   Outcome: Ongoing  Pt did not participate in wellness education/ leisure/ cognitive skills group at 1430 despite staff encouragement to attend.

## 2019-02-14 NOTE — BH NOTE
Department of Psychiatry  Attending Progress Note  Chief Complaint: Schizoaffective disorder [facility])     SUBJECTIVE:    Patient is not taking care of her personal hygiene. She has full body odor. She is responding to internal stimuli. She is pregnant. She is not letting the ObGyn exam her. She has no insight. Her judgment is impaired. She had delusions of persecution. She had delusions of reference.   OBJECTIVE    Physical  /63   Pulse 98   Temp 98 °F (36.7 °C) (Oral)   Resp 14   Ht 5' 8\" (1.727 m)   LMP  (LMP Unknown)   BMI 19.77 kg/m²      Mental Status Evaluation:  Orientation: alertness: alert   Mood:. constricted      Affect:  constricted and inappropriate      Appearance:  disheveled   Activity:  Psychomotor Retardation   Gait/Posture: Normal   Speech:  delayed, increased latency of response, normal pitch and normal volume   Thought Process:  blocked   Thought Content:  delusions and hallucinations   Sensorium:  person and place   Cognition:  grossly intact   Memory: intact   Insight:  limited   Judgment: limited   Suicidal Ideations: denies suicidal ideation   Homicidal Ideations: Negative for homicidal ideation      Medication Side Effects: absent       Attention Span attention span appeared shorter than expected for age     Medications  Current Facility-Administered Medications   Medication Dose Route Frequency Provider Last Rate Last Dose    acetaminophen (TYLENOL) tablet 650 mg  650 mg Oral Q4H PRN Tameka Swanson APRN - WESLY        hydrOXYzine (ATARAX) tablet 25 mg  25 mg Oral TID PRN Tameka Swanson APRN - CNP        albuterol sulfate  (90 Base) MCG/ACT inhaler 2 puff  2 puff Inhalation Q6H PRN Tameka Swanson APRN - CNP   2 puff at 02/08/19 1004    diphenhydrAMINE (BENADRYL) tablet 25 mg  25 mg Oral Nightly PRN Tameka Swanson APRN - CNP        ferrous sulfate tablet 325 mg  325 mg Oral BID WC AUDREY Montaño - CNP   325 mg at 02/10/19 5809    lurasidone (LATUDA) tablet 160 mg  160 mg Oral Dinner Dartha Pepe, APRN - CNP   160 mg at 02/10/19 1759    nicotine polacrilex (NICORETTE) gum 2 mg  2 mg Oral PRN Dartha Pepe, APRN - CNP   2 mg at 02/13/19 1201    prenatal vitamin 28-0.8 MG tablet 1 tablet  1 tablet Oral Dinner Dartha Pepe, APRN - CNP   1 tablet at 02/10/19 1759         ferrous sulfate  325 mg Oral BID WC    lurasidone  160 mg Oral Dinner    prenatal vitamin  1 tablet Oral Dinner       ASSESSMENT  Schizoaffective disorder ([facility] [address]     Patient's Response to Treatment: negative    PLAN  Dragon voice recognition software used in portions of this document.

## 2019-02-14 NOTE — PROGRESS NOTES
Obstetric/Gynecology Resident Interval Note    Spoke to RN if patient amenable to come down for  testing. RN reports that the patient has \"not allowed anyone to do anything today\".  Patient still unwilling to come down for  testing    Wesley Patel DO  OB/GYN Resident, PGY3  [address] [place] [address]  2019, 4:58 PM

## 2019-02-14 NOTE — PROGRESS NOTES
OB Resident Progress Note     Called and spoke to RN who states patient has been declining care all morning. Patient has been unwilling to be compliant with  testing to this point. Please consider adjustment of medications to increase likelihood of compliance with obstetrical care.         Andria Lozada  OB/GYN Resident, PGY2  Pager: [phone] [facility]  19  5:43 AM

## 2019-02-15 PROCEDURE — 1240000000 HC EMOTIONAL WELLNESS R&B

## 2019-02-15 NOTE — BH NOTE
Pt has been seclusive to room most of shift, came out for meals (breakfast & dinner only)  or fluids only. Room is dirty/ smelling of urine,. Attempted x 2 to get pt to take shower & allow room to be cleaned. Pt becomes loud, verbally abusive and threatening to harm staff.

## 2019-02-15 NOTE — PLAN OF CARE
Problem: Anger Management/Homicidal Ideation:  Goal: Absence of angry outbursts  Absence of angry outbursts   Patient yelling at staff this AM when staff attempted to clean patients room and get her to shower. Threatening to beat staff up.  Denies suicidal ideation

## 2019-02-15 NOTE — BH NOTE
Patient refused AM vital signs. Attempted to get patient in the shower. Patient threatening, verbally aggressive with staff, uncooperative, patient malodorous and room has strong urine odor.

## 2019-02-15 NOTE — PLAN OF CARE
Problem: Anger Management/Homicidal Ideation:  Goal: Absence of angry outbursts  Absence of angry outbursts   Outcome: Ongoing  Pt did not participate in coping skills/ leisure group at 1430 despite staff encouragement to attend.

## 2019-02-15 NOTE — BH NOTE
All assessments and charting done by LPN reviewed.     Electronically signed by Monika Geiger RN on 2/15/2019 at 5:09 AM

## 2019-02-15 NOTE — PLAN OF CARE
Problem: Anger Management/Homicidal Ideation:  Goal: Absence of angry outbursts  Absence of angry outbursts   Outcome: Ongoing  Pt did not attend RT group at 1330 d/t resting in room despite staff invitation to attend.

## 2019-02-16 PROCEDURE — 6370000000 HC RX 637 (ALT 250 FOR IP): Performed by: NURSE PRACTITIONER

## 2019-02-16 PROCEDURE — 1240000000 HC EMOTIONAL WELLNESS R&B

## 2019-02-16 RX ADMIN — LURASIDONE HYDROCHLORIDE 160 MG: 80 TABLET, FILM COATED ORAL at 17:34

## 2019-02-16 RX ADMIN — FERROUS SULFATE TAB 325 MG (65 MG ELEMENTAL FE) 325 MG: 325 (65 FE) TAB at 17:34

## 2019-02-16 RX ADMIN — Medication 1 TABLET: at 17:34

## 2019-02-16 NOTE — PLAN OF CARE
Problem: Anger Management/Homicidal Ideation:  Goal: Absence of angry outbursts  Absence of angry outbursts   Outcome: Ongoing  Pt. Is labile and agitated when staff enters her room for long periods of time. Pt. Is out for needs only and is selectively cooperative with staff requests. Q15min checks continued for safety. Problem: Risk of Harm:  Goal: Ability to remain free from injury will improve  Ability to remain free from injury will improve   Outcome: Ongoing  Pt. Remains free from harm or falls and is safe on the unit. Pt. Has been isolative to her room for most of the day and is labile. Pt. Has been encouraged to shower and she refused. Q15min checks contnued for safety.

## 2019-02-16 NOTE — PLAN OF CARE
Problem: Anger Management/Homicidal Ideation:  Goal: Absence of angry outbursts  Absence of angry outbursts   Outcome: Ongoing  Pt declined to attend psychoeducation at 1000 am despite encouragement. Pt offered 1:1 and refused.

## 2019-02-16 NOTE — BH NOTE
Patient sleeping through the morning. Out at lunch time. Refused medication. When nursed asked how she was feeling, patient states \"Don't ask me that. \" Ate lunch and returned to room. Refused hygiene.  Torey Soriano RN

## 2019-02-16 NOTE — PROGRESS NOTES
OB/GYN Resident Progress Note    Called and spoke with RN who reports that patient is not agreeable to evaluation this morning. Will continue to follow and strongly encourage compliance with  testing. Please do not hesitate to page with any further questions or concerns.      Alfonso Loyola DO  Ob/Gyn Resident PGY2  [facility]  2019 6:41 AM

## 2019-02-17 PROCEDURE — 1240000000 HC EMOTIONAL WELLNESS R&B

## 2019-02-17 PROCEDURE — 6370000000 HC RX 637 (ALT 250 FOR IP): Performed by: NURSE PRACTITIONER

## 2019-02-17 PROCEDURE — 6360000002 HC RX W HCPCS: Performed by: PSYCHIATRY & NEUROLOGY

## 2019-02-17 PROCEDURE — 6360000002 HC RX W HCPCS

## 2019-02-17 RX ORDER — HALOPERIDOL 5 MG/ML
INJECTION INTRAMUSCULAR
Status: DISPENSED
Start: 2019-02-17 | End: 2019-02-18

## 2019-02-17 RX ORDER — DIPHENHYDRAMINE HYDROCHLORIDE 50 MG/ML
INJECTION INTRAMUSCULAR; INTRAVENOUS
Status: COMPLETED
Start: 2019-02-17 | End: 2019-02-17

## 2019-02-17 RX ORDER — DIPHENHYDRAMINE HYDROCHLORIDE 50 MG/ML
50 INJECTION INTRAMUSCULAR; INTRAVENOUS ONCE
Status: COMPLETED | OUTPATIENT
Start: 2019-02-17 | End: 2019-02-17

## 2019-02-17 RX ORDER — HALOPERIDOL 5 MG/ML
10 INJECTION INTRAMUSCULAR ONCE
Status: COMPLETED | OUTPATIENT
Start: 2019-02-17 | End: 2019-02-17

## 2019-02-17 RX ADMIN — DIPHENHYDRAMINE HYDROCHLORIDE 50 MG: 50 INJECTION INTRAMUSCULAR; INTRAVENOUS at 20:58

## 2019-02-17 RX ADMIN — NICOTINE POLACRILEX 2 MG: 2 GUM, CHEWING BUCCAL at 19:27

## 2019-02-17 RX ADMIN — HALOPERIDOL LACTATE 10 MG: 5 INJECTION, SOLUTION INTRAMUSCULAR at 20:57

## 2019-02-17 ASSESSMENT — PAIN SCALES - GENERAL: PAINLEVEL_OUTOF10: 0

## 2019-02-17 NOTE — BH NOTE
PT refused PM vitals, 1:1 talk/assessments by staff during this PM shift  Safety maintained per hospital protocol.

## 2019-02-17 NOTE — PLAN OF CARE
Problem: Anger Management/Homicidal Ideation:  Goal: Absence of angry outbursts  Absence of angry outbursts   Outcome: Ongoing  Pt. Has been isolative to her room for the entirety of the shift and is irritable upon approach. Pt. Refused to allow staff to clean her room. Q15min checks continued for safety. Problem: Risk of Harm:  Goal: Ability to remain free from injury will improve  Ability to remain free from injury will improve   Outcome: Ongoing  Pt. remains free from harm or falls and is safe on the unit. Pt. remains irritable and isolative to her room. Pt. Has poor hygiene and refuses to shower despite staff encouragement. Q15min checks continued for safety.

## 2019-02-17 NOTE — PLAN OF CARE
Problem: Anger Management/Homicidal Ideation:  Goal: Absence of angry outbursts  Absence of angry outbursts   Outcome: Ongoing  Pt is still unable to maintain appropriate behavior and be free of any angry outbursts. Pt has cursed multiple staff members out of her room. Pt threatened to \"mop\" staff if they attempted to continue any 1:1 conversation. Pt safety maintained per hospital protocol. Problem: Risk of Harm:  Goal: Ability to remain free from injury will improve  Ability to remain free from injury will improve   Outcome: Ongoing  PT was accepting of 1:1 meet with RN. PT remains free from any self harm, falls, or any other type of injury as of this time. PT is wearing non-skid stockings at the time of assessment. PT verbalizes understanding of individual fall risks and ways to prevent them. PT agreed to use the call light if needed. PT agreed to seek out health care staff if stressors or other issues were to become to be too much. Safety checks have been maintained every 15 minutes and at irregular intervals throughout this shift.

## 2019-02-18 LAB
BILIRUBIN URINE: NEGATIVE
COLOR: YELLOW
COMMENT UA: NORMAL
GLUCOSE URINE: NEGATIVE
KETONES, URINE: NEGATIVE
LEUKOCYTE ESTERASE, URINE: NEGATIVE
NITRITE, URINE: NEGATIVE
PH UA: 6.5 (ref 5–8)
PROTEIN UA: NEGATIVE
SPECIFIC GRAVITY UA: 1 (ref 1–1.03)
TURBIDITY: CLEAR
URINE HGB: NEGATIVE
UROBILINOGEN, URINE: NORMAL

## 2019-02-18 PROCEDURE — 1240000000 HC EMOTIONAL WELLNESS R&B

## 2019-02-18 PROCEDURE — 59025 FETAL NON-STRESS TEST: CPT

## 2019-02-18 PROCEDURE — 6370000000 HC RX 637 (ALT 250 FOR IP): Performed by: NURSE PRACTITIONER

## 2019-02-18 PROCEDURE — 4A1HXCZ MONITORING OF PRODUCTS OF CONCEPTION, CARDIAC RATE, EXTERNAL APPROACH: ICD-10-PCS | Performed by: PSYCHIATRY & NEUROLOGY

## 2019-02-18 PROCEDURE — 76818 FETAL BIOPHYS PROFILE W/NST: CPT

## 2019-02-18 PROCEDURE — 81003 URINALYSIS AUTO W/O SCOPE: CPT

## 2019-02-18 RX ORDER — DEXTROMETHORPHAN POLISTIREX 30 MG/5ML
15 SUSPENSION ORAL NIGHTLY PRN
Status: DISCONTINUED | OUTPATIENT
Start: 2019-02-18 | End: 2019-03-25 | Stop reason: HOSPADM

## 2019-02-18 RX ORDER — ONDANSETRON 4 MG/1
4 TABLET, FILM COATED ORAL EVERY 8 HOURS PRN
Status: DISCONTINUED | OUTPATIENT
Start: 2019-02-18 | End: 2019-03-25 | Stop reason: HOSPADM

## 2019-02-18 RX ORDER — POLYETHYLENE GLYCOL 3350 17 G/17G
17 POWDER, FOR SOLUTION ORAL DAILY
Status: DISCONTINUED | OUTPATIENT
Start: 2019-02-19 | End: 2019-03-25 | Stop reason: HOSPADM

## 2019-02-18 RX ORDER — DOCUSATE SODIUM 100 MG/1
100 CAPSULE, LIQUID FILLED ORAL DAILY
Status: DISCONTINUED | OUTPATIENT
Start: 2019-02-19 | End: 2019-03-25 | Stop reason: HOSPADM

## 2019-02-18 RX ORDER — BENZONATATE 100 MG/1
100 CAPSULE ORAL 3 TIMES DAILY PRN
Status: DISCONTINUED | OUTPATIENT
Start: 2019-02-18 | End: 2019-03-25 | Stop reason: HOSPADM

## 2019-02-18 RX ADMIN — Medication 1 TABLET: at 17:26

## 2019-02-18 RX ADMIN — LURASIDONE HYDROCHLORIDE 160 MG: 80 TABLET, FILM COATED ORAL at 17:26

## 2019-02-18 RX ADMIN — NICOTINE POLACRILEX 2 MG: 2 GUM, CHEWING BUCCAL at 23:03

## 2019-02-18 RX ADMIN — NICOTINE POLACRILEX 2 MG: 2 GUM, CHEWING BUCCAL at 10:43

## 2019-02-18 RX ADMIN — FERROUS SULFATE TAB 325 MG (65 MG ELEMENTAL FE) 325 MG: 325 (65 FE) TAB at 17:26

## 2019-02-18 RX ADMIN — NICOTINE POLACRILEX 2 MG: 2 GUM, CHEWING BUCCAL at 11:51

## 2019-02-18 NOTE — PLAN OF CARE
Problem: Anger Management/Homicidal Ideation:  Goal: Absence of angry outbursts  Absence of angry outbursts   Outcome: Ongoing  Patient remains free of any angry outbursts or irritability at this time. Patient is calm. Cooperative. 15 minute checks maintained for safety.

## 2019-02-18 NOTE — BH NOTE
Upon rounding, multiple patients were complaining about the smell coming from said pt's room. Staff went in to intervene/clean pt room and pt cursed out staff, stating she would \"cut your fucking head off\" if staff continued to try and intervene. The Pt's  was called and was informed of the unsafe conditions of the room and the  gave the okay to clean the patient's room. Upon approaching pt room again, PT started swearing, making verbal threats towards staff. [employer] and [employer] was called along side of security. PT was informed on the reasons of why the room needed to be cleaned as the pt room was covered in food, urine, trash. Staff had made numerous attempts in the past to do so however pt was not accepting of that. PT was unaccepting of staff to clean room, continued to make verbal threats so PRN's was given. A physical hold was obtained by  and a 90second or less hold was applied to give PRN. PT was then escorted to open seclusion where as multiple staff members cleaned Pt room. Bed mattress was saturated in urine as it had to be removed off the unit. House keeping came and helped finish the clean. Pt eventually came out of open seclusion. After PRNs was given, pt was calmer, more able to follow redirection from staff. PT went to bed shortly after she came out of open seclusion and has been no issues since PRN.

## 2019-02-18 NOTE — PROGRESS NOTES
OB Resident Progress Note     Spoke to [employer]  who states patient required Haldol and IV benadryl overnight and is currently sleeping and they are unable to bring patient over for monitoring 2/2 staffing and shift change. RN voices understanding that patient required  testing and should be brought to L&D and monitored when able. Patient woken during conversation and refusing to see OB. Please continue to encourage compliance and medicate as needed.        [address]  OB/GYN Resident, PGY2  Pager: [phone]  [facility]  19  6:27 AM

## 2019-02-18 NOTE — PROGRESS NOTES
Yesterday, patient needed IM Haldol/Benadryl due to becoming combative with staff when they attempted to clean her room and encourage her to address personal hygiene. She has been urinating on herself in bed, making no attempt to get to restroom, and then refusing to allow staff to help her clean up afterwards. She continues to be unable to care for basic daily needs due to psychosis, currently pregnant with medication limited to [facility], unable to independently care for self or baby. She continues to mostly refuse prenatal OB care. Charting and medications reviewed. Therapeutic support provided. Will continue Latuda.

## 2019-02-19 PROCEDURE — 6370000000 HC RX 637 (ALT 250 FOR IP): Performed by: STUDENT IN AN ORGANIZED HEALTH CARE EDUCATION/TRAINING PROGRAM

## 2019-02-19 PROCEDURE — 6370000000 HC RX 637 (ALT 250 FOR IP): Performed by: NURSE PRACTITIONER

## 2019-02-19 PROCEDURE — 1240000000 HC EMOTIONAL WELLNESS R&B

## 2019-02-19 RX ADMIN — HYDROXYZINE HYDROCHLORIDE 25 MG: 25 TABLET, FILM COATED ORAL at 19:25

## 2019-02-19 RX ADMIN — DOCUSATE SODIUM 100 MG: 100 CAPSULE, LIQUID FILLED ORAL at 08:59

## 2019-02-19 RX ADMIN — DIPHENHYDRAMINE HCL 25 MG: 25 TABLET ORAL at 01:06

## 2019-02-19 RX ADMIN — ALBUTEROL SULFATE 2 PUFF: 90 AEROSOL, METERED RESPIRATORY (INHALATION) at 14:09

## 2019-02-19 RX ADMIN — FERROUS SULFATE TAB 325 MG (65 MG ELEMENTAL FE) 325 MG: 325 (65 FE) TAB at 18:09

## 2019-02-19 RX ADMIN — NICOTINE POLACRILEX 2 MG: 2 GUM, CHEWING BUCCAL at 14:09

## 2019-02-19 RX ADMIN — POLYETHYLENE GLYCOL 3350 17 G: 17 POWDER, FOR SOLUTION ORAL at 08:59

## 2019-02-19 RX ADMIN — FERROUS SULFATE TAB 325 MG (65 MG ELEMENTAL FE) 325 MG: 325 (65 FE) TAB at 08:59

## 2019-02-19 RX ADMIN — LURASIDONE HYDROCHLORIDE 160 MG: 80 TABLET, FILM COATED ORAL at 18:09

## 2019-02-19 RX ADMIN — Medication 1 TABLET: at 18:09

## 2019-02-19 NOTE — BH NOTE
Psychoeducation Group Note    Date: 2/19  Start Time: 1600 End Time: 1640    Number Participants in Group:  12/14    Goal:  Patient will demonstrate increased interpersonal interaction   Topic:     Discipline Responsible:   OT  AT   x Nsg.  RT  Other       Participation Level:     None  Minimal   x Active Listener  Interactive    Monopolizing         Participation Quality:  x Appropriate  Inappropriate          Attentive        Intrusive          Sharing        Resistant          Supportive        Lethargic       Affective:    Congruent  Incongruent  Blunted  Flat    Constricted  Anxious  Elated  Angry    Labile  Depressed  Other         Cognitive:  x Alert  Oriented PPTP     Concentration  G x F  P   Attention Span  G x F  P   Short-Term Memory  G x F  P   Long-Term Memory  G x F  P   ProblemSolving/  Decision Making  G x F  P   Ability to Process  Information  G x F  P      Contributing Factors             Delusional             Hallucinating             Flight of Ideas             Other:       Modes of Intervention:   Education  Support  Exploration    Clarifying  Problem Solving  Confrontation    Socialization  Limit Setting  Reality Testing    Activity  Movement  Media    Other:            Response to Learning:   Able to verbalize current knowledge/experience    Able to verbalize/acknowledge new learning    Able to retain information    Capable of insight    Able to change behavior    Progressing to goal    Other:        Comments:

## 2019-02-19 NOTE — FLOWSHEET NOTE
*Patient participated in Spirituality Group, but came in and out about two times       02/19/19 1443   Encounter Summary   Services provided to: Patient   Referral/Consult From: Rounding   Continue Visiting (2/19/19)   Complexity of Encounter Low   Length of Encounter 15 minutes   Spiritual Assessment Completed Yes   Spiritual/Worship   Type Spiritual support   Assessment Calm; Approachable   Intervention Active listening   Outcome Receptive

## 2019-02-19 NOTE — PLAN OF CARE
Problem: Anger Management/Homicidal Ideation:  Goal: Absence of angry outbursts  Absence of angry outbursts   Outcome: Ongoing  Psychoeducation Group Note    Date: 2/19/2019  Start Time: 1430  End Time: 1515    Number Participants in Group:  10    Goal:  Patient will demonstrate increased interpersonal interaction   Topic: Leisure/ creative expression     Discipline Responsible:   OT  AT  [facility]. x RT  Other       Participation Level:     None  Minimal   x Active Listener x Interactive    Monopolizing         Participation Quality:  x Appropriate  Inappropriate          Attentive        Intrusive          Sharing        Resistant          Supportive        Lethargic       Affective:    Congruent  Incongruent x Blunted  Flat    Constricted  Anxious  Elated  Angry    Labile  Depressed  Other         Cognitive:  x Alert x Oriented PPTP     Concentration  G x F  P   Attention Span  G x F  P   Short-Term Memory  G x F  P   Long-Term Memory  G x F  P   ProblemSolving/  Decision Making  G x F  P   Ability to Process  Information  G x F  P      Contributing Factors             Delusional             Hallucinating             Flight of Ideas             Other:       Modes of Intervention:  x Education x Support x Exploration   x Clarifying  Problem Solving  Confrontation   x Socialization  Limit Setting x Reality Testing   x Activity  Movement  Media    Other:            Response to Learning:   Able to verbalize current knowledge/experience    Able to verbalize/acknowledge new learning    Able to retain information    Capable of insight    Able to change behavior   x Progressing to goal    Other:        Comments:

## 2019-02-19 NOTE — BH NOTE
Patient very pleasant but anxious, approached writer and stated that she felt some wetness at vaginal area, and that she could not use the toilet, wanted to know if the [facility] doctor could come and see her; writer spoke with Mackenzie Jean-Baptiste who wanted patient to be taken down to [facility] department, stated that she(doctor) has been wanting patient to visit long ago.

## 2019-02-19 NOTE — PLAN OF CARE
Problem: Anger Management/Homicidal Ideation:  Intervention: Manage a safe environment  Patient safety checks complete [id number] and pt remains safe at current time. 1:1 talk time and education with moderate effect.

## 2019-02-19 NOTE — BH NOTE
Patient notified that doctor would like to see her down in the department; at first patient stated that doctor should come upstairs, but writer encouraged patient that the big machine was difficult to bring upstairs; patient agreed to go to [facility] department.

## 2019-02-19 NOTE — PROGRESS NOTES
- Patient needs  testing. 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)      Poor historian      Anemia              - Hgb 10.5 on 19              - Continue iron supplement               - Clinically asymptomatic      Schizoaffective disorder, bipolar disorder               - Management per psych              - Currently on Latuda 160mg po               - Please treat patient accordingly (except cat D and X drugs), organogenesis is already completed     Asthma--controlled      Hx tobacco use              - Nicorette gum     Patient Active Problem List    Diagnosis Date Noted    33 weeks gestation of pregnancy 2019    Schizoaffective disorder ([facility] [address] 2019    Bacterial vaginosis 2019    Yeast infection 2019    Poor historian 2019    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      Maximo Baig DO  Ob/Gyn Resident  [facility], [address]  2019, 4:16 PM

## 2019-02-19 NOTE — PROGRESS NOTES
TESTING NOTE    Justina Ovalle is a 41 y.o. female  at 34w3d    The patient was seen and examined. She is here today for  testing for because she is at high risk for the following reasons: very limited PNC, bipolar disorder w/ fetal exposure to latuda. The baby is moving well and she denies any complaints. 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 Biophysical Profile:   Amniotic Fluid Index: 13.1  Tone: Present  Movement: Present  Breathing: Present    Biophysical Score: 8 / 8    Fetal Position: Cephalic    EFW 5#3    Assessment/Plan:  1. Justina Ovalle is a 41 y.o. female  at 34w1d  2. NST/ BPP 8/8   - The patient will continue with her scheduled office appointments. - She will continue with her  testing as scheduled. - Signs and Symptoms of  labor precautions were reviewed. - She was counseled on adequate hydration prior to discharge   - The patient was instructed on fetal kick counts. 3. Discussed results with Dr. Philip Villanueva who is agreeable to the above plan. 4. Patient requesting colace/miralax for constipation. 5. UA negative.      Luis Enrique Mclaughlin  Ob/Gyn Resident   2019, 10:11 PM

## 2019-02-19 NOTE — BH NOTE
Patient was taken down to [facility] department by writer via wheelchair, accompanied by security; Patient was seen by the doctor; patient told doc. that she was having some pain with urination, and was also constipated; urine was collected and tested for infection, result, negative; dr. Akilah Garsia also gave new order for miralax for the constipation; doctor did an  testing; ultrasound, stated that baby was fine, everything looked normal; patient stated that she felt fine during procedure; according to doctor, patient's due date is ;  Writer returned with patient back to [facility] unit via wheelchair and  at [phone]; patient denies needs at this time, safety maintained.

## 2019-02-19 NOTE — BH NOTE
EVENING GROUP NOTE     Date:   02/18/2019      Start Time: 8:15pm                End Time: 9:00pm     Number Participants in Group:  11/16     Goal:  Patient will demonstrate increased interpersonal interaction   Topic:  Wrap Up and Relaxation Groups     Discipline Responsible:    OT   AT    x Nsg.   RT   Other         Participation Level:                   None   Minimal   x Active Listener x Interactive     Monopolizing             Participation Quality:  x Appropriate   Inappropriate   x       Attentive         Intrusive   x       Sharing         Resistant   x       Supportive         Lethargic         Affective:          x Congruent   Incongruent   Blunted   Flat     Constricted   Anxious   Elated   Angry     Labile   Depressed   Other             Cognitive:  x Alert   Oriented PPTP      Concentration x G   F   P   Attention Span x G   F   P   Short-Term Memory x G   F   P   Long-Term Memory x G   F   P   ProblemSolving/  Decision Making x G   F   P   Ability to Process  Information x G   F   P        Contributing Factors              Delusional              Hallucinating              Flight of Ideas              Other:         Modes of Intervention:  x Education   Support   Exploration   x Clarifying   Problem Solving   Confrontation     Socialization   Limit Setting   Reality Testing     Activity   Movement   Media     Other:                  Response to Learning:  x Able to verbalize current knowledge/experience   x Able to verbalize/acknowledge new learning     Able to retain information     Capable of insight     Able to change behavior     Progressing to goal     Other:          Comments:  Pt attended and participated in  Wrap Up and Relaxation Groups this evening.

## 2019-02-19 NOTE — PLAN OF CARE
Problem: Anger Management/Homicidal Ideation:  Goal: Absence of angry outbursts  Absence of angry outbursts   Outcome: Ongoing  Psychoeducation Group Note    Date: 2/19/2019  Start Time: 0900  End Time: 0920    Number Participants in Group:  8    Goal:  Patient will demonstrate increased interpersonal interaction   Topic: Community meeting/ goals group    Discipline Responsible:   OT  AT  [facility]. x RT  Other       Participation Level:     None x Minimal   x Active Listener  Interactive    Monopolizing         Participation Quality:  x Appropriate  Inappropriate          Attentive        Intrusive          Sharing        Resistant          Supportive        Lethargic       Affective:    Congruent  Incongruent x Blunted  Flat    Constricted  Anxious  Elated  Angry    Labile  Depressed  Other         Cognitive:  x Alert x Oriented PPTP     Concentration  G x F  P   Attention Span  G x F  P   Short-Term Memory  G x F  P   Long-Term Memory  G x F  P   ProblemSolving/  Decision Making  G x F  P   Ability to Process  Information  G x F  P      Contributing Factors             Delusional             Hallucinating             Flight of Ideas             Other:       Modes of Intervention:  x Education x Support x Exploration   x Clarifying x Problem Solving  Confrontation   x Socialization  Limit Setting x Reality Testing    Activity  Movement  Media    Other:            Response to Learning:   Able to verbalize current knowledge/experience    Able to verbalize/acknowledge new learning    Able to retain information    Capable of insight    Able to change behavior   x Progressing to goal    Other:        Comments:

## 2019-02-20 PROCEDURE — 6370000000 HC RX 637 (ALT 250 FOR IP): Performed by: STUDENT IN AN ORGANIZED HEALTH CARE EDUCATION/TRAINING PROGRAM

## 2019-02-20 PROCEDURE — 6370000000 HC RX 637 (ALT 250 FOR IP): Performed by: NURSE PRACTITIONER

## 2019-02-20 PROCEDURE — 1240000000 HC EMOTIONAL WELLNESS R&B

## 2019-02-20 RX ADMIN — POLYETHYLENE GLYCOL 3350 17 G: 17 POWDER, FOR SOLUTION ORAL at 09:03

## 2019-02-20 RX ADMIN — NICOTINE POLACRILEX 2 MG: 2 GUM, CHEWING BUCCAL at 09:09

## 2019-02-20 RX ADMIN — NICOTINE POLACRILEX 2 MG: 2 GUM, CHEWING BUCCAL at 18:05

## 2019-02-20 RX ADMIN — ALBUTEROL SULFATE 2 PUFF: 90 AEROSOL, METERED RESPIRATORY (INHALATION) at 07:53

## 2019-02-20 RX ADMIN — FERROUS SULFATE TAB 325 MG (65 MG ELEMENTAL FE) 325 MG: 325 (65 FE) TAB at 09:03

## 2019-02-20 RX ADMIN — DOCUSATE SODIUM 100 MG: 100 CAPSULE, LIQUID FILLED ORAL at 09:03

## 2019-02-20 ASSESSMENT — PAIN SCALES - GENERAL: PAINLEVEL_OUTOF10: 0

## 2019-02-20 NOTE — PROGRESS NOTES
OB Resident Progress Note     Spoke to RN manager regarding recent decision by [facility] to no longer allow patient to present to labor and delivery for recommended  testing. Informed RN of significant risk of stillbirth and/or fetal/maternal morbidity. Additionally OB would like to acknowledge added benefit of allowing patient to become comfortable with the labor and delivery unit prior to delivery. Will anticipate to discuss further with Linh Choe RN tomorrow morning. Future compliance with recommended OB  testing appreciated. Patient will need to present to labor and delivery on  at time of convenience for psychiatric unit for NST. She will additionally require NST/BPP on  and NST's on  for the duration of the pregnancy. Failure to comply with testing further increases patient's risk of stillbirth. Please don't hesitate to call OB with further questions/concerns. Dr. Eamon Shah updated and agreeable that patient should continue to present to labor and delivery.         Tamiko Gallagher  OB/GYN Resident, PGY2  Pager: [phone]  [facility]   19  12:48 AM

## 2019-02-20 NOTE — PROGRESS NOTES
OB Resident Progress Note     Patient sleeping when resident called unit. Please call if patient wants to be seen at a later time.        Tamiko Gallagher  OB/GYN Resident, PGY2  Pager: [phone]  [facility]  02/20/19  7:04 AM

## 2019-02-20 NOTE — PLAN OF CARE
Problem: Anger Management/Homicidal Ideation:  Goal: Absence of angry outbursts  Absence of angry outbursts   Outcome: Ongoing  CD Group Note    Date: 2/20/19  Start Time: 1330  End Time: 1415    Number Participants in Group:  9    Goal:  Patient will demonstrate increased interpersonal interaction   Topic: Chemical Dependency Recovery group    Discipline Responsible:   OT  AT X SW  Nsg.  RT  Other       Participation Level:     None  Minimal   x Active Listener  Interactive    Monopolizing         Participation Quality:  x Appropriate  Inappropriate   x       Attentive        Intrusive          Sharing        Resistant   x       Supportive        Lethargic       Affective:   x Congruent  Incongruent  Blunted  Flat    Constricted  Anxious  Elated  Angry    Labile  Depressed  Other         Cognitive:  x Alert  Oriented PPTP     Concentration  G x F  P   Attention Span  G x F  P   Short-Term Memory  G x F  P   Long-Term Memory  G x F  P   ProblemSolving/  Decision Making  G x F  P   Ability to Process  Information  G x F  P      Contributing Factors             Delusional             Hallucinating             Flight of Ideas             Other:       Modes of Intervention:  X Education X Support X Exploration   X Clarifying X Problem Solving  Confrontation   X Socialization  Limit Setting  Reality Testing    Activity  Movement  Media    Other:            Response to Learning:   Able to verbalize current knowledge/experience    Able to verbalize/acknowledge new learning    Able to retain information   x Capable of insight   x Able to change behavior    Progressing to goal    Other:        Comments:     Pt is making progress AEB actively listening.  Pt left group early

## 2019-02-20 NOTE — PROGRESS NOTES
OB/GYN Resident Progress Note    Farzaneh Wolfe is a 41 y.o. female  at 34w3d  Subjective:   Patient agreeable to be seen and examined. Patient states that she is \"ok\" . Reports that earlier she felt cramping however she drank some juice and went to the bathroom and that resolved the cramping. The patient reports fetal movement is present, denies contractions, denies loss of fluid, denies vaginal bleeding.       Objective:   Vitals:  Vitals:    19 0830 19 2000 19 0147 19 0815   BP:  (!) 105/56 (!) 145/84 (!) 97/52   Pulse:  87 86 88   Resp: 14 14  14   Temp:    97.5 °F (36.4 °C)   TempSrc:       SpO2:    98%   Height:             Fetal Heart Tones via doppler:  148 bpm; positive fetal movement audible and palpable    Physical Exam:  General appearance:  no apparent distress, alert and cooperative  Neurologic:  alert,  no focal findings or movement disorder noted  Abdomen:  soft, gravid, non-tender, no right upper quadrant tenderness, no CVA tenderness, uterus non-tender, no signs of abruption and no signs of chorioamnionitis  Extremities:  no calf tenderness, non edematous    Assessment/Plan:  Farzaneh Wolfe is a 41 y.o. female  at 34w3d IUP              - GBS negative (obtained on 19) / Rh positive / R immune              - No indication for GBS prophylaxis until delivery is imminent               - Prenatal labs obtained 19              - S/p tdap/influenza vaccine 19              - Patient refused 1hr gtt, Hgb A1C 4.1 on 19              - Limited anatomy US 19 with no abnormalities seen              - Request for formal MFM consult at [facility] denied by ethics/risk management as patient too unstable for transport and increased flight risk               - Still recommend formal MFM consult and Ultrasound when patient clinically stable; please notify when ethics, risk management and psych in agreeance when ok to transport patient so appointment can be set              - No s/s of  labor at this time              - Recommend continue PNV daily                - Patient needs  testing. 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)      Poor historian      Anemia              - Hgb 10.5 on 19              - Continue iron supplement               - Clinically asymptomatic      Schizoaffective disorder, bipolar disorder               - Management per psych              - Currently on Latuda 160mg po               - Please treat patient accordingly (except cat D and X drugs), organogenesis is already completed     Asthma--controlled      Hx tobacco use              - Nicorette gum     Patient Active Problem List    Diagnosis Date Noted    33 weeks gestation of pregnancy 2019    [facility]izoa[facility]ive disorder ([facility] 2019    Bacterial vaginosis 2019    Yeast infection 2019    Poor historian 2019    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      Rosy Padron DO  Ob/Gyn Resident  [place]  2/20/2019, 11:31 AM

## 2019-02-20 NOTE — BH NOTE
Patient at desk with C/O abdominal cramping and back pain. Patient is observed in dayroom walking around,  Drinking juice and at desk asking for more food,  States \"i'm hungry, you got any snacks\". Consulted OB resident,  Regarding patients complaints,  Encouraged patient to increase water intake. Patient was agreeable, glass of ice water given to patient. Staff will continue to monitor.

## 2019-02-20 NOTE — PLAN OF CARE
Problem: Risk of Harm:  Goal: Ability to remain free from injury will improve  Ability to remain free from injury will improve   Outcome: Ongoing  PSYCHOEDUCATION GROUP NOTE    Date: 2/20/19  Start Time: 0900  End Time: 0915    Number Participants in Group:  11/16    Goal:  Patient will demonstrate increased interpersonal interaction   Topic: Community Meeting     Discipline Responsible:   OT  AT  [facility]. x RT MHP Other       Participation Level:     None  Minimal   x Active Listener  Interactive    Monopolizing         Participation Quality:   Appropriate  Inappropriate          Attentive        Intrusive          Sharing x       Resistant          Supportive        Lethargic       Affective:    Congruent  Incongruent  Blunted  Flat   x Constricted  Anxious  Elated  Angry    Labile  Depressed  Other         Cognitive:  x Alert  Oriented PPTP     Concentration  G  F  P   Attention Span  G  F  P   Short-Term Memory  G  F  P   Long-Term Memory  G  F  P   ProblemSolving/  Decision Making  G  F  P   Ability to Process  Information  G  F  P      Contributing Factors             Delusional             Hallucinating             Flight of Ideas             Other:       Modes of Intervention:  x Education x Support x Exploration   x Clarifying x Problem Solving  Confrontation   x Socialization  Limit Setting x Reality Testing    Activity  Movement  Media    Other:            Response to Learning:   Able to verbalize current knowledge/experience    Able to verbalize/acknowledge new learning    Able to retain information    Capable of insight    Able to change behavior   x Progressing to goal    Other:        Comments: Pt was in and out of group and had minimal participation.

## 2019-02-20 NOTE — PLAN OF CARE
Problem: Anger Management/Homicidal Ideation:  Goal: Absence of angry outbursts  Absence of angry outbursts   Outcome: Ongoing  Pt is absent of angry outburst. Pt was cooperative when taking vitals and medications. Problem: Risk of Harm:  Goal: Ability to remain free from injury will improve  Ability to remain free from injury will improve   Outcome: Ongoing  Pt is free from injury. Every 15 minute safety checks continue as ordered.

## 2019-02-20 NOTE — BH NOTE
EVENING GROUP NOTE    Pt was encouraged to attend evening wrap up group and relaxation group but pt declined. Will continue to encourage pt to attend groups.

## 2019-02-21 LAB
DIRECT EXAM: ABNORMAL
Lab: ABNORMAL
SPECIMEN DESCRIPTION: ABNORMAL

## 2019-02-21 PROCEDURE — 6370000000 HC RX 637 (ALT 250 FOR IP): Performed by: NURSE PRACTITIONER

## 2019-02-21 PROCEDURE — 59025 FETAL NON-STRESS TEST: CPT

## 2019-02-21 PROCEDURE — 87510 GARDNER VAG DNA DIR PROBE: CPT

## 2019-02-21 PROCEDURE — 1240000000 HC EMOTIONAL WELLNESS R&B

## 2019-02-21 PROCEDURE — 87480 CANDIDA DNA DIR PROBE: CPT

## 2019-02-21 PROCEDURE — 87591 N.GONORRHOEAE DNA AMP PROB: CPT

## 2019-02-21 PROCEDURE — 87081 CULTURE SCREEN ONLY: CPT

## 2019-02-21 PROCEDURE — 6370000000 HC RX 637 (ALT 250 FOR IP): Performed by: STUDENT IN AN ORGANIZED HEALTH CARE EDUCATION/TRAINING PROGRAM

## 2019-02-21 PROCEDURE — 87660 TRICHOMONAS VAGIN DIR PROBE: CPT

## 2019-02-21 PROCEDURE — 87491 CHLMYD TRACH DNA AMP PROBE: CPT

## 2019-02-21 RX ORDER — FLUCONAZOLE 150 MG/1
150 TABLET ORAL ONCE
Status: COMPLETED | OUTPATIENT
Start: 2019-02-21 | End: 2019-02-22

## 2019-02-21 RX ORDER — DIPHENHYDRAMINE HCL 25 MG
50 TABLET ORAL NIGHTLY PRN
Status: DISCONTINUED | OUTPATIENT
Start: 2019-02-21 | End: 2019-03-25 | Stop reason: HOSPADM

## 2019-02-21 RX ADMIN — LURASIDONE HYDROCHLORIDE 160 MG: 80 TABLET, FILM COATED ORAL at 17:09

## 2019-02-21 RX ADMIN — DIPHENHYDRAMINE HCL 50 MG: 25 TABLET ORAL at 22:45

## 2019-02-21 RX ADMIN — Medication 1 TABLET: at 17:09

## 2019-02-21 RX ADMIN — NICOTINE POLACRILEX 2 MG: 2 GUM, CHEWING BUCCAL at 22:45

## 2019-02-21 RX ADMIN — FERROUS SULFATE TAB 325 MG (65 MG ELEMENTAL FE) 325 MG: 325 (65 FE) TAB at 17:09

## 2019-02-21 RX ADMIN — ALBUTEROL SULFATE 2 PUFF: 90 AEROSOL, METERED RESPIRATORY (INHALATION) at 22:12

## 2019-02-21 NOTE — PROGRESS NOTES
Patient laid in bed, acknowledged my attempt to speak with her, turned back away from me and the refused to interact further. Her room continues to smell like urine, she displays extremely poor hygiene and care of ADL's. She continues to be unable to care for basic daily needs due to psychosis, currently pregnant with medication limited to [facility], unable to independently care for self or baby. She continues to mostly refuse prenatal OB care. Charting and medications reviewed. Therapeutic support provided. Will continue Latuda.

## 2019-02-21 NOTE — PLAN OF CARE
Problem: Risk of Harm:  Goal: Ability to remain free from injury will improve  Ability to remain free from injury will improve   Outcome: Ongoing  Pt did not attend RT group at 1100 d/t resting in room despite staff invitation to attend.

## 2019-02-21 NOTE — PROGRESS NOTES
Patient laid in bed, acknowledged my attempt to speak with her, turned back away from me and the refused to interact further. Her room continues to smell like urine, she displays extremely poor hygiene and care of ADL's. She continues to be unable to care for basic daily needs due to psychosis, currently pregnant with medication limited to [place], unable to independently care for self or baby. She continues to mostly refuse prenatal OB care. Charting and medications reviewed. Therapeutic support provided. Will continue Latuda.

## 2019-02-21 NOTE — PROGRESS NOTES
Discussed with nurse, Margi Loyd,  that patient needs NST today. Explained that the NST is to confirm fetal well being. Director of L&D has discussed with Hospital supervisor and was told that she is ok to leave the floor to come for  testing. She was told by charge nurse  (who had talked to Jeremie Grimes, director of nursing for [facility]) and was told that the patient is not to leave the floor. Asked to speak with charge nurse. Discussed with charge nurse, Mariluz Calzada, and was told that she was instructed by Jeremie Grimes that she is not allowed to be off the unit and they do not have stafffing to bring her down anyways. Explained that the NST is to confirm fetal well being. Attempted to call Jeremie Grimes multiple times with no call back or answer.      Oskar Rothman, PGY3  OB-GYN Resident

## 2019-02-21 NOTE — FLOWSHEET NOTE
*Patient participated in the [address] [place] [address]       02/21/19 1400   Encounter Summary   Services provided to: Patient   Referral/Consult From: Rounding   Continue Visiting (2/21/19)   Complexity of Encounter Low   Length of Encounter 15 minutes   Spiritual Assessment Completed Yes   Spiritual/Bahai   Type Spiritual support   Assessment Calm; Approachable   Intervention Active listening   Outcome Receptive        02/21/19 1400   Encounter Summary   Services provided to: Patient   Referral/Consult From: Rounding   Continue Visiting (2/21/19)   Complexity of Encounter Low   Length of Encounter 15 minutes   Spiritual Assessment Completed Yes   Spiritual/Bahai   Type Spiritual support   Assessment Calm; Approachable   Intervention Active listening   Outcome Receptive

## 2019-02-21 NOTE — PLAN OF CARE
Problem: Anger Management/Homicidal Ideation:  Goal: Absence of angry outbursts  Absence of angry outbursts   Outcome: Ongoing  Patient became verbally aggressive with staff when staff removed soiled linens from room. Staff encouraged patient to shower d/t her soiling herself,  Patient responded to with profanities and verbal threats. Patient did eventually shower and tend to personal hygiene. Pt. Remains on q15 min checks and frequent spontaneous checks throughout shift. Pt. Safety maintained. Problem: Risk of Harm:  Goal: Ability to remain free from injury will improve  Ability to remain free from injury will improve   Outcome: Ongoing  Patient remains free of harm. Pt. Remains on q15 min checks and frequent spontaneous checks throughout shift. Pt. Safety maintained.

## 2019-02-21 NOTE — PROGRESS NOTES
OB Resident Progress Note     Called and spoke to RN who states that patient was \"asleep and uncooperative today. \" Please call OB if patient become cooperative at a later time. Of note, patient should be coming to labor and delivery today for NST.        Aysha Lewis  OB/GYN Resident, PGY2  Pager: [phone] [facility]  02/21/19  6:23 AM

## 2019-02-21 NOTE — DISCHARGE SUMMARY
Physician Discharge Summary     Patient ID:  Tim Fitzpatrick  [id number]  44 y.o.  1980    Admit date: 12/20/2018    Discharge date and time: 2/8/2019  1:46 AM     Admitting Physician: Tj Boggs MD       Admission Diagnoses: Schizoaffective disorder ([facility] [F25.9]  Schizoaffective disorder ([facility] [F25.9]    Discharge Diagnoses: Schizoaffective disorder ([facility] [F25.9]    Admission Condition: poor    Discharged Condition: poor      This on call provider was notified by RN on unit that Ms. Teodora King is pregnant and complaining of contraction pain and leaking fluid. Patient is to be transferred to [facility] unit for exam and then plan to return to [facility] once medically cleared for continuation of psychiatric care.      Fariha Lynch CNP  2/20/2019  8:08 PM

## 2019-02-21 NOTE — PROGRESS NOTES
Called to see if Aysha is willing to be seen by OB. Told by RN that patient is sleeping and she has not been cooperative today. Please call OB when patient awakes and is will to see OB. Patient needs NST today.      Andrade Briones, PGY3  OB-GYN Resident

## 2019-02-22 LAB
C TRACH DNA GENITAL QL NAA+PROBE: NEGATIVE
N. GONORRHOEAE DNA: NEGATIVE
SPECIMEN DESCRIPTION: NORMAL

## 2019-02-22 PROCEDURE — 6370000000 HC RX 637 (ALT 250 FOR IP): Performed by: NURSE PRACTITIONER

## 2019-02-22 PROCEDURE — 1240000000 HC EMOTIONAL WELLNESS R&B

## 2019-02-22 PROCEDURE — 6370000000 HC RX 637 (ALT 250 FOR IP): Performed by: STUDENT IN AN ORGANIZED HEALTH CARE EDUCATION/TRAINING PROGRAM

## 2019-02-22 RX ADMIN — NICOTINE POLACRILEX 2 MG: 2 GUM, CHEWING BUCCAL at 17:59

## 2019-02-22 RX ADMIN — FLUCONAZOLE 150 MG: 150 TABLET ORAL at 05:42

## 2019-02-22 ASSESSMENT — PAIN SCALES - GENERAL: PAINLEVEL_OUTOF10: 0

## 2019-02-22 NOTE — PLAN OF CARE
Problem: Anger Management/Homicidal Ideation:  Goal: Absence of angry outbursts  Absence of angry outbursts   Outcome: Ongoing  Patient remains free of angry outbursts at this time. Patient is calm and pleasant at this time. Refused morning medications and assessments.

## 2019-02-22 NOTE — PROGRESS NOTES
OB Resident Progress Note     Spoke to Heather Donahue who reports the unit has spoken to their boss and patient will be brought to unit for evaluation. Awaiting her arrival.  Attending updated.        Grabiel Castellano  OB/GYN Resident, PGY2  Pager: [phone]  [address] [place] [address]  02/21/19  8:00 PM

## 2019-02-22 NOTE — PROGRESS NOTES
external lesions or erythema, normal appearing vaginal mucosa that is pink and moist, no blood noted in the vaginal canal or at the cervical os, moderate amount of thick white non odorous discharge in vault, cervix visually closed and long without lesions or erythema    LBSUS: cephalic, adequate 4P1AI pocked of fluid       NST:   Fetal heart rate baseline: 140, moderate variability, 15x15 accelerations present, decelerations absent    The tracing has been reviewed and is considered reactive. Assessment/Plan:  Matthew Daniels is a 41 y.o. female  at 34w4d here for IUP    Abdominal pain    - GC/C, vaginitis collected and pending    - UA performed on  wnl, denies urinary sx    - SSE: cervical os visualized and closed, no lesions or discharge noted    - TOCO quiet   - Category 1 tracing    - No concern for labor, placental abruption, or premature rupture of membranes at this time    - Please encourage increased PO hydration of patient while on psychiatric unit. If patient declining water, may flavor water with whatever is available on the unit. Limited PNC - NST    - Patient should continue with her  testing as scheduled. - Signs and Symptoms of  labor precautions were reviewed. - She was counseled on adequate hydration    - The patient was instructed on fetal kick counts   - Dr. Kelle Moreira has reviewed this NST and agrees with the above stated assessment and plan. - Patient appears to be agreeable with staff when provided with snacks. - Patient has not had a formal MFM ultrasound to assess fetal anatomy as this has been declined by psych as patient cannot leave hospital.  OB continues to recommend formal MFM assessment.        Fetal exposure to [facility]    - MFM consultation recommended per OB, unable to perform 2/2 inability to transfer patient to [facility] for assessment per psych     Homelessness    - Currently in [facility]     Asthma    - Controlled no meds     AMA    - NIPT ordered, declined by patient     Noncompliance/Poor historian      - Patient is a significant flight risk per psychiatric unit       If further medications recommended to aid in improvement with compliance/decrease risk of elopement/improve psychiatric health, please feel free to discuss risks/benefits of medications with OB. Patient's use of Latuda alone is not the recommendation of OB as it has not been effective treatment of patient's psychiatric symptoms. Of note, over an hour passed following discussion with Ayad Gann LPN and Robbie Fox RN prior to patient's presentation to labor and delivery. This may pose a health risk in the future, especially in the context of a multiparous patient who may have difficulties communicating physical symptoms of labor. Please consider pink slipping patient for prevention of future delays in transferring patient to labor and delivery       Discussed results with Dr. Edward Sahni who is agreeable to the above plan.      Roman Began  Ob/Gyn Resident  2/21/2019, 10:02 PM

## 2019-02-22 NOTE — BH NOTE
Per management patient is to be transferred with staff and security due to patient condition, patient transferred at this time in wheelchair and is accepting.

## 2019-02-22 NOTE — PLAN OF CARE
Problem: Risk of Harm:  Goal: Ability to remain free from injury will improve  Ability to remain free from injury will improve   Outcome: Ongoing  PSYCHOEDUCATION GROUP NOTE    Date: 2/22/19  Start Time: 0900  End Time: 0915    Number Participants in Group:  12/18    Goal:  Patient will demonstrate increased interpersonal interaction   Topic: Community Meeting     Discipline Responsible:   OT  AT  [facility]. x RT MHP Other       Participation Level:     None  Minimal   x Active Listener x Interactive    Monopolizing         Participation Quality:  x Appropriate  Inappropriate   x       Attentive        Intrusive          Sharing        Resistant          Supportive        Lethargic       Affective:   x Congruent  Incongruent  Blunted  Flat    Constricted  Anxious  Elated  Angry    Labile  Depressed  Other         Cognitive:  x Alert x Oriented PPTP     Concentration x G  F  P   Attention Span x G  F  P   Short-Term Memory x G  F  P   Long-Term Memory x G  F  P   ProblemSolving/  Decision Making x G  F  P   Ability to Process  Information x G  F  P      Contributing Factors             Delusional             Hallucinating             Flight of Ideas             Other:       Modes of Intervention:  x Education x Support x Exploration   x Clarifying x Problem Solving  Confrontation   x Socialization  Limit Setting x Reality Testing    Activity  Movement  Media    Other:            Response to Learning:   Able to verbalize current knowledge/experience    Able to verbalize/acknowledge new learning   x Able to retain information    Capable of insight    Able to change behavior   x Progressing to goal    Other:        Comments:

## 2019-02-22 NOTE — BH NOTE
Writer contacts OB and talks to Dr. Shawna Fernandez to express concern relating to patient being in pain and feeling a lot of movement and patient telling day shift she was ready to push. Dr. Shawna Fernandez states that without proper equipment to evaluate her she cannot come over to do that. Also states to evaluate patient she would need to be brought over to labor and delivery. Writer states that per management and Dr. Kenzie Srinivasanrs that she cannot be transferred due to high flight risk. Writer to call charge nurse to decide what to do at this time.

## 2019-02-22 NOTE — PROGRESS NOTES
Labor Progress Note    Isabela Zhong is a 41 y.o. female  at 35w7d  The patient was seen and examined. Her pain is well controlled. She reports fetal movement is present, denies contractions, denies loss of fluid, denies vaginal bleeding. Patient with no complaints this morning. Agreeable to doppler of FHT. Patient's room in disarray, however, does not smell of urine. Denies obstetrical complaints this morning. Agreeable to diflucan overnight per RN charting. Vital Signs:  Vitals:    19 0815 19 1100 19   BP: (!) 97/52  111/72 (!) 100/56   Pulse: 88  94 67   Resp: 14 14 14 16   Temp: 97.5 °F (36.4 °C)  97.9 °F (36.6 °C) 97.6 °F (36.4 °C)   TempSrc: Oral  Oral Infrared   SpO2: 98%      Height:         Physical Exam:    FHT: 135 by doppler     Gen: A+O.  NAD. Heart: RRR. Lungs: CTA b/l. Abd: Soft, gravid, nontender. Ext: No edema or cyanosis. Pelvic: not indicated       Assessment/Plan:  Isabela Zhong is a 41 y.o. female  at 34w5d here for IUP              - GC/C collected and pending    - GBS negative on  additional collected overnight    - Prenatal labs drawn    - S/p Tdap and Influenza    - Refusing 1h GTT, Hg AIC    - Limiated anatomy  with no abnormalities seen, unable to perform formal MFM scan as patient is not allowed to leave hospital per psych   - No s/s  labor at this time    - PNV daily    - Please continue  testing twice weekly ( - NST/BPP, Thursday NST)    Yeast infection    - Diflucan provided by RN overnight     Abdominal pain    - Resolved      Limited PNC - NST              - Patient should continue with her  testing as scheduled. - Signs and Symptoms of  labor precautions were reviewed.               - She was counseled on adequate hydration               - The patient was instructed on fetal kick counts              - Patient has not had a formal MFM ultrasound to assess fetal anatomy as this has been declined by psych. OB continues to recommend formal MFM assessment.         Fetal exposure to Demi Hirschfeld               - MFM consultation recommended per OB, unable to perform 2/2 inability to transfer patient to [facility] for assessment per psych     Homelessness               - Currently in [facility]      Asthma               - Controlled no meds      AMA               - NIPT ordered, declined by patient      Noncompliance/Poor historian               - Patient is a significant flight risk per psychiatric unit. Please discuss need for additional medication with OB, use of Latuda alone is not at the recommendation of OB.        Tai Mckeon  Ob/Gyn Resident  2/22/2019, 6:20 AM

## 2019-02-22 NOTE — BH NOTE
Patient out to desk for gatorade and asked again if she is willing to take diflucan. Patient accepting at this time and took with no incident.

## 2019-02-22 NOTE — BH NOTE
Diflucan medication offered several times throughout night, patient states \"i'll take it later today, tell those nurses in the morning\" goes to room after asking for some gatorade.

## 2019-02-22 NOTE — BH NOTE
Called over to OB due to pt requesting them to come over to check her out because she is having stomach pain. Pt was yelling out \"help\". OB stated pt needs to come to   OB to get checked out. Page went out to Dr. Reanna Alvarado.

## 2019-02-22 NOTE — PLAN OF CARE
Problem: Anger Management/Homicidal Ideation:  Goal: Absence of angry outbursts  Absence of angry outbursts   Outcome: Ongoing  [facility]  Week Interdisciplinary Treatment Plan Note     Review Date & Time: 2/22/19 0930    Admission Type:   Admission Type: Involuntary (Signed in.)    Reason for admission:  Reason for Admission: Patient was sent to [address] [place] [address] for assessment. Estimated Length of Stay :  5-7 days  Estimated Discharge Date Update: to be determined by physician    PATIENT STRENGTHS:  Patient Strengths:Strengths: Connection to output provider  Patient Strengths and Limitations:Limitations: Unrealistic self-view, Hopeless about future, Apathetic / unmotivated, Tendency to isolate self  Addictive Behavior:Addictive Behavior  In the past 3 months, have you felt or has someone told you that you have a problem with:  : None  Do you have a history of Chemical Use?: No  Do you have a history of Alcohol Use?: No  Do you have a history of Street Drug Abuse?: No  Histroy of Prescripton Drug Abuse?: No  Medical Problems:   Past Medical History:   Diagnosis Date    Anxiety     Asthma     Bipolar 1 disorder ([facility]     Multiple personality ([facility]     Seizures ([facility]        Risk:  Fall RiskTotal: 104  Stephen Scale Stephen Scale Score: 20  BVC Total: 0    Change in scores no. Changes to plan of Care no    Status EXAM:   Status and Exam  Normal: No  Facial Expression: Flat  Affect: Blunt  Level of Consciousness: Alert  Mood:Normal: No  Mood: Anxious, Suspicious  Motor Activity:Normal: No  Motor Activity: Increased  Interview Behavior: Cooperative  Preception: [place] to Person, Corinn Bright to Time, [place] to Place  Attention:Normal: No  Attention: Unable to Concentrate  Thought Processes: Flt.of Ideas  Thought Content:Normal: No  Thought Content: Preoccupations, Delusions, Paranoia  Hallucinations:  Other (Comment) (self talk noted.)  Delusions: Yes  Delusions: Persecution (paranoid.)  Memory:Normal: No  Memory: Poor Recent, Poor Remote  Insight and Judgment: No  Insight and Judgment: Poor Judgment, Poor Insight, Unmotivated  Present Suicidal Ideation: No  Present Homicidal Ideation: No    Daily Assessment Last Entry:   Daily Sleep (WDL): Exceptions to WDL         Patient Currently in Pain: Denies  Daily Nutrition (WDL): Within Defined Limits  Appetite Change: Normal for patient  Barriers to Nutrition: None  Level of Assistance: Independent/Self    Patient Monitoring:  Frequency of Checks: 4 times per hour, close    Psychiatric Symptoms:   Depression Symptoms  Depression Symptoms: Impaired concentration  Anxiety Symptoms  Anxiety Symptoms: Generalized  Lora Symptoms  Lora Symptoms: Increased energy, Poor judgment     Psychosis Symptoms  Delusion Type: Paranoid    Suicide Risk CSSR-S:  1) Within the past month, have you wished you were dead or wished you could go to sleep and not wake up? : No  2) Have you actually had any thoughts of killing yourself? : No  6) Have you ever done anything, started to do anything, or prepared to do anything to end your life?: No  Change in Result no Change in Plan of care no    EDUCATION:   Learner Progress Toward Treatment Goals: Reviewed results and recommendations of this team, Reviewed group plan and strategies, Reviewed signs, symptoms and risk of self harm and violent behavior, Reviewed goals and plan of care    Method: individual education, small group, verbal education    Outcome: verbalized understanding, but needs reinforcement to obtain goals    PATIENT GOALS:   short term: To work on her health   Long term: To go back to her normal life     PLAN/TREATMENT RECOMMENDATIONS UPDATE:   Continue with group therapies, education of coping skills   Continue to monitor patient on unit. Medications provided/ medication compliance by patient. Continue for plans to obtain long term goals after discharge.     SHORT-TERM GOALS UPDATE:  Time frame for

## 2019-02-22 NOTE — PLAN OF CARE
Problem: Anger Management/Homicidal Ideation:  Goal: Absence of angry outbursts  Absence of angry outbursts   Outcome: Ongoing  No angry outburst noted at this time.

## 2019-02-22 NOTE — BH NOTE
Writer is informed that patient needs to be discharged to Labor and Delivery, called Dr. Maureen Moraes to update him and Dr. Maureen Moraes states he will not be discharging her at this time.

## 2019-02-22 NOTE — FLOWSHEET NOTE
Patient admitted to room 178 from [facility] per wheelchair. Here with c/o stomach pain. Denies ROM or vaginal bleeding. Denies N/V/D. Denies fever/chills. Relates of + fetal movement. Assisted into bed, Siderails up x2. Call light in reach. Bed in low position. Oriented to room, surroundings, call system and plan of care. Patient verbalizes understanding. EFM applied and monitor test completed/passed.

## 2019-02-23 PROCEDURE — 1240000000 HC EMOTIONAL WELLNESS R&B

## 2019-02-23 PROCEDURE — 6370000000 HC RX 637 (ALT 250 FOR IP): Performed by: NURSE PRACTITIONER

## 2019-02-23 RX ADMIN — NICOTINE POLACRILEX 2 MG: 2 GUM, CHEWING BUCCAL at 15:49

## 2019-02-23 NOTE — PLAN OF CARE
Problem: Anger Management/Homicidal Ideation:  Goal: Absence of angry outbursts  Absence of angry outbursts   Outcome: Ongoing  Pt did not attend 1330 RT Coping by [employer] despite staff invitation to attend.

## 2019-02-23 NOTE — PLAN OF CARE
Problem: Anger Management/Homicidal Ideation:  Goal: Absence of angry outbursts  Absence of angry outbursts   Outcome: Ongoing  Patient has had no angry outbursts on shift; pt has been pleasant, cooperative, allowed staff to obtain vitals; sat in milieu for a period of time coloring; states she can feel baby moving in stomach; staff continues to monitor and care, checking on patient every 15 minutes and randomly to ensure safety. Problem: Risk of Harm:  Goal: Ability to remain free from injury will improve  Ability to remain free from injury will improve   Outcome: Ongoing  Patient is, and will remain free of injury; denies wanting to harm self; pt has been pleasant, cooperative, allowed staff to obtain vitals; sat in milieu for a period of time coloring; states she can feel baby moving in stomach; staff continues to monitor and care, checking on patient every 15 minutes and randomly to ensure safety.

## 2019-02-24 LAB
CULTURE: NORMAL
Lab: NORMAL
SPECIMEN DESCRIPTION: NORMAL

## 2019-02-24 PROCEDURE — 6370000000 HC RX 637 (ALT 250 FOR IP): Performed by: STUDENT IN AN ORGANIZED HEALTH CARE EDUCATION/TRAINING PROGRAM

## 2019-02-24 PROCEDURE — 1240000000 HC EMOTIONAL WELLNESS R&B

## 2019-02-24 PROCEDURE — 6370000000 HC RX 637 (ALT 250 FOR IP): Performed by: NURSE PRACTITIONER

## 2019-02-24 RX ADMIN — Medication 1 TABLET: at 17:35

## 2019-02-24 RX ADMIN — ACETAMINOPHEN 650 MG: 325 TABLET, FILM COATED ORAL at 19:54

## 2019-02-24 RX ADMIN — LURASIDONE HYDROCHLORIDE 160 MG: 80 TABLET, FILM COATED ORAL at 17:35

## 2019-02-24 RX ADMIN — FERROUS SULFATE TAB 325 MG (65 MG ELEMENTAL FE) 325 MG: 325 (65 FE) TAB at 17:34

## 2019-02-24 RX ADMIN — NICOTINE POLACRILEX 2 MG: 2 GUM, CHEWING BUCCAL at 11:49

## 2019-02-24 RX ADMIN — FERROUS SULFATE TAB 325 MG (65 MG ELEMENTAL FE) 325 MG: 325 (65 FE) TAB at 11:00

## 2019-02-24 RX ADMIN — HYDROXYZINE HYDROCHLORIDE 25 MG: 25 TABLET, FILM COATED ORAL at 19:54

## 2019-02-24 RX ADMIN — POLYETHYLENE GLYCOL 3350 17 G: 17 POWDER, FOR SOLUTION ORAL at 11:00

## 2019-02-24 RX ADMIN — DOCUSATE SODIUM 100 MG: 100 CAPSULE, LIQUID FILLED ORAL at 11:00

## 2019-02-24 ASSESSMENT — PAIN SCALES - GENERAL
PAINLEVEL_OUTOF10: 0
PAINLEVEL_OUTOF10: 5
PAINLEVEL_OUTOF10: 0

## 2019-02-24 NOTE — PLAN OF CARE
Problem: Anger Management/Homicidal Ideation:  Goal: Absence of angry outbursts  Absence of angry outbursts   Outcome: Ongoing  Pt did not participate in Psycho Education Group at 1000 due to resting in room and choosing to not attend.

## 2019-02-24 NOTE — BH NOTE
Writer assisted patient by providing her with supplies for a shower. While patient was in shower, dominicr changed patient's bed linens and picked up her room with staff assistance. Writer also put her dirty clothes that were on the floor into the washer.

## 2019-02-24 NOTE — PROGRESS NOTES
OB/GYN Resident Interval Note     Called [facility] to ask if patient willing to be seen by OB/GYN today, spoke with Miguel Mckeon. Patient unwilling at this time. Asked staff to call OB/GYN if patient would like to be seen, has any obstetric complaints, or with any questions/concerns.      Sergio Mayer DO  Ob/Gyn Resident  Pager: [phone]  2/24/2019 6:18 AM'

## 2019-02-24 NOTE — PLAN OF CARE
Problem: Anger Management/Homicidal Ideation:  Goal: Absence of angry outbursts  Absence of angry outbursts   Outcome: Ongoing  Patient has remained in behavior control so far this shift. Patient has had no angry outbursts. Patient was accepting of medications upon second attempt to administer them. Problem: Risk of Harm:  Goal: Ability to remain free from injury will improve  Ability to remain free from injury will improve   Outcome: Ongoing  Patient has had no physical injuries so far this shift. Patient encouraged to stay hydrated and change position slowly to prevent chances of injury. Patient said \"I'm fine. \"

## 2019-02-24 NOTE — BH NOTE
Patient did not participate in  [facility] group,  After invitation given. Patient remain seclusive to self. Every 15 minute checks maintained.

## 2019-02-24 NOTE — PLAN OF CARE
Problem: Anger Management/Homicidal Ideation:  Goal: Absence of angry outbursts  Absence of angry outbursts   Outcome: Ongoing  Remains free from angry outbursts at this time, remains evasive and uncooperative with care, not accepting of 1:1 time, but pleasant when interacted with

## 2019-02-24 NOTE — BH NOTE
Writer spoke to Dr. Eliana Estrella who was on call for Labor and Delivery. Writer expressed to Dr. Eliana Estrella that patient was \"saying she has fluid running thru her water, she has fibrotic changes and she is having a lot of pain. \" Dr. Eliana Estrella stated, Brittany Francoisuver her pants wet?' Writer informed Dr. Eliana Estrella that patient had changed pants. Dr. Eliana Estrella asked Marya Sifuentes if writer could check the pants she took off. Writer checked patient's room and could not find the pants. Writer checked the washer and dryer and found patient's pants in the dryer. Writer informed Dr. Eliana Estrella that the pants had been washed. Dr. Eliana Estrella stated, \"If you are concerned that she is having labor concerns, she would have to come here to labor and delivery for testing. \" writer relayed to Dr. Eliana Estrella that Marya Sifuentes was told \"patient can not leave unit unless she is currently in labor. \" Dr. Eliana Estrella said \"I talked to the hospital  and they said she is supposed to come over to labor and delivery for any labor concerns. \" writer asked Dr. Eliana Estrella if writer could put her on hold for a moment. Dr. Eliana Estrella was agreeable to this. Writer spoke to Dagmar Polanco, charge nurse, who stated Dr. Eliana Estrella would need to contact Dr. Armani Johnson if she wanted patient to go over to [facility]. Writer relayed this message to Dr. Eliana Estrella. Dr Eliana Estrella stated \"we went thru all this on Thursday and I'm not going thru it all again. You guys need to figure this out on your side. This is crazy. \" writer asked Dr. Eliana Estrella, Kristian Azeem are you requesting that she comes over now? \" Dr. Eliana Estrella said, \"If you are concerned she is actively in labor, then yes. \" Marya Sifuentes was monitoring patient in the dayroom throughout the conversation and noted no visible distress. Writer informed Dr. Eliana Estrella, \"No, I don't think she is actively in labor currently. \" Dr. Eliana Estrella said, \"Ok. Well call us back if that changes. \"

## 2019-02-24 NOTE — BH NOTE
Patient approached team station and asked writer to call the [facility] doctor. Writer asked patient why she wanted writer to call the [facility] doctor. Patient reported \"I'm having fluid thru my water and I'm having fibrotic changes. \"

## 2019-02-25 PROCEDURE — 6370000000 HC RX 637 (ALT 250 FOR IP): Performed by: NURSE PRACTITIONER

## 2019-02-25 PROCEDURE — 1240000000 HC EMOTIONAL WELLNESS R&B

## 2019-02-25 RX ADMIN — NICOTINE POLACRILEX 2 MG: 2 GUM, CHEWING BUCCAL at 18:16

## 2019-02-25 NOTE — PROGRESS NOTES
Called to [facility] to see if patient willing to be seen by OB/GYN. Patient in room and awake however does not answer if ok to see OB. I explained that the patient needs to be seen on L&D for NST/BPP to evaluate fetal well being. The staff informed me that the patient is not allowed to leave the floor. I was instructed to call Dr. Rosa Ronquillo answering service to directly talk with Dr. Rayna Dowling. Dr. Rayna Dowling was notified through answering service ([phone])  Explained to Dr. Rayna Dowling that the patient needs to come to L&D for her NST/BPP. He has informed me that he is allowed to leave the floor however she is not to be discharged from the [facility] unit. He states that a [facility] staff will accompany the patient when the patient is willing to come to L&D for testing. Dr. Rayna Dowling states he will call his staff at the [facility] to give instructions to allow the patient to leave the floor for  testing for fetal well being.        Maximo Baig, PGY3  OB-GYN Resident

## 2019-02-25 NOTE — BH NOTE
Dr. Zabrina Esquivel contacted staff stating patient is able to go to L&D for testing if she is agreeable to do so, that she is not to be discharged from [facility] unit but escorted by [facility] staff to L&D for the duration of testing than brought directly back to unit.

## 2019-02-25 NOTE — BH NOTE
L&D contacted [facility] staff stating patient needed to be brought down for  testing, L&D staff informed that Dr. Lynn Landon needed to be contacted directly for anything that would require patient to leave the unit.

## 2019-02-25 NOTE — PROGRESS NOTES
OB Resident Progress Note     Received message from L&D staff that psychiatric unit called reporting head, neck, back, side, and leg pain. BP 97/62. Spoke to RN who states patient is now resting comfortably but was previously stating she wanted to see OB. RN notified that patient should be evaluated by either psych or ED for complaints that are not related to OB. Additionally, informed RN that if there is concern that patient is complaining of labor pain or pains related to pregnancy, she should be brought to labor and delivery. Again informed by RN that patient is unable to be brought to the labor and delivery unit per Dr. López Friday.          Marie Kenney  OB/GYN Resident, PGY2  Pager: [phone]  [facility]  02/24/19  9:23 PM

## 2019-02-25 NOTE — BH NOTE
OB called at 5:29am wondering if patient wants them to come see her, patient agreed to see OB at this time.

## 2019-02-25 NOTE — PROGRESS NOTES
Notified by staff that patient does not want to come to L&D for testing.     Washington Quiroz, PGY3  OB-GYN Resident

## 2019-02-25 NOTE — PROGRESS NOTES
Patient refused to interact with me today. She was sleeping in bed, acknowledged my presence, returned to sleep and refused to answer questions. She continues to display poor self-care, there are strong odor in her room. She refuses staff's offer multiple times to help her with hygiene. She is intermittently accepting of OB care. Patient is eating appropriately. Charting and medications reviewed. She refuses attempts at therapeutic interjection.   There is no safe alternative to continued hospitalization due to psychosis and pregnancy

## 2019-02-25 NOTE — PROGRESS NOTES
Patient denies any discomfort at this time. Noted to be cooperative And appropriate. Patient informed of  testing to be performed every Monday and Wednesday until delivery, requested by OB. Patient indicates understanding. Will continue to monitor.

## 2019-02-25 NOTE — PLAN OF CARE
Problem: Anger Management/Homicidal Ideation:  Goal: Absence of angry outbursts  Absence of angry outbursts   Outcome: Ongoing  Pt did not attend therapeutic recreation at 1330 d/t resting in room despite staff invitation to attend.

## 2019-02-25 NOTE — PROGRESS NOTES
OB Resident Progress Note     Writer called by Rick Claros RN, stating that patient states she is having \"fluid through my water\" and \"having fibrotic changes. \"  Unclear as to meaning of these statements. RN asked if patient is leaking fluid vaginally, RN states patient is \"wearing different pants. \"  Asked RN to check if previous pants are wet. RN states \"someone already put them in the washer\" and when prompted further as to whether pants were wet RN states \"honestly, her pants are always wet with urine. \"  Informed RN that if there is concern for labor or rupture of membranes, patient needs to be brought to labor and delivery for evaluation. RN states that, per Dr Rayna Dowling, she is unable to bring patient to labor and delivery for evaluation. RN reports that this order has been given per charge nurse, Mala Caban. Writer informed I may speak to Dr Rayna Dowling on the phone if I have further questions. Writer then spoke with Mala Caban who states patient may not be discharged, however, she may be brought to labor and delivery as needed without a discharge order. Charge RN reminded that patient requires  testing and that this may be performed at time of psychiatric units convenience, however, it must be performed on  and . Again, please note that OB is accepting of changes to patients current medications to assist in her compliance. Use of Latuda alone is not at the recommendation of OB. If any concerns regarding specific medications, please do not hesitate to discuss risks/benefits with OB.        Grabiel Castellano  OB/GYN Resident, PGY2  Pager: [phone] [facility]  19  7:21 PM

## 2019-02-25 NOTE — BH NOTE
Received phone call from Dr. Eliezer Eric physician,  Updated physician on reason for consult. Patient was at desk with complaints of back pain, neck pain, leg pain,  Patient pacing in front of desk,  Yelling from room \"somethings wrong, I can not breathe\". Staff gave patient vistaril 25mg and Tylenol 650mg at [address]. Patient currently is resting in bed,  And does not appear to be in distress. Per Dr. Sherry Pradhan patients symptoms did not sound like OB issues and that Dr. Rayna Dowling needs to be consulted with non-OB issues. Writer informed Dr. Sherry Pradhan that staff is unable to make that assumption when patient is verbally expressing \"I need to see OB, something is wrong\",  And per patients symptoms. OB Dr. Celeste Oconnell that if we feel an assessment is needed that they will not be able to come to unit that patient needs to be brought to [facility]. Dr. Sherry Pradhan stated \"per president of the hospital, as of Friday,  That the patient needs to go to [facility] for OB needs\".

## 2019-02-25 NOTE — BH NOTE
Writer spoke to Paulo Luna at [facility] to request to speak to the [facility] doctor on call. Paulo Luna asked what was going on with patient that writer needed to speak with the [facility] resident. Writer relayed that patient was reporting \"fluid running through her water and fibrotic changes. \" Paulo Luna stated she would contact OB resident. Writer thanked Paulo Luna.

## 2019-02-25 NOTE — PLAN OF CARE
Problem: Anger Management/Homicidal Ideation:  Goal: Absence of angry outbursts  Absence of angry outbursts   Outcome: Ongoing  Patient had no angry outbursts at this time. Problem: Risk of Harm:  Goal: Ability to remain free from injury will improve  Ability to remain free from injury will improve   Outcome: Ongoing  Patient is free of injury at this time. Patient is free of self harm at this time. Patient agrees to seek out staff if thoughts to harm self arise. Staff will provide support and reassurance as needed. Safety checks maintained every 15 minutes.

## 2019-02-25 NOTE — BH NOTE
Writer approached patient regarding going down to OB for testing. Patient is extremely angered by this and screams at Allegra Navas \"NO! \" loud enough that she was heard by patients in day area and staff behind desk.

## 2019-02-25 NOTE — PROGRESS NOTES
Labor Progress Note    Manuel Aparicio is a 41 y.o. female  at 35w1d  The patient was seen and examined. Her pain is well controlled. She reports fetal movement is present, denies contractions, denies loss of fluid, denies vaginal bleeding. Patient with no complaints this morning, resting comfortably in bed. Agreeable to doppler of FHT. Patient will need  testing on labor and delivery unit today. Vital Signs:  Vitals:    19   BP: 111/72 (!) 100/56 101/75 97/62   Pulse: 94 67 91 92   Resp: 14 16 16 16   Temp: 97.9 °F (36.6 °C) 97.6 °F (36.4 °C) 98.6 °F (37 °C)    TempSrc: Oral Infrared Oral    SpO2:       Height:         Physical Exam:    FHT: 145 by doppler     Gen: A+O.  NAD. Heart: RRR. Lungs: CTA b/l. Abd: Soft, gravid, nontender. Ext: No edema or cyanosis. Pelvic: not indicated       Assessment/Plan:  Manuel Aparicio is a 41 y.o. female  at 35w1d here for IUP              - GC/C collected negative    - GBS negative    - Prenatal labs wnl   - S/p Tdap and Influenza    - Refusing 1h GTT, Hg AIC    - Limited anatomy  with no abnormalities seen, unable to perform formal MFM scan as patient is not allowed to leave hospital per psych    - No s/s  labor at this time    - PNV daily    - Please continue  testing twice weekly ( - NST/BPP, Thursday NST)    Limited [facility] - NST              - Patient should continue with her  testing as scheduled. - Signs and Symptoms of  labor precautions were reviewed.               - She was counseled on adequate hydration               - The patient was instructed on fetal kick counts              - Patient has not had a formal MFM ultrasound to assess fetal anatomy as she is not allowed to leave the hospital per psych, OB continues to recommend formal MFM assessment.      Fetal exposure to [facility]               - MFM consultation recommended per OB, unable to perform 2/2 inability to transfer patient to \[facility] [place] [facility]\"" for assessment per psych      Homelessness               - Currently in [facility]      Asthma               - Controlled no meds      AMA               - NIPT ordered, declined by patient      Noncompliance/Poor historian               - Patient is a significant flight risk per psychiatric unit. Please discuss need for additional medication with OB, use of Latuda alone is not at the recommendation of OB. Please note, patient is to come to labor and delivery for NST/BPP today at time of convenience to Psych unit.      Olya Khanna  Ob/Gyn Resident  2/25/2019, 7:03 AM

## 2019-02-25 NOTE — PLAN OF CARE
Problem: Anger Management/Homicidal Ideation:  Goal: Absence of angry outbursts  Absence of angry outbursts   Outcome: Ongoing  Patient continues with angry outburst at this time. Irritable when approached by other patients. Redirectable with select staff. Refused AM medications along with assessments. 15 minute checks maintained for safety.

## 2019-02-26 PROCEDURE — 1240000000 HC EMOTIONAL WELLNESS R&B

## 2019-02-26 PROCEDURE — 76818 FETAL BIOPHYS PROFILE W/NST: CPT

## 2019-02-26 PROCEDURE — 6370000000 HC RX 637 (ALT 250 FOR IP): Performed by: NURSE PRACTITIONER

## 2019-02-26 PROCEDURE — 99213 OFFICE O/P EST LOW 20 MIN: CPT

## 2019-02-26 PROCEDURE — 59025 FETAL NON-STRESS TEST: CPT

## 2019-02-26 RX ADMIN — NICOTINE POLACRILEX 2 MG: 2 GUM, CHEWING BUCCAL at 19:55

## 2019-02-26 RX ADMIN — LURASIDONE HYDROCHLORIDE 160 MG: 80 TABLET, FILM COATED ORAL at 17:09

## 2019-02-26 RX ADMIN — NICOTINE POLACRILEX 2 MG: 2 GUM, CHEWING BUCCAL at 14:40

## 2019-02-26 NOTE — PLAN OF CARE
Problem: Anger Management/Homicidal Ideation:  Goal: Absence of angry outbursts  Absence of angry outbursts   Outcome: Ongoing  Pt did not attend [facility] group at 0900 d/t resting in room despite staff invitation to attend.

## 2019-02-26 NOTE — PLAN OF CARE
Problem: Anger Management/Homicidal Ideation:  Goal: Absence of angry outbursts  Absence of angry outbursts   Outcome: Ongoing  Psychotherapy Group Note    Date: 2/26/2019  Start Time: 1000  End Time: 1045    Number Participants in Group:  14/16    Goal:  Patient will demonstrate increased interpersonal interaction   Topic: [facility]    Discipline Responsible:   OT  AT X SW  Nsg.  RT  Other       Participation Level:     None  Minimal   X Active Listener X Interactive    Monopolizing         Participation Quality:  X Appropriate  Inappropriate   X       Attentive        Intrusive   X       Sharing        Resistant   X       Supportive        Lethargic       Affective:   X Congruent  Incongruent  Blunted   Flat    Constricted  Anxious  Elated   Angry    Labile  Depressed  Other          Cognitive:  X Alert X Oriented PPTP     Concentration  G X F  P   Attention Span  G X F  P   Short-Term Memory  G X F  P   Long-Term Memory  G X F  P   ProblemSolving/  Decision Making  G X F  P   Ability to Process  Information  G X F  P      Contributing Factors             Delusional             Hallucinating             Flight of Ideas             Other:       Modes of Intervention:  X Education X Support X Exploration   X Clarifying X Problem Solving X Confrontation   X Socialization X Limit Setting X Reality Testing    Activity  Movement  Media    Other:            Response to Learning:  X Able to verbalize current knowledge/experience   X Able to verbalize/acknowledge new learning   X Able to retain information    Capable of insight    Able to change behavior   X Progressing to goal    Other:        Comments:

## 2019-02-26 NOTE — CARE COORDINATION
Writer spoke with pt's sister, Geraldo Wayne, [phone], who stated she would be in on 2/26/19, at noon, to visit with pt and  expert frank.  Brian Kumari stated she has not been in due to her mother being in the hospital.

## 2019-02-26 NOTE — CARE COORDINATION
Writer spoke with pt's sister, Caitlyn Sheridan, [phone], who stated she would be in today, 2/26/19, at five o'clock, to  pt's expert evaluation.

## 2019-02-26 NOTE — PLAN OF CARE
Problem: Anger Management/Homicidal Ideation:  Goal: Absence of angry outbursts  Absence of angry outbursts   Outcome: Ongoing  Patient agitated tonight, sharp 'no' to every question, but no angry outbursts at staff or peers; in day room for long periods of time, coloring, and drawing. safety maintained per protocol and random checks. Problem: Risk of Harm:  Goal: Ability to remain free from injury will improve  Ability to remain free from injury will improve   Outcome: Ongoing  Patient denies wanting to harm self; staff continues to monitor and care every 15 minutes to ensure safety; patient in day room for a long period of time, coloring, drawing and watching TV; OB called, but patient was not in a good mood to see anyone.

## 2019-02-26 NOTE — PROGRESS NOTES
Dr Errol Pimentel [facility] called requesting pt be brought to [facility] for testing. I asked Jeff Harsh if she would go down explaining the importance for the safety of her baby & she replied, \"Get the f___ out! \" I left a message re: her response in OB who assured me they would tell Dr Errol Pimentel.

## 2019-02-26 NOTE — PROGRESS NOTES
OB Resident Progress Note     Spoke to Dominique Truong who asked patient if she was willing to be seen by OB today. RN states patient told him to \"Get the fuck out of my room. \"   RN informed patient is due for NST/BPP today [facility]  OB/GYN Resident, PGY2  Pager: [phone] [facility]  02/26/19  5:41 AM

## 2019-02-26 NOTE — PROGRESS NOTES
Called [facility] to see if Farzaneh willing to come to L&D for NST/BPP. RN asked patient and Latia Contreras said no. RN stated that Latia Contreras is not allowed to leave the floor anyways unless [facility] Explained that I talked with Dr. Lashell Holloway yesterday and the Nursing staff and that Latia Contreras is able to leave the floor however she is not to be discharged. Latia Contreras also has to be willing to come to L&D. Requested RN to clarify with Dr. Lashell Holloway that at any point when Latia Contreras is willing to be seen by [facility] and staff able to accompany patient that she is able to leave the unit without having to call Dr. Lashell Holloway each time. RN verbalized that she would discuss the issue with Dr. Lashell Holloway.     Washington Quiroz, PGY3  OB-GYN Resident

## 2019-02-26 NOTE — PROGRESS NOTES
OB Resident Interim Note     Spoke to RN who states Jennifer Topete is not willing to come to [facility] for NST/BPP. RN informed that patient is due for testing today and encouraged to continue asking patient throughout the night. OB is willing to see patient at any time.        Roman Bonner  OB/GYN Resident, PGY2  Pager: [phone]  [facility]  02/25/19  7:52 PM

## 2019-02-26 NOTE — PROGRESS NOTES
Per Management, AMANDA contacted [facility] and spoke to Ms. Guzman. Referral was taken and someone will contact this worker. Ms. [facility] did state that \" there is nothing we can do until there is a baby\". AMANDA awaits call from investigative worker.

## 2019-02-26 NOTE — PLAN OF CARE
Problem: Anger Management/Homicidal Ideation:  Goal: Absence of angry outbursts  Absence of angry outbursts   Outcome: Ongoing  Patient continues with angry outburst at this time. Irritable when approached by staff. Refused AM medications along with assessments. Isolative to room for long periods. 15 minute checks maintained for safety.

## 2019-02-27 PROCEDURE — 6370000000 HC RX 637 (ALT 250 FOR IP): Performed by: STUDENT IN AN ORGANIZED HEALTH CARE EDUCATION/TRAINING PROGRAM

## 2019-02-27 PROCEDURE — 1240000000 HC EMOTIONAL WELLNESS R&B

## 2019-02-27 PROCEDURE — 6370000000 HC RX 637 (ALT 250 FOR IP): Performed by: NURSE PRACTITIONER

## 2019-02-27 RX ADMIN — FERROUS SULFATE TAB 325 MG (65 MG ELEMENTAL FE) 325 MG: 325 (65 FE) TAB at 07:45

## 2019-02-27 RX ADMIN — ALBUTEROL SULFATE 2 PUFF: 90 AEROSOL, METERED RESPIRATORY (INHALATION) at 07:46

## 2019-02-27 RX ADMIN — POLYETHYLENE GLYCOL 3350 17 G: 17 POWDER, FOR SOLUTION ORAL at 07:46

## 2019-02-27 RX ADMIN — NICOTINE POLACRILEX 2 MG: 2 GUM, CHEWING BUCCAL at 14:31

## 2019-02-27 RX ADMIN — NICOTINE POLACRILEX 2 MG: 2 GUM, CHEWING BUCCAL at 17:02

## 2019-02-27 RX ADMIN — DOCUSATE SODIUM 100 MG: 100 CAPSULE, LIQUID FILLED ORAL at 07:45

## 2019-02-27 ASSESSMENT — PAIN SCALES - GENERAL: PAINLEVEL_OUTOF10: 0

## 2019-02-27 NOTE — BH NOTE
Patient noted with restlessness, from room to day room, up at team station frequently asking for random things; patient requested to see OB/GYN doctor; call was placed to [facility] department, and dr. Real Lovelace stated to bring patient to department, and that patient needed an  testing done; writer took patient to [facility] department via wheelchair at 10:10 pm; patient was returned to unit at 11:20 pm; no complaints voiced; Dr. López Friday was aware of transfer and treatment, charge nurse was also notified.

## 2019-02-27 NOTE — PROGRESS NOTES
OB Resident Progress Note     Patient declining OB evaluation this morning. Please call with any questions/concerns.        Patricia Montez  OB/GYN Resident, PGY2  Pager: [phone] [facility]  02/27/19  7:15 AM

## 2019-02-27 NOTE — PLAN OF CARE
Problem: Anger Management/Homicidal Ideation:  Intervention: Manage a safe environment  Pt declined to attend psychotherapy at 1000 am despite encouragement. Pt offered 1:1 and refused.

## 2019-02-27 NOTE — BH NOTE
Pt did not participate  In self esteem group at 1600 due to resting in room after much encouragement from staff.

## 2019-02-27 NOTE — PLAN OF CARE
Problem: Anger Management/Homicidal Ideation:  Goal: Absence of angry outbursts  Absence of angry outbursts   Outcome: Ongoing  Psychotherapy Group Note    Date: 2/27/2019  Start Time: 1000  End Time: 1045    Number Participants in Group:  9/16    Goal:  Patient will demonstrate increased interpersonal interaction   Topic: [facility]    Discipline Responsible:   OT  AT X SW  Nsg.  RT  Other       Participation Level:     None  Minimal   X Active Listener X Interactive    Monopolizing         Participation Quality:  X Appropriate  Inappropriate   X       Attentive        Intrusive   X       Sharing        Resistant   X       Supportive        Lethargic       Affective:   X Congruent  Incongruent  Blunted   Flat    Constricted  Anxious  Elated   Angry    Labile  Depressed  Other          Cognitive:  X Alert X Oriented PPTP     Concentration  G X F  P   Attention Span  G X F  P   Short-Term Memory  G X F  P   Long-Term Memory  G X F  P   ProblemSolving/  Decision Making  G X F  P   Ability to Process  Information  G X F  P      Contributing Factors             Delusional             Hallucinating             Flight of Ideas             Other:       Modes of Intervention:  X Education X Support X Exploration   X Clarifying X Problem Solving X Confrontation   X Socialization X Limit Setting X Reality Testing    Activity  Movement  Media    Other:            Response to Learning:  X Able to verbalize current knowledge/experience   X Able to verbalize/acknowledge new learning   X Able to retain information    Capable of insight    Able to change behavior   X Progressing to goal    Other:        Comments:

## 2019-02-27 NOTE — PLAN OF CARE
Problem: Risk of Harm:  Goal: Ability to remain free from injury will improve  Ability to remain free from injury will improve   Outcome: Ongoing  Patient denies wanting to harm self; staff continues to monitor and care every 15 minutes to ensure safety; patient in day room for a long period of time, coloring, drawing and watching TV;  Patient very restless, asked if she can visit the OB doctor; patient visited. Problem: Falls - Risk of:  Goal: Will remain free from falls  Will remain free from falls   Outcome: Ongoing  Patient will remain free of falls; patient noted with fast walking and like stumbling,encouraged to walk slower; staff continues to monitor and care,checking on patient Q 15 minutes and randomly to ensure safety.

## 2019-02-27 NOTE — CARE COORDINATION
Pt's sister, Valdez Culp, [phone], phoned and stated she came up at 10:30pm to  the expert evaluation paperwork for pt, but was told the building was locked and she couldn't get the paperwork. Writer explained there is no one at the desk at that time and doors are locked after 7:00pm when visiting hours are over.   Tad Read stated she would be in today to  the paperwork before 7:00 pm.

## 2019-02-27 NOTE — PROGRESS NOTES
Patient was sleeping in bed today, refused to wake or acknowledge my attempts questioning her. She continues to display very poor hygiene, poor self-care. There is odor in her room. She continues to display very poor insight into current situation secondary to psychosis. She displays thought blocking and poverty of thought. Patient has non-reality based thinking often. She has been intermittently cooperative with OB care-refused assessment today, but was willing last night to go get testing. There is no safe alternative to continued hospitalization. Charting and medications reviewed.

## 2019-02-27 NOTE — PLAN OF CARE
Problem: Anger Management/Homicidal Ideation:  Goal: Absence of angry outbursts  Absence of angry outbursts   Outcome: Ongoing  Recovery Group Note    Date: 2/27/19  Start Time: 1430  End Time: 1515    Number Participants in Group:  7/15    Goal:  Patient will demonstrate increased interpersonal interaction   Topic:  Recovery group    Discipline Responsible:   OT  AT X SW  Nsg.  RT  Other       Participation Level:     None  Minimal   X Active Listener  Interactive    Monopolizing         Participation Quality:   Appropriate  Inappropriate   X       Attentive        Intrusive          Sharing        Resistant   X       Supportive        Lethargic       Affective:   X Congruent  Incongruent  Blunted  Flat    Constricted  Anxious  Elated  Angry    Labile  Depressed  Other         Cognitive:  X Alert  Oriented PPTP     Concentration  G X F  P   Attention Span  G X F  P   Short-Term Memory  G X F  P   Long-Term Memory  G X F  P   ProblemSolving/  Decision Making  G X F  P   Ability to Process  Information  G X F  P      Contributing Factors             Delusional             Hallucinating             Flight of Ideas             Other:       Modes of Intervention:  X Education X Support X Exploration   X Clarifying X Problem Solving  Confrontation   X Socialization  Limit Setting  Reality Testing    Activity  Movement  Media    Other:            Response to Learning:   Able to verbalize current knowledge/experience    Able to verbalize/acknowledge new learning    Able to retain information    Capable of insight    Able to change behavior   X Progressing to goal    Other:        Comments:     Pt is making progress AEB listening attentively.

## 2019-02-27 NOTE — PROGRESS NOTES
OB Resident Interim Note      RN called and states patient has felt restless today. RN informed this is not an obstetrical complaint and Dr. Enid Iinguez may be notified, however, patient is overdue for NST/BPP and should come to unit for this if willing. RN voices understanding.        Tamiko Gallagher  OB/GYN Resident, PGY2  Pager: [phone] [facility]  02/26/19  10:08 PM

## 2019-02-28 PROCEDURE — 1240000000 HC EMOTIONAL WELLNESS R&B

## 2019-02-28 PROCEDURE — 6370000000 HC RX 637 (ALT 250 FOR IP): Performed by: NURSE PRACTITIONER

## 2019-02-28 PROCEDURE — 59025 FETAL NON-STRESS TEST: CPT

## 2019-02-28 PROCEDURE — 99213 OFFICE O/P EST LOW 20 MIN: CPT

## 2019-02-28 RX ADMIN — NICOTINE POLACRILEX 2 MG: 2 GUM, CHEWING BUCCAL at 14:59

## 2019-02-28 ASSESSMENT — PAIN SCALES - GENERAL
PAINLEVEL_OUTOF10: 0
PAINLEVEL_OUTOF10: 0

## 2019-02-28 NOTE — PLAN OF CARE
Problem: Anger Management/Homicidal Ideation:  Goal: Absence of angry outbursts  Absence of angry outbursts   Outcome: Ongoing  Patient has not had any angry outbursts this shift so far. Patient politely declined her medications stating \"no thanks. \" patient thanked staff for letting her into shower. Patient also thanked staff when they provided her with ice water at her request. Patient has been isolative to room this shift. Problem: Risk of Harm:  Goal: Ability to remain free from injury will improve  Ability to remain free from injury will improve   Patient has had no physical injuries this shift so far. Patient encouraged to stay hydrated and to change position slowly to prevent chance of falls. Patient denies any thoughts to harm self and no signs of self harm are noted. Problem: Falls - Risk of:  Goal: Will remain free from falls  Will remain free from falls    Outcome: Ongoing  Patient has had no falls this shift so far. Patient encouraged to stay hydrated and to change position slowly to prevent chance of falls. Patient voiced understanding.

## 2019-02-28 NOTE — PLAN OF CARE
Problem: Risk of Harm:  Goal: Ability to remain free from injury will improve  Ability to remain free from injury will improve   Outcome: Ongoing  Pt did not attend RT group at 1430 despite staff invitation to attend.

## 2019-02-28 NOTE — PROGRESS NOTES
Called to see if Ruben Prasad is willing to be seen by OB. RN states that she is refusing. Patient needs NST today or tomorrow.      Tigre Osborne, PGY3  OB-GYN Resident

## 2019-02-28 NOTE — PLAN OF CARE
Problem: Anger Management/Homicidal Ideation:  Goal: Absence of angry outbursts  Absence of angry outbursts   Outcome: Ongoing  Patient spent long periods of time in day room; sister brought in new clothes, patient very excited; states that she felt baby kicking and asked writer if there is something to stop the baby from kicking her! No angry outbursts, patient is easily redirectable; safety maintained via protocol and random checks. Problem: Risk of Harm:  Goal: Ability to remain free from injury will improve  Ability to remain free from injury will improve   Outcome: Ongoing  Patient denies wanting to harm self; staff continues to monitor and care every 15 minutes to ensure safety; patient in day room for a long period of time, coloring, drawing and watching TV, states baby really kicking.

## 2019-02-28 NOTE — PROGRESS NOTES
OB Resident Progress Note     Vitals:    02/22/19 2000 02/24/19 2000 02/26/19 2014 02/27/19 0900   BP:   116/64 121/76   Pulse:    93   Resp: 16 16  14   Temp:    97.5 °F (36.4 °C)   TempSrc: Oral   Oral   SpO2:    100%   Height:             Called and spoke to RN who states patient is sleeping and unwilling to be seen at this time. Please call with questions/concerns today.        Deborah Post  OB/GYN Resident, PGY2  Pager: [phone] [facility] [place]  02/28/19  6:19 AM

## 2019-03-01 PROCEDURE — 6370000000 HC RX 637 (ALT 250 FOR IP): Performed by: NURSE PRACTITIONER

## 2019-03-01 PROCEDURE — 1240000000 HC EMOTIONAL WELLNESS R&B

## 2019-03-01 RX ADMIN — NICOTINE POLACRILEX 2 MG: 2 GUM, CHEWING BUCCAL at 13:24

## 2019-03-01 RX ADMIN — ALBUTEROL SULFATE 2 PUFF: 90 AEROSOL, METERED RESPIRATORY (INHALATION) at 08:46

## 2019-03-01 ASSESSMENT — PAIN SCALES - GENERAL: PAINLEVEL_OUTOF10: 0

## 2019-03-01 NOTE — FLOWSHEET NOTE
1955- Arrived ambulatory to room 178 for NST. 2000- monitor test completed and EFM and TOCO applied. Patient states she has felt fetal movement. Denies leaking of fluid. States rare contractions. 2048- monitor removed. NST reactive per Dr. Praveena Rodriguez. Returned to previous unit with RN/escorts.

## 2019-03-01 NOTE — PLAN OF CARE
Problem: Anger Management/Homicidal Ideation:  Goal: Absence of angry outbursts  Absence of angry outbursts   Outcome: Ongoing  Pt did not attend RT group at 1345 d/t resting in room despite staff invitation to attend.

## 2019-03-01 NOTE — PLAN OF CARE
Problem: Anger Management/Homicidal Ideation:  Goal: Absence of angry outbursts  Absence of angry outbursts   Outcome: Ongoing  Pt. remains free from any behavioral outbursts thus far this shift and has been isolative to her room for the majority of the morning. Pt. Has expressed her needs to staff appropriately. Q15min checks continued for safety. Problem: Falls - Risk of:  Goal: Will remain free from falls  Will remain free from falls    Outcome: Ongoing  Pt. Remains free from harm or falls and is safe on the unit. Pt. Has been out of her room for meals and occasionally asks for candy at the nurse's station. Pt. Refused vitals in the AM. Q15min checks continued for safety.

## 2019-03-01 NOTE — PROGRESS NOTES
TESTING NOTE    Farzaneh Wolfe is a 41 y.o. female  at 29w2d    The patient was seen and examined. She is here today for  testing for because she is at high risk for the following reasons: limited PNC, fetal exposure to latuda, noncompliance. The baby is moving well and she denies any complaints. Patient Active Problem List   Diagnosis    Schizoaffective disorder, bipolar type ([address]    Polysubstance abuse ([address]    No prenatal care in current pregnancy    Macrocytic anemia    Advanced maternal age in multigravida    Patient does not believe she is pregnant, refusing care    Noncompliance    Bacterial vaginosis    Yeast infection    Poor historian    Fetal drug exposure (latuda)    Schizoaffective disorder ([address]    33 weeks gestation of pregnancy       Vitals:  Vitals:    19 0900 19   BP:   121/76 95/62   Pulse:   93 95   Resp: 16 16 14 14   Temp:   97.5 °F (36.4 °C) 98 °F (36.7 °C)   TempSrc: Oral  Oral Oral   SpO2:   100%    Height:             NST:   Fetal heart rate baseline: 145, moderate variability, accelerations present, decelerations absent    The tracing has been reviewed and is considered reactive. Assessment/Plan:  1. Farzaneh Wolfe is a 41 y.o. female  at 35w4d  2. NST   - The patient will continue with her scheduled office appointments. - She will continue with her  testing as scheduled. - Signs and Symptoms of  labor precautions were reviewed. - She was counseled on adequate hydration prior to discharge   - The patient was instructed on fetal kick counts. 3. Discussed results with Dr. Lady Valentine who is agreeable to the above plan.      Shai Franco DO  Ob/Gyn Resident   2019, 8:50 PM

## 2019-03-01 NOTE — PLAN OF CARE
Problem: Anger Management/Homicidal Ideation:  Goal: Absence of angry outbursts  Absence of angry outbursts   Outcome: Ongoing  [address] [facility] Interdisciplinary Treatment Plan Note     Review Date & Time: 3/1/19 9:39AM    Admission Type:   Admission Type: Involuntary (Signed in.)    Reason for admission:  Reason for Admission: Patient was sent to [address] [place] [address] for assessment. Estimated Length of Stay :  5-7 days  Estimated Discharge Date Update: to be determined by physician    PATIENT STRENGTHS:  Patient Strengths:Strengths: Connection to output provider  Patient Strengths and Limitations:Limitations: Unrealistic self-view, Hopeless about future, Apathetic / unmotivated, Tendency to isolate self  Addictive Behavior:Addictive Behavior  In the past 3 months, have you felt or has someone told you that you have a problem with:  : None  Do you have a history of Chemical Use?: No  Do you have a history of Alcohol Use?: No  Do you have a history of Street Drug Abuse?: No  Histroy of Prescripton Drug Abuse?: No  Medical Problems:   Past Medical History:   Diagnosis Date    Anxiety     Asthma     Bipolar 1 disorder ([facility]     Multiple personality ([facility]     Seizures ([facility]        Risk:  Fall RiskTotal: 96  Stephen Scale Stephen Scale Score: 21  BVC Total: 0    Change in scores no.  Changes to plan of Care no    Status EXAM:   Status and Exam  Normal: No  Facial Expression: Avoids Gaze, Flat  Affect: Blunt, Unstable  Level of Consciousness: Alert  Mood:Normal: No  Mood: Depressed, Anxious, Irritable  Motor Activity:Normal: No  Motor Activity: Decreased  Interview Behavior: Evasive, Cooperative, Uncooperative/Withdrawn  Preception: Lovilia to Person, Lovilia to Place  Attention:Normal: No  Attention: Distractible, Unable to Concentrate  Thought Processes: Blocking, Loose Assoc., Flt.of Ideas  Thought Content:Normal: No  Thought Content: Poverty of Content, Preoccupations  Hallucinations: Auditory (Comment) (denies but seen responding)  Delusions: Yes  Delusions: Obsessions, Persecution  Memory:Normal: No  Memory: Confabulation, Poor Recent, Poor Remote  Insight and Judgment: No  Insight and Judgment: Poor Judgment, Poor Insight, Unmotivated, Unrealistic  Present Suicidal Ideation: No  Present Homicidal Ideation: No    Daily Assessment Last Entry:   Daily Sleep (WDL): Within Defined Limits         Patient Currently in Pain: Denies  Daily Nutrition (WDL): Within Defined Limits  Appetite Change: Normal for patient  Barriers to Nutrition: None  Level of Assistance: Independent/Self    Patient Monitoring:  Frequency of Checks: 4 times per hour, close    Psychiatric Symptoms:   Depression Symptoms  Depression Symptoms: Impaired concentration, Isolative, Loss of interest  Anxiety Symptoms  Anxiety Symptoms: Generalized  Lora Symptoms  Lora Symptoms: Poor judgment, Labile     Psychosis Symptoms  Delusion Type: Paranoid, Persecutory    Suicide Risk CSSR-S:  1) Within the past month, have you wished you were dead or wished you could go to sleep and not wake up? : No  2) Have you actually had any thoughts of killing yourself? : No  6) Have you ever done anything, started to do anything, or prepared to do anything to end your life?: No  Change in Result no Change in Plan of care no    EDUCATION:   Learner Progress Toward Treatment Goals: Reviewed results and recommendations of this team, Reviewed group plan and strategies, Reviewed signs, symptoms and risk of self harm and violent behavior, Reviewed goals and plan of care    Method: individual education, small group, verbal education    Outcome: verbalized understanding, but needs reinforcement to obtain goals    PATIENT GOALS:   short term: Pt unable to attend  Long term:Pt unable to attend    PLAN/TREATMENT RECOMMENDATIONS UPDATE:   Continue with group therapies, education of coping skills   Continue to monitor patient on unit.   Medications provided/ medication compliance by patient. Continue for plans to obtain long term goals after discharge.     SHORT-TERM GOALS UPDATE:  Time frame for Short-Term Goals: 8-14days     LONG-TERM GOALS UPDATE:  Time frame for Long-Term Goals: 6 months  Members Present in Team Meeting: See Signature Sheet    Gita Red, MSW, LSW

## 2019-03-01 NOTE — BH NOTE
Paged Dr. Saldana Para to notify him that patient agrees to testing and is off unit with staff in OB.

## 2019-03-01 NOTE — PROGRESS NOTES
Called to see if Tim Silver is willing to be seen. RN stated that she was ok with being seen. OB attempted to see patient in her room and she yelled \"Get out of my room, get out of my room. \" Patient had NST last night. Will need next NST/BPP on Monday. OB Resident will attempt to see patient later today.     Arnoldo Vazquez,   Ob/Gyn Resident PGY-3  3/1/2019, 6:49 AM

## 2019-03-01 NOTE — BH NOTE
All assessments and charting done by LPN reviewed.     Electronically signed by Alexi Louie RN on 2/28/2019 at 10:13 PM

## 2019-03-01 NOTE — PLAN OF CARE
Problem: Anger Management/Homicidal Ideation:  Goal: Absence of angry outbursts  Absence of angry outbursts   Outcome: Ongoing  Patient had no angry outburst this evening and was willing to go to [facility] for her check up. Problem: Risk of Harm:  Goal: Ability to remain free from injury will improve  Ability to remain free from injury will improve   Outcome: Ongoing  Patient denies suicidal and homicidal ideation at this time. Patient is free of self harm at this time. Patient agrees to seek out staff if thoughts to harm self arise. Staff will provide support and reassurance as needed. Safety checks maintained every 15 minutes. Problem: Falls - Risk of:  Goal: Will remain free from falls  Will remain free from falls    Outcome: Ongoing  Patient is free of falls at this time. Patient gait is steady. Patient is wearing non skid footwear and agrees to seek out staff for assistance as needed.

## 2019-03-01 NOTE — CARE COORDINATION
Alexys Muro from [facility] came to meet with pt. Pt was not receptive to Alexys Muro. Alexys Muro inquired with social work about progress made on guardianship. Writer informed Alexys Muro that pt sister had picked up the expert evaluation but no further information on progress is known at this time.

## 2019-03-02 PROCEDURE — 1240000000 HC EMOTIONAL WELLNESS R&B

## 2019-03-02 NOTE — PROGRESS NOTES
Pt did not participate in cognitive skills/ leisure group at 1330 despite staff encouragement to attend.

## 2019-03-02 NOTE — PLAN OF CARE
Patient remains free from self harm at this time. Refuses medications and assessments. Isolative to self and room.

## 2019-03-02 NOTE — BH NOTE
Patient did not participate in [address] despite encouragement and prompts from staff. Patient in room resting.

## 2019-03-02 NOTE — PROGRESS NOTES
OB/GYN Resident Progress Note     Called I to see if patient was willing to be seen this morning. RN reports the patient yelled \"Get the 'F' out of my room\" when they asked to see if OB could come for evaluation. Patient had NST on 2/28/19, and will need NST/BPP on 3/4/19 (Monday). Will continue to follow closely. Please do not hesitate to page with any further questions or concerns.      Maria M Pfeiffer DO  Ob/Gyn Resident PGY2  [facility]  3/2/2019 6:55 AM

## 2019-03-02 NOTE — GROUP NOTE
Group Therapy Note    Date: March 2    Group Start Time: 0910  Group End Time: 0930  Group Topic: Community Meeting    RANDELL Chun    Pt did not participate in community meeting/ goals group at 6621 despite staff encouragement to attend.              Signature:  Precious Chun

## 2019-03-02 NOTE — PLAN OF CARE
Problem: Anger Management/Homicidal Ideation:  Goal: Absence of angry outbursts  Absence of angry outbursts   Outcome: Ongoing  Patient remains free from outbursts this shift, remains isolative and refuses vitals and assessments at this time. Problem: Falls - Risk of:  Goal: Will remain free from falls  Will remain free from falls    Outcome: Ongoing  Patient remains free from falls.

## 2019-03-03 PROCEDURE — 1240000000 HC EMOTIONAL WELLNESS R&B

## 2019-03-03 NOTE — GROUP NOTE
Group Therapy Note    Date: March 3    Group Start Time: 7370  Group End Time: 1500  Group Topic: Recreational    [facility]    Renee Read, CTRS    Pt did not attend RT group at 1345 d/t resting in room despite staff invitation to attend.       Signature:  Gonzalo Cisneros

## 2019-03-03 NOTE — PROGRESS NOTES
OB GYN Resident Progress Note    Called I to see if patient was willing to be seen this morning. RN reported that she refused to be seen. Patient is scheduled for NST/BPP tomorrow. Continue current care. Continue to follow closely. Please do not hesitate to page with any questions or concerns.      Vicky Coreas   Ob-Gyn Resident PGY-2  Pager: [phone]

## 2019-03-03 NOTE — PLAN OF CARE
Patient continues with angry outburts. Ashley Valdez at [address] [place] [address] when approached for assessments and medications \"get the fuck out of my room before I fuck your bitch ass up\" Not attending groups. Poor hygiene. 15 minute checks maintained for safety.

## 2019-03-03 NOTE — GROUP NOTE
Group Therapy Note    Date: March 3    Group Start Time: 0900  Group End Time: 0920  Group Topic: Community Meeting    CZ BHSONIA DENNEY    [facility] [employer]      Pt did not attend [employer] at 0900 d/t resting in room despite staff invitation to attend.         Signature:  Khari Amador

## 2019-03-03 NOTE — GROUP NOTE
Group Therapy Note    Date: March 3    Group Start Time: 1000  Group End Time: 1047  Group Topic: Psychoeducation    [facility]    SALLY Acevedo    Pt declined to attend psychoeducation at 1000 am despite encouragement. Pt offered 1:1 and refused.      Signature:  SALLY Cantor

## 2019-03-04 PROCEDURE — 59025 FETAL NON-STRESS TEST: CPT

## 2019-03-04 PROCEDURE — 99213 OFFICE O/P EST LOW 20 MIN: CPT

## 2019-03-04 PROCEDURE — 1240000000 HC EMOTIONAL WELLNESS R&B

## 2019-03-04 PROCEDURE — 6370000000 HC RX 637 (ALT 250 FOR IP): Performed by: NURSE PRACTITIONER

## 2019-03-04 RX ADMIN — NICOTINE POLACRILEX 2 MG: 2 GUM, CHEWING BUCCAL at 21:15

## 2019-03-04 RX ADMIN — LURASIDONE HYDROCHLORIDE 160 MG: 80 TABLET, FILM COATED ORAL at 17:29

## 2019-03-04 RX ADMIN — FERROUS SULFATE TAB 325 MG (65 MG ELEMENTAL FE) 325 MG: 325 (65 FE) TAB at 17:28

## 2019-03-04 RX ADMIN — NICOTINE POLACRILEX 2 MG: 2 GUM, CHEWING BUCCAL at 22:37

## 2019-03-04 RX ADMIN — Medication 1 TABLET: at 17:30

## 2019-03-04 ASSESSMENT — PAIN SCALES - GENERAL: PAINLEVEL_OUTOF10: 0

## 2019-03-04 NOTE — PROGRESS NOTES
Called I to see if patient is willing to be seen by OB this morning. RN reported that patient is refusing to be seen at this time. Patient needs NST/BPP today. Will attempt to complete later today if patient amenable. Continue current care. Will continue to follow closely. Please do not hesitate to page with any questions.       Brionna Kilgore,   Ob/Gyn Resident PGY-3  3/4/2019, 6:03 AM

## 2019-03-04 NOTE — GROUP NOTE
Group Therapy Note    Date: March 4    Group Start Time: 0900  Group End Time: 0930  Group Topic: Community Meeting    [id number] [facility] [place], [facility]    Pt did not attend [employer] at 0900 d/t resting in room despite staff invitation to attend.

## 2019-03-04 NOTE — PLAN OF CARE
Problem: Anger Management/Homicidal Ideation:  Goal: Absence of angry outbursts  Description  Absence of angry outbursts   3/4/2019 0003 by Caitlyn Kerr LPN  Outcome: Ongoing  Note:   Patient noted with some agitation but no angry out bursts; accepted staff to obtain vitals, accepted fluids, 2 cups of water, a large cup of gaterade, 1 cup of juice; also had a cup of chocolate pudding; patient denies pain, stated 'i think I am anxious, but i'm alright'  3/3/2019 1014 by Es Canada RN  Outcome: Not Met This Shift     Problem: Risk of Harm:  Goal: Ability to remain free from injury will improve  Description  Ability to remain free from injury will improve   Outcome: Ongoing  Note:   Patient is and will remain free of harm/injury as staff continues to monitor and care; patient controlled; Q 15 minutes and random checks maintained for safety.

## 2019-03-04 NOTE — GROUP NOTE
Group Therapy Note    Date: March 4    Group Start Time: 1430  Group End Time: 2184  Group Topic: Cognitive Skills    RANDELL Durham, CTRS    Pt did not attend Therapeutic Recreation at 1430 d/t resting in room despite staff invitation to attend.

## 2019-03-04 NOTE — BH NOTE
Patient approached desk requesting medications previously refussed.  Patient was pleasant and polite saying \"thank you\"

## 2019-03-04 NOTE — BH NOTE
Writer approached patient with her medication.  Patient refuses stating \"bitch get the fuck away from me before I bust your mother fucking head open and my daddy kills your ass\"

## 2019-03-04 NOTE — BH NOTE
Writer approached patient encouraging her to go down to [facility] for NST and ultrasound patient stated \"NO! And get out my mother fucking room! \" OB resident will try again later today.

## 2019-03-04 NOTE — GROUP NOTE
Group Therapy Note    Date: March 4    Group Start Time: 1100  Group End Time: 1130  Group Topic: Psychoeducation    [facility]    Renee Read, CTRS    Pt did not attend RT group at 1100 d/t resting in room despite staff invitation to attend.         Signature:  Yamel Purcell

## 2019-03-04 NOTE — PROGRESS NOTES
Patient refused to speak with me today. She continues to be largely isolative to her room, spending most of the day in bed. She has been eating appropriately, continues to display very poor hygiene. It requires staff encouragement to address even the most basic tasks. She has largely been refusing most attempts at obstetric care. However, workup that has been obtained does not show any concerning factors at this time. Patient has been consistently refusing all medication, including Latuda. She has been refusing prenatal vitamin. Charting and medications reviewed. Attempted to provide therapeutic interjection. Patient will continue in patient as this is only available safe option at this time.

## 2019-03-05 PROCEDURE — 6370000000 HC RX 637 (ALT 250 FOR IP): Performed by: NURSE PRACTITIONER

## 2019-03-05 PROCEDURE — 1240000000 HC EMOTIONAL WELLNESS R&B

## 2019-03-05 RX ADMIN — ALBUTEROL SULFATE 2 PUFF: 90 AEROSOL, METERED RESPIRATORY (INHALATION) at 07:48

## 2019-03-05 RX ADMIN — NICOTINE POLACRILEX 2 MG: 2 GUM, CHEWING BUCCAL at 11:45

## 2019-03-05 NOTE — BH NOTE
Patient requesting to go to Labor and Delivery for testing at this time. Charge nurse and x2 security guards go with patient at this time.

## 2019-03-05 NOTE — GROUP NOTE
Group Therapy Note    Date: March 5    Group Start Time: 1100  Group End Time: 1130  Group Topic: Recreational    [facility]    Renee Read, CTRS    Pt did not attend RT group at 1100 d/t resting in room despite staff invitation to attend.

## 2019-03-05 NOTE — PROGRESS NOTES
Patient became profane, threatening with my attempt to see her today. She again yelled she will not talk to me. She then made threats to kill me. She made no attempt to get out of bed despite these threats. She has been spending all day in bed. She has been coming out to eat some meals. She displays very poor hygiene, noticeable odor. She has been refusing medication now for the past several days, will not answer why she is refusing medication. She has been noncompliant with medical testing. Charting and medications reviewed. Will continue to encourage patient to resume taking medication.

## 2019-03-05 NOTE — GROUP NOTE
Group Therapy Note    Date: March 5    Group Start Time: 1430  Group End Time: 6833  Group Topic: Psychoeducation    RANDELL BHSONIA Read, CTRS    Pt did not attend RT group at 1430 d/t resting in room despite staff invitation to attend.

## 2019-03-05 NOTE — PLAN OF CARE
Pt. Remains irritable and is flat and uncooperative upon assessment. Q15min checks continued for safety.

## 2019-03-05 NOTE — PLAN OF CARE
Problem: Anger Management/Homicidal Ideation:  Goal: Absence of angry outbursts  Description  Absence of angry outbursts   3/4/2019 2025 by Cherri Escobar RN  Outcome: Ongoing  Note:   Only angry outbursts are in room, patient has been calm and appropriate with this writer. 3/4/2019 1127 by ANITA Xiong, LSW  Outcome: Ongoing  3/4/2019 1100 by Nancy Schmidt RN  Outcome: Not Met This Shift     Problem: Risk of Harm:  Goal: Ability to remain free from injury will improve  Description  Ability to remain free from injury will improve   Outcome: Ongoing  Note:   Patient remains free from injury at this time. Problem: Falls - Risk of:  Goal: Will remain free from falls  Description  Will remain free from falls    Outcome: Ongoing  Note:   Patient remains free from falls at this time.

## 2019-03-05 NOTE — BH NOTE
Patient brought back from Labor and Delivery, Charge nurse states that patient became uncomfortable with the equipment and was brought back. No testing done at this time.

## 2019-03-05 NOTE — PROGRESS NOTES
Resident Progress Note    Shweta Evans is a 41 y.o. female  at [address] Day: 22    CC: Schizoaffective Disorder    Subjective:   Patient has been seen and examined. Patient is currently refusing to be seen currently. OB received called from [facility] stating patient would like to be seen. Once down on L&D, the patient then stated \"I'm fine. I don't want monitoring. \"  Denies headache, vision changes, nausea, vomiting, chest pain, shortness of breath, RUQ pain, or increase in swelling. The patient reports fetal movement is present, denies contractions, denies loss of fluid, denies vaginal bleeding. She declined  testing today. Still strongly recommended by OB but patient continued to refuse. Objective:   Vitals:  Vitals:    19   BP:   112/72 102/77   Pulse:   79 116   Resp: 14 14 14 14   Temp:    98.1 °F (36.7 °C)   TempSrc: Oral   Oral   SpO2:       Height:           Fetal Heart Monitor:  Baseline Heart Rate 155, moderate variability  Patient on monitor only 3 minutes before removing monitor as she was then refusing monitoring. Physical Exam:  Declined by patient    General appearance:  no apparent distress, alert and cooperative  Pelvic Exam: Declined by patient    Assessment/Plan:  Shweta Evans is a 41 y.o. female  at 36w1d IUP     - VSS   - GBS negative   - GC negative   - Prenatal labs WNL   - Refusing 1 hr GTT, HgA1C   - S/p Tdap and Influenza 19   - Limited anatomy  with no abnormalities seen, unable to perform formal MFM scan as patient is not allowed to leave hospital per psych recommendations   - Patient initially amenable to being monitored but upon entry to L&D, then refused.   - FHTs noted to be in the 150s but patient only allowed monitoring for a few minutes total.   - PNV daily   - Continue  testing twice weekly (M- NST/BPP & -NST)- patient refused  testing today once down to labor and delivery. Limited PNC   - Continue  testing as scheduled   - S/s of  labor precautions were reviewed. - Counseled on adequate hydration   - Instructed on fetal kick counts   - Patient has not had a formal MFM US to assess fetal anatomy as she is not allowed to leave the hospital per psych recommendations. OB continues to recommend formal MFM assessment. Fetal exposure to [facility]   - MFM consultation recommended per OB, unable to perfrom 2/2 inability to transfer patient to [facility] for assessment per psych. Homelessness   - Currently in [facility]    Asthma   - Controlled on no meds    AMA   - NIPT ordered. Declined by patient. Non-compliance/Poor Historian   - Patient is significant flight risk per psychiatric unit. Please discuss need for additional medication with OB, use of Latuda alone is not at the recommendation of OB. 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      Olive Larsen DO  Ob/Gyn Resident  [facility], [address]  3/4/2019, 9:41 PM

## 2019-03-05 NOTE — GROUP NOTE
Group Therapy Note    Date: March 5    Group Start Time: 0915  Group End Time: 0930  Group Topic: Community Meeting    CARY Hair    Pt did not attend [employer] at 0900 d/t resting in room despite staff invitation to attend.

## 2019-03-06 PROCEDURE — 6370000000 HC RX 637 (ALT 250 FOR IP): Performed by: NURSE PRACTITIONER

## 2019-03-06 PROCEDURE — 1240000000 HC EMOTIONAL WELLNESS R&B

## 2019-03-06 RX ADMIN — ALBUTEROL SULFATE 2 PUFF: 90 AEROSOL, METERED RESPIRATORY (INHALATION) at 21:15

## 2019-03-06 NOTE — PLAN OF CARE
Pt did not attend Community Meeting at 0900 d/t resting in room despite staff invitation to attend. Pt did not attend Therapeutic Recreation at 1100 d/t resting in room despite staff invitation to attend.

## 2019-03-06 NOTE — PROGRESS NOTES
Called [facility] to see if patient is willing to be seen by OB this morning. RN reported that patient is refusing to be seen at this time and \"not acknowledging my existence. \" Patient needs NST/BPP today. Will attempt to complete later today if patient amenable. Continue current care. Will continue to follow closely. Please do not hesitate to page with any questions.     Tra Salazar,   Ob/Gyn Resident PGY-3  3/6/2019, 7:14 AM

## 2019-03-06 NOTE — PROGRESS NOTES
Patient refuses to talk with me today. She dismisses my presence, continues to lay in bed ignoring me. She displays extremely poor care of hygiene, no independent care of ADLs. She is resistant to staff's interventions. She is argumentative with staff, intermittently accepting offers of help. She has been refusing to see obstetric doctors. She did take Donneta Jeane again yesterday after about a week of noncompliance. There are no apparent side effects. Due to severe psychosis affecting patient's ability to care for herself and developing baby, there is no safe alternative to continued hospitalization. Charting and medications reviewed. Therapeutic support attempted.

## 2019-03-06 NOTE — PLAN OF CARE
PSYCHOEDUCATION GROUP NOTE    Date: 3/6/2019    Start Time: 1330  End Time: 1410    Number Participants in Group:  9/16    Goal:  Patient will demonstrate increased interpersonal interaction   Topic: Happiness     Discipline Responsible:   OT  AT    Ns. x RT MHP Other       Participation Level:     None x Minimal    Active Listener  Interactive    Monopolizing         Participation Quality:  x Appropriate  Inappropriate          Attentive        Intrusive   x       Sharing        Resistant          Supportive        Lethargic       Affective:    Congruent  Incongruent  Blunted x Flat    Constricted  Anxious  Elated  Angry    Labile  Depressed  Other         Cognitive:  x Alert x Oriented PPTP     Concentration  G x F  P   Attention Span  G x F  P   Short-Term Memory  G x F  P   Long-Term Memory  G x F  P   ProblemSolving/  Decision Making  G x F  P   Ability to Process  Information  G x F  P      Contributing Factors             Delusional             Hallucinating             Flight of Ideas             Other:       Modes of Intervention:  x Education  Support  Exploration    Clarifying x Problem Solving  Confrontation   x Socialization  Limit Setting x Reality Testing   x Activity  Movement  Media    Other:            Response to Learning:   Able to verbalize current knowledge/experience    Able to verbalize/acknowledge new learning    Able to retain information    Capable of insight    Able to change behavior   x Progressing to goal    Other:        Comments:

## 2019-03-06 NOTE — BH NOTE
Received called from Joaquín concerning patient going down for testing.   Staff Had spoken to patient about fifteen minutes prior to receiving call and patient said \" no, no, no\" Joaquín informed, Dr. Ortega Wakefield, and will call if patient changes mind about going down for testing

## 2019-03-06 NOTE — BH NOTE
Refusal: patient stated she would comply with OB testing but once security was available for transport, patient refused to go.  Nolberto Zaman RN

## 2019-03-06 NOTE — PLAN OF CARE
Problem: Anger Management/Homicidal Ideation:  Goal: Absence of angry outbursts  Description  Absence of angry outbursts   3/5/2019 2101 by Bronson Del Valle RN  Outcome: Ongoing  Note:   Irritable with staff, but no angry outbursts at this time.   3/5/2019 1453 by Michela Nunes  Outcome: Ongoing     Problem: Risk of Harm:  Goal: Ability to remain free from injury will improve  Description  Ability to remain free from injury will improve   Outcome: Ongoing  Note:   Patient remains free from harm     Problem: Falls - Risk of:  Goal: Will remain free from falls  Description  Will remain free from falls    3/5/2019 2101 by Bronson Del Valle RN  Outcome: Ongoing  Note:   Patient remains free from falls

## 2019-03-07 PROCEDURE — 1240000000 HC EMOTIONAL WELLNESS R&B

## 2019-03-07 ASSESSMENT — PAIN SCALES - GENERAL: PAINLEVEL_OUTOF10: 0

## 2019-03-07 NOTE — BH NOTE
OB resident called earlier in shift and spoke with José House the rec therapist, who relayed message that OB resident on the phone and would like to see if patient is agreeable to go down for testing. Writer asked patient if she was willing to go down for testing with OB. Patient said \"no\" and refused to go for testing.

## 2019-03-07 NOTE — CARE COORDINATION
Writer received a phone call from Hector Humphrey, pt's sister, who stated she went down town to file for guardianship and was told the fee was waived due to lack of funding. Andrea Hester stated she had to electronically fill out the application and send it to file, Andrea Hester stated she was going to the [employer] to complete this today. She states she was told once it was complete she would hear back from the [employer] within a couple of days for a hearing to be set.

## 2019-03-07 NOTE — PLAN OF CARE
Problem: Anger Management/Homicidal Ideation:  Goal: Absence of angry outbursts  Description  Absence of angry outbursts   3/6/2019 2341 by Zayda Perry RN  Note:   One angry outburst this evening but maintained control after. Problem: Risk of Harm:  Goal: Ability to remain free from injury will improve  Description  Ability to remain free from injury will improve   Note:   Patient remains free from injury     Problem: Falls - Risk of:  Goal: Will remain free from falls  Description  Will remain free from falls    3/6/2019 2341 by Zayda Perry RN  Note:   Remains free from fall.   3/6/2019 1238 by Fitz Elizabeth RN  Note:   Patient has not had any witnessed falls this shift and has not reported them

## 2019-03-07 NOTE — GROUP NOTE
Group Therapy Note    Date: March 7    Group Start Time: 1100  Group End Time: 1130  Group Topic: Recreational    [facility] MATTHEW G    Keith Males, CTRS    Pt did not attend Therapeutic Recreation at 1100 d/t resting in room despite staff invitation to attend.

## 2019-03-07 NOTE — GROUP NOTE
Group Therapy Note    Date: March 7    Group Start Time: 0900  Group End Time: 0915  Group Topic: Community Meeting    RANDELL DENNEY    [facility] [employer], CTRS    Pt did not attend [employer] at 0900 d/t resting in room despite staff invitation to attend.

## 2019-03-07 NOTE — GROUP NOTE
Group Therapy Note    Date: March 7    Group Start Time: 1430  Group End Time: 1510  Group Topic: Psychoeducation    RANDELL Read, CTRS    Pt did not attend RT group at 1430 d/t resting in room despite staff invitation to attend.

## 2019-03-07 NOTE — PROGRESS NOTES
Called [facility] to see if patient is willing to be seen by OB this morning. Per RN, the patient doesn't want to be seen right now, but that the patient stated \"try back later this morning. \" OB will call [facility] back later this morning and attempt to have patient come to L&D if amenable. Patient needs NST/BPP today. Continue current care. Please garibay not hesitate to page with any questions.       Tyrell Pope,   Ob/Gyn Resident PGY-3  3/7/2019, 6:11 AM

## 2019-03-07 NOTE — PROGRESS NOTES
OB/GYN Resident Interval Note    Called [facility] to ask if patient is willing to come to L&D for NST/BPP. Spoke with Garrett Sanchez who states patient is declining at this time. Asked her to please call L&D if patient becomes agreeable.     Please call or page with any questions     Flor Greco DO  Ob/Gyn Resident  Pager: [phone]  3/7/2019 2:27 PM

## 2019-03-07 NOTE — PLAN OF CARE
Problem: Anger Management/Homicidal Ideation:  Goal: Absence of angry outbursts  Description  Absence of angry outbursts   3/7/2019 1151 by Justus Dent RN  Note:   Patient continues to have angry outbursts. Patient yelled at Gladys Meza \" you better get the fuck out of my room bitch before I bust your head\" when asked about medications and assessment   3/6/2019 2341 by Natalya Ferguson RN  Note:   One angry outburst this evening but maintained control after. Problem: Risk of Harm:  Goal: Ability to remain free from injury will improve  Description  Ability to remain free from injury will improve   3/7/2019 1151 by Justus Dent RN  Note:   Patient denies suicidal and homicidal ideations. Patient has not sustained injury   3/6/2019 2341 by Natalya Ferguson RN  Note:   Patient remains free from injury     Problem: Falls - Risk of:  Goal: Will remain free from falls  Description  Will remain free from falls    3/7/2019 1151 by Justus Dent RN  Note:   Patient has not experienced any witnessed falls this shift and has not reported any    3/6/2019 2341 by Natalya Ferguson RN  Note:   Remains free from fall.

## 2019-03-08 PROCEDURE — 1240000000 HC EMOTIONAL WELLNESS R&B

## 2019-03-08 ASSESSMENT — PAIN SCALES - GENERAL: PAINLEVEL_OUTOF10: 0

## 2019-03-08 NOTE — PROGRESS NOTES
Patient refuses to talk with me again today. She dismisses my presence, continues to lay in bed ignoring me. She displays extremely poor care of hygiene, no independent care of ADLs. She is resistant to staff's interventions. She yells threats at staff- \"You better get the fuck out of my room bitch before I bust your head\". She is argumentative with staff, intermittently accepting offers of help. She has been refusing to see obstetric doctors. She has been again refusing Renée Nat, gives no explanation. There are no apparent side effects. Due to severe psychosis affecting patient's ability to care for herself and developing baby, there is no safe alternative to continued hospitalization. Charting and medications reviewed. Therapeutic support attempted.

## 2019-03-08 NOTE — PLAN OF CARE
Unrealistic  Present Suicidal Ideation: No  Present Homicidal Ideation: No    Daily Assessment Last Entry:   Daily Sleep (WDL): Within Defined Limits         Patient Currently in Pain: Denies  Daily Nutrition (WDL): Within Defined Limits  Appetite Change: Normal for patient  Barriers to Nutrition: None  Level of Assistance: Independent/Self    Patient Monitoring:  Frequency of Checks: 4 times per hour, close    Psychiatric Symptoms:   Depression Symptoms  Depression Symptoms: Isolative, Loss of interest, Impaired concentration  Anxiety Symptoms  Anxiety Symptoms: Generalized  Lora Symptoms  Lora Symptoms: Flight of ideas, Poor judgment, Pressured speech, Labile     Psychosis Symptoms  Delusion Type: Paranoid, Persecutory    Suicide Risk CSSR-S:  1) Within the past month, have you wished you were dead or wished you could go to sleep and not wake up? : No  2) Have you actually had any thoughts of killing yourself? : No  6) Have you ever done anything, started to do anything, or prepared to do anything to end your life?: No  Change in Result no Change in Plan of care no    EDUCATION:   Learner Progress Toward Treatment Goals: Reviewed results and recommendations of this team, Reviewed group plan and strategies, Reviewed signs, symptoms and risk of self harm and violent behavior, Reviewed goals and plan of care    Method: individual education, small group, verbal education    Outcome: verbalized understanding, but needs reinforcement to obtain goals    PATIENT GOALS:  Short term: Pt unable to attend, sleeping. Long term: Pt unable to attend, sleeping. PLAN/TREATMENT RECOMMENDATIONS UPDATE:   Continue with group therapies, education of coping skills   Continue to monitor patient on unit. Medications provided/ medication compliance by patient. Continue for plans to obtain long term goals after discharge.     SHORT-TERM GOALS UPDATE:  Time frame for Short-Term Goals: 8-14 days     LONG-TERM GOALS UPDATE:  Time frame for Long-Term Goals: 6 months  Members Present in Team Meeting: See [address] [facility] Crissy, MSW, LSW

## 2019-03-08 NOTE — PROGRESS NOTES
Called [facility] to see if patient is willing to be seen by OB this morning. Per RN, the patient doesn't want to be seen right now. OB will call [facility] back later this morning and attempt to have patient come to L&D if amenable. Patient needs NST/BPP today. Continue current care. Please garibay not hesitate to page with any questions.     Delilah Aguirre DO  Ob/Gyn Resident PGY-3  3/8/2019, 7:26 AM

## 2019-03-08 NOTE — PROGRESS NOTES
Patient refuses to talk with me again today. She dismisses my presence, continues to lay in bed ignoring me. She displays extremely poor care of hygiene, no independent care of ADLs. She is resistant to staff's interventions. She yells threats at staff- \"You better get the fuck out of my room bitch before I bust your head\". She is argumentative with staff, intermittently accepting offers of help. She has been refusing to see obstetric doctors. She has been again refusing Harvey Prim, gives no explanation. There are no apparent side effects. Due to severe psychosis affecting patient's ability to care for herself and developing baby, there is no safe alternative to continued hospitalization. Charting and medications reviewed. Therapeutic support attempted.

## 2019-03-08 NOTE — PROGRESS NOTES
OB/GYN Resident Interval Note     Called [facility] to ask if patient was agreeable to coming to L&D for NST/BPP. Spoke with Jeannie Nyhan, RN who asked patient if she wanted to come to L&D. Informed that patient is not agreeable at this time. Patient is recommended to have twice weekly NSTs and weekly BPPs. She has not been seen this week. Please call L&D if patient becomes agreeable to come for testing.      Mohsen Ortega DO  Ob/Gyn Resident  Pager: [phone]  3/8/2019 10:17 AM

## 2019-03-08 NOTE — PLAN OF CARE
Patient remains free from injury at this time. Continues to refused medications and assessments. Irritable on approach. Verbal threats towards staff. 15 minute checks maintained for patient safety.

## 2019-03-08 NOTE — GROUP NOTE
Group Therapy Note    Date: March 8    Group Start Time: 1330  Group End Time: 1430  Group Topic: Cognitive Skills    RANDELL Ruvalcaba, CTRS    Pt did not attend RT group at 1330 d/t resting in room despite staff invitation to attend.

## 2019-03-08 NOTE — GROUP NOTE
Group Therapy Note    Date: March 8    Group Start Time: 1430  Group End Time: 7708  Group Topic: Psychoeducation    RANDELL Cardona, CTRS      Group Therapy Note    Pt did not attend Therapeutic Recreation at 1430 d/t resting in room despite staff invitation to attend.

## 2019-03-09 PROCEDURE — 1240000000 HC EMOTIONAL WELLNESS R&B

## 2019-03-09 ASSESSMENT — PAIN SCALES - GENERAL: PAINLEVEL_OUTOF10: 0

## 2019-03-09 NOTE — PLAN OF CARE
Problem: Anger Management/Homicidal Ideation:  Goal: Absence of angry outbursts  Description  Absence of angry outbursts   3/8/2019 2016 by Radha Sun LPN  Outcome: Ongoing  Note:   Patient in room, calm and cooperative, but gets agitated with answering questions; patient stated to writer that she did not want to be bothered, and was nice about it; states that baby moving in tummy; safety maintained via protocol and random checks. 3/8/2019 8336 by Johnny Gonzales MSW, LSW  Outcome: Ongoing     Problem: Risk of Harm:  Goal: Ability to remain free from injury will improve  Description  Ability to remain free from injury will improve   3/8/2019 2016 by Radha Sun LPN  Outcome: Ongoing  Note:   Patient is and will remain free of injury as staff continues with protocol and random checks.   3/8/2019 1112 by Kimberly Palacio RN  Outcome: Ongoing

## 2019-03-09 NOTE — BH NOTE
When 1:1 talk time was attempted, patient stated yes to baby moving; by earlier this morning, patient replied to question saying \" I think my baby , no kicking me\" when asked if she wanted to see OB doctor, patient stated 'no'.; when asked if writer can listen or feel stomach patient asked writer to leave room; GO Muller and RN Collette Sanfilippo aware.

## 2019-03-09 NOTE — PLAN OF CARE
Pt. Irritable when questions are asked. Pt. Admits to depression and anxiety. Out only for meals. Pt. Is refusing medication and is isolative to her room.

## 2019-03-09 NOTE — PROGRESS NOTES
Called RN to see if patient was willing to see OB this morning and patient has refused. Will continue to attempt to see patient daily.      Casey Reason   Ob-Gyn Resident PGY-2  Pager: [phone]

## 2019-03-10 PROCEDURE — 1240000000 HC EMOTIONAL WELLNESS R&B

## 2019-03-10 ASSESSMENT — PAIN SCALES - GENERAL: PAINLEVEL_OUTOF10: 0

## 2019-03-10 NOTE — BH NOTE
Patient up to desk for a snack. Patient requested an orange. Staff asked patient if she was experiencing abdominal pain. Patient denies stating, 'No I'm good. Baby is fine. \" Strong odor of urin noted. Asked patient if she wanted to shower. Patient refused shower again. Asked patient if staff could wash her clothes and change. Patient refused and yelled at staff. \"I said I'm good. \"

## 2019-03-10 NOTE — PLAN OF CARE
Problem: Anger Management/Homicidal Ideation:  Goal: Absence of angry outbursts  Description  Absence of angry outbursts   3/9/2019 2123 by Montine Closs, RN  Outcome: Ongoing  Note:   No angry outbursts noted this shift     Problem: Risk of Harm:  Goal: Ability to remain free from injury will improve  Description  Ability to remain free from injury will improve   Outcome: Ongoing  Note:   Patient denies suicidal and homicidal thoughts. Patient denies auditory and visual hallucinations. Patient is taking meds and eating well. No self harm noted this shift. Patient agrees to seek staff out if negative thoughts arise. Will continue to monitor Q15 minute and intermittently. Problem: Falls - Risk of:  Goal: Will remain free from falls  Description  Will remain free from falls    Outcome: Ongoing  Note:   Pt remains free of falls and verbalizes understanding of individual fall risks. Pt wearing non skid footwear and encouraged to seek out staff for any assistance needed.

## 2019-03-10 NOTE — BH NOTE
AFTERNOON WELLNESS GROUP NOTES     Pt was encouraged to attend afternoon wellness group at 4:05pm-5:05pm but pt declined. Will continue to encourage patient to attend groups.

## 2019-03-10 NOTE — GROUP NOTE
Group Therapy Note    Date: March 10    Group Start Time: 1330  Group End Time: 2173  Group Topic: Psychoeducation    RANDELL Read, CTRS      Pt did not attend RT group at 1330 d/t resting in room despite staff invitation to attend.

## 2019-03-10 NOTE — GROUP NOTE
Group Therapy Note    Date: March 10    Group Start Time: 0900  Group End Time: 0915  Group Topic: Community Meeting    ASHLYN HairS      Pt did not attend [employer] at 0900 d/t resting in room despite staff invitation to attend.

## 2019-03-10 NOTE — PROGRESS NOTES
OB Resident Progress Note     Called and spoke to RN who states patient told him to \"F off\" when asked if she was willing to see OBGYN. Patient is overdue for  testing, please encourage compliance and call with any questions/concerns.        Shakira Saucedo  OB/GYN Resident, PGY2  Pager: [phone]  [facility]  03/10/19  5:29 AM

## 2019-03-10 NOTE — PLAN OF CARE
Problem: Anger Management/Homicidal Ideation:  Goal: Absence of angry outbursts  Description  Absence of angry outbursts   Outcome: Ongoing  Note:   Pt continues to be verbally aggressive,  yelling and swearing at staff whenever she was approached about anything. Problem: Risk of Harm:  Goal: Ability to remain free from injury will improve  Description  Ability to remain free from injury will improve   Note:   Pt remains free of harm. Safe environment maintained. Q15 minute checks for safety continued per unit policy. Will continue to monitor for safety and provide support and reassurance as needed. Problem: Falls - Risk of:  Goal: Will remain free from falls  Description  Will remain free from falls    Note:   No falls reported or observed as of this documentation. Fall precaution continued and maintained. Pt wearing non skid footwear and encouraged to seek out staff for any assistance needed.

## 2019-03-11 PROCEDURE — 1240000000 HC EMOTIONAL WELLNESS R&B

## 2019-03-11 NOTE — PLAN OF CARE
Problem: Anger Management/Homicidal Ideation:  Goal: Absence of angry outbursts  Description  Absence of angry outbursts   3/11/2019 1501 by Janet Francois RN  Note:   Numerous outbursts noted whenever patient approached by staff. She continues to be irritable and isolative. Patient continues to refuse any care from writer and screams anytime approached. Patient refuses to answer writer when asked if any suicidal/Homicidal ideations and instead starts screaming. Will continue to monitor. 3/11/2019 1500 by Janet Francois RN  Outcome: Ongoing  3/11/2019 1459 by Janet Francois RN  Outcome: Ongoing     Problem: Risk of Harm:  Goal: Ability to remain free from injury will improve  Description  Ability to remain free from injury will improve   3/11/2019 1501 by Janet Francois RN  Note:   No injuries noted at this time but patient will not allow staff near her to assess anything. Continue to monitor for safety every 15 minutes without any problems noted. Problem: Falls - Risk of:  Goal: Will remain free from falls  Description  Will remain free from falls    3/11/2019 1501 by Janet Francois RN  Note:   No falls observed or reported during this shift. Patient remains on safety checks as ordered. Will continue to monitor     Problem: Risk of Harm:  Intervention: Interactive 1:1  Note:   Patient refuses to interact with staff or peers and remains isolative to room.  Continue to monitor

## 2019-03-11 NOTE — PLAN OF CARE
Pt did not participate in leisure/ cognitive skills group at 1100 despite staff encouragement to attend.

## 2019-03-11 NOTE — BH NOTE
PT refused OB assessment; PT stated \"Get the fuck out before I pop you in the head. \" OB  notified of refusal. OB  stated she will need to come down for a NSA and ultrasound test at some point today. RN will pass this off to day shift.

## 2019-03-11 NOTE — PROGRESS NOTES
OB/GYN Resident Interval Note    Called and spoke to RN to see if patient willing to be evaluated this morning. RN reports she declined to be seen and that she was very irritable this morning. Discussed with RN that patient is overdue for  testing and she will need to come down to Labor and Delivery today for NST/BPP. Please page with any further questions or concerns.      Allan Koehler DO  Ob/Gyn Resident PGY2  [facility]  3/11/2019 5:25 AM

## 2019-03-11 NOTE — PLAN OF CARE
Problem: Risk of Harm:  Goal: Ability to remain free from injury will improve  Description  Ability to remain free from injury will improve   3/11/2019 0311 by Ivett Gibson RN  Note:   PT was accepting of 1:1 meet with RN. PT remains free from any self harm, falls, or any other type of injury as of this time. PT is wearing non-skid stockings at the time of assessment. PT verbalizes understanding of individual fall risks and ways to prevent them. PT agreed to use the call light if needed. PT agreed to seek out health care staff if stressors or other issues were to become to be too much. Safety checks have been maintained every 15 minutes and at irregular intervals throughout this shift. Problem: Falls - Risk of:  Goal: Will remain free from falls  Description  Will remain free from falls    3/11/2019 0311 by Ivett Gibson RN  Note:   PT was accepting of 1:1 meet with RN. PT remains free from any self harm, falls, or any other type of injury as of this time. PT is wearing non-skid stockings at the time of assessment. PT verbalizes understanding of individual fall risks and ways to prevent them. PT agreed to use the call light if needed. PT agreed to seek out health care staff if stressors or other issues were to become to be too much. Safety checks have been maintained every 15 minutes and at irregular intervals throughout this shift.

## 2019-03-11 NOTE — PROGRESS NOTES
OB/GYN Resident Interval Note     Called [facility] to ask if patient is willing to come to L&D for NST/BPP. Informed that patient is not agreeable at this time. Asked [facility] staff to please notify OB/GYN if patient becomes agreeable. Patient is recommended to have twice weekly nonstress tests and weekly biophysical profiles. She has not had testing since 19. Will continue to attempt to complete  testing daily. Asked RN, Dagmar Polanco if any progress has been made to establish legal guardianship for patient. Informed that the patient's sister \"picked up the paperwork. \"     Patient refused to be seen by Psych attending today. No additional medications started. Patient is currently refusing all medications. OB/GYN would again like to emphasize that any psychiatric medications other than those that are pregnancy category X may be used at this time as fetal development has been completed. Please do not hesitate to call OB with questions about medications. No established plan for delivery at this time as patient is refusing all OB/GYN care. OB/GYN asked to be informed of any upcoming Ethics/Risk management meetings to further discuss plan for delivery. RN confirmed that [facility] staff will notify OB. Please call or page with any questions.      Tawana Alaniz DO  Ob/Gyn Resident  Pager: [phone]  3/11/2019 3:24 PM

## 2019-03-11 NOTE — GROUP NOTE
Group Therapy Note    Date: March 11    Group Start Time: 0900  Group End Time: 0930  Group Topic: [facility]      Group Therapy Note  Pt did not attend Community Meeting at 0900 d/t resting in room despite staff invitation to attend.

## 2019-03-12 PROCEDURE — 1240000000 HC EMOTIONAL WELLNESS R&B

## 2019-03-12 NOTE — PLAN OF CARE
Problem: Anger Management/Homicidal Ideation:  Goal: Absence of angry outbursts  Description  Absence of angry outbursts   3/12/2019 0945 by Vannesa Mercedes RN  Outcome: Ongoing  Note:   Patient continues with angry outburst towards staff. Poor ADLS. Refusing medications and assessments. 15 minute checks maintained for safety.    3/12/2019 0945 by Vannesa Mercedes RN  Outcome: Ongoing

## 2019-03-12 NOTE — GROUP NOTE
Group Therapy Note    Date: March 12    Group Start Time: 1100  Group End Time: 1130  Group Topic: Recreational    [facility] G Annell Aase, ASHLYNS      Group Therapy Note    Pt did not attend Therapeutic Recreation at 1100 d/t resting in room despite staff invitation to attend.

## 2019-03-12 NOTE — BH NOTE
OB resident calls at this time, asking staff to inquire if pt will allow OB resident to come onto unit and assess patient. Staff goes to patient room to ask patient if OB can assess patient. Pt yells angrily, un comprehensive words at writer and rolls over. Writer informed OB resident that she is refusing at this time. OB states will continue to attempt to assess patient so that they can do fetal monitoring as well as  testing.

## 2019-03-12 NOTE — PROGRESS NOTES
OB/GYN Resident Interval Note     Called [facility] to ask if patient would come to L&D for NST/BPP. Spoke with Nader cao RN who reports patient is not agreeable at the moment. Please call OB/GYN if patient becomes agreeable.      Isabel Navarro DO  Ob/Gyn Resident  Pager: [phone]  3/12/2019 2:25 PM

## 2019-03-12 NOTE — PROGRESS NOTES
OB/GYN Progress Note:    Called and spoke the RN to see if patient was willing to be evaluated this AM. RN reports that patient declines evaluation at this time. Will attempt later and patient needs to come to L&D for NST/BPP some time today. RN will pass the word along to the day shift.      Electronically signed by Pedro Jacobs DO on 3/12/2019 at 6:51 AM

## 2019-03-12 NOTE — BH NOTE
Patient screaming at staff anytime door opened to assess for safety. Her room is a disaster with books, magazines, food and drinks all over floor. When writer asks patient if writer may  floor and clean up spilled milk and juice, she screams \"get the fuck out\" Patients room also smells strongly of urine. Patient not attending to her ADL's and refuses to allow staff to help. She refused all meds this am and appears to be more isolative to room. Patient refused assessment or care by OB. Will continue to monitor.

## 2019-03-12 NOTE — PROGRESS NOTES
Patient again starting yelling profane threats at me when I attempted to speak with her. Staff report she has been more irritable, isolating to bed throughout the day, eating selective meals. She has been consistently refusing all medications and offers of treatment for some time now. She displays complete inability to care for herself due to psychosis. She displays extremely poor insight and judgement. Patient is in only safe situation at this time. Charting reviewed, discussed with nursing staff. Will continue to attempt to encourage patient to comply with medications, OB evaluations.

## 2019-03-12 NOTE — BH NOTE
Patient refused all evening care and assessments, began yelling at [address] and told him to [facility] my room now\" when he began picking up the floor, patient refused to allow further clean up, refuses all offers of hygiene, will continue to monitor and offer these interventions at every available opportunity

## 2019-03-13 PROCEDURE — 1240000000 HC EMOTIONAL WELLNESS R&B

## 2019-03-13 NOTE — GROUP NOTE
Group Therapy Note    Date: March 13    Group Start Time: 1430  Group End Time: 1505  Group Topic: Cognitive Skills    RANDELL Meza, ASHLYNS      Pt did not attend RT group at 1430 despite staff invitation to attend.

## 2019-03-13 NOTE — BH NOTE
Multiple attempts were made to clean patients room, she was verbally aggressive at every attempt threatening writer and other staff, not receptive to education on hygiene or any interventions to improve her ADL's, multiple staff members attempted when she came out throughout the shift with the hope she would be more agreeable, to no success, will continue to monitor and offer support

## 2019-03-13 NOTE — BH NOTE
Asked patient is she would like to go to [facility] for testing. Patient looked at [facility], did not respond, then closed eyes. Told OB assumed as a no since patient would not respond.

## 2019-03-13 NOTE — GROUP NOTE
Group Therapy Note    Date: March 13    Group Start Time: 0900  Group End Time: 0915  Group Topic: Community Meeting    CARY Hair    Pt did not attend [employer] at 0900  despite staff invitation to attend.

## 2019-03-13 NOTE — PROGRESS NOTES
OB/GYN Resident Interval Note     Called to ask if patient is willing to come to L&D for NST/BPP. Spoke with RN, Sandie Ko who states patient is ignoring her and is not agreeable to testing at this time. Please notify OB/GYN if patient becomes agreeable or has any OB complaints.      Rekha Bennett DO  Ob/Gyn Resident  Pager: [phone]  3/13/2019 4:42 PM

## 2019-03-13 NOTE — PROGRESS NOTES
OBGYN Resident Interval Progress Note    Called and spoke with RN to see if patient was willing ot be evaluated this AM. RN reports that patient is not answering questions this morning. Discussed that we will attempt later to see if patient is to come to L&D for NST/BPP today.      Alice Champion DO  OBGYN Resident  Pager: [phone]  3/13/2019 6:33 AM

## 2019-03-13 NOTE — PLAN OF CARE
Problem: Risk of Harm:  Goal: Ability to remain free from injury will improve  Description  Ability to remain free from injury will improve   Outcome: Met This Shift  Note:   Remains free from injury at this time, [id number] safety checks maintained     Problem: Anger Management/Homicidal Ideation:  Goal: Absence of angry outbursts  Description  Absence of angry outbursts   3/12/2019 2310 by Daylin Molina RN  Outcome: Ongoing  Note:   Patient refused to answer assessment questions, refused all hygiene, and yells at staff if they attempt to clean her room, minimal interactions only out for food, moderately pleasant if she initiates the interaction, verbally hostile if staff initiates interaction

## 2019-03-13 NOTE — PROGRESS NOTES
Pt did not attend 1330 cognitive skills group d/t resting in room despite staff invitation to attend

## 2019-03-13 NOTE — BH NOTE
Patient refused vitals this am, medications, and physical assessment.  Patient is irritable and non compliant, non interactive

## 2019-03-14 PROCEDURE — 6370000000 HC RX 637 (ALT 250 FOR IP): Performed by: NURSE PRACTITIONER

## 2019-03-14 PROCEDURE — 1240000000 HC EMOTIONAL WELLNESS R&B

## 2019-03-14 RX ADMIN — NICOTINE POLACRILEX 2 MG: 2 GUM, CHEWING BUCCAL at 11:14

## 2019-03-14 ASSESSMENT — PAIN SCALES - GENERAL: PAINLEVEL_OUTOF10: 0

## 2019-03-14 NOTE — BH NOTE
Patient out of room at nurses station looking for snacks.  Asked patient if she would like to see OB today and she said \" no\"

## 2019-03-14 NOTE — PROGRESS NOTES
OB/GYN Resident Interval Note    Called and spoke to RN to see if patient is willing to be evaluated. Per RN, patient pulled her blanket over her head and refused to acknowledge her presence. Please page OB/GYN team if patient is agreeable for evaluation or has any OB complaints.      Jen Kaur DO  Ob/Gyn Resident PGY2  [facility]  3/14/2019 6:06 AM

## 2019-03-14 NOTE — PLAN OF CARE
Problem: Risk of Harm:  Goal: Ability to remain free from injury will improve  Description  Ability to remain free from injury will improve   Outcome: Met This Shift  Note:   Remains free from injury, [id number] safety checks maintained     Problem: Anger Management/Homicidal Ideation:  Goal: Absence of angry outbursts  Description  Absence of angry outbursts   Outcome: Ongoing  Note:   Patient denies suicidal ideations, remains verbally aggressive with staff, did allow staff to clean her room, showered and allowed staff to clean her clothes, still refused to allow OB to see her and refused evening vitals and assessments

## 2019-03-14 NOTE — PROGRESS NOTES
Staff report she has been more irritable, isolating to bed throughout the day, eating only selective meals. She has been consistently refusing all medications and offers of treatment for some time now. She displays complete inability to care for herself due to psychosis. She displays extremely poor insight and judgement. Patient is in only safe situation at this time. Charting reviewed, discussed with nursing staff. Will continue to attempt to encourage patient to comply with medications, OB evaluations.

## 2019-03-14 NOTE — GROUP NOTE
Group Therapy Note    Date: March 14    Group Start Time: 1430  Group End Time: 3346  Group Topic: Recreational    STCZ MATTHEW DOVE    [place], [place]    Patient did not participate in Creative Expression / Stress and Relaxation / Positive Coping Skill group at 1430 despite staff encouragement.

## 2019-03-14 NOTE — GROUP NOTE
Group Therapy Note    Date: March 14    Group Start Time: 0900  Group End Time: 0930  Group Topic: [facility]      Group Therapy Note    Pt did not attend Community Meeting at 0900 d/t resting in room despite staff invitation to attend.

## 2019-03-14 NOTE — PROGRESS NOTES
OB/GYN Resident Interval Note     Called [facility] to ask if patient willing to come to labor and delivery for NST/BPP. Spoke with RN, Angelia Choi, who reports she asked patient and she said no. Please ban OB/GYN if patient becomes agreeable. Please call with any questions/concerns.      Eladio Bernal DO  Ob/Gyn Resident  Pager: [phone]  3/14/2019 3:13 PM

## 2019-03-15 PROBLEM — R89.9 ABNORMAL LABORATORY TEST: Status: ACTIVE | Noted: 2019-03-15

## 2019-03-15 PROCEDURE — 1240000000 HC EMOTIONAL WELLNESS R&B

## 2019-03-15 ASSESSMENT — PAIN SCALES - GENERAL: PAINLEVEL_OUTOF10: 0

## 2019-03-15 NOTE — GROUP NOTE
Group Therapy Note    Date: March 15    Group Start Time: 0900  Group End Time: 0930  Group Topic: [facility]      Group Therapy Note    Pt did not attend Community Meeting at 0900 d/t resting in room despite staff invitation to attend.

## 2019-03-15 NOTE — GROUP NOTE
Group Therapy Note    Date: March 15    Group Start Time: 1330  Group End Time: 4715  Group Topic: Recreational    CZ MATTHEW Ng, CTRS      Group Therapy Note    Pt did not attend Therapeutic Recreation at [id number] d/t resting in room despite staff invitation to attend.

## 2019-03-15 NOTE — CARE COORDINATION
Writer phoned Wallace Quinonez, sister, [phone], for update on guardianship. Writer was told that [place] [employer] [place] had completed the paperwork and was taking it to [employer], today. [facility] [place] confirmed that Probate is on an emergency basis and should have a court date in a few days, as well as, the fees are being waived.

## 2019-03-15 NOTE — GROUP NOTE
Group Therapy Note    Date: March 15    Group Start Time: 1100  Group End Time: 1130  Group Topic: Cognitive Skills    RANDELL Patel, CTRS      Group Therapy Note    Pt did not attend Therapeutic Recreation  at 1100 d/t resting in room despite staff invitation to attend.

## 2019-03-15 NOTE — PLAN OF CARE
Problem: Anger Management/Homicidal Ideation:  Goal: Absence of angry outbursts  Description  Absence of angry outbursts   Outcome: Ongoing  Note:   Patient upset and irate when writer opened room door and asked if she was ok. Patient yelled \" get the fuck out my room before I bust you in your damn head. Keep playing with me\". Self calmed after writer closed her door. Problem: Falls - Risk of:  Goal: Will remain free from falls  Description  Will remain free from falls    Outcome: Ongoing  Note:   Remains free from falls. Non skid footwear with ambulation. Bed in low position. Problem: Risk of Harm:  Goal: Ability to remain free from injury will improve  Description  Ability to remain free from injury will improve   Note:   Patient denies suicidal and homicidal ideations at this time.

## 2019-03-15 NOTE — PLAN OF CARE
[facility] Interdisciplinary Treatment Plan Note     Review Date & Time: 3/15/19 0943    Admission Type:   Admission Type: Involuntary(Signed in.)    Reason for admission:  Reason for Admission: Patient was sent to [address] [place] [address] for assessment. Estimated Length of Stay :  5-7 days  Estimated Discharge Date Update: to be determined by physician    PATIENT STRENGTHS:  Patient Strengths:Strengths: Connection to output provider  Patient Strengths and Limitations:Limitations: Unrealistic self-view, Hopeless about future, Apathetic / unmotivated, Tendency to isolate self  Addictive Behavior:Addictive Behavior  In the past 3 months, have you felt or has someone told you that you have a problem with:  : None  Do you have a history of Chemical Use?: No  Do you have a history of Alcohol Use?: No  Do you have a history of Street Drug Abuse?: No  Histroy of Prescripton Drug Abuse?: No  Medical Problems:   Past Medical History:   Diagnosis Date    Anxiety     Asthma     Bipolar 1 disorder ([facility]     Multiple personality ([facility]     Seizures ([facility]        Risk:  Fall RiskTotal: 88  Stephen Scale Stephen Scale Score: 20  BVC Total: 3    Change in scores no. Changes to plan of Care no    Status EXAM:   Status and Exam  Normal: No  Facial Expression: Flat, Hostile  Affect: Blunt  Level of Consciousness: Alert  Mood:Normal: No  Mood: Anxious, Irritable, Labile, Suspicious, Angry  Motor Activity:Normal: No  Motor Activity: Increased  Interview Behavior: Evasive, Irritable, Uncooperative/Withdrawn  Preception: [place] to Person, [place] to Place  Attention:Normal: No  Attention: Distractible, Unable to Concentrate  Thought Processes: Blocking, Flt.of Ideas  Thought Content:Normal: No  Thought Content: Delusions, Paranoia, Preoccupations  Hallucinations:  Other (Comment)(self talk noted.)  Delusions: Yes  Delusions: Persecution  Memory:Normal: No  Memory: Poor Recent, Poor Remote  Insight and Judgment: No  Insight and Judgment: Poor Judgment, Poor Insight, Unmotivated  Present Suicidal Ideation: No  Present Homicidal Ideation: No    Daily Assessment Last Entry:   Daily Sleep (WDL): Within Defined Limits         Patient Currently in Pain: Denies  Daily Nutrition (WDL): Exceptions to WDL  Appetite Change: Decreased  Barriers to Nutrition: None  Level of Assistance: Independent/Self    Patient Monitoring:  Frequency of Checks: 4 times per hour, close    Psychiatric Symptoms:   Depression Symptoms  Depression Symptoms: Impaired concentration, Increased irritability, Isolative, Loss of interest  Anxiety Symptoms  Anxiety Symptoms: Obsessions  Lora Symptoms  Lora Symptoms: Flight of ideas, Pressured speech, Poor judgment, Increased energy, Labile     Psychosis Symptoms  Delusion Type: Paranoid, Persecutory    Suicide Risk CSSR-S:  1) Within the past month, have you wished you were dead or wished you could go to sleep and not wake up? : No  2) Have you actually had any thoughts of killing yourself? : No  6) Have you ever done anything, started to do anything, or prepared to do anything to end your life?: No  Change in Result no Change in Plan of care no    EDUCATION:   Learner Progress Toward Treatment Goals: Reviewed results and recommendations of this team, Reviewed group plan and strategies, Reviewed signs, symptoms and risk of self harm and violent behavior, Reviewed goals and plan of care    Method: individual education, small group, verbal education    Outcome: verbalized understanding, but needs reinforcement to obtain goals    PATIENT GOALS:  Short term: Pt refused to attend  Long term: Pt refused to attend    PLAN/TREATMENT RECOMMENDATIONS UPDATE:   Continue with group therapies, education of coping skills   Continue to monitor patient on unit. Medications provided/ medication compliance by patient. Continue for plans to obtain long term goals after discharge.     SHORT-TERM GOALS UPDATE:  Time frame for Short-Term Goals: 8-14days     LONG-TERM GOALS UPDATE:  Time frame for Long-Term Goals: 6 months  Members Present in Team Meeting: See [facility], [facility]

## 2019-03-16 PROCEDURE — 6370000000 HC RX 637 (ALT 250 FOR IP): Performed by: NURSE PRACTITIONER

## 2019-03-16 PROCEDURE — 1240000000 HC EMOTIONAL WELLNESS R&B

## 2019-03-16 RX ADMIN — NICOTINE POLACRILEX 2 MG: 2 GUM, CHEWING BUCCAL at 19:20

## 2019-03-16 RX ADMIN — NICOTINE POLACRILEX 2 MG: 2 GUM, CHEWING BUCCAL at 21:53

## 2019-03-16 NOTE — BH NOTE
Writer asked patient if she would like to see OB. Patient yelled \" No! Quit opening my mother fucking door! \"  OB resident notified of response. RN notified.

## 2019-03-16 NOTE — PLAN OF CARE
Problem: Anger Management/Homicidal Ideation:  Goal: Absence of angry outbursts  Description  Absence of angry outbursts   3/15/2019 2214 by Rosemary Aguero LPN  Outcome: Ongoing  Note:   No angry outburst noted. Isolative to room and out for needs only. Problem: Falls - Risk of:  Goal: Will remain free from falls  Description  Will remain free from falls    Outcome: Ongoing  Note:   Remains free from falls. Non skid footwear with ambulation. Bed in low position.

## 2019-03-16 NOTE — PLAN OF CARE
Michel Kearney did not attend [phone] community meeting/ goals group despite staff invitation to attend.

## 2019-03-16 NOTE — PLAN OF CARE
Pt did not participate in Psycho Education Group at 1000 due to resting in room and choosing to not attend.

## 2019-03-17 PROCEDURE — 1240000000 HC EMOTIONAL WELLNESS R&B

## 2019-03-17 NOTE — BH NOTE
Patient did not attend goal setting and community meeting from [phone] despite staff encouragement and prompts.

## 2019-03-17 NOTE — BH NOTE
Patient participated in 1600 safety checks. Patient allowed staff to clean room some. As staff were cleaning up patients room Dr. Zabrina Esquivel entered room.  Patient began yelling at them to get out

## 2019-03-17 NOTE — PLAN OF CARE
Pt did not attend communication and socialization skills group at 1000 despite staff invitation to attend.

## 2019-03-18 PROCEDURE — 6370000000 HC RX 637 (ALT 250 FOR IP): Performed by: NURSE PRACTITIONER

## 2019-03-18 PROCEDURE — 1240000000 HC EMOTIONAL WELLNESS R&B

## 2019-03-18 RX ADMIN — NICOTINE POLACRILEX 2 MG: 2 GUM, CHEWING BUCCAL at 19:51

## 2019-03-18 NOTE — PLAN OF CARE
Problem: Risk of Harm:  Goal: Ability to remain free from injury will improve  Description  Ability to remain free from injury will improve   3/17/2019 2357 by Bren Sanchez RN  Note:   PT was accepting of 1:1 meet with RN. PT remains free from any self harm, falls, or any other type of injury as of this time. PT is wearing non-skid stockings at the time of assessment. PT verbalizes understanding of individual fall risks and ways to prevent them. PT agreed to use the call light if needed. PT agreed to seek out health care staff if stressors or other issues were to become to be too much. Safety checks have been maintained every 15 minutes and at irregular intervals throughout this shift. Problem: Falls - Risk of:  Goal: Will remain free from falls  Description  Will remain free from falls    Note:   PT was accepting of 1:1 meet with RN. PT remains free from any self harm, falls, or any other type of injury as of this time. PT is wearing non-skid stockings at the time of assessment. PT verbalizes understanding of individual fall risks and ways to prevent them. PT agreed to use the call light if needed. PT agreed to seek out health care staff if stressors or other issues were to become to be too much. Safety checks have been maintained every 15 minutes and at irregular intervals throughout this shift.

## 2019-03-18 NOTE — GROUP NOTE
Group Therapy Note    Date: March 18    Group Start Time: 1100  Group End Time: 1130  Group Topic: Psychoeducation    RANDELL Meza, CTRS    Pt did not attend RT group at 1100 d/t resting in room despite staff invitation to attend.

## 2019-03-18 NOTE — PROGRESS NOTES
Patient immediately again became quite profane and threatening with my attempt to speak with her. Immediately, she starts yelling that she will cut off my body parts, starts calling me profane names. She has been coming out of room only for meals. Displays very poor self-care. She has been uncooperative with nursing assessments as well as offers of OB assessments. She has been refusing medications. There is a plan in place for  either electively at 39 weeks or if labor progresses this week. There is no safe alternative to continued hospitalization due to psychosis and current pregnancy. Charting and medications reviewed.   Will continue to attempt to speak with patient

## 2019-03-18 NOTE — GROUP NOTE
Group Therapy Note    Date: March 18    Group Start Time: 1430  Group End Time: 1505  Group Topic: Cognitive Skills    RANDELL Meza, CTRS    Pt did not attend RT group at 1430 d/t resting in room despite staff invitation to attend.

## 2019-03-18 NOTE — PROGRESS NOTES
Called [facility] to see if patient is willing to be seen by OB this morning. Per RN, the patient doesn't want to be seen right now. OB will call [facility] back later this morning and attempt to have patient come to L&D if amenable. Patient needs NST/BPP today. Continue current care. Please garibay not hesitate to page with any questions.     Alecia Geiger DO  Ob/Gyn Resident PGY-3  3/18/2019, 6:15 AM

## 2019-03-18 NOTE — GROUP NOTE
Group Therapy Note    Date: March 18    Group Start Time: 0900  Group End Time: 0915  Group Topic: Community Meeting    CARY Hair    Pt did not attend [employer] at 0900 d/t resting in room despite staff invitation to attend.

## 2019-03-18 NOTE — CARE COORDINATION
Writer phoned Neyda Hyman, pt's sister, [phone], but was unable to leave a VM, as it stated VM was full. Writer will attempt to call back later today, 3/18/19. Writer phoned Vicky/Jaye, [address], [phone], and left VM for Vicky to verify if [facility] and [place] had filed legal guardianship paperwork on pt. Writer supplied name can call back number and requested a returned phone call.

## 2019-03-18 NOTE — PLAN OF CARE
Problem: Anger Management/Homicidal Ideation:  Goal: Absence of angry outbursts  Description  Absence of angry outbursts   Note:   Patient continues with angry outbursts directed at staff. Refuses assesments. Vitals. And medications. 15 minute checks maintained to safety.

## 2019-03-19 PROCEDURE — 1240000000 HC EMOTIONAL WELLNESS R&B

## 2019-03-19 NOTE — BH NOTE
All assessments and charting done by LPN reviewed.     Electronically signed by Lynnette Shell RN on 3/19/2019 at 6:40 AM

## 2019-03-19 NOTE — BH NOTE
Writer attempts to speak with patient regarding birthing plan. At this time patient is not receptive to developing a plan and voices she is not having a baby and is not pregnant.

## 2019-03-19 NOTE — GROUP NOTE
Group Therapy Note    Date: March 19    Group Start Time: 0900  Group End Time: 0915  Group Topic: Community Meeting    CARY Hair    Pt did not attend [employer] at 0900 d/t resting in room despite staff invitation to attend.

## 2019-03-19 NOTE — GROUP NOTE
Group Therapy Note    Date: March 19    Group Start Time: 1100  Group End Time: 1130  Group Topic: Psychoeducation    RANDELL Meza, CTRS    Pt did not attend RT group at 1100 d/t resting in room despite staff invitation to attend.

## 2019-03-19 NOTE — PROGRESS NOTES
OB/GYN Resident Interval Note     Called [facility] to ask if patient willing to come to L&D for NST/BPP. Patient is not agreeable at this time. Please call or page if patient changes her mind. OB/GYN recommends the patient have twice weekly NSTs and once weekly BPPs for the remainder of pregnancy to evaluate for fetal well-being.      Reilly Guzman DO  Ob/Gyn Resident  Pager: [phone]  3/19/2019 4:21 PM

## 2019-03-19 NOTE — PROGRESS NOTES
RN asked if patient was willing to be seen by OB this morning. Per RN, the patient did not acknowledge her existence and refused to respond. OB will call [facility] back later this morning and attempt to have patient come to L&D if amenable. Patient needs NST/BPP today. Continue current care. Please garibay not hesitate to page with any questions.     Iram Smith DO  Ob/Gyn Resident PGY-3  3/19/2019, 6:27 AM

## 2019-03-20 PROCEDURE — 6370000000 HC RX 637 (ALT 250 FOR IP): Performed by: NURSE PRACTITIONER

## 2019-03-20 PROCEDURE — 1240000000 HC EMOTIONAL WELLNESS R&B

## 2019-03-20 RX ADMIN — NICOTINE POLACRILEX 2 MG: 2 GUM, CHEWING BUCCAL at 19:32

## 2019-03-20 NOTE — PLAN OF CARE
Problem: Anger Management/Homicidal Ideation:  Goal: Absence of angry outbursts  Description  Absence of angry outbursts   Outcome: Ongoing  Note:   Patient is unable to assess at this time, verbally aggressive and threatening, will not answer assessment questions yells \"get out my room before I beat your ass little boy\". She is very irritable and agitated. Refusing all medications, vitals, physical and mental assessment. Very poor hygiene, good sleep, poor appetite. Patient safety maintained through Q15 min and random safety checks. Problem: Risk of Harm:  Goal: Ability to remain free from injury will improve  Description  Ability to remain free from injury will improve   Outcome: Ongoing     Problem: Falls - Risk of:  Goal: Will remain free from falls  Description  Will remain free from falls    Outcome: Ongoing  Note:   Pt remains free of falls and verbalizes understanding of individual fall risks. Pt wearing non skid footwear and encouraged to seek out staff for any assistance needed.

## 2019-03-20 NOTE — GROUP NOTE
Group Therapy Note    Date: March 20    Group Start Time: 1430  Group End Time: 1273  Group Topic: Recovery    [facility]    Nevaeh Matute MSW, LSW    Patient declined to attend Recovery group at 1430 pm despite encouragement by staff.

## 2019-03-20 NOTE — PLAN OF CARE
Problem: Anger Management/Homicidal Ideation:  Goal: Absence of angry outbursts  Description  Absence of angry outbursts   3/19/2019 2300 by Mabel Kaplan LPN  Outcome: Ongoing  Note:   Patient visible on unit all throughout shift, coloring in the day room or at times just sitting and watching TV; no angry outbursts, asking staff for things politely; patient admits that baby moving, denies writer request to listen to heartbeat; staff continues to provide a safe environment, checking on patient Q 15 minutes and at random times. Problem: Risk of Harm:  Goal: Ability to remain free from injury will improve  Description  Ability to remain free from injury will improve   3/19/2019 2300 by Mabel Kaplan LPN  Outcome: Ongoing  Note:   Patient states that she will not hurt baby; staff continues to monitor and provide a safe environment; safety maintained via protocol and random checks.

## 2019-03-20 NOTE — GROUP NOTE
Group Therapy Note    Date: March 20    Group Start Time: 1330  Group End Time: 1400  Group Topic: Cognitive Skills    RANDELL Resendez, CTRS      Group Therapy Note    Pt did not attend Therapeutic Recreation at 1330 d/t resting in room despite staff invitation to attend.

## 2019-03-20 NOTE — GROUP NOTE
Group Therapy Note    Date: March 20    Group Start Time: 1100  Group End Time: 1130  Group Topic: Psychoeducation    RANDELL Meza, CTRS    Pt did not attend RT group at 1100 d/t resting in room despite staff invitation to attend.

## 2019-03-20 NOTE — PROGRESS NOTES
RN asked if patient was willing to be seen by OB this morning. The patient did not acknowledge the RN's existence and refused to respond.  OB will call [facility] back later this morning and attempt to have patient come to L&D if amenable. Patient needs NST/BPP today. Continue current care. Please do not hesitate to page with any questions.       Miah Nogueira, DO  Ob/Gyn Resident PGY-3  3/20/2019, 6:24 AM

## 2019-03-21 PROCEDURE — 1240000000 HC EMOTIONAL WELLNESS R&B

## 2019-03-21 ASSESSMENT — PAIN SCALES - GENERAL: PAINLEVEL_OUTOF10: 0

## 2019-03-21 NOTE — PROGRESS NOTES
Patient again became profane and told me to \"Get the fuck out\" of her room. She has been uncooperative with nursing assessments. She's been refusing opiate assessments. She displays very poor body odor, hygiene. She displays no independent care of ADLs. She has been eating intermittently. Due to continued severe psychosis, there is no safe alternative to continued hospitalization. She has been refusing medications. There is a plan in place for  either electively at 39 weeks or if labor progresses this week. There is no safe alternative to continued hospitalization due to psychosis and current pregnancy. Charting and medications reviewed.   Will continue to attempt to speak with patient

## 2019-03-21 NOTE — GROUP NOTE
Group Therapy Note    Date: March 21    Group Start Time: 0900  Group End Time: 0915  Group Topic: Community Meeting    CARY Hair    Pt did not attend [employer] at 0900 d/t resting in room despite staff invitation to attend.

## 2019-03-21 NOTE — PROGRESS NOTES
Called to see if patient would like to be seen by OB this morning. Per RN, the patient replied, \"I'm ok. \" OB will call [facility] back later this morning and attempt to have patient come to L&D if amenable. Patient needs NST/BPP today. Continue current care. Please do not hesitate to page with any questions.     Leonidas Simmonds,   Ob/Gyn Resident PGY-3  3/21/2019, 5:57 AM

## 2019-03-21 NOTE — BH NOTE
Clean up garbage off patients floor. Encouraged shower but patient refused.  Patient malodorous with urine smell

## 2019-03-21 NOTE — PROGRESS NOTES
OB/GYN Resident Interval Note     Called [facility] to ask if patient is willing to come to L&D for NST/BPP. Informed by [facility] staff that patient is not agreeable at this time. Please contact OB/GYN if patient becomes agreeable. Will continue to attempt NST/BPP daily. Plan for CS at 39w. Please call or page with any questions.      Jeramie Ferreira DO  Ob/Gyn Resident  Pager: [phone]  3/21/2019 1:42 PM

## 2019-03-21 NOTE — GROUP NOTE
Group Therapy Note    Date: March 21    Group Start Time: 1100  Group End Time: 1130  Group Topic: Psychoeducation    RANDELL Meza, CTRS    Pt did not attend RT group at 1100 d/t resting in room despite staff invitation to attend.

## 2019-03-21 NOTE — CONSULTS
[facility] Ethics Consultation Form  Report to [address] [place] [address]    MR# [id number]   Ethics Committee Representatives: Alina Garber M.D., Isaias Gaspar, Ph.D., [facility]  Date of Consult: 3/21/2019   Date of Admission: 2018    Referral Source (doctor, nurse, etc.)  OB Unit    Medical Background: The patient is a 41 y.o. female who has a past medical history of Anxiety, Asthma, Bipolar 1 disorder ([address] Multiple personality ([address] and Seizures ([address] Admitted to hospital 2018. Schizoaffective disorder ([address] [F25.9] was the admission diagnosis. Patient is now almost 39 weeks pregnant having been admitted at 20 weeks with little or no documented pre- care. Multiple notes from OB/GYN service calling the [facility] to see if patient wishes services, patient declining, and recommendations for weekly testing. One note from OB/GYN resident regarding visit to patient in the [facility] and two notes from OB/GYN regarding patient visit to L and D for testing. What are the requester's ethical concerns? What are his/her recommendations? What decisions need to be made? How soon do they need to be made? Per notes, patient will be 39 weeks pregnant on . OB/GYN physicians plan to deliver baby via emergent . Areas of concern include lack of medical indications for , patients decision-making capacity and her wishes about delivery, and if needed--surrogate decision-makers involvement and understanding of options. Concern for the patients ability to care for herself post-surgery. Patient and family wishes, values and goals:  Currently, patient is not being forced to take medications. Patient is sometimes aware and accepting of pregnancy and at other times in denial.  No chart note indicating conversation with the patient about her wishes regarding delivery (elective , Induction, or spontaneous labor and vaginal delivery).  [facility] personnel are contacting mom and sister (mom is legal surrogate) to talk about patient wishes and their understanding of options should patient be deemed by psychiatry as not having capacity to consent to either  or induction. Process (conversation with patient, family, doctor, nurse, care coordinator, ethics committee member, pastoral care, consultants, risk, others):  I reviewed the chart and spoke with director of nursing, administration, chair of the ethics committee, and M to understand the clinical situation and the questions and concerns they have. Awaiting conversation/input with OB/GYN physician and psychiatry at [facility]. Recommendations:  Meet with OB/GYN service, medical staff, administration, risk/legal, and ethics to review patient wishes for surgical intervention, define medical need if necessary, as well as timing regarding determination of capacity. Meet with and document recommendations from Medical (OB/GYN, Anesthesia, Psychiatry), nursing, administration, and legal as to Plan A, Plan B, and Plan C for delivery such as what has been done--preparing a L and D room for the patient. The physician team listens to the patient's goals/values (per patient or surrogate decision-maker) then recommends a treatment plan that is consistent with those goals/values, condition and prognosis. The plan includes interventions that are medically indicated given the entire situation.       Thank you for the opportunity to serve the patient, family and medical team.    Outcome:  Comments:    Hali Jones MD, Sabrina Padron, Ph.D., Darrell Bunch, [facility]    Primary feature ethics consult code:  bolB  raysa = beginning of life / OB  cpf   cpf= caregiver-patient-family communication      Secondary feature ethics consult code:  cdm = clarify decision-maker   cpp = clarify policy or protocol  icd = informed consent / disclosure  itf = inappropriate treatment, requests / futility  ntr = non-adherence / treatment refusal  qdc = questionable decision-making capacity

## 2019-03-22 PROCEDURE — 6370000000 HC RX 637 (ALT 250 FOR IP): Performed by: NURSE PRACTITIONER

## 2019-03-22 PROCEDURE — 1240000000 HC EMOTIONAL WELLNESS R&B

## 2019-03-22 RX ADMIN — NICOTINE POLACRILEX 2 MG: 2 GUM, CHEWING BUCCAL at 18:29

## 2019-03-22 NOTE — PLAN OF CARE
Problem: Anger Management/Homicidal Ideation:  Goal: Absence of angry outbursts  Description  Absence of angry outbursts   3/22/2019 0957 by Tra Velasquez RN  Note:   Patient has been isolative to room. She can have angry outburst when approached by staff with questions concerning daily care      Problem: Falls - Risk of:  Goal: Will remain free from falls  Description  Will remain free from falls    3/22/2019 0957 by Tra Velasquez RN  Note:   Patient has not reported any falls this shift.  Patient has also not had any witnessed falls

## 2019-03-22 NOTE — CARE COORDINATION
Patient surgery has been scheduled for Monday 3/25 at 1200. Discussed with charge nurse as well. Spoke with sister Jamey Ji and asked that she be in OB on Monday at 56 am.  Jamey Ji states that she and patients mother will be there at 1000 am on Monday. She had no further questions or concerns at present. She is able to reach out to staff this weekend with questions.

## 2019-03-22 NOTE — GROUP NOTE
Group Therapy Note    Date: March 22    Group Start Time: 1100  Group End Time: 1130  Group Topic: Psychoeducation    RANDELL Meza, CTRS    Pt did not attend RT group at 1100 d/t resting in room despite staff invitation to attend.

## 2019-03-22 NOTE — PLAN OF CARE
[facility] Interdisciplinary Treatment Plan Note     Review Date & Time: 3/22/19 9:51AM    Admission Type:   Admission Type: Involuntary(Signed in.)    Reason for admission:  Reason for Admission: Patient was sent to [address] [place] [address] for assessment. Estimated Length of Stay :  5-7 days  Estimated Discharge Date Update: to be determined by physician    PATIENT STRENGTHS:  Patient Strengths:Strengths: Connection to output provider  Patient Strengths and Limitations:Limitations: Unrealistic self-view, Hopeless about future, Apathetic / unmotivated, Tendency to isolate self  Addictive Behavior:Addictive Behavior  In the past 3 months, have you felt or has someone told you that you have a problem with:  : None  Do you have a history of Chemical Use?: No  Do you have a history of Alcohol Use?: No  Do you have a history of Street Drug Abuse?: No  Histroy of Prescripton Drug Abuse?: No  Medical Problems:   Past Medical History:   Diagnosis Date    Anxiety     Asthma     Bipolar 1 disorder ([facility]     Multiple personality ([facility]     Seizures ([facility]        Risk:  Fall RiskTotal: 88  Stephen Scale Stephne Scale Score: 21  BVC Total: 1    Change in scores no. Changes to plan of Care no    Status EXAM:   Status and Exam  Normal: No  Facial Expression: Hostile  Affect: Unstable  Level of Consciousness: Alert  Mood:Normal: No  Mood: Labile, Irritable, Anxious, Suspicious  Motor Activity:Normal: No  Motor Activity: Decreased  Interview Behavior: Irritable, Uncooperative/Withdrawn  Preception: [place] to Person, [place] to Place  Attention:Normal: No  Attention: Unable to Concentrate  Thought Processes: Blocking, Flt.of Ideas  Thought Content:Normal: No  Thought Content: Preoccupations, Obsessions, Paranoia  Hallucinations:  Other (Comment)(self talk)  Delusions: Yes  Delusions: Obsessions  Memory:Normal: No  Memory: Poor Recent, Poor Remote  Insight and Judgment: No  Insight and Judgment: Poor Judgment, Poor Insight, Unmotivated  Present Suicidal Ideation: No  Present Homicidal Ideation: No    Daily Assessment Last Entry:   Daily Sleep (WDL): Within Defined Limits         Patient Currently in Pain: Denies  Daily Nutrition (WDL): Within Defined Limits  Appetite Change: Decreased  Barriers to Nutrition: Cognitive impairment  Level of Assistance: Independent/Self    Patient Monitoring:  Frequency of Checks: 4 times per hour, close    Psychiatric Symptoms:   Depression Symptoms  Depression Symptoms: Impaired concentration, Loss of interest, Isolative  Anxiety Symptoms  Anxiety Symptoms: Obsessions  Lora Symptoms  Lora Symptoms: Poor judgment, Labile     Psychosis Symptoms  Delusion Type: Paranoid    Suicide Risk CSSR-S:  1) Within the past month, have you wished you were dead or wished you could go to sleep and not wake up? : No  2) Have you actually had any thoughts of killing yourself? : No  6) Have you ever done anything, started to do anything, or prepared to do anything to end your life?: No  Change in Result no Change in Plan of care no    EDUCATION:   Learner Progress Toward Treatment Goals: Reviewed results and recommendations of this team, Reviewed group plan and strategies, Reviewed signs, symptoms and risk of self harm and violent behavior, Reviewed goals and plan of care    Method: individual education, small group, verbal education    Outcome: verbalized understanding, but needs reinforcement to obtain goals    PATIENT GOALS:  Short term:Pt unable to attend, sleeping. Long term:Pt unable to attend, sleeping. PLAN/TREATMENT RECOMMENDATIONS UPDATE:   Continue with group therapies, education of coping skills   Continue to monitor patient on unit. Medications provided/ medication compliance by patient. Continue for plans to obtain long term goals after discharge.     SHORT-TERM GOALS UPDATE:  Time frame for Short-Term Goals: 8-14 days     LONG-TERM GOALS UPDATE:  Time frame for Long-Term

## 2019-03-22 NOTE — GROUP NOTE
Group Therapy Note    Date: March 22    Group Start Time: 1330  Group End Time: 0428  Group Topic: Cognitive Skills    RANDELL Damico, CTRS      Group Therapy Note    Pt did not attend [employer] at 1330 d/t resting in room despite staff invitation to attend.

## 2019-03-22 NOTE — PROGRESS NOTES
Called [facility] to see if patient is willing to be seen by OB this morning. Per RN, the patient doesn't want to be seen right now.  OB will call [facility] back later this morning and attempt to have patient come to L&D if amenable. Patient needs NST/BPP today. Continue current care. Please do not hesitate to page with any questions.         Clari Marsh,   Ob/Gyn Resident PGY-3  3/22/2019, 6:30 AM

## 2019-03-22 NOTE — PLAN OF CARE
Problem: Anger Management/Homicidal Ideation:  Goal: Absence of angry outbursts  Description  Absence of angry outbursts   3/21/2019 2144 by Jimmy Recio LPN  Outcome: Ongoing  Note:   No angry outburst noted at this time. Patient is guarded, suspicious, labile, paranoid and irritable. Refused nightly vitals signs and assistance with personal hygiene. Irritable upon contact when writer ask about cleaning room and for her to take a shower. Problem: Falls - Risk of:  Goal: Will remain free from falls  Description  Will remain free from falls    3/21/2019 2144 by Jimmy Recio LPN  Outcome: Ongoing  Note:   Remains free from falls. Non skid footwear with ambulation. Bed in low position.

## 2019-03-22 NOTE — PROGRESS NOTES
OB/GYN Resident Interval Note     Called [facility] to ask if patient was willing to come to L&D for NST/BPP. [facility] staff reports patient is not agreeable to coming to L&D at this time. Asked [facility] staff to call OB/GYN if patient becomes agreeable. Please call or page with any questions.      Sean Fletcher DO  Ob/Gyn Resident  Pager: [phone]  3/22/2019 9:42 AM

## 2019-03-22 NOTE — GROUP NOTE
Group Therapy Note    Date: March 22    Group Start Time: 0910  Group End Time: 6080  Group Topic: Community Meeting    RANDELL Mora [place], [place]    Pt did not attend [employer] at 0573 d/t resting in room despite staff invitation to attend.

## 2019-03-22 NOTE — BH NOTE
Patient out of room after lunch looking for food. Patient given food and drinks. Encourage fluids. Asked patient if she would like to shower and she ignored writer and walked back into room.

## 2019-03-22 NOTE — GROUP NOTE
Group Therapy Note    Date: March 22    Group Start Time: 4010  Group End Time: 2926  Group Topic: Cognitive Skills    [facility]    Renee Read, CTRS    Pt did not attend RT group at 1445 d/t resting in room despite staff invitation to attend.

## 2019-03-22 NOTE — CARE COORDINATION
Met with Mother Nidia Melo and sister Kanika Cain to discuss patient care. OB, DON,  CNO, DON [facility], Patients rights/risk management and writer. Discussed options for delivery. Pt's mother Nidia Melo deferred decision making to patient sister Kanika Cain. Options discussed throughly with next of kin.

## 2019-03-23 PROCEDURE — 1240000000 HC EMOTIONAL WELLNESS R&B

## 2019-03-23 NOTE — BH NOTE
Patient was served with guardianship papers, made aware that there will be a court hearing on April 8th at 10:00 AM, patient refused to acknowledge this information.

## 2019-03-23 NOTE — PLAN OF CARE
Problem: Anger Management/Homicidal Ideation:  Goal: Absence of angry outbursts  Description  Absence of angry outbursts   3/22/2019 2227 by Minnie Bah LPN  Outcome: Ongoing  Note:   Patient quiet, isolative to self, drinking much fluid; cooperative when told that there were no ham sandwich for snack; patient is controlled,no angry outbursts at this time; staff continues to monitor and care, providing a safe environment, checking on patient every 15 minutes and randomly. Problem: Risk of Harm:  Goal: Ability to remain free from injury will improve  Description  Ability to remain free from injury will improve   Outcome: Ongoing  Note:   Staff continues to provide safety, checking on patient every 15 minutes and spontaneously, meeting needs when applicable.

## 2019-03-23 NOTE — PLAN OF CARE
Problem: Anger Management/Homicidal Ideation:  Goal: Absence of angry outbursts  Description  Absence of angry outbursts   Outcome: Ongoing  Note:   Patient refusing to allow physical assessments, when asked if she was doing okay today she stated \"yeah\" and walked away. She has been more isolative to room, appetite is decreased but she is getting adequate nutrition - comes out and eats select items off her tray and asks for fruit which is available to her whenever she wants it, she has also been drinking her strawberry ensures. Her hygiene remains poor, however she did shower last night and allowed staff to clean her room. When asked if she wanted her medications today she state \"no thanks I'm good\". She appears less irritable today, no verbal aggression thus far. Patient safety maintained through q15 min and random safety checks. Problem: Risk of Harm:  Goal: Ability to remain free from injury will improve  Description  Ability to remain free from injury will improve   Outcome: Ongoing     Problem: Falls - Risk of:  Goal: Will remain free from falls  Description  Will remain free from falls    Outcome: Ongoing  Note:   Pt remains free of falls and verbalizes understanding of individual fall risks. Pt wearing non skid footwear and encouraged to seek out staff for any assistance needed.

## 2019-03-24 VITALS
TEMPERATURE: 98.2 F | DIASTOLIC BLOOD PRESSURE: 70 MMHG | RESPIRATION RATE: 14 BRPM | OXYGEN SATURATION: 100 % | SYSTOLIC BLOOD PRESSURE: 102 MMHG | HEART RATE: 104 BPM | HEIGHT: 68 IN | BODY MASS INDEX: 19.77 KG/M2

## 2019-03-24 PROCEDURE — 1240000000 HC EMOTIONAL WELLNESS R&B

## 2019-03-24 NOTE — GROUP NOTE
Group Therapy Note    Date: March 24    Group Start Time: 1330  Group End Time: 1430  Group Topic: Cognitive Skills    RANDELL Pham, CTRS      Group Therapy Note    Pt did not attend Therapeutic Recreation at 1330 d/t resting in room despite staff invitation to attend.

## 2019-03-24 NOTE — PROGRESS NOTES
OB/GYN Resident Interval Note     Called [facility] to ask if patient is willing to be seen by OB/GYN. Patient is declining examination at this time. Please call or page if patient has any obstetric complaints. NPO @ 9300  Plan for delivery tomorrow, 3/25.      Lea Oglesby DO  Ob/Gyn Resident  Pager: [phone]  3/24/2019 6:30 AM

## 2019-03-24 NOTE — PLAN OF CARE
Problem: Anger Management/Homicidal Ideation:  Goal: Absence of angry outbursts  Description  Absence of angry outbursts   Outcome: Ongoing  Note:   Patient refusing to allow physical and mental assessments. She has been more isolative to room, appetite is decreased but she is getting adequate nutrition - comes out and eats select items off her tray and asks for fruit which is available to her whenever she wants it, she has also been drinking her strawberry ensures. Her hygiene remains poor, however she did shower last night and allowed staff to clean her room. When asked if she wanted her medications today she state \"no thanks I'm good\". She appears less irritable today, no verbal aggression thus far. Patient safety maintained through q15 min and random safety checks       Problem: Risk of Harm:  Goal: Ability to remain free from injury will improve  Description  Ability to remain free from injury will improve   Outcome: Ongoing     Problem: Falls - Risk of:  Goal: Will remain free from falls  Description  Will remain free from falls    Outcome: Ongoing  Note:   Pt remains free of falls and verbalizes understanding of individual fall risks. Pt wearing non skid footwear and encouraged to seek out staff for any assistance needed.

## 2019-03-24 NOTE — PLAN OF CARE
Problem: Anger Management/Homicidal Ideation:  Goal: Absence of angry outbursts  Description  Absence of angry outbursts   3/23/2019 2241 by Rad Delgado LPN  Outcome: Ongoing  Note:   No angry outbursts on this shift; patient cooperative, visible on unit; admitted that 'baby' moved; staff continues to care and provide safety, checking on paitent Q 15 minutes and randomly. Problem: Falls - Risk of:  Goal: Will remain free from falls  Description  Will remain free from falls    3/23/2019 2241 by Rad Delgado LPN  Note:   Ambulating wnl, in day room for very few minutes, returned to room; patient cooperative, visible on unit; admitted that 'baby' moved; staff continues to care and provide safety, checking on paitent Q 15 minutes and randomly.
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
## 2019-03-25 ENCOUNTER — ANESTHESIA (OUTPATIENT)
Dept: LABOR AND DELIVERY | Age: 39
DRG: 560 | End: 2019-03-25
Payer: MEDICAID

## 2019-03-25 ENCOUNTER — ANESTHESIA EVENT (OUTPATIENT)
Dept: LABOR AND DELIVERY | Age: 39
DRG: 560 | End: 2019-03-25
Payer: MEDICAID

## 2019-03-25 ENCOUNTER — HOSPITAL ENCOUNTER (INPATIENT)
Age: 39
LOS: 1 days | Discharge: PSYCHIATRIC OR SUBSTANCE USE FACILITY | DRG: 560 | End: 2019-03-26
Attending: OBSTETRICS & GYNECOLOGY | Admitting: OBSTETRICS & GYNECOLOGY
Payer: MEDICAID

## 2019-03-25 PROBLEM — Z72.0 TOBACCO USE: Status: ACTIVE | Noted: 2019-03-25

## 2019-03-25 PROBLEM — O09.93 HRP (HIGH RISK PREGNANCY), THIRD TRIMESTER: Status: ACTIVE | Noted: 2019-03-25

## 2019-03-25 PROBLEM — J45.909 ASTHMA: Status: ACTIVE | Noted: 2019-03-25

## 2019-03-25 LAB
-: NORMAL
ABO/RH: NORMAL
ANTIBODY SCREEN: NEGATIVE
ARM BAND NUMBER: NORMAL
EXPIRATION DATE: NORMAL
HCT VFR BLD CALC: 35.7 % (ref 36–46)
HEMOGLOBIN: 11.9 G/DL (ref 12–16)
MCH RBC QN AUTO: 33.2 PG (ref 26–34)
MCHC RBC AUTO-ENTMCNC: 33.5 G/DL (ref 31–37)
MCV RBC AUTO: 99.1 FL (ref 80–100)
NRBC AUTOMATED: ABNORMAL PER 100 WBC
PDW BLD-RTO: 13 % (ref 11.5–14.9)
PLATELET # BLD: 289 K/UL (ref 150–450)
PMV BLD AUTO: 9.6 FL (ref 6–12)
RBC # BLD: 3.6 M/UL (ref 4–5.2)
REASON FOR REJECTION: NORMAL
WBC # BLD: 10.6 K/UL (ref 3.5–11)
ZZ NTE CLEAN UP: ORDERED TEST: NORMAL
ZZ NTE WITH NAME CLEAN UP: SPECIMEN SOURCE: NORMAL

## 2019-03-25 PROCEDURE — 36415 COLL VENOUS BLD VENIPUNCTURE: CPT

## 2019-03-25 PROCEDURE — 4A1HXCZ MONITORING OF PRODUCTS OF CONCEPTION, CARDIAC RATE, EXTERNAL APPROACH: ICD-10-PCS | Performed by: OBSTETRICS & GYNECOLOGY

## 2019-03-25 PROCEDURE — 2580000003 HC RX 258: Performed by: STUDENT IN AN ORGANIZED HEALTH CARE EDUCATION/TRAINING PROGRAM

## 2019-03-25 PROCEDURE — 0KQM0ZZ REPAIR PERINEUM MUSCLE, OPEN APPROACH: ICD-10-PCS | Performed by: OBSTETRICS & GYNECOLOGY

## 2019-03-25 PROCEDURE — 88307 TISSUE EXAM BY PATHOLOGIST: CPT

## 2019-03-25 PROCEDURE — 76815 OB US LIMITED FETUS(S): CPT

## 2019-03-25 PROCEDURE — 2500000003 HC RX 250 WO HCPCS: Performed by: OBSTETRICS & GYNECOLOGY

## 2019-03-25 PROCEDURE — 86901 BLOOD TYPING SEROLOGIC RH(D): CPT

## 2019-03-25 PROCEDURE — 6360000002 HC RX W HCPCS: Performed by: PSYCHIATRY & NEUROLOGY

## 2019-03-25 PROCEDURE — 1220000000 HC SEMI PRIVATE OB R&B

## 2019-03-25 PROCEDURE — 6370000000 HC RX 637 (ALT 250 FOR IP): Performed by: OBSTETRICS & GYNECOLOGY

## 2019-03-25 PROCEDURE — 86850 RBC ANTIBODY SCREEN: CPT

## 2019-03-25 PROCEDURE — 86900 BLOOD TYPING SEROLOGIC ABO: CPT

## 2019-03-25 PROCEDURE — 59409 OBSTETRICAL CARE: CPT | Performed by: OBSTETRICS & GYNECOLOGY

## 2019-03-25 PROCEDURE — 7200000001 HC VAGINAL DELIVERY

## 2019-03-25 PROCEDURE — 85027 COMPLETE CBC AUTOMATED: CPT

## 2019-03-25 RX ORDER — DIPHENHYDRAMINE HYDROCHLORIDE 50 MG/ML
50 INJECTION INTRAMUSCULAR; INTRAVENOUS
Status: COMPLETED | OUTPATIENT
Start: 2019-03-25 | End: 2019-03-25

## 2019-03-25 RX ORDER — ONDANSETRON 2 MG/ML
4 INJECTION INTRAMUSCULAR; INTRAVENOUS EVERY 6 HOURS PRN
Status: DISCONTINUED | OUTPATIENT
Start: 2019-03-25 | End: 2019-03-26 | Stop reason: HOSPADM

## 2019-03-25 RX ORDER — LIDOCAINE HYDROCHLORIDE AND EPINEPHRINE 10; 10 MG/ML; UG/ML
20 INJECTION, SOLUTION INFILTRATION; PERINEURAL ONCE
Status: COMPLETED | OUTPATIENT
Start: 2019-03-25 | End: 2019-03-25

## 2019-03-25 RX ORDER — DEXTROMETHORPHAN POLISTIREX 30 MG/5ML
15 SUSPENSION ORAL NIGHTLY PRN
Status: DISCONTINUED | OUTPATIENT
Start: 2019-03-25 | End: 2019-03-26 | Stop reason: HOSPADM

## 2019-03-25 RX ORDER — POLYETHYLENE GLYCOL 3350 17 G/17G
17 POWDER, FOR SOLUTION ORAL DAILY
Status: DISCONTINUED | OUTPATIENT
Start: 2019-03-25 | End: 2019-03-26 | Stop reason: HOSPADM

## 2019-03-25 RX ORDER — HALOPERIDOL 5 MG/ML
10 INJECTION INTRAMUSCULAR
Status: COMPLETED | OUTPATIENT
Start: 2019-03-25 | End: 2019-03-25

## 2019-03-25 RX ORDER — SWAB
1 SWAB, NON-MEDICATED MISCELLANEOUS
Status: DISCONTINUED | OUTPATIENT
Start: 2019-03-25 | End: 2019-03-26 | Stop reason: HOSPADM

## 2019-03-25 RX ORDER — BENZONATATE 100 MG/1
100 CAPSULE ORAL 3 TIMES DAILY PRN
Status: DISCONTINUED | OUTPATIENT
Start: 2019-03-25 | End: 2019-03-26 | Stop reason: HOSPADM

## 2019-03-25 RX ORDER — DIPHENHYDRAMINE HYDROCHLORIDE 50 MG/ML
50 INJECTION INTRAMUSCULAR; INTRAVENOUS ONCE
Status: COMPLETED | OUTPATIENT
Start: 2019-03-25 | End: 2019-03-25

## 2019-03-25 RX ORDER — SODIUM CHLORIDE, SODIUM LACTATE, POTASSIUM CHLORIDE, CALCIUM CHLORIDE 600; 310; 30; 20 MG/100ML; MG/100ML; MG/100ML; MG/100ML
INJECTION, SOLUTION INTRAVENOUS CONTINUOUS
Status: DISCONTINUED | OUTPATIENT
Start: 2019-03-25 | End: 2019-03-26 | Stop reason: HOSPADM

## 2019-03-25 RX ORDER — HYDROXYZINE HYDROCHLORIDE 25 MG/1
25 TABLET, FILM COATED ORAL 3 TIMES DAILY PRN
Status: DISCONTINUED | OUTPATIENT
Start: 2019-03-25 | End: 2019-03-26 | Stop reason: HOSPADM

## 2019-03-25 RX ORDER — DOCUSATE SODIUM 100 MG/1
100 CAPSULE, LIQUID FILLED ORAL DAILY
Status: DISCONTINUED | OUTPATIENT
Start: 2019-03-25 | End: 2019-03-26 | Stop reason: HOSPADM

## 2019-03-25 RX ORDER — SODIUM CHLORIDE 0.9 % (FLUSH) 0.9 %
10 SYRINGE (ML) INJECTION EVERY 12 HOURS SCHEDULED
Status: DISCONTINUED | OUTPATIENT
Start: 2019-03-25 | End: 2019-03-26 | Stop reason: HOSPADM

## 2019-03-25 RX ORDER — ONDANSETRON 4 MG/1
4 TABLET, FILM COATED ORAL EVERY 8 HOURS PRN
Status: DISCONTINUED | OUTPATIENT
Start: 2019-03-25 | End: 2019-03-26 | Stop reason: HOSPADM

## 2019-03-25 RX ORDER — HALOPERIDOL 5 MG/ML
10 INJECTION INTRAMUSCULAR ONCE
Status: COMPLETED | OUTPATIENT
Start: 2019-03-25 | End: 2019-03-25

## 2019-03-25 RX ORDER — ALBUTEROL SULFATE 90 UG/1
2 AEROSOL, METERED RESPIRATORY (INHALATION) EVERY 6 HOURS PRN
Status: DISCONTINUED | OUTPATIENT
Start: 2019-03-25 | End: 2019-03-26 | Stop reason: HOSPADM

## 2019-03-25 RX ORDER — DIPHENHYDRAMINE HCL 25 MG
50 TABLET ORAL NIGHTLY PRN
Status: DISCONTINUED | OUTPATIENT
Start: 2019-03-25 | End: 2019-03-26 | Stop reason: HOSPADM

## 2019-03-25 RX ORDER — TRISODIUM CITRATE DIHYDRATE AND CITRIC ACID MONOHYDRATE 500; 334 MG/5ML; MG/5ML
30 SOLUTION ORAL ONCE
Status: CANCELLED | OUTPATIENT
Start: 2019-03-25 | End: 2019-03-25

## 2019-03-25 RX ORDER — ONDANSETRON 2 MG/ML
4 INJECTION INTRAMUSCULAR; INTRAVENOUS EVERY 6 HOURS PRN
Status: DISCONTINUED | OUTPATIENT
Start: 2019-03-25 | End: 2019-03-25

## 2019-03-25 RX ORDER — LANOLIN 100 %
OINTMENT (GRAM) TOPICAL PRN
Status: DISCONTINUED | OUTPATIENT
Start: 2019-03-25 | End: 2019-03-26 | Stop reason: HOSPADM

## 2019-03-25 RX ORDER — ACETAMINOPHEN 325 MG/1
650 TABLET ORAL EVERY 4 HOURS PRN
Status: DISCONTINUED | OUTPATIENT
Start: 2019-03-25 | End: 2019-03-26 | Stop reason: HOSPADM

## 2019-03-25 RX ORDER — FERROUS SULFATE 325(65) MG
325 TABLET ORAL 2 TIMES DAILY WITH MEALS
Status: DISCONTINUED | OUTPATIENT
Start: 2019-03-25 | End: 2019-03-26 | Stop reason: HOSPADM

## 2019-03-25 RX ORDER — IBUPROFEN 800 MG/1
800 TABLET ORAL EVERY 8 HOURS PRN
Status: DISCONTINUED | OUTPATIENT
Start: 2019-03-25 | End: 2019-03-26 | Stop reason: HOSPADM

## 2019-03-25 RX ORDER — SODIUM CHLORIDE 0.9 % (FLUSH) 0.9 %
10 SYRINGE (ML) INJECTION PRN
Status: DISCONTINUED | OUTPATIENT
Start: 2019-03-25 | End: 2019-03-26 | Stop reason: HOSPADM

## 2019-03-25 RX ADMIN — DIPHENHYDRAMINE HYDROCHLORIDE 50 MG: 50 INJECTION, SOLUTION INTRAMUSCULAR; INTRAVENOUS at 00:42

## 2019-03-25 RX ADMIN — Medication 1 TABLET: at 17:22

## 2019-03-25 RX ADMIN — DIPHENHYDRAMINE HYDROCHLORIDE 50 MG: 50 INJECTION INTRAMUSCULAR; INTRAVENOUS at 06:14

## 2019-03-25 RX ADMIN — Medication 999 MILLI-UNITS/MIN: at 08:07

## 2019-03-25 RX ADMIN — SODIUM CHLORIDE, POTASSIUM CHLORIDE, SODIUM LACTATE AND CALCIUM CHLORIDE: 600; 310; 30; 20 INJECTION, SOLUTION INTRAVENOUS at 07:02

## 2019-03-25 RX ADMIN — BENZOCAINE AND LEVOMENTHOL: 200; 5 SPRAY TOPICAL at 13:09

## 2019-03-25 RX ADMIN — DOCUSATE SODIUM 100 MG: 100 CAPSULE, LIQUID FILLED ORAL at 09:42

## 2019-03-25 RX ADMIN — IBUPROFEN 800 MG: 800 TABLET ORAL at 09:42

## 2019-03-25 RX ADMIN — FERROUS SULFATE TAB 325 MG (65 MG ELEMENTAL FE) 325 MG: 325 (65 FE) TAB at 17:22

## 2019-03-25 RX ADMIN — HALOPERIDOL LACTATE 10 MG: 5 INJECTION, SOLUTION INTRAMUSCULAR at 00:42

## 2019-03-25 RX ADMIN — SODIUM CHLORIDE, POTASSIUM CHLORIDE, SODIUM LACTATE AND CALCIUM CHLORIDE: 600; 310; 30; 20 INJECTION, SOLUTION INTRAVENOUS at 08:45

## 2019-03-25 RX ADMIN — FERROUS SULFATE TAB 325 MG (65 MG ELEMENTAL FE) 325 MG: 325 (65 FE) TAB at 09:42

## 2019-03-25 RX ADMIN — HALOPERIDOL LACTATE 10 MG: 5 INJECTION INTRAMUSCULAR at 06:14

## 2019-03-25 RX ADMIN — LIDOCAINE HYDROCHLORIDE,EPINEPHRINE BITARTRATE 20 ML: 10; .01 INJECTION, SOLUTION INFILTRATION; PERINEURAL at 08:10

## 2019-03-25 ASSESSMENT — PAIN SCALES - GENERAL
PAINLEVEL_OUTOF10: 0
PAINLEVEL_OUTOF10: 0
PAINLEVEL_OUTOF10: 8
PAINLEVEL_OUTOF10: 0

## 2019-03-25 NOTE — PROGRESS NOTES
AMANDA spoke to Neftaly Khalil from [place]. SW updated agency on delivery of infant. SW gathered information on Mario Ireland and provided it to agency. AMANDA expects call in the morning with worker's name.

## 2019-03-25 NOTE — FLOWSHEET NOTE
Pt up multiple times to the bathroom. States she is having stronger back and lower abd pain. Ctx palpate strong. Refuses to have continuous monitoring. Will allow for very brief FHT ausculation with the [address]. Agitated but non combative since shift change. Dr Marda Gosselin on the unit.

## 2019-03-25 NOTE — PROGRESS NOTES
Patient Name: Froy Pedroza  Patient : 1980  Room/Bed: 8610/4525-04  Admission Date/Time: 3/25/2019  1:44 AM  MRN #: [id number]  [facility] #: [id number]      Date: 3/25/2019  Time: 7:55 AM        Froy Pedroza is a 41 y.o. female     OB History    Para Term  AB Living   2 1 1 0 0 1   SAB TAB Ectopic Molar Multiple Live Births   0 0 0 0 0 1      # Outcome Date GA Lbr Lio/2nd Weight Sex Delivery Anes PTL Lv   2 Current            1 Term     M Vag-Spont   YORDY      Obstetric Comments   Her last preg was more than 10 years ago and she was institutionalized during the preg in the state of [place] (from chart review)       Gestational Age:  36w3d    H&P Reviewed. The patient was seen I discussed with her the options of  delivery through the abdomen or vaginally, as she did prior. She is understanding of the situation at this time. She wishes to proceed with attempted \"natural delivery\". There are two [facility] nurses and L&D staff. Intermittent FHT's only last were 127-135 bpm, this is due to patient cooperation. The patient is allowing only a nurses to check her and she was last 7/100/0/C/R clear. Neonatology is in the hospital. Her GBS is negative. The patient is comfortable between contractions which seem to be Q 2-3 minutes. I spoke to Dr. Nav Granados this am and he was fine with attempted vaginal Delivery. Intervention:  None      Membranes Are: Ruptured clear fluid    Lab Review:  Just collected    Last 4 week labs:  No visits with results within 4 Week(s) from this visit. Latest known visit with results is:   Admission on 2019, Discharged on 2019   Component Date Value Ref Range Status    Specimen Description 2019 . CERVIX   Final    C. trachomatis DNA 2019 NEGATIVE  NEGATIVE Final    Comment: CHLAMYDIA TRACHOMATIS DNA not detected by nucleic acid amplification.         This test is intended for medical purposes only and is not valid for the evaluation of   suspected sexual abuse or for other forensic purposes. In certain contexts, culture may be required to meet applicable laws and regulations for   diagnosis of C. trachomatis and N. gonorrhoeae infections. Per 2014  CDC recommendations, this test does not include confirmation of positive results   by an alternative nucleic acid target.  N. gonorrhoeae DNA 02/08/2019 NEGATIVE  NEGATIVE Final    Comment: NEISSERIA GONORRHOEAE DNA not detected by nucleic acid amplification. This test is intended for medical purposes only and is not valid for the evaluation of   suspected sexual abuse or for other forensic purposes. In certain contexts, culture may be required to meet applicable laws and regulations for   diagnosis of C. trachomatis and N. gonorrhoeae infections. Per 2014  CDC recommendations, this test does not include confirmation of positive results   by an alternative nucleic acid target.  Specimen Description 02/08/2019 . VAGINA   Final    Special Requests 02/08/2019 NOT REPORTED   Final    Direct Exam 02/08/2019 POSITIVE for Candida sp. *  Final    Direct Exam 02/08/2019 NEGATIVE for Gardnerella vaginalis   Final    Direct Exam 02/08/2019 NEGATIVE for Trichomonas vaginalis   Final    Direct Exam 02/08/2019 Method of testing is a DNA probe intended for detection and identification of   Final    Direct Exam 02/08/2019  Candida species, Gardnerella vaginalis, and Trichomonas vaginalis nucleic acid   Final    Direct Exam 02/08/2019  in vaginal fluid specimens from patients with symptoms of vaginitis/vaginosis. Final    Status 02/08/2019 FINAL 02/08/2019   Final    Specimen Description 02/08/2019 . VAGINA   Final    Special Requests 02/08/2019 NOT REPORTED   Final    Culture 02/08/2019 NEGATIVE FOR GROUP B STREPTOCOCCI   Final   ]    Vitals:    03/25/19 0152   BP: 105/64   Pulse: 114   Resp: 14   Temp: 98.7 °F (37.1 °C)   TempSrc: Infrared   SpO2: 95% Assessment:  1. Daria Reddy is a 41 y.o. female  39w1d   2. Labor with advanced cervical dilation  3. Pt with Schizoaffective Disorder  4. Negative GBS  5. Pt cleared for attempted Vaginal Delivery by Dr. Stephanie Clemons this am.  6.  Adequate Pain control  7. Labs ordered Iv Access obtained  8. Pt allowing only intermittent FHT's    OB History    Para Term  AB Living   2 1 1     1   SAB TAB Ectopic Molar Multiple Live Births             1      # Outcome Date GA Lbr Lio/2nd Weight Sex Delivery Anes PTL Lv   2 Current            1 Term     M Vag-Spont   YORDY      Obstetric Comments   Her last preg was more than 10 years ago and she was institutionalized during the preg in the state of [place] (from chart review)         HRP (high risk pregnancy), third trimester [O09.93]      Patient Active Problem List    Diagnosis Date Noted    Care Plan For Delivery 03/15/2019     Priority: High     Per Dr. Demond Castelan meeting Harleenluz elena Noam, Family of patient (mother and sister) Cristobal Rutledge, Bud Vincent, Ladarius Quiroz, Peter Bentley, Patty Sim, Risk Management)    1. Call Dr. Stephanie Clemons for Capacity evaluation upon Labor, ROM, Planned  at family's request-Consent Signed [phone]  2. If no Capacity per Dr. Stephanie Clemons he will dose with medication for delivery. Plan  per Family-Consent signed  3. Plan  no sooner than 39 weeks unless obstetrically indicated weeks if undelivered  4. Hold 48 hrs post op on L&D. Dr. Stephanie Clemons for medical  Medication management  5. Call child and family service upon delivery. They are aware of the case and will plan disposition for   6. Pt will need to go back to [facility] for post op care. If willing to voluntary sign in. If NOT, then pink slip by OBGYN Attending. 7. [facility] nursing available to help complete pink slip documents    The procedure risks and complictions were discussed in detail. All questions answered.  The consent

## 2019-03-25 NOTE — PROGRESS NOTES
phone conversation with the patient's legal surrogate in which he presented all three options for delivery because Dr. Armani Johnson had said the method of delivery was a decision for the [facility] physicians. The legal surrogate chose  based on remembering what a hard time the patient had with the first vaginal delivery. Dr. Jorge Adair and I agreed that I had questions for Dr. Armani Johnson unrelated to [facility] so I called him. 1. I asked Dr. Armani Johnson to explain the situation with the patient and her capacity since it seemed she was making her own decisions regarding her treatment plan and I had seen no note declaring her lacking capacity. I needed clarification that on the day of the scheduled  that he would declare her lacking capacity and then use drugs to ensure she had surgery. He explained that he evaluated her capacity on a issue by issue basis depending on the risks. Medication, therapy, and all treatment so far he deemed low risk so she was considered to have capacity. He was clear that she did not have capacity regarding . When I asked him about testing and capacity he asked if I wanted her drugged, restrained and held down. I said no and repeated my question. 2.  I asked Dr. Armani Johnson if it were possible and if so what medications he would utilize to maximize her lucidity and ability to participate in deciding her plan of care and undergo ObGyn testing and physical exam. He said that he was a  licensed psychiatrist in the state of [place] and that the ONLY safe way to delivery her was via  and that he would never give her any other medication until the . I thanked him for his time.

## 2019-03-25 NOTE — PROGRESS NOTES
I learned of the planned  and details of the process leading up to that today and had a number of questions so contacted Dr. Young Elise. 1. I asked about what he had learned from the patient over the 3 months of her [facility] stay regarding her wishes for the delivery as long as the baby's condition even warranted a trial of labor. No OB resident or attending went to [facility] to have that conversation with her. Two residents did go to [facility] and wrote notes regarding their visits with the patient. 2. I asked about what they had learned from the medical records from her previous vaginal delivery when she was institutionalized about 10 years ago in [place]. The residents were not able to obtain the records. 3.  I asked about the medical risks of vaginal delivery to the now 39 wk fetus. He was clear that the ultrasound that they did had confirmed the fetus had no major anomalies but was too early to confirm positioning and other details. He expressed great concern about all the testing that she has not had including but not limited to nonstress test, fundal height, repeat ultrasound, and amniotic fluid estimation. He would like very much to know more details about the fetus to assist with the decision regarding safe delivery for  as well as the mother and staff. He has wanted sufficient psychiatric treatment for the patient to have been lucent enough to comply with studies, examination and to discuss a treatment/delivery plan. He was unclear why that could not be done. That is a question for Dr. Stephen Koehler from psychiatry. 4.  I asked about the decision regarding capacity and my understanding from the progress notes that she was making her own decisions regarding her treatment. Dr. Bro Meyer understanding was that Dr. Stephen Koehler was going to evaluate her on the day of the planned  and declare that she did not have capacity. Then he can medicate her for the surgery.       5.  He reported a long phone conversation with the patient's legal surrogate in which he presented all three options for delivery because Dr. Padilla Smith had said the method of delivery was a decision for the [facility] physicians. The legal surrogate chose  based on remembering what a hard time the patient had with the first vaginal delivery. Dr. Richey Hidden and I agreed that I had questions for Dr. Padilla Smith unrelated to [facility] so I called him. 1. I asked Dr. Padilla Smith to explain the situation with the patient and her capacity since it seemed she was making her own decisions regarding her treatment plan and I had seen no note declaring her lacking capacity. I needed clarification that on the day of the scheduled  that he would declare her lacking capacity and then use drugs to ensure she had surgery. He explained that he evaluated her capacity on a issue by issue basis depending on the risks. Medication, therapy, and all treatment so far he deemed low risk so she was considered to have capacity. He was clear that she did not have capacity regarding . When I asked him about testing and capacity he asked if I wanted her drugged, restrained and held down. I said no and repeated my question. 2.  I asked Dr. Padilla Smith if it were possible and if so what medications he would utilize to maximize her lucidity and ability to participate in deciding her plan of care and undergo ObGyn testing and physical exam. He said that he was a  licensed psychiatrist in the state of [place] and that the ONLY safe way to delivery her was via  and that he would never give her any other medication until the . I thanked him for his time.

## 2019-03-25 NOTE — BH NOTE
1:1 Note    Patient continues to rest in room. She is cooperative with incoming care members. Family x2 in room with baby at a distance. Patient voices no concerns at this time. No visible signs of distress noted. Will continue to monitor for safety risk.

## 2019-03-25 NOTE — BH NOTE
1:1 Note:    Patient continues to rest in room. She is cooperative with incoming care members. Family x2 in room with baby at a distance. Patient voices no concerns at this time. No visible signs of distress. Continue to monitor for safety risk.

## 2019-03-25 NOTE — DISCHARGE SUMMARY
spontaneous labor. She delivered by spontaneous vaginal a Live Born infant on 3/25/19. Weight: 6 lbs 15 oz    Apgars:  8 at 1 minute  9 at 5 minutes    Postpartum course: The patient stayed on L&D for 24 hours postpartum and then was discharged back to [facility] in stable condition from an OB standpoint.       Course of patient: complicated by schizoaffective disorder    Discharge to: [facility]    Readmission planned: no     Recommendations on Discharge:     Medications:      Medication List      START taking these medications    docusate sodium 100 MG capsule  Commonly known as:  COLACE  Take 1 capsule by mouth 2 times daily as needed for Constipation     naproxen 500 MG tablet  Commonly known as:  NAPROSYN  Take 1 tablet by mouth 2 times daily        CONTINUE taking these medications    ARIPiprazole  MG Susr  Commonly known as:  ABILIFY MAINTENA  Inject 400 mg into the muscle every 28 days     benztropine 1 MG tablet  Commonly known as:  COGENTIN     carBAMazepine 200 MG tablet  Commonly known as:  TEGRETOL     lurasidone 80 MG Tabs tablet  Commonly known as:  LATUDA     nicotine polacrilex 2 MG gum  Commonly known as:  NICORETTE  Take 1 each by mouth as needed for Smoking cessation     PARoxetine 10 MG tablet  Commonly known as:  PAXIL  Take 1 tablet by mouth daily for 14 days     traZODone 50 MG tablet  Commonly known as:  DESYREL  Take 1 tablet by mouth nightly as needed for Sleep           Where to Get Your Medications      You can get these medications from any pharmacy    Bring a paper prescription for each of these medications  · docusate sodium 100 MG capsule  · naproxen 500 MG tablet           Activity: pelvic rest x 6 weeks, no driving on narcotics, no lifting greater than 15 lbs  Diet: regular diet  Follow up: 2 weeks or as soon as discharged from [facility] for postpartum visit    Condition on discharge: stable    Discharge date: 3/26/19    Liana Berkowitz DO  Ob/Gyn Resident    Comments:  Home care and follow-up care were reviewed. Pelvic rest, and birth control were reviewed. Signs and symptoms of mastitis and post partum depression were reviewed. The patient is to notify her physician if any of these occur. The patient was counseled on secondary smoke risks and the increased risk of sudden infant death syndrome and respiratory problems to her baby with exposure. She was counseled on various alternate recommendations to decrease the exposure to secondary smoke to her children.

## 2019-03-25 NOTE — BH NOTE
1:1 Note    Patient is resting in room. No signs of physical distress or safety concerns noted. Patient is calm and cooperative. Family member and baby in room. Will continue to monitor for safety.

## 2019-03-25 NOTE — BH NOTE
Writer arrives to unit, to relieve night shift nurse. Report verbally given on behaviors throughout evening. Patient to have 1 behavioral nurse in the room. Patient is beginning to advance into delivery. Emotional support and safety measures utilized to keep patient and staff safe.

## 2019-03-25 NOTE — CARE COORDINATION
Name: Nakul Flood    : 1980    Discharge Date: 3/25/19    Primary Auth/Cert #: [id number]     Pt was discharged to medical floor d/t being in labor.     Discharge Medications:      Medication List      ASK your doctor about these medications    ARIPiprazole  MG Susr  Commonly known as:  ABILIFY MAINTENA  Inject 400 mg into the muscle every 28 days     benztropine 1 MG tablet  Commonly known as:  COGENTIN     carBAMazepine 200 MG tablet  Commonly known as:  TEGRETOL     lurasidone 80 MG Tabs tablet  Commonly known as:  LATUDA     nicotine polacrilex 2 MG gum  Commonly known as:  NICORETTE  Take 1 each by mouth as needed for Smoking cessation     PARoxetine 10 MG tablet  Commonly known as:  PAXIL  Take 1 tablet by mouth daily for 14 days     traZODone 50 MG tablet  Commonly known as:  DESYREL  Take 1 tablet by mouth nightly as needed for Sleep            Follow Up Appointment: AUDREY Coffey - CNP  [address] [facility] [id number] [place] [address] MD Audra  [address] [place] [id number] [phone]

## 2019-03-25 NOTE — PROGRESS NOTES
Attempt to start IV on patient. Patient is uncooperative and refuses IV. [facility] nurse comes to OB unit to help give IM haldol and IM benadryl. After medication is given patient takes [facility] nurse hand and bites it and breaks the skin. Will wait until haldol and benadryl take affect before attempting to start IV again. Dr. Joseph Jackson notified of this plan, verbalizes understanding.

## 2019-03-25 NOTE — BH NOTE
Patient escorted to Labor and delivery in a wheelchair with [facility] staff and security.

## 2019-03-25 NOTE — PROGRESS NOTES
OB Progress Note    Katalina Goyal is a 41 y.o. female  at 36w3d  Called to patient's room per RN saying that patient's water may have broken after using the restroom. The patient laid back in bed and once her legs were placed in stirrups, she was grossly ruptured. SVE done and charted below. She says her pain is well controlled at this time but \"sometimes her back hurts. \"    Vital Signs:  Vitals:    19 0152   BP: 105/64   Pulse: 114   Resp: 14   Temp: 98.7 °F (37.1 °C)   TempSrc: Infrared   SpO2: 95%         FHT: 140, moderate variability, accelerations present, decelerations absent  Contractions: irregular, every 7 minutes  Cervical Exam: 1 cm dilated, 80 effaced, 0 station  Pitocin: @ 0 mu/min    Membranes: Ruptured clear fluid  Scalp Electrode in place: absent  Intrauterine Pressure Catheter in Place: absent    Interventions: IV insertion to be completed and labs drawn. LIMITED BEDSIDE US:  Position: Cephalic  Placental Location: posterior  Fetal Heart Tones: Present  Fetal Movement: Present  Amniotic Fluid Index/Volume:  20 cm  Estimated Fetal Weight:  6 lbs 12 oz        Assessment/Plan:  Katalina Goyal is a 41 y.o. female  at 39w1d IUP   - GBS negative, No indication for GBS prophylaxis   - s/p SROM (clr) @ 0530   - Will attempt start IV and obtain basic C/S labs at this time. Dr. Virgilio Henley called and orders received. Patient may need further dosing of Haldol/Benadryl depending on agitation with IV insertion. Will attempt to reach family at this time as C/S is now moved up ahead of schedule. Patient remains NPO at this time. Dr. Irene Nguyen, Anesthesia and NICU notified.       Lazarus Hopper DO  Ob/Gyn Resident  3/25/2019, 5:53 AM

## 2019-03-25 NOTE — PROGRESS NOTES
SW made referral to [facility].  Await call back from Jara regarding who will be assigned to case

## 2019-03-25 NOTE — BH NOTE
Patient is agitated, disruptive, and dangerous AEB yelling and screaming in room, slamming doors in room, attempting to bite staff, and attempting to hit staff. Unable to redirect patient despite multiple attempts by multiple staff. Attempted to redirect with coloring activities, listening to music, and moving to quiet room. Orders received for haldol 10mg and benadryl 50mg IM x 1. Haldol 10mg and Benadryl 50mg IM given per orders. Patient accepting of injections.

## 2019-03-25 NOTE — BH NOTE
1:1 note:    Patient is resting in room, staff member holding baby. Safety maintained will continue to monitor.

## 2019-03-25 NOTE — H&P
OBSTETRICAL HISTORY [address]    Date: 3/25/2019       Time: 2:29 AM   Patient Name: Erika Aleman     Patient : 1980  Room/Bed: 8872/9714-13    Admission Date/Time: 3/25/2019  1:44 AM      CC: Fetal Tachycardia in BHI     HPI: Erika Aleman is a 41 y.o.  at 39w1d who presents from [facility] due to fetal tachycardia noted on doppler. The patient had become very agitated when she was informed she could no longer drink anything. She is NPO for her  Section @ 1200 due to her extensive psych history. She received Haldol and Benadryl due to her agitation. The OB resident was called to assess patient for possible vaginal bleeding following the episode. The patient did allow doppler to be performed and FHTs were noted to be 205 bpm for at least 15 seconds. The patient would not allow further examination at that time. The decision was made to discharge the patient early from [facility] and admit her to L&D for immediate fetal heart monitoring to assess fetal status. The patient reports that she hasn't felt good fetal movement since yesterday, denies contractions, denies loss of fluid, denies vaginal bleeding. She was not willing to undergo a pelvic exam to assess for bleeding. At this time, she has no complaints other than feeling \"tired. \" Denies headache, vision changes, nausea, vomiting, chest pain, shortness of breath, RUQ pain, or increase in swelling. Once on the monitor on L&D, FHTs were noted to initially be in the 160s but quickly lowered to < 160 in about 5 minutes and have remained < 160 bpm since. Her pregnancy is complicated by schizoaffective disorder. She was prescribed Latuda by psych recommendations but has been refusing all medications at [facility] since 3/4/19. She also has a history of polysubstance abuse but all of her UDS since  have been negative. For a significant portion of her pregnancy, the patient did not believe she was pregnant.  When OB History    Para Term  AB Living   2 1 1 0 0 1   SAB TAB Ectopic Molar Multiple Live Births   0 0 0 0 0 1      # Outcome Date GA Lbr Lio/2nd Weight Sex Delivery Anes PTL Lv   2 Current            1 Term     M Vag-Spont   YORDY      Obstetric Comments   Her last preg was more than 10 years ago and she was institutionalized during the preg in the state of [place] (from chart review)       PAST MEDICAL HISTORY:   has a past medical history of Anxiety, Asthma, Bipolar 1 disorder ([facility] Multiple personality ([facility] and Seizures ([facility] PAST SURGICAL HISTORY:   has no past surgical history on file. ALLERGIES:  is allergic to bee venom; beeswax; and wasp venom protein. MEDICATIONS:  Prior to Admission medications    Medication Sig Start Date End Date Taking? Authorizing Provider   benztropine (COGENTIN) 1 MG tablet Take 1 mg by mouth    Historical Provider, MD   lurasidone (LATUDA) 80 MG TABS tablet Take 80 mg by mouth Daily with supper     Historical Provider, MD   carBAMazepine (TEGRETOL) 200 MG tablet Take 200 mg by mouth 2 times daily 18   Historical Provider, MD   traZODone (DESYREL) 50 MG tablet Take 1 tablet by mouth nightly as needed for Sleep 3/29/18   Pedro Hines MD   PARoxetine (PAXIL) 10 MG tablet Take 1 tablet by mouth daily for 14 days 3/30/18 4/13/18  Pedro Hines MD   nicotine polacrilex (NICORETTE) 2 MG gum Take 1 each by mouth as needed for Smoking cessation 17   Malu Greenfield MD   ARIPiprazole ER (ABILIFY MAINTENA) 400 MG SUSR Inject 400 mg into the muscle every 28 days 17   Lexus Henry MD       FAMILY HISTORY:  family history includes Cancer in her mother. SOCIAL HISTORY:   reports that she has been smoking cigarettes and cigars. She has been smoking about 0.25 packs per day. She uses smokeless tobacco. She reports that she does not drink alcohol or use drugs.     VITALS:  Vitals:    19 0152   BP: 105/64   Pulse: 114   Resp: 14 Temp: 98.7 °F (37.1 °C)   TempSrc: Infrared   SpO2: 95%       PHYSICAL EXAM:  Fetal Heart Monitor:  Baseline Heart Rate 150, moderate variability, present accelerations, 1 late deceleration noted but otherwise reassuring strip. [place]: contractions, rare    Patient declined Physical Exam    Pelvic Exam: Patient declined     OMM EXAM:  Chief Complaint: Pregnancy  Reason for No Exam (if applicable): not applicable  Anterior/ Posterior Spinal Curves: Lumbar Lordosis -  Increased  Assessment Tool: T= Tenderness, A= Asymmetry, R= Restricted Motion (A=Active, P=Passive), T=Tissue Texture Changes  Region Evaluated : Severity / Specific of Major Somatic Dysfunction: 739.4 Pelvis / Sacrum -  Minor TART - more than BG levels -    Major Correlations with: Genitourinary  Structural Diagnosis: Increased lumbar lordosis   Treatment Plan: outpatient     PRENATAL LAB RESULTS:   Blood Type/Rh: B pos  Antibody Screen: negative  Hemoglobin, Hematocrit, Platelets: 03.9 / 30 / 316  Rubella: immune  T. Pallidum, IgG: non-reactive   Hepatitis B Surface Antigen: non-reactive   HIV: non-reactive   Sickle Cell Screen: not done  Gonorrhea: negative  Chlamydia: negative  Urine culture: not done    1 hour Glucose Tolerance Test: Declined  HgA1C:  4.1      Group B Strep:  negative    Cystic Fibrosis Screen:  N/A  First Trimester Screen:  N/A  MSAFP/Multiple Markers:  N/A  Anatomy US: A single live intrauterine pregnancy with estimated gestational age of 30 weeks 6 days by ultrasound. The estimated fetal weight is 1546 grams. EDC by ultrasound is 3 April 2019. Limited visualization of the fetal cranial structures due to positioning.     ASSESSMENT & PLAN:  Adali Dee is a 41 y.o. female  at 36w3d IUP   - GBS negative / Rh positive / R immune   - No indication for GBS prophylaxis   - VSS    Fetal Tachycardia   - Resolved    Primary  Section   - Admit to L&D   - cEFM and TOCO until to the OR   - CBC, T&S, T pal and UDS ordered   - IV LR @ 125ml/hr   - Ancef 2g IV to be given preoperatively   - Surgical consent signed and in the chart   - Anesthesia and NICU notified    Schizoaffective Disorder              - Psych Consult pending from Dr. Cony Prakash   - Management per Psych. - Patient had bene prescribed Latuda 160 mg QD. Patient has not taken since 3/4/19.   - SW consult PP     Limited [facility]              - Prenatal labs WNL              - Refused 1 hr GTT              - S/p Tdap and Influenza 19              - Limited anatomy  with no abnormalities seen, unable to perform formal MFM scan as patient is not allowed to leave hospital per psych recommendations                 Fetal exposure to [address]              - MFM consultation recommended per OB, unable to perfrom  inability to transfer patient to [facility] for assessment per psych.     Homelessness    - SW consult PP    Asthma              - Controlled on Albuterol PRN. Has not used in a long time per [facility]     AMA              - NIPT ordered. Declined by patient.     Tobacco Use                - Nicorette gum PRN     Hx Polysubstance Abuse              - UDS neg to date in Epic   - Repeat UDS pending      Poor Hygiene     Non-compliance/Poor Historian               UDS ordered, Informed consent obtained. Discussed R/B/A. All questions and concerns answered. Patient verbalized understanding and agreement to plan. Patient Active Problem List    Diagnosis Date Noted    Asthma 2019     Albuterol PRN      Tobacco use 2019     Nicorette Gum      Rh+/RI/GBS neg 2019    Care Plan For Delivery 03/15/2019     Per Dr. Rina Montenegro, Family of patient (mother and sister) Johnson Kaur, Sarah Peterson, Chelly Jefferson, Summer Tompkins, Herman Calles, Risk Management)    1. Call Dr. Cony Prakash for Capacity evaluation upon Labor, ROM, Planned  at family's request-Consent Signed [phone]  2.  If no Capacity per Dr. Cony Prakash he will dose with medication for delivery. Plan  per Family-Consent signed  3. Plan  no sooner than 39 weeks unless obstetrically indicated weeks if undelivered  4. Hold 48 hrs post op on L&D. Dr. Kenzie Vines for medical  Medication management  5. Call child and family service upon delivery. They are aware of the case and will plan disposition for   6. Pt will need to go back to [facility] for post op care. If willing to voluntary sign in. If NOT, then pink slip by OBGYN Attending. 7. [facility] nursing available to help complete pink slip documents    The procedure risks and complictions were discussed in detail. All questions answered. The consent was signed by the next of kin as they are petitioning the courts for guardianship          Schizoaffective disorder  2019     On Latuda      Bacterial vaginosis 2019    Yeast infection 2019    Poor historian 2019    Fetal drug exposure (latuda) 2019    Macrocytic anemia 2018    Advanced maternal age 2018    Patient does not believe she is pregnant, refusing care 2018    Noncompliance 2018    No prenatal care in current pregnancy 2018     [facility] [address]  Patient has repeatedly declined exams and to be seen by ED      Polysubstance abuse  2018    Schizoaffective disorder, bipolar type ([facility] 2014       Plan discussed with Dr. Natalya Jackson, who is agreeable. Steroids given this admission: No    Risks, benefits, alternatives and possible complications have been discussed in detail with the patient. Admission, and post admission procedures and expectations were discussed in detail. All questions were answered.     Attending's Name: Dr. Lakia Szymanski,   Ob/Gyn Resident   3/25/2019, 2:29 AM

## 2019-03-25 NOTE — BH NOTE
[facility]  Discharge Note    Pt discharged with followings belongings:   Dentures: None  Vision - Corrective Lenses: None  Hearing Aid: None  Jewelry: None  Body Piercings Removed: N/A  Clothing: Undergarments (Comment), Jacket / coat, Shirt, Footwear, Socks  Were All Patient Medications Collected?: Not Applicable  Other Valuables: Other (Comment)(lighter)     Valuables retrieved from safe, Security envelope number:  [id number]/ [id number] and returned to patient. Patient left department with Departure Mode: Other (Comment)(by wheelchair, escorted by staff for OB admission) via Mobility at Departure: Wheelchair, discharged to Discharged to: Other (Comment). Status EXAM upon discharge:  Status and Exam  Normal: No  Facial Expression: Exaggerated  Affect: Blunt  Level of Consciousness: Alert  Mood:Normal: No  Mood: Angry, Irritable  Motor Activity:Normal: No  Motor Activity: Decreased  Interview Behavior: Irritable, Uncooperative/Withdrawn  Preception: [place] to Person, [place] to Place  Attention:Normal: No  Attention: Unable to Concentrate  Thought Processes: Blocking  Thought Content:Normal: No  Thought Content: Preoccupations, Phobias  Hallucinations:  Other (Comment)(loud cursing/yelling/ to self)  Delusions: Yes  Delusions: Obsessions  Memory:Normal: No  Memory: Poor Recent  Insight and Judgment: No  Insight and Judgment: Poor Judgment, Poor Insight, Unmotivated, Unrealistic  Present Suicidal Ideation: No  Present Homicidal Ideation: No    Ariaan Brizuela RN

## 2019-03-25 NOTE — L&D DELIVERY NOTE
Mother's Information    Labor Events    Rupture type:  Spontaneous=SROM  Fluid color:  Clear  Fluid odor:  None     Mother Delivery Information    Episiotomy:  Median, None  Lacerations:  2nd  # of Repair Packets:  1  Vaginal Delivery Est. Blood Loss (mL):  100  Surgical or Additional Est. Blood Loss (mL):  0 (View Only):  Edit in Flowsheets   Combined Est. Blood Loss (mL):  100        Javan [place] Julia Love [[id number]]    Labor Events    Cervical ripening date/time:     Rupture date/time: 3/25/19 05:33:00   Rupture type:  Spontaneous=SROM  Fluid color:  Clear          Anesthesia    Method:  Local  Analgesics:  HALDOL IJ     Assisted Delivery Details    Forceps attempted?:  No  Vacuum extractor attempted?:  No     Document Additional Attempt       Document Additional Attempt             Shoulder Dystocia    Shoulder dystocia present?:  No  Add Second Maneuver  Add Third Maneuver  Add Fourth Maneuver  Add Fifth Maneuver  Add Sixth Maneuver  Add Seventh Maneuver  Add Eighth Maneuver  Add Ninth Maneuver     Anatone Presentation    Presentation:  Vertex  Position:  Left  _:  Occiput  _:  Anterior      Information    Head delivery date/time:  3/25/2019 08:03:00   Changing the 's delivery date/time could affect patient care.:     Delivery date/time:   3/25/19 7689   Delivery type:  Vaginal, Spontaneous    Details:         Delivery Providers    Delivering clinician:  eCce Vogel DO   Provider Role    Cece Vogel, DO Obstetrician    Pretty Dang, DO Chief Resident      Cord    Vessels:  3 Vessels  Complications:  Knot  Delayed cord clamping?:  No  Cord blood disposition:  Lab  Gases sent?:  Yes  Stem cell collection (by provider):   No     Placenta    Date/time:  3/25/2019 08:07:00  Removal:  Spontaneous  Appearance:  Intact  Disposition:  Pathology     Delivery Resuscitation    Method:  Bulb Suction, Stimulation     Apgars    Living status:  Living  Apgars   1 Minute:   5

## 2019-03-25 NOTE — BH NOTE
1:1 note:     Patient is resting in room. No signs of physical distress or safety concerns. Patient is cooperative, family member and baby in room.

## 2019-03-25 NOTE — BH NOTE
1:1 Note:    Patient is in room and eating lunch. No signs on physical distress or safety concerns at this time. Patient voices no concerns at this time.

## 2019-03-25 NOTE — CONSULTS
I spoke with OB resident, who informed me that patient's water broke and there is emergent reason that  in more urgent manner is needed. This is being done in the interest of the baby and the patient. Patient has consistently shown clear lack of capacity to have meaningful discussion of risks/benefits involved of  so her sister and mother were involved in a meeting as identified next of kins who gave their consent for procedure and agreed that this was in the best interest of all involved. Emergency medications were ordered in the event they are needed to safely get IV started to start anasthesia.

## 2019-03-25 NOTE — PROGRESS NOTES
Patient moves quickly in bed, often, to a straight upright sitting position, making it difficult at times to monitor FHT's  And contraction pattern.

## 2019-03-25 NOTE — BH NOTE
1:1 Note:      Patient is resting in room, staff member holding baby. Patient shows no signs of distress and has no concerns. Will continue to monitor for safety.

## 2019-03-25 NOTE — BH NOTE
1:1 Note    Patient is resting in room. No signs of distress noted. Patient is calm and cooperative. Baby is in room at a distance. Will continue to monitor for safety.

## 2019-03-25 NOTE — BH NOTE
1:1 Note:    Patient is still with L&D care team completing post partum care. Patient is fairly cooperative but still requires some redirection. Patient does not wish to see the baby at this time.

## 2019-03-25 NOTE — FLOWSHEET NOTE
Patient up to bathroom with each contractions. Scant amount of bloody show noted in toilet. SVE performed.

## 2019-03-25 NOTE — BH NOTE
Patient offered meal around 9 pm, but she declined at the time;OB has not been able to get an ultrasound on patient d/t patient's constant refusal, and belief that she is not pregnant; safety measures in effect.

## 2019-03-25 NOTE — BH NOTE
1:1 note:     Patient is resting in room and is following direction. Patient remains safe at this time. No signs of physical distress noted at this time. L&D care team checking in often and continuously.

## 2019-03-26 ENCOUNTER — HOSPITAL ENCOUNTER (INPATIENT)
Age: 39
LOS: 15 days | Discharge: HOME | DRG: 561 | End: 2019-04-10
Attending: PSYCHIATRY & NEUROLOGY | Admitting: PSYCHIATRY & NEUROLOGY
Payer: MEDICAID

## 2019-03-26 VITALS
OXYGEN SATURATION: 95 % | DIASTOLIC BLOOD PRESSURE: 69 MMHG | SYSTOLIC BLOOD PRESSURE: 116 MMHG | HEART RATE: 75 BPM | TEMPERATURE: 98.6 F | RESPIRATION RATE: 16 BRPM

## 2019-03-26 PROCEDURE — 6370000000 HC RX 637 (ALT 250 FOR IP): Performed by: PSYCHIATRY & NEUROLOGY

## 2019-03-26 PROCEDURE — 99024 POSTOP FOLLOW-UP VISIT: CPT | Performed by: OBSTETRICS & GYNECOLOGY

## 2019-03-26 PROCEDURE — 6370000000 HC RX 637 (ALT 250 FOR IP): Performed by: OBSTETRICS & GYNECOLOGY

## 2019-03-26 PROCEDURE — 1240000000 HC EMOTIONAL WELLNESS R&B

## 2019-03-26 PROCEDURE — 6370000000 HC RX 637 (ALT 250 FOR IP): Performed by: STUDENT IN AN ORGANIZED HEALTH CARE EDUCATION/TRAINING PROGRAM

## 2019-03-26 RX ORDER — TRAZODONE HYDROCHLORIDE 50 MG/1
50 TABLET ORAL NIGHTLY PRN
Status: DISCONTINUED | OUTPATIENT
Start: 2019-03-26 | End: 2019-04-10 | Stop reason: HOSPADM

## 2019-03-26 RX ORDER — NAPROXEN 500 MG/1
500 TABLET ORAL 2 TIMES DAILY
Qty: 60 TABLET | Refills: 0 | Status: ON HOLD | OUTPATIENT
Start: 2019-03-26 | End: 2019-04-09 | Stop reason: SDUPTHER

## 2019-03-26 RX ORDER — BENZTROPINE MESYLATE 1 MG/1
1 TABLET ORAL 2 TIMES DAILY
Status: DISCONTINUED | OUTPATIENT
Start: 2019-03-26 | End: 2019-04-10 | Stop reason: HOSPADM

## 2019-03-26 RX ORDER — CARBAMAZEPINE 200 MG/1
200 TABLET ORAL 2 TIMES DAILY
Status: DISCONTINUED | OUTPATIENT
Start: 2019-03-26 | End: 2019-04-10 | Stop reason: HOSPADM

## 2019-03-26 RX ORDER — DOCUSATE SODIUM 100 MG/1
100 CAPSULE, LIQUID FILLED ORAL 2 TIMES DAILY PRN
Status: DISCONTINUED | OUTPATIENT
Start: 2019-03-26 | End: 2019-04-10 | Stop reason: HOSPADM

## 2019-03-26 RX ORDER — RISPERIDONE 2 MG/1
2 TABLET, FILM COATED ORAL 2 TIMES DAILY
Status: DISCONTINUED | OUTPATIENT
Start: 2019-03-26 | End: 2019-03-28

## 2019-03-26 RX ORDER — DOCUSATE SODIUM 100 MG/1
100 CAPSULE, LIQUID FILLED ORAL 2 TIMES DAILY PRN
Qty: 60 CAPSULE | Refills: 0 | Status: ON HOLD | OUTPATIENT
Start: 2019-03-26 | End: 2019-04-09 | Stop reason: SDUPTHER

## 2019-03-26 RX ORDER — CALCIUM CARBONATE 200(500)MG
1000 TABLET,CHEWABLE ORAL 3 TIMES DAILY PRN
Status: DISCONTINUED | OUTPATIENT
Start: 2019-03-26 | End: 2019-03-26 | Stop reason: HOSPADM

## 2019-03-26 RX ORDER — ACETAMINOPHEN 325 MG/1
650 TABLET ORAL EVERY 4 HOURS PRN
Status: DISCONTINUED | OUTPATIENT
Start: 2019-03-26 | End: 2019-04-10 | Stop reason: HOSPADM

## 2019-03-26 RX ORDER — NAPROXEN 500 MG/1
500 TABLET ORAL 2 TIMES DAILY WITH MEALS
Status: DISCONTINUED | OUTPATIENT
Start: 2019-03-26 | End: 2019-04-10 | Stop reason: HOSPADM

## 2019-03-26 RX ORDER — BENZTROPINE MESYLATE 1 MG/ML
2 INJECTION INTRAMUSCULAR; INTRAVENOUS 2 TIMES DAILY PRN
Status: DISCONTINUED | OUTPATIENT
Start: 2019-03-26 | End: 2019-04-10 | Stop reason: HOSPADM

## 2019-03-26 RX ORDER — DIPHENHYDRAMINE HCL 25 MG
25 TABLET ORAL NIGHTLY PRN
Status: DISCONTINUED | OUTPATIENT
Start: 2019-03-26 | End: 2019-04-10 | Stop reason: HOSPADM

## 2019-03-26 RX ORDER — MAGNESIUM HYDROXIDE/ALUMINUM HYDROXICE/SIMETHICONE 120; 1200; 1200 MG/30ML; MG/30ML; MG/30ML
30 SUSPENSION ORAL PRN
Status: DISCONTINUED | OUTPATIENT
Start: 2019-03-26 | End: 2019-04-10 | Stop reason: HOSPADM

## 2019-03-26 RX ADMIN — FERROUS SULFATE TAB 325 MG (65 MG ELEMENTAL FE) 325 MG: 325 (65 FE) TAB at 07:44

## 2019-03-26 RX ADMIN — RISPERIDONE 2 MG: 2 TABLET ORAL at 21:05

## 2019-03-26 RX ADMIN — TRAZODONE HYDROCHLORIDE 50 MG: 50 TABLET ORAL at 21:05

## 2019-03-26 RX ADMIN — CARBAMAZEPINE 200 MG: 200 TABLET ORAL at 21:05

## 2019-03-26 RX ADMIN — ANTACID TABLETS 1000 MG: 500 TABLET, CHEWABLE ORAL at 10:36

## 2019-03-26 RX ADMIN — IBUPROFEN 800 MG: 800 TABLET ORAL at 07:44

## 2019-03-26 RX ADMIN — BENZTROPINE MESYLATE 1 MG: 1 TABLET ORAL at 21:05

## 2019-03-26 RX ADMIN — NICOTINE POLACRILEX 2 MG: 2 GUM, CHEWING ORAL at 22:26

## 2019-03-26 RX ADMIN — DIPHENHYDRAMINE HCL 25 MG: 25 TABLET ORAL at 21:05

## 2019-03-26 RX ADMIN — DOCUSATE SODIUM 100 MG: 100 CAPSULE, LIQUID FILLED ORAL at 07:44

## 2019-03-26 ASSESSMENT — SLEEP AND FATIGUE QUESTIONNAIRES
SLEEP PATTERN: RESTLESSNESS;DISTURBED/INTERRUPTED SLEEP
DIFFICULTY STAYING ASLEEP: YES
RESTFUL SLEEP: NO
DO YOU HAVE DIFFICULTY SLEEPING: YES
AVERAGE NUMBER OF SLEEP HOURS: 6
DIFFICULTY FALLING ASLEEP: NO
DIFFICULTY ARISING: NO
DO YOU USE A SLEEP AID: NO

## 2019-03-26 ASSESSMENT — PAIN SCALES - GENERAL
PAINLEVEL_OUTOF10: 0
PAINLEVEL_OUTOF10: 2

## 2019-03-26 ASSESSMENT — LIFESTYLE VARIABLES: HISTORY_ALCOHOL_USE: NO

## 2019-03-26 ASSESSMENT — PATIENT HEALTH QUESTIONNAIRE - PHQ9: SUM OF ALL RESPONSES TO PHQ QUESTIONS 1-9: 9

## 2019-03-26 NOTE — FLOWSHEET NOTE
Patient's sister Wallace Quinonez at bedside, notified patient will be discharged and admitted to [facility]. Informed of postpartum discharge instructions as well as [facility] staff.

## 2019-03-26 NOTE — BH NOTE
`[facility]  Admission Note     Admission Type:   Admission Type: Voluntary    Reason for admission:  Reason for Admission: Patient had a vaginal delivery of a baby girl 3/25/19. Voluntarily signed in, did not sign other paperwork. Medications are ordered, verbalized that she will take medications. Refused physical assessment but states everything is \"fine\", denies bleeding at this time. There are disolveable stitches to her sadia area, refused assessment.      PATIENT STRENGTHS:  Strengths: Connection to output provider, No significant Physical Illness    Patient Strengths and Limitations:  Limitations: Inappropriate/potentially harmful leisure interests, Difficulty problem solving/relies on others to help solve problems    Addictive Behavior:   Addictive Behavior  In the past 3 months, have you felt or has someone told you that you have a problem with:  : None  Do you have a history of Chemical Use?: No  Do you have a history of Alcohol Use?: No  Do you have a history of Street Drug Abuse?: Yes  Histroy of Prescripton Drug Abuse?: No    Medical Problems:   Past Medical History:   Diagnosis Date    Anxiety     Asthma     Bipolar 1 disorder (HCC)     Multiple [facility] ([facility]     Seizures ([facility]        Status EXAM:  Status and Exam  Normal: No  Facial Expression: Flat  Affect: Blunt  Level of Consciousness: Alert  Mood:Normal: No  Mood: Suspicious, Irritable  Motor Activity:Normal: Yes  Interview Behavior: Uncooperative/Withdrawn  Preception: [place] to Person, [place] to Place  Attention:Normal: No  Attention: Unable to Concentrate  Thought Processes: Blocking  Thought Content:Normal: No  Thought Content: Poverty of Content, Preoccupations  Hallucinations: None(pt denies)  Delusions: No  Memory:Normal: No  Memory: Poor Recent  Insight and Judgment: No  Insight and Judgment: Poor Judgment, Poor Insight, Unmotivated, Unrealistic  Present Suicidal Ideation: No  Present Homicidal Ideation: No    Tobacco Screening:  Practical Counseling, on admission, soren X, if applicable and completed (first 3 are required if patient doesn't refuse):            ( )  Recognizing danger situations (included triggers and roadblocks)                    ( )  Coping skills (new ways to manage stress, exercise, relaxation techniques, changing routine, distraction)                                                           ( )  Basic information about quitting (benefits of quitting, techniques in how to quit, available resources  ( ) Referral for counseling faxed to [facility]                                           ( X ) Patient refused counseling  ( ) Patient has not smoked in the last 30 days    Metabolic Screening:    Lab Results   Component Value Date    LABA1C 4.1 01/23/2019       No results for input(s): CHOL, TRIG, HDL, LDLCALC, LABVLDL in the last 72 hours. Body mass index is 19.77 kg/m². BP Readings from Last 2 Encounters:   03/26/19 108/62   03/26/19 116/69           Pt admitted with followings belongings:  Dentures: None  Vision - Corrective Lenses: None  Hearing Aid: None  Jewelry: Necklace  Body Piercings Removed: N/A  Clothing: Jacket / coat, Pants, Socks, Dress  Were All Patient Medications Collected?: Not Applicable  Other Valuables: Other (Comment)(lighter, and 10 payee checks [facility])     Valuables placed in safe in security envelope, number:  [id number]. Patient's home medications were reviewed by Dr Ulises Agarwal. Patient oriented to surroundings and program expectations and copy of patient rights given. Received admission packet:  yes. Consents reviewed, signed voluntary. Refused all other paperwork. Patient verbalize understanding:  yes. Patient education on precautions: yes    Upon admission to unit patient allowed blood pressure to be taken, refused all other vital signs, refused assessments and only answered select questions.  She refused a physical assessment, denying any

## 2019-03-26 NOTE — BH NOTE
1: 1 NOTE:     Pt resting in bed with eyes closed. Respirations even and non labored. No distress noted. 1:1 maintained for safety.

## 2019-03-26 NOTE — CARE COORDINATION
was unable to meet with pt to complete psychosocial assessment. Social work will attempt at a later time.

## 2019-03-26 NOTE — FLOWSHEET NOTE
Dr. Ariadne Braxton returned call and make arrangements for patient to be admitted to [facility].

## 2019-03-26 NOTE — PROGRESS NOTES
Nursery   - Encourage ambulation   - Postpartum Hgb/Hct if Sx  1. Rh positive/Rubella immune  2. Bottle feeding   3. Schizoaffective disorder   - Management per Psych   - Will discharge to [facility] for further inpatient medical management this AM   - PRN meds ordered per Dr. Susan Britt   - SW consulted. [facility] updated on delivery of infant.    - Baby will not be discharged with patient  4. Asthma   - Controlled on PRN meds  5. Tobacco Use   - Nicorette gum PRN  6. Continue post partum care  7. Discharge back to [facility] later this AM    Counseling Completed:  Secondary Smoke risks and Sudden Infant Death Syndrome were reviewed with recommendations. Infant sleeping, \"back to sleep\" and avoidance of co-sleeping recommendations were reviewed. Signs and Symptoms of Post Partum Depression were reviewed. The patient is to call if any occur. Signs and symptoms of Mastitis were reviewed. The patient is to call if any occur for follow up.   Discharge instructions including pelvic rest, no driving with pain medicine and office follow-up were reviewed with patient     Provider's Name: Dr. Nevaeh Felipe DO  Ob/Gyn Resident   3/26/2019, 1:13 AM

## 2019-03-26 NOTE — FLOWSHEET NOTE
Patient transported to [facility] via wheelchair, accompanied by [facility] staff and security. Belongings sent with patient. Patient cooperative.

## 2019-03-26 NOTE — FLOWSHEET NOTE
Dr. Flor Laguna paged regarding discharge from [place] dept and plan to readmit to [facility].

## 2019-03-26 NOTE — FLOWSHEET NOTE
Dr. López Friday paged as requested by Social Service to verify if patient is able to sign release of minor for infant.

## 2019-03-26 NOTE — BH NOTE
1:1NOTE:     Pt awake in bed watching TV. Pt calm, cooperative at this time. 1:1 maintained for safety.

## 2019-03-26 NOTE — FLOWSHEET NOTE
Dr. Michelle Pimentel returns call, states that patient is not able to sign release of minor form, patient has pending guardianship with the court, scheduled for April 8th. Duong MARCW in department and informed.

## 2019-03-26 NOTE — PLAN OF CARE
Problem: Mood - Altered:  Goal: Mood stable  3/26/2019 1258 by Liang Swanson RN  Outcome: Ongoing  3/26/2019 0519 by Janelle Brown RN  Outcome: Ongoing     Problem: Pain:  Goal: Pain level will decrease  3/26/2019 1258 by Liang Swanson RN  Outcome: Completed  3/26/2019 0519 by Janelle Brown RN  Outcome: Met This Shift  Goal: Control of acute pain  3/26/2019 1258 by Liang Swanson RN  Outcome: Completed  3/26/2019 0519 by Janelle Brown RN  Outcome: Met This Shift     Problem: Discharge Planning:  Goal: Discharged to appropriate level of care  3/26/2019 1258 by Liang Swanson RN  Outcome: Completed  3/26/2019 0519 by Janelle Brown RN  Outcome: Ongoing     Problem: Constipation:  Goal: Bowel elimination is within specified parameters  3/26/2019 1258 by Liang Swanson RN  Outcome: Completed  3/26/2019 0519 by Janelle Brown RN  Outcome: Ongoing     Problem: Fluid Volume - Imbalance:  Goal: Absence of imbalanced fluid volume signs and symptoms  3/26/2019 1258 by Liang Swanson RN  Outcome: Completed  3/26/2019 0519 by Janelle Brown RN  Outcome: Met This Shift  Goal: Absence of postpartum hemorrhage signs and symptoms  3/26/2019 1258 by Liang Swanson RN  Outcome: Completed  3/26/2019 0519 by Janelle Brown RN  Outcome: Met This Shift     Problem: Infection - Risk of, Puerperal Infection:  Goal: Will show no infection signs and symptoms  3/26/2019 1258 by Liang Swanson RN  Outcome: Completed  3/26/2019 0519 by Janelle Brown RN  Outcome: Met This Shift     Problem: Pain - Acute:  Goal: Pain level will decrease  3/26/2019 1258 by Liang Swanson RN  Outcome: Completed  3/26/2019 0519 by Janelle Brown RN  Outcome: Met This Shift

## 2019-03-26 NOTE — FLOWSHEET NOTE
Patient participated in Spirituality Group.       03/26/19 1425   Encounter Summary   Services provided to: Patient   Referral/Consult From: Rounding   Continue Visiting   (3/26/19)   Complexity of Encounter Low   Length of Encounter 30 minutes   Spiritual Assessment Completed Yes   Spiritual/Confucianism   Type Spiritual support   Assessment Calm   Intervention Active listening;Sustaining presence/ Ministry of presence   Outcome Receptive

## 2019-03-26 NOTE — BH NOTE
Patient given tobacco quitline number 40937800115 at this time, refusing to call at this time, states \" I just dont want to quit now\"- patient given information as to the dangers of long term tobacco use. Continue to reinforce the importance of tobacco cessation.

## 2019-03-26 NOTE — PROGRESS NOTES
Obstetrical Rounds:    PPD#: [id number] Day: 2  Procedure: normal spontaneous vaginal delivery    Date: 3/26/2019  Time: 5:10 AM        Patient Name: Adali Dee  Patient : 1980  Room/Bed: 0007/7747-96  Admission Date/Time: 3/25/2019  1:44 AM  MRN #: [id number]  [facility] #: [id number]        Attending Physician Statement  I have discussed the care of Adali Dee, including pertinent history and exam findings,  with the resident. I have reviewed their note in the electronic medical record. I have seen and examined the patient and the key elements of all parts of the encounter have been performed/reviewed by me . I agree with the assessment, plan and orders as documented by the resident. Pt resting comfortably. Pt verbalizes no complaints. Per nursing lochia light. Plan for discharge back to [facility] for full management per psych,    Vitals:    19 0416   BP: (!) 110/56   Pulse: 88   Resp: 16   Temp: 97.9 °F (36.6 °C)   SpO2:        Admission on 2019   Component Date Value Ref Range Status    Expiration Date 2019   Final    Arm Band Number 2019 [id number]   Final    ABO/Rh 2019 B POSITIVE   Final    Antibody Screen 2019 NEGATIVE   Final    WBC 2019 10.6  3.5 - 11.0 k/uL Final    RBC 2019 3.60* 4.0 - 5.2 m/uL Final    Hemoglobin 2019 11.9* 12.0 - 16.0 g/dL Final    Hematocrit 2019 35.7* 36 - 46 % Final    MCV 2019 99.1  80 - 100 fL Final    MCH 2019 33.2  26 - 34 pg Final    MCHC 2019 33.5  31 - 37 g/dL Final    RDW 2019 13.0  11.5 - 14.9 % Final    Platelets  289  150 - 450 k/uL Final    MPV 2019 9.6  6.0 - 12.0 fL Final    NRBC Automated 2019 NOT REPORTED  per 100 WBC Final    Specimen Source 2019 Unable to perform testing: No specimen received. Final    Ordered Test 2019 Unable to perform testing: No specimen received.    Final    Reason for Rejection 03/25/2019 Unable to perform testing: No specimen received. Final    - 03/25/2019 NOT REPORTED   Final           Discharge Instructions for Labor and Delivery, Vaginal Birth     A vaginal birth refers to the baby being delivered through the vagina. The amount of time that labor can take varies greatly. Labor for the average first-born baby is about 12 hours. Usually your hospital stay after a routine delivery is no more than two nights. Some new mothers go home the same day. Recovery from childbirth varies depending upon whether you had an episiotomy (an incision in the perineum, the area between your vaginal opening and your anus), the duration of labor and delivery, and the amount of rest you get. In general, it takes about 6-8 weeks for a woman's body to recover from childbirth. What You Will Need   Along with your medications, you will need the following:   · Sanitary pads    · Nursing pads    · Witch hazel pads    · Sitz bath    Steps to [address] will want to arrange for transportation home for you and your baby. The baby will need a car seat. You will receive instructions in the hospital for breastfeeding and taking care of the perineum area. You may use ointment for cracked nipples or warm water rinses to your perineum. · You will need to wear sanitary pads for about six weeks after delivery. · If you had an episiotomy or vaginal tear, you will be sent home with a plastic squirt bottle. Fill it with warm water and squirt over the vaginal and anal area every time you urinate and defecate. · Warm baths can be soothing to healing tissues. · Apply warm or cold cloths to sore breasts. · Apply ointment to cracked nipples. · Use nursing pads for leaky breast.    · Apply witch hazel pads to sore perineum (area between vagina and anus). · Ask your doctor about when it is safe for you to shower or bathe. · Sit in a sitz bath to soothe sore perineum and/or hemorrhoids.  A significant lifestyle changes. You and your doctor will plan lifestyle changes that will help you recover. Some things to keep in mind include:   · It is important to get enough rest so you can recover. Try sleeping when the baby sleeps. · Ask your doctor when you can resume sexual relations. If you have not done so already, talk to your doctor about birth control options. · If you are breastfeeding, consider a breast pump. · Call your obstetrician and/or pediatrician for any questions that arise. · Understand the changes your body is going through as it recovers from childbirth:   ¨ Hot and cold flashes as your body adjusts to new hormone and blood flow levels   ¨ Urinary or fecal incontinence due to stretched muscles   ¨ After pains from uterine contractions as the uterus shrinks   ¨ Vaginal bleeding (called lochia), which is heavier than your period (generally stops within two months)   ¨ Baby blues, feelings of irritability, sadness, crying, or anxiety. Postpartum depression is more severe, occurring in 10%-20% of new moms. If you experience such symptoms, contact your doctor. · Consider joining a support group for new mothers. You can get encouragement and learn parenting strategies. Follow-up   Schedule a follow-up appointment as directed by your doctor. Call Your Doctor If Any of the Following Occurs   It is important for you to monitor your recovery once you leave the hospital. That way, you can alert your doctor to any problems immediately.  If any of the following occurs, call your doctor:   · Signs of infection, including fever and chills    · Increased bleeding: soaking more than one sanitary pad an hour    · Wounds that become red, swollen or drain pus    · Vaginal discharge that smells foul    · New pain, swelling, or tenderness in your legs    · Pain that you can't control with the medications you've been given    · Pain, burning, urgency or frequency of urination, or persistent bleeding in the urine    · Cough, shortness of breath, or chest pain    · Depression, suicidal thoughts, or feelings of harming your baby    · Breasts that are hot, red and accompanied by fever    · Any cracking or bleeding from the nipple or areola (the dark-colored area of the breast)      In case of an emergency, call 911 immediately. Home care, Restrictions,  Follow up Care, and birth control review completed    RTO upon discharge from [facility]    Secondary Smoke risks and Sudden Infant Death Syndrome were reviewed with recommendations. Cessation options discussed. Signs and Symptoms of Post Partum Depression were reviewed. The patient is to call if any occur. Signs and symptoms of Mastitis were reviewed. The patient is to call if any occur for follow up.       Attending's Name:  Electronically signed by Christopher Gann DO on 3/26/2019 at 5:10 AM

## 2019-03-27 LAB — SURGICAL PATHOLOGY REPORT: NORMAL

## 2019-03-27 PROCEDURE — 6370000000 HC RX 637 (ALT 250 FOR IP): Performed by: PSYCHIATRY & NEUROLOGY

## 2019-03-27 PROCEDURE — 1240000000 HC EMOTIONAL WELLNESS R&B

## 2019-03-27 RX ADMIN — RISPERIDONE 2 MG: 2 TABLET ORAL at 08:17

## 2019-03-27 RX ADMIN — TRAZODONE HYDROCHLORIDE 50 MG: 50 TABLET ORAL at 20:44

## 2019-03-27 RX ADMIN — CARBAMAZEPINE 200 MG: 200 TABLET ORAL at 20:44

## 2019-03-27 RX ADMIN — NICOTINE POLACRILEX 2 MG: 2 GUM, CHEWING ORAL at 18:19

## 2019-03-27 RX ADMIN — RISPERIDONE 2 MG: 2 TABLET ORAL at 20:44

## 2019-03-27 RX ADMIN — NICOTINE POLACRILEX 2 MG: 2 GUM, CHEWING ORAL at 20:45

## 2019-03-27 RX ADMIN — NAPROXEN 500 MG: 500 TABLET ORAL at 08:17

## 2019-03-27 RX ADMIN — CARBAMAZEPINE 200 MG: 200 TABLET ORAL at 08:17

## 2019-03-27 RX ADMIN — BENZTROPINE MESYLATE 1 MG: 1 TABLET ORAL at 20:44

## 2019-03-27 RX ADMIN — DIPHENHYDRAMINE HCL 25 MG: 25 TABLET ORAL at 20:44

## 2019-03-27 RX ADMIN — NAPROXEN 500 MG: 500 TABLET ORAL at 18:18

## 2019-03-27 RX ADMIN — NICOTINE POLACRILEX 2 MG: 2 GUM, CHEWING ORAL at 08:17

## 2019-03-27 RX ADMIN — BENZTROPINE MESYLATE 1 MG: 1 TABLET ORAL at 08:17

## 2019-03-27 ASSESSMENT — LIFESTYLE VARIABLES: HISTORY_ALCOHOL_USE: NO

## 2019-03-27 ASSESSMENT — PAIN SCALES - GENERAL
PAINLEVEL_OUTOF10: 0
PAINLEVEL_OUTOF10: 0

## 2019-03-27 NOTE — GROUP NOTE
Group Therapy Note    Date: March 27    Group Start Time: 1100  Group End Time: 1140  Group Topic: Psychoeducation    RANDELL Meza, CTRS    Pt did not attend RT group at 1100 d/t resting in room despite staff invitation to attend.

## 2019-03-27 NOTE — PLAN OF CARE
[facility]  Initial Interdisciplinary Treatment Plan NO      Original treatment plan Date & Time: 3/27/19 0930    Admission Type:  Admission Type: Voluntary    Reason for admission:   Reason for Admission: Patient had a vaginal delivery of a baby girl 3/25/19. Voluntarily signed in, did not sign other paperwork. Medications are ordered, verbalized that she will take medications. Refused physical assessment but states everything is \"fine\", denies bleeding at this time. There are disolveable stitches to her sadia area, refused assessment.      Estimated Length of Stay:  5-7days  Estimated Discharge Date: to be determined by physician    PATIENT STRENGTHS:  Patient Strengths:Strengths: Connection to output provider, No significant Physical Illness  Patient Strengths and Limitations:Limitations: Inappropriate/potentially harmful leisure interests, Difficulty problem solving/relies on others to help solve problems  Addictive Behavior: Addictive Behavior  In the past 3 months, have you felt or has someone told you that you have a problem with:  : None  Do you have a history of Chemical Use?: No  Do you have a history of Alcohol Use?: No  Do you have a history of Street Drug Abuse?: Yes  Histroy of Prescripton Drug Abuse?: No  Medical Problems:  Past Medical History:   Diagnosis Date    Anxiety     Asthma     Bipolar 1 disorder ([facility]     [facility]     Seizures ([facility]      Status EXAM:Status and Exam  Normal: No  Facial Expression: Flat  Affect: Blunt  Level of Consciousness: Alert  Mood:Normal: No  Mood: Suspicious, Irritable  Motor Activity:Normal: Yes  Interview Behavior: Evasive  Preception: [place] to Person, [place] to Place  Attention:Normal: No  Attention: Unable to Concentrate  Thought Processes: Blocking  Thought Content:Normal: No  Thought Content: Poverty of Content, Preoccupations  Hallucinations: None(denies but observed self talk)  Delusions: No  Memory:Normal: No  Memory: Poor

## 2019-03-27 NOTE — PLAN OF CARE
Problem: Risk of Harm:  Goal: Ability to remain free from injury will improve  Description  Ability to remain free from injury will improve  Outcome: Met This Shift  Note:   No self harm noted. Denies suicidal ideations. Problem: Altered Mood, Psychotic Behavior:  Goal: Able to verbalize reality based thinking  Description  Able to verbalize reality based thinking  Outcome: Ongoing     Problem: Altered Mood, Psychotic Behavior:  Intervention: Patient-specific treatments  Note:   Patient is alert and oriented. Reality based in thinking. No delusions noted.

## 2019-03-27 NOTE — GROUP NOTE
Group Therapy Note    Date: March 27    Group Start Time: 1330  Group End Time: 1410  Group Topic: Cognitive Skills    [facility]    Renee Read, CTRS    Pt did not attend RT group at 1330 d/t resting in room despite staff invitation to attend.     Signature:  Molly Michelle

## 2019-03-27 NOTE — PLAN OF CARE
Problem: Altered Mood, Psychotic Behavior:  Goal: Able to verbalize decrease in frequency and intensity of hallucinations  Description  Able to verbalize decrease in frequency and intensity of hallucinations  Outcome: Ongoing  Note:   Patient refused most of assessment this evening. Answered select questions. Denied thoughts of harming self or others. Aloof of peers out in dayroom listening to music and drawing. When asking questions, patient replies, \"No, I'm good. \" Patient pleasant towards staff. Refused physical assessment and refused to allow staff to examine stitches to sadia-area. Problem: Risk of Harm:  Goal: Ability to remain free from injury will improve  Description  Ability to remain free from injury will improve  Outcome: Ongoing  Note:   No self harm noted this shift. Patient agrees to seek staff out if negative thoughts arise. Will continue to monitor Q15 minute and intermittently.

## 2019-03-27 NOTE — PLAN OF CARE
Psychotherapy Group Note    Date: 3/27/2019  Start Time: 10:00AM  End Time: 10:45AM    Number Participants in Group:      Goal:  Patient will demonstrate increased interpersonal interaction   Topic: [place] [facility]    Discipline Responsible:   OT  AT x SW  Nsg.  RT  Other       Participation Level:     None  Minimal   x Active Listener x Interactive    Monopolizing         Participation Quality:   Appropriate  Inappropriate   x       Attentive        Intrusive   x       Sharing        Resistant          Supportive        Lethargic       Affective:   x Congruent  Incongruent  Blunted  Flat    Constricted  Anxious  Elated  Angry    Labile  Depressed  Other         Cognitive:  x Alert x Oriented PPTP     Concentration  G x F  P   Attention Span  G  F x P   Short-Term Memory  G x F  P   Long-Term Memory  G x F  P   Problem Solving/  Decision Making  G x F  P   Ability to Process  Information  G  F x P      Contributing Factors             Delusional             Hallucinating             Flight of Ideas             Other:       Modes of Intervention:  x Education x Support x Exploration   x Clarifying x Problem Solving x Confrontation   x Socialization x Limit Setting x Reality Testing    Activity  Movement  Media    Other:            Response to Learning:  x Able to verbalize current knowledge/experience    Able to verbalize/acknowledge new learning   x Able to retain information    Capable of insight    Able to change behavior   x Progressing to goal    Other:

## 2019-03-27 NOTE — CARE COORDINATION
[facility] Biopsychosocial Assessment    Current Level of Psychosocial Functioning     Independent:    Dependent: x  Minimal Assist:      Comments: Pt states she lives with her sister, Geraldo Wayne, and will be returning to her home upon discharge. Pt has a guardianship hearing with Joanne Bulmaro Burnham on 4/8/19. Pt's sister Brian uKmari, would like to be pt's legal guardian. Psychosocial High Risk Factors (check all that apply)    Unable to obtain meds:    Chronic illness/pain:     Substance abuse:    Lack of Family Support:    Financial stress:    Isolation:    Inadequate Community Resources:    Suicide attempt(s):    Not taking medications:     Victim of crime:    Developmental Delay:    Unable to manage personal needs: x  Age 72 or older:    Homeless:    No transportation: x  Readmission within 30 days:  x  Unemployment: x  Traumatic Event:      Comments:     Psychiatric Advanced Directives: None reported    Family to Involve in Treatment: Sister, Geraldo Wayne, CAMILLE on file    Sexual Orientation: Heterosexual    Patient Strengths: Legal income, stable housing, [facility], medication compliant, no current use of AoD    Patient Barriers: Lack of coping skills    Opiate Education: N/A    [facility]/mental health history: [facility], [facility], CAMILLE on file    Plan of Care   medication management, group/individual therapies, family meetings, psycho -education, treatment team meetings to assist with stabilization    Initial Discharge Plan: To become stable on medications, return home, and follow up with [facility]. Clinical Summary:      Pt is a37 year old [facility] American female who presented to the [facility] from OB, as she was on a pink slip. Pt states her appetite has been great and she has been sleeping well. Pt states that she is medication compliant at this time. [facility] is [facility], [address] [place] [address] on file, and does have a CM. Pt states that she lives with her sister, Geraldo Wayne, whom is supportive, CORAL on file.  Pt states that she does have a form of legal income and receives $771/Month in SSI. Pt states that she does have a Hx of AoD use. (YONY: length of sobriety). Pt states that she does not have abuse issues from her past. Pt states that she does have MI in her family. Pt states that she has [employer]. Pt states that she has not graduated from . Pt states that she is currently not having AH, VH, SI and HI. Pt states that she has not attempted suicide. Pt states she is in a good mood, feels okay, and \"just wants to see if I am gonna get to go home today. \"  Pt states she has been taking her medications and acknowledges her daughter was born and states she named her.

## 2019-03-27 NOTE — GROUP NOTE
Group Therapy Note    Date: March 27    Group Start Time: 1430  Group End Time: 1500  Group Topic: Recovery    [facility]    ANITA Stevens LSW    Patient declined to attend Recovery group at 230 pm despite encouragement by staff.           Signature:  ANITA Stevens LSW

## 2019-03-27 NOTE — H&P
HISTORY and [id number]       NAME:  Hellen Pat  MRN: [id number]   YOB: 1980   Date: 3/27/2019   Age: 41 y.o. Gender: female     H&P Update Note    H&P from 2019  reviewed and updated, Patient examined. Vitals: /70   Pulse 58   Resp 18   Ht 5' 8\" (1.727 m)   LMP  (LMP Unknown)   BMI 19.77 kg/m²  Body mass index is 19.77 kg/m². Hellen Pat is 41 y.o. , female, admitted to [facility] [place] is paranoid, aggressive, did not want answer questions. Patient allowed limited physical examination. Patient delivered, no marked significant changes from previous exam.  I concur with the findings. PROVISIONAL DIAGNOSES / SURGERY:      Schizoaffective disorder.     Patient Active Problem List    Diagnosis Date Noted    Postpartum state     Asthma 2019    Tobacco use 2019    Rh+/RI/GBS neg 2019     3/25/19 F Apg 8/9 Wt 6#15 2019    Labor abnormality, delivered    [facility] 03/15/2019    Schizoaffective disorder  2019    Bacterial vaginosis 2019    Yeast infection 2019    Poor historian 2019    Fetal drug exposure (latuda) 2019    Macrocytic anemia 2018    Advanced maternal age 2018    Patient does not believe she is pregnant, refusing care 2018    Noncompliance 2018    No prenatal care in current pregnancy 2018    Polysubstance abuse  2018    [facility] disorder, [facility] 2014           Wileen Koyanagi, PA-C on 3/27/2019 at 4:42 PM        Jayme [address]    Date: 3/27/2019       Time: 4:42 PM   Patient Name: Hellen Pat     Patient : 1980  Room/Bed: 0213/0213-01    Admission Date/Time: 3/26/2019  1:23 PM      CC: Fetal Tachycardia in BHI     HPI: Hellen Pat is a 41 y.o.  at 39w1d who she's been using nicorette gum. She had bacterial vaginosis and a yeast infection on  and was treated with Flagyl and Diflucan. She also is AMA but declined NIPT. DATING:  LMP: No LMP recorded (lmp unknown). (Menstrual status: Postpartum).   Estimated Date of Delivery: 3/31/19   Based on: midtrimester ultrasound, at 14 5/7 weeks GA    PREGNANCY RISK FACTORS:  Patient Active Problem List   Diagnosis    Schizoaffective disorder, bipolar type ([address]    Polysubstance abuse     No prenatal care in current pregnancy    Macrocytic anemia    Advanced maternal age   Joanna Rea Patient does not believe she is pregnant, refusing care    Noncompliance    Bacterial vaginosis    Yeast infection    Poor historian    Fetal drug exposure (latuda)    Schizoaffective disorder     Care Plan For Delivery    Asthma    Tobacco use    Rh+/RI/GBS neg    Labor abnormality, delivered     3/25/19 F Apg 8/9 Wt 6#15    Postpartum state        Steroids Given In This Pregnancy:  no     REVIEW OF SYSTEMS:  Constitutional: negative fever, negative chills  HEENT: negative visual disturbances, negative headaches  Respiratory: negative dyspnea, negative cough  Cardiovascular: negative chest pain,  negative palpitations  Gastrointestinal: negative abdominal pain, negative RUQ pain, negative N/V, negative diarrhea, negative constipation  Genitourinary: negative dysuria, negative vaginal discharge  Dermatological: negative rash  Hematologic: negative bruising  Immunologic/Lymphatic: negative recent illness, negative recent sick contact  Musculoskeletal: negative back pain  Neurological:  negative dizziness, negative weakness  Behavior/Psych: negative depression, negative anxiety    OBSTETRICAL HISTORY:   OB History    Para Term  AB Living   2 2 2 0 0 2   SAB TAB Ectopic Molar Multiple Live Births   0 0 0 0 0 2      # Outcome Date GA Lbr Lio/2nd Weight Sex Delivery Anes PTL Lv   2 Term 19 39w1d 00:15 / 00:02 6 lb 15 oz (3.147 kg) F Vag-Spont Local N YORDY      Name: Yoanna GILLESPIE      Apgar1: 8  Apgar5: 9   1 Term     M Vag-Spont   YORDY      Obstetric Comments   Her last preg was more than 10 years ago and she was institutionalized during the preg in the state of [place] (from chart review)       PAST MEDICAL HISTORY:   has a past medical history of Anxiety, Asthma, Bipolar 1 disorder ([address] Multiple personality ([facility] and Seizures ([facility] PAST SURGICAL HISTORY:   has no past surgical history on file. ALLERGIES:  is allergic to bee venom; beeswax; and wasp venom protein. MEDICATIONS:  Prior to Admission medications    Medication Sig Start Date End Date Taking? Authorizing Provider   naproxen (NAPROSYN) 500 MG tablet Take 1 tablet by mouth 2 times daily 3/26/19   Tyrell Pope, DO   docusate sodium (COLACE) 100 MG capsule Take 1 capsule by mouth 2 times daily as needed for Constipation 3/26/19   Tyrell Pope, DO   benztropine (COGENTIN) 1 MG tablet Take 1 mg by mouth    Historical Provider, MD   lurasidone (LATUDA) 80 MG TABS tablet Take 80 mg by mouth Daily with supper     Historical Provider, MD   carBAMazepine (TEGRETOL) 200 MG tablet Take 200 mg by mouth 2 times daily 5/2/18   Historical Provider, MD   traZODone (DESYREL) 50 MG tablet Take 1 tablet by mouth nightly as needed for Sleep 3/29/18   Adilson Gomes MD   PARoxetine (PAXIL) 10 MG tablet Take 1 tablet by mouth daily for 14 days 3/30/18 4/13/18  Adilson Gomes MD   nicotine polacrilex (NICORETTE) 2 MG gum Take 1 each by mouth as needed for Smoking cessation 11/7/17   Prudence Garcia MD   ARIPiprazole ER (ABILIFY MAINTENA) 400 MG SUSR Inject 400 mg into the muscle every 28 days 7/21/17   Malinda Santos MD       FAMILY HISTORY:  family history includes Cancer in her mother. SOCIAL HISTORY:   reports that she has been smoking cigarettes and cigars. She has been smoking about 0.25 packs per day.  She uses smokeless tobacco. She reports that she does not drink alcohol or use drugs. VITALS:  Vitals:    19 1341 19 0737   BP: 108/62 120/70   Pulse: (!) 43 58   Resp: 14 18   Height: 5' 8\" (1.727 m)        PHYSICAL EXAM:  Fetal Heart Monitor:  Baseline Heart Rate 150, moderate variability, present accelerations, 1 late deceleration noted but otherwise reassuring strip. [place]: contractions, rare    Patient declined Physical Exam    Pelvic Exam: Patient declined     OMM EXAM:  Chief Complaint: Pregnancy  Reason for No Exam (if applicable): not applicable  Anterior/ Posterior Spinal Curves: Lumbar Lordosis -  Increased  Assessment Tool: T= Tenderness, A= Asymmetry, R= Restricted Motion (A=Active, P=Passive), T=Tissue Texture Changes  Region Evaluated : Severity / Specific of Major Somatic Dysfunction: 739.4 Pelvis / Sacrum -  Minor TART - more than BG levels -    Major Correlations with: Genitourinary  Structural Diagnosis: Increased lumbar lordosis   Treatment Plan: outpatient     PRENATAL LAB RESULTS:   Blood Type/Rh: B pos  Antibody Screen: negative  Hemoglobin, Hematocrit, Platelets: 33.9 / 30 / 316  Rubella: immune  T. Pallidum, IgG: non-reactive   Hepatitis B Surface Antigen: non-reactive   HIV: non-reactive   Sickle Cell Screen: not done  Gonorrhea: negative  Chlamydia: negative  Urine culture: not done    1 hour Glucose Tolerance Test: Declined  HgA1C:  4.1      Group B Strep:  negative    Cystic Fibrosis Screen:  N/A  First Trimester Screen:  N/A  MSAFP/Multiple Markers:  N/A  Anatomy US: A single live intrauterine pregnancy with estimated gestational age of 30 weeks 6 days by ultrasound. The estimated fetal weight is 1546 grams. EDC by ultrasound is 3 April 2019. Limited visualization of the fetal cranial structures due to positioning.     ASSESSMENT & PLAN:  Tim Fitzpatrick is a 41 y.o. female  at 39w1d IUP   - GBS negative / Rh positive / R immune   - No indication for GBS prophylaxis   - VSS    Fetal Tachycardia   - Resolved    Primary  Section   - Admit to L&D   - cEFM and TOCO until to the OR   - CBC, T&S, T pal and UDS ordered   - IV LR @ 125ml/hr   - Ancef 2g IV to be given preoperatively   - Surgical consent signed and in the chart   - Anesthesia and NICU notified    Schizoaffective Disorder              - Psych Consult pending from Dr. Manjula Alonso   - Management per Psych. - Patient had bene prescribed Latuda 160 mg QD. Patient has not taken since 3/4/19.   - SW consult PP     Limited [facility]              - Prenatal labs WNL              - Refused 1 hr GTT              - S/p Tdap and Influenza 19              - Limited anatomy  with no abnormalities seen, unable to perform formal MFM scan as patient is not allowed to leave hospital per psych recommendations                 Fetal exposure to [place]              - MFM consultation recommended per OB, unable to perfrom  inability to transfer patient to [facility] for assessment per psych.     Homelessness    - SW consult PP    Asthma              - Controlled on Albuterol PRN. Has not used in a long time per [facility]     AMA              - NIPT ordered. Declined by patient.     Tobacco Use                - Nicorette gum PRN     Hx Polysubstance Abuse              - UDS neg to date in Epic   - Repeat UDS pending      Poor Hygiene     Non-compliance/Poor Historian               UDS ordered, Informed consent obtained. Discussed R/B/A. All questions and concerns answered. Patient verbalized understanding and agreement to plan.        Patient Active Problem List    Diagnosis Date Noted    Postpartum state     Asthma 2019     Albuterol PRN      Tobacco use 2019     Nicorette Gum      Rh+/RI/GBS neg 2019     3/25/19 F Apg 8/9 Wt 6#15 2019    Labor abnormality, delivered    [facility] 03/15/[facility]     Per Dr. Jose Martinez meeting George Da Silva, Family of patient (mother and sister) [facility] [place], Risk Management)    1. Call Dr. Michelle Pimentel for Capacity evaluation upon Labor, ROM, Planned  at family's request-Consent Signed [phone]  2. If no Capacity per Dr. Michelle Pimentel he will dose with medication for delivery. Plan  per Family-Consent signed  3. Plan  no sooner than 39 weeks unless obstetrically indicated weeks if undelivered  4. Hold 48 hrs post op on L&D. Dr. Michelle Pimentel for medical  Medication management  5. Call child and family service upon delivery. They are aware of the case and will plan disposition for   6. Pt will need to go back to [facility] for post op care. If willing to voluntary sign in. If NOT, then pink slip by OBGYN Attending. 7. [facility] nursing available to help complete pink slip documents    The procedure risks and complictions were discussed in detail. All questions answered. The consent was signed by the next of kin as they are petitioning the courts for guardianship          Schizoaffective disorder  2019     On Latuda      Bacterial vaginosis 2019    Yeast infection 2019    Poor historian 2019    Fetal drug exposure (latuda) 2019    Macrocytic anemia 2018    Advanced maternal age 2018    Patient does not believe she is pregnant, refusing care 2018    Noncompliance 2018    No prenatal care in current pregnancy 2018     [facility]  Patient has repeatedly declined exams and to be seen by ED      Polysubstance abuse  2018    Schizoaffective disorder, bipolar type ([facility] 2014       Plan discussed with Dr. Jo-Ann Delgado, who is agreeable. Steroids given this admission: No    Risks, benefits, alternatives and possible complications have been discussed in detail with the patient. Admission, and post admission procedures and expectations were discussed in detail. All questions were answered.     Attending's Name:  Natalya Jackson    Ob/Gyn Resident   3/27/2019, 4:42 PM

## 2019-03-27 NOTE — CARE COORDINATION
Pt came to writer asking when Dr Iva Zuniga would be here, asking if he was going to discharge her. Writer encouraged pt to join in unit activities until  Dr Iva Zuniga speaks with her. Pt thanked writer.

## 2019-03-27 NOTE — PROGRESS NOTES
Psychiatric Admission Note         Farzaneh Wolfe is a 41 y.o. female with severe schizophrenia who was transferred to the labor and delivery unit for delivery of the baby, then transferred back to [facility] for resolution of psychosis. Patient refused medication consistently throughout pregnancy. She displays severe psychotic symptoms at this time/disorganization of thought processes, paranoia, responding internal stimuli, no care of ADLs. Patient shows no ability at this time to care for her basic daily needs. She has difficulty with short-term recall, often repeats questions without demonstrating insight into responses. Allergies:  Bee venom; Beeswax; and Wasp venom protein     History of Substance Abuse     Denies alcohol use or use of any illicit drugs. Past Psychiatric History     Patient has been consistently refusing medication prior to delivery. . She has a long history of  psychiatric inpatient hospitalizations. Denied history of suicide attempts. Family History of psychiatric disorders    Family history: positive for psychosis      Medical History     Past Medical History:   Diagnosis Date    Anxiety     Asthma     Bipolar 1 disorder ([address]     Multiple personality ([facility]     Seizures ([facility]         Mental Status  Pt. was alert, oriented to person and place, minimally cooperative. Appearance and hygiene weredisheveled, poor hygiene . Mood was angry. Affect was irritable Thought process was disorganized. Patient minimized symptoms but appears to be responding to internal stimuli. Patient denied suicidal ideations. Patient denied homicidal ideations . Patient's gross cognitive functions were impaired. Insight and judgement were poor. Both recent and remote memory were impaired.     Psychomotor status was without abnormality     Diagnostic Impression    Schizophrenia      Medications   carBAMazepine  200 mg Oral BID    benztropine  1 mg Oral BID    naproxen  500 mg Oral BID WC    risperiDONE  2 mg Oral BID     docusate sodium, nicotine polacrilex, witch hazel-glycerin, acetaminophen, traZODone, benztropine mesylate, magnesium hydroxide, aluminum & magnesium hydroxide-simethicone, diphenhydrAMINE    Treatment Plan:    1. Admit to inpatient psychiatric treatment  2. Supportive therapy with medication management. Reviewed risks and benefits as well as potential side effects with patient. We'll start Tegretol, Risperdal, Cogentin with plan of getting patient on Risperdal Consta injection as well. 3. Therapeutic activities and groups  4.  Follow up at [facility] [place] after symptoms stabilized    Estimated length of stay: 5-7 days    Jovan Braxton MD  Psychiatrist.

## 2019-03-28 PROCEDURE — 1240000000 HC EMOTIONAL WELLNESS R&B

## 2019-03-28 PROCEDURE — 6370000000 HC RX 637 (ALT 250 FOR IP): Performed by: PSYCHIATRY & NEUROLOGY

## 2019-03-28 RX ORDER — RISPERIDONE 3 MG/1
3 TABLET, FILM COATED ORAL 2 TIMES DAILY
Status: DISCONTINUED | OUTPATIENT
Start: 2019-03-28 | End: 2019-04-04

## 2019-03-28 RX ADMIN — DIPHENHYDRAMINE HCL 25 MG: 25 TABLET ORAL at 21:22

## 2019-03-28 RX ADMIN — CARBAMAZEPINE 200 MG: 200 TABLET ORAL at 21:22

## 2019-03-28 RX ADMIN — BENZTROPINE MESYLATE 1 MG: 1 TABLET ORAL at 12:49

## 2019-03-28 RX ADMIN — NICOTINE POLACRILEX 2 MG: 2 GUM, CHEWING ORAL at 05:58

## 2019-03-28 RX ADMIN — CARBAMAZEPINE 200 MG: 200 TABLET ORAL at 12:49

## 2019-03-28 RX ADMIN — NICOTINE POLACRILEX 2 MG: 2 GUM, CHEWING ORAL at 13:44

## 2019-03-28 RX ADMIN — RISPERIDONE 3 MG: 3 TABLET, FILM COATED ORAL at 21:22

## 2019-03-28 RX ADMIN — NAPROXEN 500 MG: 500 TABLET ORAL at 12:48

## 2019-03-28 RX ADMIN — BENZTROPINE MESYLATE 1 MG: 1 TABLET ORAL at 21:22

## 2019-03-28 RX ADMIN — NICOTINE POLACRILEX 2 MG: 2 GUM, CHEWING ORAL at 22:16

## 2019-03-28 RX ADMIN — TRAZODONE HYDROCHLORIDE 50 MG: 50 TABLET ORAL at 21:22

## 2019-03-28 ASSESSMENT — PAIN SCALES - GENERAL
PAINLEVEL_OUTOF10: 0

## 2019-03-28 NOTE — GROUP NOTE
Group Therapy Note    Date: March 28    Group Start Time: 0900  Group End Time: 0930  Group Topic: [facility]      Group Therapy Note    Pt did not attend Community Meeting at 0900 d/t resting in room despite staff invitation to attend.

## 2019-03-28 NOTE — PLAN OF CARE
[facility]  Day 3 Interdisciplinary Treatment Plan NOTE    Review Date & Time: 3/28/19 0945    Admission Type:   Admission Type: Voluntary    Reason for admission:  Reason for Admission: Patient had a vaginal delivery of a baby girl 3/25/19. Voluntarily signed in, did not sign other paperwork. Medications are ordered, verbalized that she will take medications. Refused physical assessment but states everything is \"fine\", denies bleeding at this time. There are disolveable stitches to her sadia area, refused assessment.    Estimated Length of Stay: 5-7 days  Estimated Discharge Date Update: to be determined by physician    PATIENT STRENGTHS:  Patient Strengths Strengths: Communication, Connection to output provider, Medication Compliance, Positive Support  Patient Strengths and Limitations:Limitations: Tendency to isolate self  Addictive Behavior:Addictive Behavior  In the past 3 months, have you felt or has someone told you that you have a problem with:  : None  Do you have a history of Chemical Use?: No  Do you have a history of Alcohol Use?: No  Do you have a history of Street Drug Abuse?: Yes  Histroy of Prescripton Drug Abuse?: No  Medical Problems:  Past Medical History:   Diagnosis Date    Anxiety     Asthma     Bipolar 1 disorder (HCC)     Multiple personality ([facility]     Seizures ([facility]        Risk:  Fall RiskTotal: 87  Stephen Scale Stephen Scale Score: 22  BVC Total: 0  Change in scores no Changes to plan of Care no    Status EXAM:   Status and Exam  Normal: No  Facial Expression: Flat  Affect: Appropriate  Level of Consciousness: Alert  Mood:Normal: No  Mood: Elated, Helpless  Motor Activity:Normal: Yes  Motor Activity: Decreased  Interview Behavior: Cooperative  Preception: [place] to Person, [place] to Time, [place] to Place  Attention:Normal: Yes  Attention: Unable to Concentrate  Thought Processes: Circumstantial  Thought Content:Normal: Yes  Thought Content: Preoccupations  Hallucinations: None  Delusions: No  Memory:Normal: No  Memory: Poor Recent, Poor Remote  Insight and Judgment: No  Insight and Judgment: Poor Insight  Present Suicidal Ideation: No  Present Homicidal Ideation: No    Daily Assessment Last Entry:   Daily Sleep (WDL): Within Defined Limits         Patient Currently in Pain: Denies  Daily Nutrition (WDL): Within Defined Limits  Appetite Change: Excessive  Barriers to Nutrition: None  Level of Assistance: Independent/Self    Patient Monitoring:  Frequency of Checks: 4 times per hour, close    Psychiatric Symptoms:   Depression Symptoms  Depression Symptoms: Impaired concentration, Loss of interest  Anxiety Symptoms  Anxiety Symptoms: No problems reported or observed. Lora Symptoms  Lora Symptoms: Labile     Psychosis Symptoms  Delusion Type: No problems reported or observed.     Suicide Risk CSSR-S:  1) Within the past month, have you wished you were dead or wished you could go to sleep and not wake up? : No  2) Have you actually had any thoughts of killing yourself? : No  6) Have you ever done anything, started to do anything, or prepared to do anything to end your life?: No  Change in Result no Change in Plan of care no      EDUCATION:   EDUCATION:   Learner Progress Toward Treatment Goals: Reviewed results and recommendations of this team, Reviewed group plan and strategies, Reviewed signs, symptoms and risk of self harm and violent behavior, Reviewed goals and plan of care    Method:small group, individual verbal education    Outcome:verbalized by patient, but needs reinforcement to obtain goals    PATIENT GOALS:  Short term: Pt unable to attend  Long term: Pt unable to attend    PLAN/TREATMENT RECOMMENDATIONS UPDATE: continue with group therapies, increased socialization, continue planning for after discharge goals, continue with medication compliance    SHORT-TERM GOALS UPDATE:   Time frame for Short-Term Goals: 5-7 days    LONG-TERM GOALS UPDATE:   Time frame for Long-Term Goals: 6 months  Members Present in Team Meeting: See [facility]

## 2019-03-28 NOTE — PLAN OF CARE
Problem: Altered Mood, Psychotic Behavior:  Goal: Able to verbalize reality based thinking  Description  Able to verbalize reality based thinking  3/27/2019 2306 by Metro [place], LPN  Outcome: Ongoing  Note:   Patient more clearer, appropriate, cooperative; patient informed writer that  stated she could go home on next Wednesday; patient also asked if she could see her baby tonight; safety maintained by protocol and random checks. Problem: Risk of Harm:  Goal: Ability to remain free from injury will improve  Description  Ability to remain free from injury will improve  3/27/2019 2306 by Metro [place]ua, LPN  Outcome: Ongoing  Note:   Patient brightened, ambulating around unit, some what needy; asking for her clothes, wanting candy, and pop; in day room all shift till bed time, drawing and coloring; appropriate, controlled and cooperative; safety maintained via protocol and random checks.

## 2019-03-28 NOTE — GROUP NOTE
Group Therapy Note    Date: March 28    Group Start Time: 1100  Group End Time: 1130  Group Topic: Recreational    RANDELL Damico, ASHLYNS      Group Therapy Note    Pt did not attend Therapeutic Recreation at 1100 d/t resting in room despite staff invitation to attend.

## 2019-03-28 NOTE — GROUP NOTE
Group Therapy Note    Date: March 28    Group Start Time: 1430  Group End Time: 1500  Group Topic: Psychoeducation    RANDELL Ng, CTRS      Group Therapy Note    Pt did not attend Therapeutic Recreation at 1430 d/t resting in room despite staff invitation to attend.

## 2019-03-29 PROCEDURE — 1240000000 HC EMOTIONAL WELLNESS R&B

## 2019-03-29 PROCEDURE — 6370000000 HC RX 637 (ALT 250 FOR IP): Performed by: PSYCHIATRY & NEUROLOGY

## 2019-03-29 RX ORDER — HYDROXYZINE HYDROCHLORIDE 25 MG/1
25 TABLET, FILM COATED ORAL 3 TIMES DAILY PRN
Status: DISCONTINUED | OUTPATIENT
Start: 2019-03-29 | End: 2019-04-10 | Stop reason: HOSPADM

## 2019-03-29 RX ADMIN — NAPROXEN 500 MG: 500 TABLET ORAL at 08:18

## 2019-03-29 RX ADMIN — RISPERIDONE 3 MG: 3 TABLET, FILM COATED ORAL at 08:19

## 2019-03-29 RX ADMIN — TRAZODONE HYDROCHLORIDE 50 MG: 50 TABLET ORAL at 21:43

## 2019-03-29 RX ADMIN — NAPROXEN 500 MG: 500 TABLET ORAL at 17:39

## 2019-03-29 RX ADMIN — RISPERIDONE 3 MG: 3 TABLET, FILM COATED ORAL at 21:43

## 2019-03-29 RX ADMIN — NICOTINE POLACRILEX 2 MG: 2 GUM, CHEWING ORAL at 16:34

## 2019-03-29 RX ADMIN — HYDROXYZINE HYDROCHLORIDE 25 MG: 25 TABLET, FILM COATED ORAL at 17:39

## 2019-03-29 RX ADMIN — BENZTROPINE MESYLATE 1 MG: 1 TABLET ORAL at 08:18

## 2019-03-29 RX ADMIN — NICOTINE POLACRILEX 2 MG: 2 GUM, CHEWING ORAL at 11:38

## 2019-03-29 RX ADMIN — CARBAMAZEPINE 200 MG: 200 TABLET ORAL at 21:43

## 2019-03-29 RX ADMIN — CARBAMAZEPINE 200 MG: 200 TABLET ORAL at 08:18

## 2019-03-29 RX ADMIN — NICOTINE POLACRILEX 2 MG: 2 GUM, CHEWING ORAL at 08:19

## 2019-03-29 RX ADMIN — BENZTROPINE MESYLATE 1 MG: 1 TABLET ORAL at 21:43

## 2019-03-29 ASSESSMENT — PAIN SCALES - GENERAL
PAINLEVEL_OUTOF10: 0
PAINLEVEL_OUTOF10: 5
PAINLEVEL_OUTOF10: 0

## 2019-03-29 NOTE — GROUP NOTE
Group Therapy Note    Date: March 29    Group Start Time: 1330  Group End Time: 2079  Group Topic: Cognitive Skills    RANDELL Giraldo, CTRS      Group Therapy Note    Pt did not attend Therapeutic Recreation at 1330 d/t resting in room despite staff invitation to attend.

## 2019-03-29 NOTE — CARE COORDINATION
Writer phoned pt's sisterCharisse on file, who stated she is unsure if pt is allowed to return home, as she was given placement of pt's daughter through [employer] and there was an open case. Writer encouraged Theodora Miller to phone [employer] and speak with pt's  to see if pt is allowed to live at home, with pt's sister, and pt's daughter, while under [employer] custody. Theodora Miller states she will and will phone writer with updated information. Writer provided name and call back number.

## 2019-03-29 NOTE — BH NOTE
Encouraged patient to shower.  She refused telling writer \" Im going to cut your fucking head off\"

## 2019-03-29 NOTE — PLAN OF CARE
Problem: Risk of Harm:  Goal: Ability to remain free from injury will improve  Description  Ability to remain free from injury will improve  Outcome: Ongoing   Patient denies any thoughts to harm self and no signs of self harm were noted. Patient encouraged to inform staff if she starts to develop any thoughts to harm self. Patient has had no falls this shift so far. Patient encouraged to stay hydrated and to change position slowly to prevent chance of falls. Patient voiced understanding.

## 2019-03-29 NOTE — PLAN OF CARE
PSYCHOEDUCATION GROUP NOTE    Date: 3/29/2019    Start Time: 1100  End Time: 1130    Number Participants in Group:  5/15    Goal:  Patient will demonstrate increased interpersonal interaction   Topic: Leisure Awareness     Discipline Responsible:   OT  AT    Ns. x RT MHP Other       Participation Level:     None x Minimal    Active Listener  Interactive    Monopolizing         Participation Quality:  x Appropriate  Inappropriate          Attentive        Intrusive          Sharing        Resistant          Supportive        Lethargic       Affective:    Congruent  Incongruent x Blunted  Flat    Constricted  Anxious  Elated  Angry    Labile  Depressed  Other         Cognitive:  x Alert x Oriented PPTP     Concentration  G  F x P   Attention Span  G  F x P   Short-Term Memory  G x F  P   Long-Term Memory  G x F  P   ProblemSolving/  Decision Making  G x F  P   Ability to Process  Information  G x F  P      Contributing Factors             Delusional             Hallucinating             Flight of Ideas             Other:       Modes of Intervention:  x Education  Support  Exploration    Clarifying x Problem Solving  Confrontation   x Socialization  Limit Setting x Reality Testing   x Activity  Movement  Media    Other:            Response to Learning:   Able to verbalize current knowledge/experience    Able to verbalize/acknowledge new learning    Able to retain information    Capable of insight    Able to change behavior   x Progressing to goal    Other:        Comments:

## 2019-03-29 NOTE — PLAN OF CARE
Problem: Altered Mood, Psychotic Behavior:  Goal: Able to verbalize decrease in frequency and intensity of hallucinations  Description  Able to verbalize decrease in frequency and intensity of hallucinations  3/29/2019 1153 by Nicolette Sauceda RN  Outcome: Ongoing       Problem: Altered Mood, Psychotic Behavior:  Goal: Able to verbalize reality based thinking  Description  Able to verbalize reality based thinking  3/29/2019 1153 by Nicolette Sauceda RN  Outcome: Ongoing   Patient is fully oriented. Patient denies having any hallucinations and no signs of responding to internal stimuli were noted. Patient is still paranoid and guarded, however. Patient encouraged to inform staff if her hallucinations return. Patient said \"alright\" and walked away from [facility].

## 2019-03-29 NOTE — BH NOTE
PSYCHOEDUCATION GROUP NOTE    Date: 3/29/19  Start Time: 1600  End Time: 1630    Number Participants in Group:  7/13    Goal:  Patient will demonstrate increased interpersonal interaction   Topic: coping skills    Discipline Responsible:   OT  AT   x Nsg.  RT MHP Other       Participation Level:     None  Minimal   x Active Listener  Interactive    Monopolizing         Participation Quality:  x Appropriate  Inappropriate   x       Attentive        Intrusive          Sharing        Resistant          Supportive        Lethargic       Affective:   x Congruent  Incongruent  Blunted  Flat    Constricted  Anxious  Elated  Angry    Labile  Depressed  Other         Cognitive:  x Alert  Oriented PPTP     Concentration  G x F  P   Attention Span  G x F  P   Short-Term Memory  G x F  P   Long-Term Memory  G x F  P   ProblemSolving/  Decision Making  G x F  P   Ability to Process  Information  G x F  P      Contributing Factors             Delusional             Hallucinating             Flight of Ideas             Other:       Modes of Intervention:  x Education  Support  Exploration    Clarifying  Problem Solving  Confrontation    Socialization  Limit Setting  Reality Testing    Activity  Movement  Media    Other:            Response to Learning:  x Able to verbalize current knowledge/experience    Able to verbalize/acknowledge new learning    Able to retain information    Capable of insight    Able to change behavior    Progressing to goal    Other:

## 2019-03-29 NOTE — BH NOTE
Writer informed patient that Marlene Terrell had a shot that doctor wanted patient to be given. Patient said \"you can just leave it up there for now. \"

## 2019-03-29 NOTE — GROUP NOTE
Group Therapy Note    Date: March 29    Group Start Time: 0900  Group End Time: 0930  Group Topic: [facility]      Group Therapy Note    Pt did not attend Community Meeting at 0900 d/t resting in room despite staff invitation to attend.

## 2019-03-30 PROCEDURE — 6370000000 HC RX 637 (ALT 250 FOR IP): Performed by: PSYCHIATRY & NEUROLOGY

## 2019-03-30 PROCEDURE — 1240000000 HC EMOTIONAL WELLNESS R&B

## 2019-03-30 RX ADMIN — CARBAMAZEPINE 200 MG: 200 TABLET ORAL at 21:22

## 2019-03-30 RX ADMIN — NICOTINE POLACRILEX 2 MG: 2 GUM, CHEWING ORAL at 17:15

## 2019-03-30 RX ADMIN — BENZTROPINE MESYLATE 1 MG: 1 TABLET ORAL at 21:22

## 2019-03-30 RX ADMIN — BENZTROPINE MESYLATE 1 MG: 1 TABLET ORAL at 09:15

## 2019-03-30 RX ADMIN — RISPERIDONE 3 MG: 3 TABLET, FILM COATED ORAL at 21:22

## 2019-03-30 RX ADMIN — TRAZODONE HYDROCHLORIDE 50 MG: 50 TABLET ORAL at 21:22

## 2019-03-30 RX ADMIN — RISPERIDONE 3 MG: 3 TABLET, FILM COATED ORAL at 09:15

## 2019-03-30 RX ADMIN — CARBAMAZEPINE 200 MG: 200 TABLET ORAL at 09:15

## 2019-03-30 RX ADMIN — NAPROXEN 500 MG: 500 TABLET ORAL at 09:15

## 2019-03-30 ASSESSMENT — PAIN SCALES - GENERAL
PAINLEVEL_OUTOF10: 0
PAINLEVEL_OUTOF10: 0

## 2019-03-30 NOTE — PROGRESS NOTES
Patient is quite  psychotic, agitated, paranoid, and guarded, had agreed for Risperdal Consta injection but he refused and became more agitated. Overwhelmed with persecutory delusions and hallucinationsStill responding to internal stimuli . /81   Pulse 60   Temp 97.9 °F (36.6 °C) (Oral)   Resp 14   Ht 5' 8\" (1.727 m)   LMP  (LMP Unknown)   SpO2 97%   BMI 19.77 kg/m²     Affect-Superficial  Mood -  Agitated and labile  Thought process -  Disorganized showing looseness of association and tangentiality. Reality testing-Impaired  Cognition -  Impaired  Memory- Recent-Impaired                Remote-Impaired  Orientation-Disoriented                                              Insight-Poor  Judgement-Poor                                                Attempted to develop insight. Medications reviewed. Side effects of medications reviewed and medication education provided. No side effects including akathisia,tremers,dystonia or orthostasis reported or observed. Plan: Stabilization with antipsychotic and mood stabilization medications,group therapy and develop coping skills,discharge to community. Theresa Wilson

## 2019-03-30 NOTE — GROUP NOTE
Group Therapy Note    Date: March 30    Group Start Time: 1330  Group End Time: 6167  Group Topic: Cognitive Skills    RANDELL Meza, CTRS    Pt did not attend RT group at 1330 d/t resting in room despite staff invitation to attend.

## 2019-03-30 NOTE — GROUP NOTE
Group Therapy Note    Date: March 30    Group Start Time: 1827  Group End Time: 9603  Group Topic: Community Meeting    RANDELL BHSONIA G    Renee Abby [place], [address]    Pt did not attend [employer] d/t resting in room despite staff invitation to attend.

## 2019-03-30 NOTE — PLAN OF CARE
Problem: Altered Mood, Psychotic Behavior:  Goal: Able to verbalize decrease in frequency and intensity of hallucinations  Description  Able to verbalize decrease in frequency and intensity of hallucinations  3/29/2019 2359 by Bandar Hendricks LPN  Outcome: Ongoing  Note:   Pt denies hallucinations but pt is actively responding to internal stimuli    Problem: Altered Mood, Psychotic Behavior:  Goal: Able to verbalize reality based thinking  Description  Able to verbalize reality based thinking  3/29/2019 2359 by Bandar Hendricks LPN  Outcome: Ongoing  Note:   Pt is unable to verbalize reality based thinking at this time.

## 2019-03-30 NOTE — GROUP NOTE
Group Therapy Note    Date: March 30    Group Start Time: 0100  Group End Time: 1050  Group Topic: Psychoeducation    RANDELL MATTHEW DENNEY    ANITA Purdy LSW      Group Therapy Note           Patient's Goal:  n/a    Notes:  n/a    Status After Intervention:  Unchanged    Participation Level: None    Participation Quality: Resistant      Speech:  normal      Thought Process/Content:MARTY,did not participate      Affective Functioning: Congruent      Mood: marty,patient did not participate      Level of consciousness:  Alert      Response to Learning: Resistant      Endings: None Reported    Modes of Intervention: Education, Support, Socialization and Exploration      Discipline Responsible: /Counselor      Signature:  ANITA Purdy LSW

## 2019-03-30 NOTE — BH NOTE
Psychoeducation Group Note    Date: 3/30/19  Start Time: 1600  End Time: 1630    Number Participants in Group:  9    Goal:  Patient will demonstrate increased interpersonal interaction   Topic: feedback group    Discipline Responsible:   OT  AT  [facility].  RT x Other       Participation Level:     None  Minimal   x Active Listener  Interactive    Monopolizing         Participation Quality:  x Appropriate  Inappropriate   x       Attentive        Intrusive          Sharing        Resistant          Supportive        Lethargic       Affective:    Congruent  Incongruent  Blunted  Flat    Constricted  Anxious  Elated  Angry    Labile  Depressed  Other         Cognitive:  x Alert x Oriented PPTP     Concentration x G  F  P   Attention Span x G  F  P   Short-Term Memory x G  F  P   Long-Term Memory x G  F  P   ProblemSolving/  Decision Making x G  F  P   Ability to Process  Information x G  F  P      Contributing Factors             Delusional             Hallucinating             Flight of Ideas             Other:       Modes of Intervention:  x Education  Support  Exploration    Clarifying  Problem Solving  Confrontation   x Socialization  Limit Setting  Reality Testing   x Activity  Movement  Media    Other:            Response to Learning:  x Able to verbalize current knowledge/experience   x Able to verbalize/acknowledge new learning    Able to retain information    Capable of insight    Able to change behavior    Progressing to goal    Other:        Comments:

## 2019-03-31 PROCEDURE — 1240000000 HC EMOTIONAL WELLNESS R&B

## 2019-03-31 PROCEDURE — 6370000000 HC RX 637 (ALT 250 FOR IP): Performed by: PSYCHIATRY & NEUROLOGY

## 2019-03-31 RX ADMIN — NAPROXEN 500 MG: 500 TABLET ORAL at 09:27

## 2019-03-31 RX ADMIN — CARBAMAZEPINE 200 MG: 200 TABLET ORAL at 09:28

## 2019-03-31 RX ADMIN — RISPERIDONE 3 MG: 3 TABLET, FILM COATED ORAL at 09:27

## 2019-03-31 RX ADMIN — HYDROXYZINE HYDROCHLORIDE 25 MG: 25 TABLET, FILM COATED ORAL at 09:27

## 2019-03-31 RX ADMIN — RISPERIDONE 3 MG: 3 TABLET, FILM COATED ORAL at 21:20

## 2019-03-31 RX ADMIN — CARBAMAZEPINE 200 MG: 200 TABLET ORAL at 21:20

## 2019-03-31 RX ADMIN — BENZTROPINE MESYLATE 1 MG: 1 TABLET ORAL at 21:19

## 2019-03-31 RX ADMIN — HYDROXYZINE HYDROCHLORIDE 25 MG: 25 TABLET, FILM COATED ORAL at 21:20

## 2019-03-31 RX ADMIN — NICOTINE POLACRILEX 2 MG: 2 GUM, CHEWING ORAL at 19:25

## 2019-03-31 RX ADMIN — DIPHENHYDRAMINE HCL 25 MG: 25 TABLET ORAL at 21:19

## 2019-03-31 RX ADMIN — BENZTROPINE MESYLATE 1 MG: 1 TABLET ORAL at 09:27

## 2019-03-31 ASSESSMENT — PAIN SCALES - GENERAL
PAINLEVEL_OUTOF10: 0
PAINLEVEL_OUTOF10: 0

## 2019-03-31 NOTE — PLAN OF CARE
Problem: Risk of Harm:  Goal: Ability to remain free from injury will improve  Description  Ability to remain free from injury will improve  Outcome: Met This Shift  Note:   Patient denies suicidal or homicidal ideations. No self harm behaviors. Problem: Altered Mood, Psychotic Behavior:  Goal: Able to verbalize decrease in frequency and intensity of hallucinations  Description  Able to verbalize decrease in frequency and intensity of hallucinations  Outcome: Ongoing  Note:   Patient continues to respond to audio/visual hallucinations. Problem: Altered Mood, Psychotic Behavior:  Goal: Able to verbalize reality based thinking  Description  Able to verbalize reality based thinking  3/31/2019 1831 by Ashlee King RN  Outcome: Ongoing  Note:   Patient is reality based. Able to be controlled and cooperative.    3/31/2019 1521 by CARY Meza  Outcome: Ongoing

## 2019-03-31 NOTE — PLAN OF CARE
Problem: Risk of Harm:  Goal: Ability to remain free from injury will improve  Description  Ability to remain free from injury will improve  3/30/2019 2144 by Pau Liao RN  Note:   PT was accepting of 1:1 meet with RN. PT remains free from any self harm, falls, or any other type of injury as of this time. PT is wearing non-skid stockings at the time of assessment. PT verbalizes understanding of individual fall risks and ways to prevent them. PT agreed to use the call light if needed. PT agreed to seek out health care staff if stressors or other issues were to become to be too much. Safety checks have been maintained every 15 minutes and at irregular intervals throughout this shift. Problem: Altered Mood, Psychotic Behavior:  Goal: Able to verbalize reality based thinking  Description  Able to verbalize reality based thinking  3/30/2019 2144 by Pau Liao RN  Note:   PT was accepting of 1:1 meet with RN. PT did refuse a lot of her PM assessments; PT was not accepting of PM program/group. PT denied any current issues when asked. PT was encouraged to take a shower and pt verbally accepted however after the conversation pt never made an attempt even with encouragement. PT refused PM medications at first but after encouragement pt accepted them a short time later. Pt Agreed to seek out health care staff if stressors were to become to be to much.   Safety checks have been maintained every 15 minutes and at irregular intervals throughout the shift

## 2019-03-31 NOTE — GROUP NOTE
Group Therapy Note    Date: March 31    Group Start Time: 1100  Group End Time: 6071  Group Topic: Psychoeducation    RANDELL Meza, CTRS    Pt did not attend RT group at 1100 d/t resting in room despite staff invitation to attend.

## 2019-04-01 PROCEDURE — 6370000000 HC RX 637 (ALT 250 FOR IP): Performed by: PSYCHIATRY & NEUROLOGY

## 2019-04-01 PROCEDURE — 6360000002 HC RX W HCPCS: Performed by: PSYCHIATRY & NEUROLOGY

## 2019-04-01 PROCEDURE — 1240000000 HC EMOTIONAL WELLNESS R&B

## 2019-04-01 RX ADMIN — TRAZODONE HYDROCHLORIDE 50 MG: 50 TABLET ORAL at 22:09

## 2019-04-01 RX ADMIN — BENZTROPINE MESYLATE 1 MG: 1 TABLET ORAL at 09:05

## 2019-04-01 RX ADMIN — HYDROXYZINE HYDROCHLORIDE 25 MG: 25 TABLET, FILM COATED ORAL at 17:31

## 2019-04-01 RX ADMIN — CARBAMAZEPINE 200 MG: 200 TABLET ORAL at 09:05

## 2019-04-01 RX ADMIN — NAPROXEN 500 MG: 500 TABLET ORAL at 17:31

## 2019-04-01 RX ADMIN — DIPHENHYDRAMINE HCL 25 MG: 25 TABLET ORAL at 22:09

## 2019-04-01 RX ADMIN — RISPERIDONE 3 MG: 3 TABLET, FILM COATED ORAL at 22:09

## 2019-04-01 RX ADMIN — CARBAMAZEPINE 200 MG: 200 TABLET ORAL at 22:08

## 2019-04-01 RX ADMIN — NICOTINE POLACRILEX 2 MG: 2 GUM, CHEWING ORAL at 12:31

## 2019-04-01 RX ADMIN — RISPERIDONE 37.5 MG: KIT at 13:36

## 2019-04-01 RX ADMIN — NICOTINE POLACRILEX 2 MG: 2 GUM, CHEWING ORAL at 10:07

## 2019-04-01 RX ADMIN — RISPERIDONE 3 MG: 3 TABLET, FILM COATED ORAL at 09:05

## 2019-04-01 RX ADMIN — NICOTINE POLACRILEX 2 MG: 2 GUM, CHEWING ORAL at 17:31

## 2019-04-01 RX ADMIN — BENZTROPINE MESYLATE 1 MG: 1 TABLET ORAL at 22:09

## 2019-04-01 RX ADMIN — NAPROXEN 500 MG: 500 TABLET ORAL at 09:05

## 2019-04-01 RX ADMIN — HYDROXYZINE HYDROCHLORIDE 25 MG: 25 TABLET, FILM COATED ORAL at 22:10

## 2019-04-01 ASSESSMENT — PAIN SCALES - GENERAL
PAINLEVEL_OUTOF10: 0
PAINLEVEL_OUTOF10: 0

## 2019-04-01 NOTE — PLAN OF CARE
Problem: Altered Mood, Psychotic Behavior:  Goal: Able to verbalize decrease in frequency and intensity of hallucinations  Description  Able to verbalize decrease in frequency and intensity of hallucinations  4/1/2019 1437 by Amira Engel RN  Outcome: Ongoing  Note:   Patient refusing to answer most assessment questions, responded \"no I dont' when asked if she felt like harming herself. She responds loudly to internal stimuli, threatens people who are not visible to writer. Medication compliant, allowed vitals but refused physical assessment. She only received half of her risperdal consta shot. Showered today, allowed housekeeping to clean her room, reports good appetite and good sleep. Patient safety maintained through Q15 min and random safety checks.       Problem: Altered Mood, Psychotic Behavior:  Goal: Able to verbalize reality based thinking  Description  Able to verbalize reality based thinking  4/1/2019 1437 by Amira Engel RN  Outcome: Ongoing     Problem: Risk of Harm:  Goal: Ability to remain free from injury will improve  Description  Ability to remain free from injury will improve  4/1/2019 1437 by Amira Engel RN  Outcome: Ongoing

## 2019-04-01 NOTE — PLAN OF CARE
PSYCHOEDUCATION GROUP NOTE    Date: 4/1/2019    Start Time: 1100  End Time: 1130    Number Participants in Group:  8/16    Goal:  Patient will demonstrate increased interpersonal interaction   Topic: Leisure Awareness     Discipline Responsible:   OT  AT    Ns. x RT MHP Other       Participation Level:     None x Minimal    Active Listener  Interactive    Monopolizing         Participation Quality:  x Appropriate  Inappropriate          Attentive        Intrusive          Sharing        Resistant          Supportive        Lethargic       Affective:    Congruent  Incongruent x Blunted  Flat    Constricted  Anxious  Elated  Angry    Labile  Depressed  Other         Cognitive:  x Alert x Oriented PPTP     Concentration  G x F  P   Attention Span  G x F  P   Short-Term Memory  G x F  P   Long-Term Memory  G x F  P   ProblemSolving/  Decision Making  G x F  P   Ability to Process  Information  G x F  P      Contributing Factors             Delusional             Hallucinating             Flight of Ideas             Other:       Modes of Intervention:  x Education  Support  Exploration    Clarifying x Problem Solving  Confrontation   x Socialization  Limit Setting x Reality Testing   x Activity  Movement  Media    Other:            Response to Learning:   Able to verbalize current knowledge/experience    Able to verbalize/acknowledge new learning    Able to retain information    Capable of insight    Able to change behavior   x Progressing to goal    Other:        Comments:

## 2019-04-01 NOTE — PLAN OF CARE
Problem: Risk of Harm:  Goal: Ability to remain free from injury will improve  Description  Ability to remain free from injury will improve  4/1/2019 0126 by Manan Schmidt RN  Note:   PT was not accepting of 1:1 meet with RN. PT remains free from any new self harm, falls, or any other type of injury as of this time. PT is wearing non-skid stockings at the time of assessment. PT verbalizes understanding of individual fall risks and ways to prevent them. PT agreed to use the call light if needed. PT agreed to seek out health care staff if stressors or other issues were to become to be too much. Safety checks have been maintained every 15 minutes and at irregular intervals throughout this shift. Problem: Altered Mood, Psychotic Behavior:  Goal: Able to verbalize reality based thinking  Description  Able to verbalize reality based thinking  4/1/2019 0126 by Manan Schmidt RN  Note:   PT was not accepting of 1:1 meet with RN. PT was not accepting of PM program/group. PT was out on unit, but aloof to peers. Pt refused most of her physical assessment and started to refused pm meds however pt became med compliant after encouragement from multiple staff. Pt Agreed to seek out health care staff if stressors were to become to be to much.   Safety checks have been maintained every 15 minutes and at irregular intervals throughout the shift

## 2019-04-01 NOTE — PROGRESS NOTES
Writer was in process of giving risperdal consta injection, pt requested it be given in right deltoid. In middle of injection patient started to pull away, then turned and grabbed the needle and pulled it out of her arm. Patient received 1/2 of dose. Dr Dominique Fermin notified, no new orders at this time.

## 2019-04-01 NOTE — GROUP NOTE
Group Therapy Note    Date: April 1    Group Start Time: 0900  Group End Time: 0915  Group Topic: [facility]      Group Therapy Note    Pt did not attend Community Meeting at 0900 d/t resting in room despite staff invitation to attend.

## 2019-04-01 NOTE — PROGRESS NOTES
Patient did refuse to take Consta injection which was ordered for her over the weekend. She does exhibit some observable reduction in intensity of psychosis with time back on Risperdal. She was more pleasant with me and reality-based. She has been out of her room more, interacting with others and exhibiting much less paranoia. She denies any adverse side effects to medications and self-reports good appetite and sleep. She does tell me today that she will take Consta injection- will order as soon as pharmacy can send it up. Due to her long history of noncompliance with appointments and medications, I feel it is paramount she gets injection prior to discharge- she has guardianship hearing scheduled for April 8th bit I am hoping she will accept injectable today. Affect remains flat, poor insight into illness. Charting and medications reviewed. Therapeutic support provided.

## 2019-04-01 NOTE — GROUP NOTE
Group Therapy Note    Date: April 1    Group Start Time: 1430  Group End Time: 1500  Group Topic: Cognitive Skills    RANDELL Archuleta, CTRS      Group Therapy Note    Pt did not attend Therapeutic Recreation at 1430 d/t resting in room despite staff invitation to attend.

## 2019-04-02 PROCEDURE — 1240000000 HC EMOTIONAL WELLNESS R&B

## 2019-04-02 PROCEDURE — 6370000000 HC RX 637 (ALT 250 FOR IP): Performed by: PSYCHIATRY & NEUROLOGY

## 2019-04-02 RX ADMIN — NICOTINE POLACRILEX 2 MG: 2 GUM, CHEWING ORAL at 17:09

## 2019-04-02 RX ADMIN — RISPERIDONE 3 MG: 3 TABLET, FILM COATED ORAL at 20:41

## 2019-04-02 RX ADMIN — DIPHENHYDRAMINE HCL 25 MG: 25 TABLET ORAL at 20:41

## 2019-04-02 RX ADMIN — HYDROXYZINE HYDROCHLORIDE 25 MG: 25 TABLET, FILM COATED ORAL at 09:32

## 2019-04-02 RX ADMIN — HYDROXYZINE HYDROCHLORIDE 25 MG: 25 TABLET, FILM COATED ORAL at 20:41

## 2019-04-02 RX ADMIN — CARBAMAZEPINE 200 MG: 200 TABLET ORAL at 09:32

## 2019-04-02 RX ADMIN — BENZTROPINE MESYLATE 1 MG: 1 TABLET ORAL at 09:32

## 2019-04-02 RX ADMIN — BENZTROPINE MESYLATE 1 MG: 1 TABLET ORAL at 20:41

## 2019-04-02 RX ADMIN — NICOTINE POLACRILEX 2 MG: 2 GUM, CHEWING ORAL at 09:33

## 2019-04-02 RX ADMIN — TRAZODONE HYDROCHLORIDE 50 MG: 50 TABLET ORAL at 20:41

## 2019-04-02 RX ADMIN — NAPROXEN 500 MG: 500 TABLET ORAL at 17:09

## 2019-04-02 RX ADMIN — RISPERIDONE 3 MG: 3 TABLET, FILM COATED ORAL at 09:32

## 2019-04-02 RX ADMIN — HYDROXYZINE HYDROCHLORIDE 25 MG: 25 TABLET, FILM COATED ORAL at 17:09

## 2019-04-02 RX ADMIN — CARBAMAZEPINE 200 MG: 200 TABLET ORAL at 20:41

## 2019-04-02 RX ADMIN — NAPROXEN 500 MG: 500 TABLET ORAL at 09:32

## 2019-04-02 ASSESSMENT — PAIN SCALES - GENERAL
PAINLEVEL_OUTOF10: 0
PAINLEVEL_OUTOF10: 0

## 2019-04-02 NOTE — GROUP NOTE
Group Therapy Note    Date: April 2    Group Start Time: 0900  Group End Time: 0915  Group Topic: Community Meeting    RANDELL Lopez [place], [place]    Patient did not participate in Goal Setting / Community Meeting group at 0900 despite staff encouragement.

## 2019-04-02 NOTE — GROUP NOTE
Group Therapy Note    Date: April 1    Group Start Time: 2015  Group End Time: 2100  Group Topic: Wrap-Up/Relaxation    RANDELL Monterroso RN      Notes:    Pt was encouraged to attend evening wrap up group and relaxation group but pt declined. Will continue to encourage pt to attend groups.     Discipline Responsible: Registered Nurse        Signature:  Martin Terrazas RN

## 2019-04-02 NOTE — PLAN OF CARE
Problem: Altered Mood, Psychotic Behavior:  Goal: Able to verbalize reality based thinking  Description  Able to verbalize reality based thinking  4/1/2019 2251 by Caitlyn Kerr LPN  Outcome: Ongoing  Note:   Patient stated to writer that she will be going home on Wednesday; writer asked patient if she was happy about that? Patient stated \"yes. I been here too long, no more baby\"; patient more clear, controlled, cooperative and med compliant; Q 15 minutes and random checks maintained for safety. Problem: Risk of Harm:  Goal: Ability to remain free from injury will improve  Description  Ability to remain free from injury will improve  4/1/2019 2251 by Caitlyn Kerr LPN  Outcome: Ongoing  Note:   Patient visible on unit, in day room for sporadic periods of time;  cooperative, controlled, med compliant; safety checks maintained with every 15 minutes and random checks on parient.

## 2019-04-02 NOTE — GROUP NOTE
Group Therapy Note    Date: April 2    Group Start Time: 1330  Group End Time: 1415  Group Topic: Recreational    STCZ MATTHEW Khanna [place], [place]    Patient did not participate in Leisure Skills + Awareness / Social Skills / Problem Solving group at 1330 despite staff encouragement.

## 2019-04-02 NOTE — PLAN OF CARE
Problem: Altered Mood, Psychotic Behavior:  Goal: Able to verbalize decrease in frequency and intensity of hallucinations  Description  Able to verbalize decrease in frequency and intensity of hallucinations  4/2/2019 1956 by Kamala Quinteros RN  Outcome: Ongoing  Note:   Patient denies hallucinations at this time. Patient frequently yelling in room. Patient refused vital signs this evening. Problem: Altered Mood, Psychotic Behavior:  Goal: Able to verbalize reality based thinking  Description  Able to verbalize reality based thinking  4/2/2019 1956 by Kamala Quinteros RN  Outcome: Ongoing  Note:   Patient denies suicidal and homicidal thoughts. Patient denies auditory and visual hallucinations. Patient is taking meds and eating well. Problem: Risk of Harm:  Goal: Ability to remain free from injury will improve  Description  Ability to remain free from injury will improve  4/2/2019 1956 by Kamala Quinteros RN  Outcome: Ongoing  Note:   No self harm noted this shift. Patient agrees to seek staff out if negative thoughts arise. Will continue to monitor Q15 minute and intermittently.

## 2019-04-02 NOTE — BH NOTE
Psychoeducation Group Note    Date: 4/2  Start Time: 1600  End Time: 1630    Number Participants in Group:  7/15    Goal:  Patient will demonstrate increased interpersonal interaction   Topic:  Coping skills    Discipline Responsible:   OT  AT  [facility].  RT x Other       Participation Level:     None  Minimal   x Active Listener  Interactive    Monopolizing         Participation Quality:  x Appropriate  Inappropriate   x       Attentive        Intrusive          Sharing        Resistant          Supportive        Lethargic       Affective:    Congruent  Incongruent  Blunted  Flat    Constricted  Anxious  Elated  Angry    Labile  Depressed  Other         Cognitive:  x Alert x Oriented PPTP     Concentration x G  F  P   Attention Span x G  F  P   Short-Term Memory x G  F  P   Long-Term Memory x G  F  P   ProblemSolving/  Decision Making x G  F  P   Ability to Process  Information x G  F  P      Contributing Factors             Delusional             Hallucinating             Flight of Ideas             Other:       Modes of Intervention:  x Education  Support  Exploration    Clarifying  Problem Solving  Confrontation   x Socialization  Limit Setting  Reality Testing   x Activity  Movement  Media    Other:            Response to Learning:  x Able to verbalize current knowledge/experience   x Able to verbalize/acknowledge new learning    Able to retain information    Capable of insight    Able to change behavior    Progressing to goal    Other:        Comments:

## 2019-04-02 NOTE — PLAN OF CARE
Psychoeducation Group Note    Date: 4/2/2019  Start Time: 1330  End Time: 3835    Number Participants in Group:  3    Goal:  Patient will demonstrate increased interpersonal interaction. Topic:  Leisure Skills + Awareness / Social Skills / Problem Solving    Discipline Responsible:   OT  AT  [facility]. X RT  Other       Participation Level:     None x Minimal    Active Listener  Interactive    Monopolizing         Participation Quality:  x Appropriate  Inappropriate          Attentive        Intrusive          Sharing        Resistant          Supportive        Lethargic       Affective:   x Congruent  Incongruent  Blunted  Flat    Constricted  Anxious  Elated  Angry    Labile  Depressed  Other  Bright       Cognitive:  x Alert x Oriented PPTP     Concentration  G x F  P   Attention Span  G  F x P   Short-Term Memory  G  F  P   Long-Term Memory  G  F  P   ProblemSolving/  Decision Making  G x F  P   Ability to Process  Information  G  F x P      Contributing Factors             Delusional             Hallucinating             Flight of Ideas             Other:       Modes of Intervention:  x Education x Support x Exploration   x Clarifying x Problem Solving x Confrontation   x Socialization x Limit Setting x Reality Testing   x Activity  Movement  Media    Other:            Response to Learning:  x Able to verbalize current knowledge/experience    Able to verbalize/acknowledge new learning    Able to retain information    Capable of insight    Able to change behavior   x Progressing to goal    Other:        Comments: Patient attended group but had minimal participation due to trouble focusing and being frequently in and out of the group room. Patient moved seats to be on outskirts of group and observed from distance for the last 20 minutes of group.

## 2019-04-02 NOTE — GROUP NOTE
Group Therapy Note    Date: April 2    Group Start Time: 1100  Group End Time: 1130  Group Topic: Cognitive Skills    RANDELL Degroot, ASHLYNS    Pt did not participate in Self Awareness / Positive Thinking / Communication Skills group at 1100 despite staff encouragement.

## 2019-04-02 NOTE — PLAN OF CARE
Problem: Altered Mood, Psychotic Behavior:  Goal: Able to verbalize decrease in frequency and intensity of hallucinations  Description  Able to verbalize decrease in frequency and intensity of hallucinations  4/2/2019 1601 by Kwan Jones RN  Outcome: Ongoing  Note:   Patient refusing to answer most assessment questions, irritable this morning and initially refused medications, but did end up taking them. She responds loudly to internal stimuli, threatens people who are not visible to writer. Allowed vitals but refused physical assessment. Allowed housekeeping to clean her room, reports good appetite and good sleep. Patient safety maintained through Q15 min and random safety checks. Attended and participated in select groups.    Problem: Altered Mood, Psychotic Behavior:  Goal: Able to verbalize reality based thinking  Description  Able to verbalize reality based thinking  4/2/2019 1601 by Kwan Jones RN  Outcome: Ongoing     Problem: Risk of Harm:  Goal: Ability to remain free from injury will improve  Description  Ability to remain free from injury will improve  4/2/2019 1601 by Kwan Jones RN  Outcome: Ongoing

## 2019-04-03 PROCEDURE — 6370000000 HC RX 637 (ALT 250 FOR IP): Performed by: PSYCHIATRY & NEUROLOGY

## 2019-04-03 PROCEDURE — 1240000000 HC EMOTIONAL WELLNESS R&B

## 2019-04-03 RX ORDER — HALOPERIDOL 5 MG/ML
10 INJECTION INTRAMUSCULAR EVERY 6 HOURS PRN
Status: DISCONTINUED | OUTPATIENT
Start: 2019-04-03 | End: 2019-04-10 | Stop reason: HOSPADM

## 2019-04-03 RX ORDER — DIPHENHYDRAMINE HYDROCHLORIDE 50 MG/ML
25 INJECTION INTRAMUSCULAR; INTRAVENOUS EVERY 6 HOURS PRN
Status: DISCONTINUED | OUTPATIENT
Start: 2019-04-03 | End: 2019-04-10 | Stop reason: HOSPADM

## 2019-04-03 RX ADMIN — RISPERIDONE 3 MG: 3 TABLET, FILM COATED ORAL at 20:35

## 2019-04-03 RX ADMIN — NAPROXEN 500 MG: 500 TABLET ORAL at 08:27

## 2019-04-03 RX ADMIN — NICOTINE POLACRILEX 2 MG: 2 GUM, CHEWING ORAL at 17:45

## 2019-04-03 RX ADMIN — RISPERIDONE 3 MG: 3 TABLET, FILM COATED ORAL at 08:27

## 2019-04-03 RX ADMIN — TRAZODONE HYDROCHLORIDE 50 MG: 50 TABLET ORAL at 20:34

## 2019-04-03 RX ADMIN — BENZTROPINE MESYLATE 1 MG: 1 TABLET ORAL at 20:35

## 2019-04-03 RX ADMIN — HYDROXYZINE HYDROCHLORIDE 25 MG: 25 TABLET, FILM COATED ORAL at 08:27

## 2019-04-03 RX ADMIN — NICOTINE POLACRILEX 2 MG: 2 GUM, CHEWING ORAL at 20:55

## 2019-04-03 RX ADMIN — BENZTROPINE MESYLATE 1 MG: 1 TABLET ORAL at 08:27

## 2019-04-03 RX ADMIN — CARBAMAZEPINE 200 MG: 200 TABLET ORAL at 08:27

## 2019-04-03 RX ADMIN — CARBAMAZEPINE 200 MG: 200 TABLET ORAL at 20:35

## 2019-04-03 RX ADMIN — HYDROXYZINE HYDROCHLORIDE 25 MG: 25 TABLET, FILM COATED ORAL at 20:35

## 2019-04-03 RX ADMIN — NICOTINE POLACRILEX 2 MG: 2 GUM, CHEWING ORAL at 12:34

## 2019-04-03 ASSESSMENT — PAIN SCALES - GENERAL: PAINLEVEL_OUTOF10: 4

## 2019-04-03 NOTE — PLAN OF CARE
[address] [place] [address]  Day 3 Interdisciplinary Treatment Plan NOTE    Review Date & Time: 4/3/2019 0950    Admission Type:   Admission Type: Voluntary    Reason for admission:  Reason for Admission: Patient had a vaginal delivery of a baby girl 3/25/19. Voluntarily signed in, did not sign other paperwork. Medications are ordered, verbalized that she will take medications. Refused physical assessment but states everything is \"fine\", denies bleeding at this time. There are disolveable stitches to her sadia area, refused assessment.    Estimated Length of Stay: 5-7 days  Estimated Discharge Date Update: to be determined by physician    PATIENT STRENGTHS:  Patient Strengths Strengths: Communication, Connection to output provider, Medication Compliance, Positive Support  Patient Strengths and Limitations:Limitations: Tendency to isolate self  Addictive Behavior:Addictive Behavior  In the past 3 months, have you felt or has someone told you that you have a problem with:  : None  Do you have a history of Chemical Use?: No  Do you have a history of Alcohol Use?: No  Do you have a history of Street Drug Abuse?: Yes  Histroy of Prescripton Drug Abuse?: No  Medical Problems:  Past Medical History:   Diagnosis Date    Anxiety     Asthma     Bipolar 1 disorder (HCC)     Multiple personality ([facility]     Seizures ([facility] [facility]        Risk:  Fall RiskTotal: 86  Stephen Scale Stephen Scale Score: 21  BVC Total: 1  Change in scores no Changes to plan of Care no    Status EXAM:   Status and Exam  Normal: No  Facial Expression: Exaggerated  Affect: Blunt  Level of Consciousness: Alert  Mood:Normal: No  Mood: Anxious, Irritable, Labile  Motor Activity:Normal: Yes  Motor Activity: Decreased  Interview Behavior: Impulsive, Irritable, Evasive  Preception: [place] to Person, [place] to Place  Attention:Normal: No  Attention: Distractible  Thought Processes: Blocking, Flt.of Ideas  Thought Content:Normal: No  Thought Content: Preoccupations, Poverty of Content  Hallucinations: Auditory (Comment)(responding to internal stimuli )  Delusions: Yes  Delusions: Persecution  Memory:Normal: No  Memory: Poor Recent, Poor Remote  Insight and Judgment: No  Insight and Judgment: Poor Judgment, Poor Insight, Unmotivated, Unrealistic  Present Suicidal Ideation: No  Present Homicidal Ideation: No    Daily Assessment Last Entry:   Daily Sleep (WDL): Within Defined Limits         Patient Currently in Pain: Denies  Daily Nutrition (WDL): Within Defined Limits  Appetite Change: Excessive  Barriers to Nutrition: None  Level of Assistance: Independent/Self    Patient Monitoring:  Frequency of Checks: 4 times per hour, close    Psychiatric Symptoms:   Depression Symptoms  Depression Symptoms: Increased irritability, Impaired concentration  Anxiety Symptoms  Anxiety Symptoms: Generalized  Lora Symptoms  Lora Symptoms: No problems reported or observed. Psychosis Symptoms  Delusion Type: Paranoid    Suicide Risk CSSR-S:  1) Within the past month, have you wished you were dead or wished you could go to sleep and not wake up? : No  2) Have you actually had any thoughts of killing yourself? : No  6) Have you ever done anything, started to do anything, or prepared to do anything to end your life?: No  Change in Result no Change in Plan of care no      EDUCATION:   EDUCATION:   Learner Progress Toward Treatment Goals: Reviewed results and recommendations of this team, Reviewed group plan and strategies, Reviewed signs, symptoms and risk of self harm and violent behavior, Reviewed goals and plan of care    Method:small group, individual verbal education    Outcome:verbalized by patient, but needs reinforcement to obtain goals    PATIENT GOALS:  Short term: Pt states she would like to work on discharge planning  Long term:  [facility]    PLAN/TREATMENT [address]: continue with group therapies, increased socialization, continue planning for after discharge goals, continue with medication compliance    SHORT-TERM GOALS UPDATE:   Time frame for Short-Term Goals: 5-7 days    LONG-TERM GOALS UPDATE:   Time frame for Long-Term Goals: 6 months  Members Present in Team Meeting: See [address], LSW

## 2019-04-03 NOTE — BH NOTE
Patient refused medications this evening. Patient did not want to take them in front of nurse.  When patient informed that nurse needed to see her take them she responded \" you dont need to see shit\" and threw medications back at nurse

## 2019-04-03 NOTE — BH NOTE
PSYCHOEDUCATION GROUP NOTE    Date: 4/3/19  Start Time: 1600  End Time: 1630    Number Participants in Group:  8/15    Goal:  Patient will demonstrate increased interpersonal interaction   Topic: choosing to be positive over negative    Discipline Responsible:   OT  AT   x Nsg.  RT MHP Other       Participation Level:     None  Minimal   x Active Listener  Interactive    Monopolizing         Participation Quality:  x Appropriate  Inappropriate   x       Attentive        Intrusive          Sharing        Resistant          Supportive        Lethargic       Affective:   x Congruent  Incongruent  Blunted  Flat    Constricted  Anxious  Elated  Angry    Labile  Depressed  Other         Cognitive:  x Alert  Oriented PPTP     Concentration  G x F  P   Attention Span  G x F  P   Short-Term Memory  G  F  P   Long-Term Memory  G  F  P   ProblemSolving/  Decision Making  G  F  P   Ability to Process  Information  G  F  P      Contributing Factors             Delusional             Hallucinating             Flight of Ideas             Other:       Modes of Intervention:  x Education  Support  Exploration    Clarifying  Problem Solving  Confrontation   x Socialization  Limit Setting  Reality Testing    Activity  Movement  Media    Other:            Response to Learning:   Able to verbalize current knowledge/experience    Able to verbalize/acknowledge new learning    Able to retain information    Capable of insight    Able to change behavior    Progressing to goal    Other:        Comments:

## 2019-04-03 NOTE — GROUP NOTE
Group Therapy Note    Date: April 3    Group Start Time: 1330  Group End Time: 6704  Group Topic: Cognitive Skills    RANDELL Meza, CTRS    Pt did not attend RT group at 1330 d/t resting in room despite staff invitation to attend.

## 2019-04-03 NOTE — PLAN OF CARE
Problem: Altered Mood, Psychotic Behavior:  Goal: Able to verbalize decrease in frequency and intensity of hallucinations  Description  Able to verbalize decrease in frequency and intensity of hallucinations  4/3/2019 0910 by Joseluis Mai RN  Note:   Patient has been observed responding to internal stimuli this am     Problem: Risk of Harm:  Goal: Ability to remain free from injury will improve  Description  Ability to remain free from injury will improve  4/3/2019 0910 by Joseluis Mai RN  Note:   Patient has not expressed any injury and has not had any observed injury

## 2019-04-04 PROCEDURE — 6370000000 HC RX 637 (ALT 250 FOR IP): Performed by: PSYCHIATRY & NEUROLOGY

## 2019-04-04 PROCEDURE — 1240000000 HC EMOTIONAL WELLNESS R&B

## 2019-04-04 RX ORDER — RISPERIDONE 4 MG/1
4 TABLET, FILM COATED ORAL 2 TIMES DAILY
Status: DISCONTINUED | OUTPATIENT
Start: 2019-04-04 | End: 2019-04-10 | Stop reason: HOSPADM

## 2019-04-04 RX ADMIN — BENZTROPINE MESYLATE 1 MG: 1 TABLET ORAL at 09:32

## 2019-04-04 RX ADMIN — NICOTINE POLACRILEX 2 MG: 2 GUM, CHEWING ORAL at 12:26

## 2019-04-04 RX ADMIN — HYDROXYZINE HYDROCHLORIDE 25 MG: 25 TABLET, FILM COATED ORAL at 09:33

## 2019-04-04 RX ADMIN — NAPROXEN 500 MG: 500 TABLET ORAL at 09:33

## 2019-04-04 RX ADMIN — DIPHENHYDRAMINE HCL 25 MG: 25 TABLET ORAL at 22:44

## 2019-04-04 RX ADMIN — HYDROXYZINE HYDROCHLORIDE 25 MG: 25 TABLET, FILM COATED ORAL at 22:44

## 2019-04-04 RX ADMIN — CARBAMAZEPINE 200 MG: 200 TABLET ORAL at 22:44

## 2019-04-04 RX ADMIN — TRAZODONE HYDROCHLORIDE 50 MG: 50 TABLET ORAL at 22:44

## 2019-04-04 RX ADMIN — CARBAMAZEPINE 200 MG: 200 TABLET ORAL at 09:32

## 2019-04-04 RX ADMIN — NICOTINE POLACRILEX 2 MG: 2 GUM, CHEWING ORAL at 18:57

## 2019-04-04 RX ADMIN — RISPERIDONE 3 MG: 3 TABLET, FILM COATED ORAL at 09:40

## 2019-04-04 RX ADMIN — BENZTROPINE MESYLATE 1 MG: 1 TABLET ORAL at 22:44

## 2019-04-04 RX ADMIN — NAPROXEN 500 MG: 500 TABLET ORAL at 17:23

## 2019-04-04 RX ADMIN — RISPERIDONE 4 MG: 4 TABLET, FILM COATED ORAL at 22:44

## 2019-04-04 RX ADMIN — NICOTINE POLACRILEX 2 MG: 2 GUM, CHEWING ORAL at 18:09

## 2019-04-04 ASSESSMENT — PAIN SCALES - GENERAL
PAINLEVEL_OUTOF10: 5
PAINLEVEL_OUTOF10: 4

## 2019-04-04 NOTE — GROUP NOTE
Group Therapy Note    Date: April 4    Group Start Time: 0900  Group End Time: 0915  Group Topic: Community Meeting    ASHLYN HairS    Pt did not attend [employer] at 0900 d/t resting in room despite staff invitation to attend.

## 2019-04-04 NOTE — PLAN OF CARE
Problem: Altered Mood, Psychotic Behavior:  Goal: Able to verbalize decrease in frequency and intensity of hallucinations  Description  Able to verbalize decrease in frequency and intensity of hallucinations  4/4/2019 1354 by Nichelle Nieves RN  Note:   Patient responding to internal stimuli. [place] multiple times from room and in day room yelling out things like \"boy you better leave me alone, im going to bust your ass\" and no one is in patients room or around her in dayroom. Cooperative with medications this am, Refused physical assessment and vitals. Problem: Altered Mood, Psychotic Behavior:  Goal: Able to verbalize reality based thinking  Description  Able to verbalize reality based thinking  Note:   Patient responding to internal stimuli.  Has moments of clarity, but confused at other times      Problem: Risk of Harm:  Goal: Ability to remain free from injury will improve  Description  Ability to remain free from injury will improve  Note:   Patient has not reported injury this shift and refused full skin inspection

## 2019-04-04 NOTE — PROGRESS NOTES
Patient was supposed to receive Risperdal Consta injection. However, when nurse attempted to give patient medication, she removed the needle from her arm, not receiving the dose of medication. Today, she refutes that she removed the needle, blamed the nurse. She continues to display some level of psychotic symptoms. She continues to display very poor self-care. She was then agitated at our conversation about injection, then threatened to \"cut your mother-fucking jesús off\", again became physically threatening. I feel it is absolutely necessary for patient to get long-acting injection due to long history of noncompliance with oral medications. Guardianship hearing is scheduled for next Monday April 8th. At that time, I can then force injectable with security presence. There is a strong likelihood if I discharge patient prior to injection she will be lost to follow-up and will end up back rehospitalized in a short period of time due to severe level of illness. Charting and medications reviewed. Therapeutic support provided.

## 2019-04-05 PROCEDURE — 6370000000 HC RX 637 (ALT 250 FOR IP): Performed by: PSYCHIATRY & NEUROLOGY

## 2019-04-05 PROCEDURE — 1240000000 HC EMOTIONAL WELLNESS R&B

## 2019-04-05 RX ADMIN — NAPROXEN 500 MG: 500 TABLET ORAL at 09:11

## 2019-04-05 RX ADMIN — HYDROXYZINE HYDROCHLORIDE 25 MG: 25 TABLET, FILM COATED ORAL at 09:40

## 2019-04-05 RX ADMIN — NICOTINE POLACRILEX 2 MG: 2 GUM, CHEWING ORAL at 21:25

## 2019-04-05 RX ADMIN — HYDROXYZINE HYDROCHLORIDE 25 MG: 25 TABLET, FILM COATED ORAL at 21:25

## 2019-04-05 RX ADMIN — RISPERIDONE 4 MG: 4 TABLET, FILM COATED ORAL at 21:25

## 2019-04-05 RX ADMIN — BENZTROPINE MESYLATE 1 MG: 1 TABLET ORAL at 21:25

## 2019-04-05 RX ADMIN — NICOTINE POLACRILEX 2 MG: 2 GUM, CHEWING ORAL at 10:43

## 2019-04-05 RX ADMIN — BENZTROPINE MESYLATE 1 MG: 1 TABLET ORAL at 09:10

## 2019-04-05 RX ADMIN — RISPERIDONE 4 MG: 4 TABLET, FILM COATED ORAL at 09:11

## 2019-04-05 RX ADMIN — CARBAMAZEPINE 200 MG: 200 TABLET ORAL at 09:10

## 2019-04-05 RX ADMIN — DIPHENHYDRAMINE HCL 25 MG: 25 TABLET ORAL at 21:25

## 2019-04-05 RX ADMIN — CARBAMAZEPINE 200 MG: 200 TABLET ORAL at 21:25

## 2019-04-05 ASSESSMENT — PAIN SCALES - GENERAL: PAINLEVEL_OUTOF10: 3

## 2019-04-05 NOTE — GROUP NOTE
Group Therapy Note    Date: April 5    Group Start Time: 0910  Group End Time: 9086  Group Topic: Community Meeting    RANDELL Pierson    Pt did not participate in community meeting/ goals group at 0840 despite staff encouragement to attend.             Signature:  Idalia Pierson

## 2019-04-05 NOTE — GROUP NOTE
Group Therapy Note    Date: April 5    Group Start Time: 3268  Group End Time: 1500  Group Topic: Psychoeducation    RANDELL Menjivar      Group Therapy Note           Patient's Goal:  Patient will demonstrate increased interpersonal interaction     Notes:      Status After Intervention:  Improved    Participation Level:  Active Listener and Minimal    Participation Quality: Appropriate and Attentive      Speech:  normal      Thought Process/Content: Logical      Affective Functioning: Constricted/Restricted      Mood: stable      Level of consciousness:  Alert and Oriented x4      Response to Learning: Progressing to goal      Endings: None Reported    Modes of Intervention: Education, Support, Socialization, Exploration, Clarifying and Activity      Discipline Responsible: Psychoeducational Specialist      Signature:  Marilu Menjivar

## 2019-04-05 NOTE — PLAN OF CARE
Pt is labile irritable at times. Pt does come into reality based thinking long enough to request her medications would take one to two at a time. Selectively mute during assessments. Attempt to build rapport, provide support and reassurance and monitor q 15 mins for safety.

## 2019-04-05 NOTE — GROUP NOTE
Group Therapy Note    Date: April 5    Group Start Time: 1100  Group End Time: 1130  Group Topic: Psychoeducation    ASHLYN SandovalS    Pt did not attend socialization and support skills group at 1100 despite staff invitation to attend.         Signature:  Kel Augustin, [address]

## 2019-04-06 PROCEDURE — 6370000000 HC RX 637 (ALT 250 FOR IP): Performed by: PSYCHIATRY & NEUROLOGY

## 2019-04-06 PROCEDURE — 1240000000 HC EMOTIONAL WELLNESS R&B

## 2019-04-06 RX ADMIN — NICOTINE POLACRILEX 2 MG: 2 GUM, CHEWING ORAL at 10:30

## 2019-04-06 RX ADMIN — NICOTINE POLACRILEX 2 MG: 2 GUM, CHEWING ORAL at 13:04

## 2019-04-06 RX ADMIN — CARBAMAZEPINE 200 MG: 200 TABLET ORAL at 07:53

## 2019-04-06 RX ADMIN — NAPROXEN 500 MG: 500 TABLET ORAL at 17:08

## 2019-04-06 RX ADMIN — NAPROXEN 500 MG: 500 TABLET ORAL at 07:53

## 2019-04-06 RX ADMIN — BENZTROPINE MESYLATE 1 MG: 1 TABLET ORAL at 07:53

## 2019-04-06 RX ADMIN — RISPERIDONE 4 MG: 4 TABLET, FILM COATED ORAL at 07:53

## 2019-04-06 RX ADMIN — NICOTINE POLACRILEX 2 MG: 2 GUM, CHEWING ORAL at 19:07

## 2019-04-06 ASSESSMENT — PAIN SCALES - GENERAL
PAINLEVEL_OUTOF10: 4
PAINLEVEL_OUTOF10: 0
PAINLEVEL_OUTOF10: 4

## 2019-04-06 NOTE — GROUP NOTE
Group Therapy Note    Date: April 6    Group Start Time: 1000  Group End Time: 5354  Group Topic: Psychoeducation    [facility]    ANITA Dawkins LSW    Patient declined to attend Psychoeducation group at 10 am despite encouragement by staff.       Signature:  ANITA Dawkins LSW

## 2019-04-06 NOTE — GROUP NOTE
Group Therapy Note    Date: April 6    Group Start Time: 1600  Group End Time: 9446  Group Topic: Healthy Living/Wellness    [facility]    Shelia Monterroso RN      Group Therapy Note:     This is a Healthy Living/Wellness Group - Topic: EXERCISING. Patients were given hand outs, and the facilitator discussed the Mental Health benefits of Exercise. Also discussed some pointers and strategies they can use to make sure they incorporate exercise in their daily activities. Patient's Goal:  To verbalize the importance of exercising regularly to treat symptoms of mental illnesses. Notes:  Pt attended and participated in Healthy Living/Wellness Group this afternoon. Topic: EXERCISING. Status After Intervention:  Improved     Participation Level:  Active Listener and Interactive     Participation Quality: Appropriate and Attentive     Speech:  normal     Thought Process/Content: Logical     Affective Functioning: Congruent     Mood: euthymic     Level of consciousness:  Alert, Oriented x4 and Attentive     Response to Learning: Able to verbalize/acknowledge new learning and Able to retain information     Endings: None Reported     Modes of Intervention: Education, Support, Socialization, Activity and Movement      Discipline Responsible: Registered Nurse        Signature:  Ayesha Arias RN

## 2019-04-06 NOTE — GROUP NOTE
Group Therapy Note    Date: April 6    Group Start Time: 1430  Group End Time: 1500  Group Topic: Recreational    [facility]    Lisa Bhakta RN      Group Therapy Note  Patient did not attend open rec group therapy          Signature:  Lisa Bhakta RN

## 2019-04-06 NOTE — PLAN OF CARE
Problem: Altered Mood, Psychotic Behavior:  Goal: Able to verbalize decrease in frequency and intensity of hallucinations  Description  Able to verbalize decrease in frequency and intensity of hallucinations  4/5/2019 2135 by Brian Rodriguez RN  Outcome: Ongoing   She will not answer my questions or interact w/me refusing physical assessment. She approaches female nurse & takes HS med's w/out issue & is pleasant w/her. Problem: Risk of Harm:  Goal: Ability to remain free from injury will improve  Description  Ability to remain free from injury will improve  Outcome: Ongoing   She has not tried to harm herself.

## 2019-04-06 NOTE — PLAN OF CARE
Problem: Altered Mood, Psychotic Behavior:  Goal: Able to verbalize decrease in frequency and intensity of hallucinations  Description  Able to verbalize decrease in frequency and intensity of hallucinations  4/6/2019 1120 by Wale Aranda RN  Note:   Patient has been heard in day room and room responding to internal stimuli. Patient denied suicidal and homicidal ideations.

## 2019-04-06 NOTE — GROUP NOTE
Group Therapy Note    Date: April 6    Group Start Time: 1100  Group End Time: 1120  Group Topic: Healthy Living/Wellness discharge planning    [facility]    Opal Connor RN      Group Therapy Note           Patient's Goal:  Be prepared for discharge    Notes:  Patient left group skilled nursing through group    Status After Intervention:  Unchanged    Participation Level: Minimal    Participation Quality: Resistant      Speech:  normal      Thought Process/Content: Delusional  Hallucinating  Flight of ideas      Affective Functioning: Flat      Mood: anxious, depressed and irritable      Level of consciousness:  Alert      Response to Learning: Able to change behavior and Progressing to goal      Endings: None Reported    Modes of Intervention: Education, Support, Socialization, Exploration, Clarifying, Problem-solving and Limit-setting      Discipline Responsible: Registered Nurse      Signature:  Opal Connor RN

## 2019-04-06 NOTE — GROUP NOTE
Group Therapy Note    Date: April 6    Group Start Time: 0905  Group End Time: 0920  Group Topic: Community Meeting    RANDELL Alan [place], [place]    Pt did not participate in Goal Setting / [employer] group at 5650 despite staff encouragement.

## 2019-04-07 PROCEDURE — 6370000000 HC RX 637 (ALT 250 FOR IP): Performed by: PSYCHIATRY & NEUROLOGY

## 2019-04-07 PROCEDURE — 1240000000 HC EMOTIONAL WELLNESS R&B

## 2019-04-07 RX ADMIN — DIPHENHYDRAMINE HCL 25 MG: 25 TABLET ORAL at 20:21

## 2019-04-07 RX ADMIN — CARBAMAZEPINE 200 MG: 200 TABLET ORAL at 20:21

## 2019-04-07 RX ADMIN — HYDROXYZINE HYDROCHLORIDE 25 MG: 25 TABLET, FILM COATED ORAL at 20:21

## 2019-04-07 RX ADMIN — HYDROXYZINE HYDROCHLORIDE 25 MG: 25 TABLET, FILM COATED ORAL at 12:02

## 2019-04-07 RX ADMIN — NICOTINE POLACRILEX 2 MG: 2 GUM, CHEWING ORAL at 12:03

## 2019-04-07 RX ADMIN — CARBAMAZEPINE 200 MG: 200 TABLET ORAL at 12:02

## 2019-04-07 RX ADMIN — BENZTROPINE MESYLATE 1 MG: 1 TABLET ORAL at 20:21

## 2019-04-07 RX ADMIN — BENZTROPINE MESYLATE 1 MG: 1 TABLET ORAL at 12:02

## 2019-04-07 RX ADMIN — RISPERIDONE 4 MG: 4 TABLET, FILM COATED ORAL at 20:21

## 2019-04-07 RX ADMIN — RISPERIDONE 4 MG: 4 TABLET, FILM COATED ORAL at 12:02

## 2019-04-07 RX ADMIN — TRAZODONE HYDROCHLORIDE 50 MG: 50 TABLET ORAL at 20:21

## 2019-04-07 RX ADMIN — NICOTINE POLACRILEX 2 MG: 2 GUM, CHEWING ORAL at 17:15

## 2019-04-07 ASSESSMENT — PAIN SCALES - GENERAL: PAINLEVEL_OUTOF10: 0

## 2019-04-07 NOTE — BH NOTE
Patient refused all bedtime medications this evening despite multiple attempts by staff (3 attempts)

## 2019-04-07 NOTE — GROUP NOTE
Group Therapy Note    Date: April 7    Group Start Time: 1100  Group End Time: 1120  Group Topic: Psychoeducation    [facility]    Joseluis Mai RN      Group Therapy Note    Patient did not participate in 1100 group therapy    Signature:  Joseluis Mai RN

## 2019-04-07 NOTE — GROUP NOTE
Group Therapy Note    Date: April 7    Group Start Time: 1330  Group End Time: 1415  Group Topic: Recreational    [facility]    [employer], CTRS    Pt did not participate in Recreational Therapy group at 1330 despite staff encouragement.

## 2019-04-07 NOTE — GROUP NOTE
Group Therapy Note    Date: April 7    Group Start Time: 1430  Group End Time: 1500  Group Topic: Recreational    [facility]    Albina Singh RN      Group Therapy Note      Patient did not participate in open rec group       Signature:  Albina Singh RN

## 2019-04-07 NOTE — PLAN OF CARE
Problem: Altered Mood, Psychotic Behavior:  Goal: Able to verbalize decrease in frequency and intensity of hallucinations  Description  Able to verbalize decrease in frequency and intensity of hallucinations  4/6/2019 2015 by David Blackwell RN  Outcome: Ongoing  Note:   Patient has been calm, controlled and med complaint. Accepting of 1:1 talk time with staff. 1:1 with pt x ten minutes. Pt encouraged to attend unit programming and interact with peers and staff. Pt also encouraged to tend to hygiene and ADLs. Pt encouraged to discuss feelings with staff and feedback and reassurance provided. Patient refused physical assessment this evening. States, \"I'm fine\" to all questions.      Problem: Altered Mood, Psychotic Behavior:  Goal: Able to verbalize reality based thinking  Description  Able to verbalize reality based thinking  Outcome: Ongoing

## 2019-04-07 NOTE — GROUP NOTE
Group Therapy Note    Date: April 7    Group Start Time: 0910  Group End Time: 0930  Group Topic: Community Meeting    RANDELL Degroot, ASHLYNS    Pt did not participate in Goal Setting / [employer] group at 6661 despite staff encouragement.

## 2019-04-07 NOTE — GROUP NOTE
Group Therapy Note    Date: April 6    Group Start Time: 2020  Group End Time: 2055  Group Topic: Wrap-Up/Relaxation    RANDELL CASTRO G    Shelia Monterroso RN      Group Therapy NoteGroup Therapy Note:  This group covers the Wrap-Up an Relaxation groups. The facilitator encourages patients to discuss about the personal short term goal(s) they set for the day and whether they were able to accomplished them or not. Second part of the group is the discussion of different relaxation techniques. Each patient is encouraged to share their own way of relaxation that works best for them. Notes:  Pt was encouraged to attend evening Wrap-Up and Relaxation groups but pt declined. Will continue to encourage pt to attend groups.     Discipline Responsible: Registered Nurse      Signature:  Lm Zavaleta RN

## 2019-04-07 NOTE — PLAN OF CARE
Problem: Altered Mood, Psychotic Behavior:  Goal: Able to verbalize decrease in frequency and intensity of hallucinations  Description  Able to verbalize decrease in frequency and intensity of hallucinations  Note:   Patient responding to internal stimuli.  Patient denied suicidal and homicidal ideations

## 2019-04-07 NOTE — GROUP NOTE
Group Therapy Note    Date: April 7    Group Start Time: 1000  Group End Time: 0062  Group Topic: Group Therapy    [facility]    Joni Gaona, ANITA, TARIW    Pt declined to attend psychotherapy at 1000 am despite encouragement. Pt offered 1:1 and refused.                Signature:  ANITA Mg, SALLY

## 2019-04-08 PROCEDURE — 6370000000 HC RX 637 (ALT 250 FOR IP): Performed by: PSYCHIATRY & NEUROLOGY

## 2019-04-08 PROCEDURE — 1240000000 HC EMOTIONAL WELLNESS R&B

## 2019-04-08 RX ADMIN — NICOTINE POLACRILEX 2 MG: 2 GUM, CHEWING ORAL at 09:46

## 2019-04-08 RX ADMIN — RISPERIDONE 4 MG: 4 TABLET, FILM COATED ORAL at 08:56

## 2019-04-08 RX ADMIN — CARBAMAZEPINE 200 MG: 200 TABLET ORAL at 20:50

## 2019-04-08 RX ADMIN — RISPERIDONE 4 MG: 4 TABLET, FILM COATED ORAL at 20:50

## 2019-04-08 RX ADMIN — BENZTROPINE MESYLATE 1 MG: 1 TABLET ORAL at 20:50

## 2019-04-08 RX ADMIN — BENZTROPINE MESYLATE 1 MG: 1 TABLET ORAL at 08:56

## 2019-04-08 RX ADMIN — NAPROXEN 500 MG: 500 TABLET ORAL at 08:56

## 2019-04-08 RX ADMIN — NICOTINE POLACRILEX 2 MG: 2 GUM, CHEWING ORAL at 20:50

## 2019-04-08 RX ADMIN — CARBAMAZEPINE 200 MG: 200 TABLET ORAL at 08:56

## 2019-04-08 RX ADMIN — NICOTINE POLACRILEX 2 MG: 2 GUM, CHEWING ORAL at 11:55

## 2019-04-08 ASSESSMENT — PAIN SCALES - GENERAL
PAINLEVEL_OUTOF10: 0
PAINLEVEL_OUTOF10: 3

## 2019-04-08 NOTE — PROGRESS NOTES
Patient displays improvement in behavioral control- she has been out around peers, not interacting in a major away but pleasant. She displays very poor insight into why she is in hospital, her pulling out Consta needle last week, need for injectable. I attempted to explain to patient again that guardianship hearing was happening Monday, and then injectable will be given with security presence to ensure she receives full dose. She denies adverse side effects to medications, has been taking oral meds. Charting and medications reviewed. Therapeutic support provided. Will continue Risperdal, Cogentin, Tegretol.

## 2019-04-08 NOTE — GROUP NOTE
Group Therapy Note    Date: April 8    Group Start Time: 1430  Group End Time: 0052  Group Topic: Cognitive Skills    [facility] Terrell, CTRS        Group Therapy Note  Pt did not attend Therapeutic Recreation at 1430 d/t resting in room despite staff invitation to attend.

## 2019-04-08 NOTE — PROGRESS NOTES
Patient displays improvement in behavioral control- she has been out around peers, not interacting in a major away but pleasant. She displays very poor insight into why she is in hospital, her pulling out Consta needle last week, need for injectable. I attempted to explain to patient again that guardianship hearing was happening today, and then injectable will be given with security presence to ensure she receives full dose. She denies adverse side effects to medications, has been taking oral meds. Charting and medications reviewed. Therapeutic support provided.

## 2019-04-08 NOTE — PLAN OF CARE
Problem: Altered Mood, Psychotic Behavior:  Goal: Able to verbalize decrease in frequency and intensity of hallucinations  Description  Able to verbalize decrease in frequency and intensity of hallucinations  4/7/2019 2024 by David Blackwell RN  Outcome: Ongoing  Note:   Patient aloof of peers out in dayroom coloring. Replies, \"No, I'm fine. \" Refused to answer further questions. Refused assessment. Patient did take evening medications.      Problem: Altered Mood, Psychotic Behavior:  Goal: Able to verbalize reality based thinking  Description  Able to verbalize reality based thinking  Outcome: Ongoing

## 2019-04-08 NOTE — GROUP NOTE
Group Therapy Note    Date: April 8    Group Start Time: 1100  Group End Time: 36  Group Topic: Psychoeducation    [facility] MATTHEW Resendez, ASHLYNS      Group Therapy Note           Patient's Goal:  Increase socialization     Notes:  Patient attended group, minimal participation     Status After Intervention:  Unchanged    Participation Level: Minimal    Participation Quality: Appropriate and Resistant      Speech:  normal      Thought Process/Content: Logical      Affective Functioning: Constricted/Restricted      Mood: euthymic      Level of consciousness:  Alert and Oriented x4      Response to Learning: Progressing to goal      Endings: None Reported    Modes of Intervention: Education, Support, Socialization and Reality-testing      Discipline Responsible: Psychoeducational Specialist      Signature:  Isha Chris

## 2019-04-08 NOTE — GROUP NOTE
Group Therapy Note    Date: April 8    Group Start Time: 0900  Group End Time: 0915  Group Topic: Community Meeting    CARY Norman      Group Therapy Note           Patient's Goal:  Increase socialization     Notes:  Patient attended group and participated appropriately. Status After Intervention:  Improved    Participation Level:  Active Listener and Interactive    Participation Quality: Appropriate and Sharing      Speech:  normal      Thought Process/Content: Logical      Affective Functioning: Constricted/Restricted      Mood: euthymic      Level of consciousness:  Alert and Attentive      Response to Learning: Progressing to goal      Endings: None Reported    Modes of Intervention: Education, Support, Socialization, Clarifying and Reality-testing      Discipline Responsible: Psychoeducational Specialist      Signature:  Gina Cardenas

## 2019-04-08 NOTE — PLAN OF CARE
Problem: Altered Mood, Psychotic Behavior:  Goal: Able to verbalize decrease in frequency and intensity of hallucinations  Outcome: Ongoing     Problem: Altered Mood, Psychotic Behavior:  Goal: Able to verbalize reality based thinking  Outcome: Ongoing     Problem: Risk of Harm:  Goal: Ability to remain free from injury will improve  Outcome: Ongoing     Patient is medication compliant and in behavioral control. Patient has not attended groups or been social with peers in the common areas of the unit. Patient denies suicidal and homicidal ideation. Patient denies auditory and visual hallucinations, has engaged in self talk in the milieu. Patient has not engaged in ADL's. Patient has consumed meals and snacks this shift. Patient has remained free from harm. Every 15 minute and spontaneous safety checks have been maintained.

## 2019-04-08 NOTE — GROUP NOTE
Group Therapy Note    Date: April 7    Group Start Time: 2030  Group End Time: 2100  Group Topic: Relaxation    [facility] Eward Najjar, RN      Group Therapy Note    Patient did not participate in  [address] [place] [address] group,  After invitation given. Patient remain seclusive to self. Every 15 minute checks maintained.

## 2019-04-09 PROCEDURE — 1240000000 HC EMOTIONAL WELLNESS R&B

## 2019-04-09 PROCEDURE — 6360000002 HC RX W HCPCS: Performed by: PSYCHIATRY & NEUROLOGY

## 2019-04-09 PROCEDURE — 6370000000 HC RX 637 (ALT 250 FOR IP): Performed by: PSYCHIATRY & NEUROLOGY

## 2019-04-09 RX ORDER — HYDROXYZINE HYDROCHLORIDE 25 MG/1
25 TABLET, FILM COATED ORAL 2 TIMES DAILY PRN
Qty: 60 TABLET | Refills: 0 | Status: ON HOLD | OUTPATIENT
Start: 2019-04-09 | End: 2019-08-08 | Stop reason: SDUPTHER

## 2019-04-09 RX ORDER — CARBAMAZEPINE 200 MG/1
200 TABLET ORAL 2 TIMES DAILY
Qty: 60 TABLET | Refills: 0 | Status: ON HOLD | OUTPATIENT
Start: 2019-04-09 | End: 2019-08-08 | Stop reason: SDUPTHER

## 2019-04-09 RX ORDER — NAPROXEN 500 MG/1
500 TABLET ORAL 2 TIMES DAILY
Qty: 60 TABLET | Refills: 0 | Status: SHIPPED | OUTPATIENT
Start: 2019-04-09 | End: 2019-07-19

## 2019-04-09 RX ORDER — DOCUSATE SODIUM 100 MG/1
100 CAPSULE, LIQUID FILLED ORAL 2 TIMES DAILY PRN
Qty: 60 CAPSULE | Refills: 0 | Status: ON HOLD | OUTPATIENT
Start: 2019-04-09 | End: 2019-08-08 | Stop reason: SDUPTHER

## 2019-04-09 RX ORDER — TRAZODONE HYDROCHLORIDE 50 MG/1
50 TABLET ORAL NIGHTLY PRN
Qty: 30 TABLET | Refills: 0 | Status: ON HOLD | OUTPATIENT
Start: 2019-04-09 | End: 2019-08-08 | Stop reason: SDUPTHER

## 2019-04-09 RX ORDER — RISPERIDONE 4 MG/1
4 TABLET, FILM COATED ORAL 2 TIMES DAILY
Qty: 60 TABLET | Refills: 0 | Status: ON HOLD | OUTPATIENT
Start: 2019-04-09 | End: 2019-08-08 | Stop reason: SDUPTHER

## 2019-04-09 RX ORDER — BENZTROPINE MESYLATE 1 MG/1
1 TABLET ORAL 2 TIMES DAILY
Qty: 60 TABLET | Refills: 0 | Status: ON HOLD | OUTPATIENT
Start: 2019-04-09 | End: 2019-08-08 | Stop reason: SDUPTHER

## 2019-04-09 RX ADMIN — NAPROXEN 500 MG: 500 TABLET ORAL at 08:45

## 2019-04-09 RX ADMIN — HYDROXYZINE HYDROCHLORIDE 25 MG: 25 TABLET, FILM COATED ORAL at 08:45

## 2019-04-09 RX ADMIN — CARBAMAZEPINE 200 MG: 200 TABLET ORAL at 22:07

## 2019-04-09 RX ADMIN — NICOTINE POLACRILEX 2 MG: 2 GUM, CHEWING ORAL at 19:28

## 2019-04-09 RX ADMIN — RISPERIDONE 4 MG: 4 TABLET, FILM COATED ORAL at 22:07

## 2019-04-09 RX ADMIN — TRAZODONE HYDROCHLORIDE 50 MG: 50 TABLET ORAL at 22:06

## 2019-04-09 RX ADMIN — BENZTROPINE MESYLATE 1 MG: 1 TABLET ORAL at 22:07

## 2019-04-09 RX ADMIN — CARBAMAZEPINE 200 MG: 200 TABLET ORAL at 08:45

## 2019-04-09 RX ADMIN — HYDROXYZINE HYDROCHLORIDE 25 MG: 25 TABLET, FILM COATED ORAL at 17:22

## 2019-04-09 RX ADMIN — BENZTROPINE MESYLATE 1 MG: 1 TABLET ORAL at 08:45

## 2019-04-09 RX ADMIN — NAPROXEN 500 MG: 500 TABLET ORAL at 17:22

## 2019-04-09 RX ADMIN — RISPERIDONE 37.5 MG: KIT at 14:34

## 2019-04-09 RX ADMIN — NICOTINE POLACRILEX 2 MG: 2 GUM, CHEWING ORAL at 08:45

## 2019-04-09 RX ADMIN — NICOTINE POLACRILEX 2 MG: 2 GUM, CHEWING ORAL at 17:23

## 2019-04-09 RX ADMIN — RISPERIDONE 4 MG: 4 TABLET, FILM COATED ORAL at 08:45

## 2019-04-09 ASSESSMENT — PAIN SCALES - GENERAL
PAINLEVEL_OUTOF10: 0
PAINLEVEL_OUTOF10: 0

## 2019-04-09 NOTE — CARE COORDINATION
attempted  to contact pt's sister, Callie Lakhani (). Writer was unable to leave a message as the voice mailbox was full.

## 2019-04-09 NOTE — GROUP NOTE
Group Therapy Note    Date: April 9    Group Start Time: 1100  Group End Time: 1130  Group Topic: Recreational    RANDELL Durham, CTRS        Group Therapy Note    Pt did not attend Therapeutic Recreation at 1100 d/t resting in room despite staff invitation to attend.

## 2019-04-09 NOTE — GROUP NOTE
Group Therapy Note    Date: April 8    Group Start Time: 2005  Group End Time: 2035  Group Topic: Wrap-Up/Relaxation    RANDELL Piedra RN        Group Therapy Note    Attendees: 6/13       Notes:  Pt was encouraged to attend evening Wrap-Up and Relaxation groups but pt declined. Will continue to encourage pt to attend groups.      Discipline Responsible: Registered Nurse        Signature:  Nikita Correa RN

## 2019-04-09 NOTE — PLAN OF CARE
Problem: Altered Mood, Psychotic Behavior:  Goal: Able to verbalize decrease in frequency and intensity of hallucinations  4/9/2019 0045 by Andriy Min RN  Outcome: Ongoing  Note:   Pt denies any hallucinations, but can be observed responding to internal stimuli. Problem: Altered Mood, Psychotic Behavior:  Goal: Able to verbalize reality based thinking  4/9/2019 0045 by Andriy Min RN  Outcome: Ongoing  Note:   Pt not able to verbalize reality based thinking. Pt uncooperative with assessment. Refusing physical assessment and refusing to answer questions. Always saying \"I'm alright. \"  Pt repeatedly asking for candies after she was told multiple times that we do not have candies on the unit. Problem: Risk of Harm:  Goal: Ability to remain free from injury will improve  4/9/2019 0045 by Andriy Min RN  Outcome: Ongoing  Note:   Pt remains free of harm. Safe environment maintained. Q15 minute checks for safety continued per unit policy. Will continue to monitor for safety and provide support and reassurance as needed.

## 2019-04-09 NOTE — GROUP NOTE
Group Therapy Note    Date: April 9    Group Start Time: 0900  Group End Time: 0915  [facility] [address]        Group Therapy Note  Pt did not attend Community Meeting at 0900 d/t resting in room despite staff invitation to attend.

## 2019-04-09 NOTE — CARE COORDINATION
AMANDA spoke with pt sister, Mere Talamantes. Constance Skinner states guardianship went through 4/8/19. Constance Skinner has not spoken with  from children's services since the hospital, but reports they never said pt could not return home with her as she would not be left alone with the baby. Constance Skinner said she would be ready for pt this week if she is able to be d/c.

## 2019-04-09 NOTE — GROUP NOTE
Group Therapy Note    Date: April 9    Group Start Time: 1600  Group End Time: 1630  Group Topic: Healthy Living/Wellness    [facility] A    [facility]        Group Therapy Note           Patient's Goal:  Finding Inspiration    Notes:  Pt refused group despite encouragement to attend    Signature:  [employer]

## 2019-04-09 NOTE — BH NOTE
Patient took Risperdal Consta injection from [facility] staff, took medication without issue, then proceeded to contact her sister to let her know she received her injection and would like to go home.

## 2019-04-10 VITALS
RESPIRATION RATE: 14 BRPM | SYSTOLIC BLOOD PRESSURE: 112 MMHG | DIASTOLIC BLOOD PRESSURE: 57 MMHG | BODY MASS INDEX: 19.77 KG/M2 | OXYGEN SATURATION: 98 % | HEIGHT: 68 IN | TEMPERATURE: 98 F | HEART RATE: 80 BPM

## 2019-04-10 PROCEDURE — 5130000000 HC BRIDGE APPOINTMENT

## 2019-04-10 PROCEDURE — 6370000000 HC RX 637 (ALT 250 FOR IP): Performed by: PSYCHIATRY & NEUROLOGY

## 2019-04-10 RX ADMIN — BENZTROPINE MESYLATE 1 MG: 1 TABLET ORAL at 08:52

## 2019-04-10 RX ADMIN — NAPROXEN 500 MG: 500 TABLET ORAL at 08:52

## 2019-04-10 RX ADMIN — RISPERIDONE 4 MG: 4 TABLET, FILM COATED ORAL at 08:52

## 2019-04-10 RX ADMIN — CARBAMAZEPINE 200 MG: 200 TABLET ORAL at 08:52

## 2019-04-10 ASSESSMENT — PAIN SCALES - GENERAL: PAINLEVEL_OUTOF10: 5

## 2019-04-10 NOTE — GROUP NOTE
Group Therapy Note    Date: April 10    Group Start Time: 1330  Group End Time: 1410  Group Topic: Relaxation    STCZ MATTHEW Thomas, ASHLYNS        Group Therapy Note    Attendees: 6/16    Pt did not attend Therapeutic Recreation at 1330 d/t resting in room despite staff invitation to attend.

## 2019-04-10 NOTE — CARE COORDINATION
Pt sister/guardian was on phone with pt, pt asked SW to speak with writer. Tad Read states pt could be cabbed to [address] [place] [address].

## 2019-04-10 NOTE — PROGRESS NOTES
Patient did receive Risperdal Consta injection earlier today without any difficulty. She denies any adverse side effects to receiving injection. She has been more pleasant, cooperative, interacting appropriately with staff. She continues to display some thought blocking and cognitive disorganization which is baseline for patient. She does appear to be taking better care for herself, is more appropriately dressed. She reports appropriate sleep and appetite and voices readiness for discharge. Charting and medications reviewed. Therapeutic support provided. Plan is for discharge tomorrow for patient to return to sister's residence.

## 2019-04-10 NOTE — CARE COORDINATION
Bridge Appointment completed: Reviewed Discharge Instructions with patient. Patient verbalizes understanding and agreement with the discharge plan using the teachback method. Discharge Arrangements: [place] 4/25/19 2:00pm with Dr Bennie Terry notified: Yes  Discharge destination/address: [address]  Transported by:   Insurance cab

## 2019-04-10 NOTE — PLAN OF CARE
Problem: Altered Mood, Psychotic Behavior:  Goal: Able to verbalize decrease in frequency and intensity of hallucinations  Description  Able to verbalize decrease in frequency and intensity of hallucinations  4/9/2019 2014 by Nav Clayton RN  Outcome: Ongoing  Note:   Pt denies hearing voices or any other hallucinations, but can be observed responding to internal stimuli. Problem: Altered Mood, Psychotic Behavior:  Goal: Able to verbalize reality based thinking  Description  Able to verbalize reality based thinking  4/9/2019 2014 by Nav Clayton RN  Outcome: Ongoing  Note:   Pt not able to verbalize reality based thinking. Pt uncooperative with assessment, continues to refuse physical assessment and refusing to answer questions. Always saying \"I'm alright. \"  However, pt is in better mood this shift. Problem: Risk of Harm:  Goal: Ability to remain free from injury will improve  Description  Ability to remain free from injury will improve  4/9/2019 2014 by Nav Clayton RN  Outcome: Ongoing  Note:   Pt remains free of harm. Safe environment maintained. Q15 minute checks for safety continued per unit policy. Will continue to monitor for safety and provide support and reassurance as needed.

## 2019-04-10 NOTE — GROUP NOTE
Group Therapy Note    Date: April 10    Group Start Time: 1000  Group End Time: 1045  Group Topic: Psychotherapy    SALLY Bahena        Group Therapy Note    Pt declined to attend psychotherapy at 1000 am despite encouragement. Pt offered 1:1 and refused.

## 2019-04-10 NOTE — GROUP NOTE
Group Therapy Note    Date: April 10    Group Start Time: 0900  Group End Time: 0930  Group Topic: Community Meeting    CARY Rubin        Group Therapy Note             Patient's Goal:  Increase socialization     Notes:  Patient attended group and participated     Status After Intervention:  Improved    Participation Level:  Active Listener and Interactive    Participation Quality: Appropriate and Sharing      Speech:  normal      Thought Process/Content: Logical      Affective Functioning: Congruent      Mood: euthymic      Level of consciousness:  Alert and Oriented x4      Response to Learning: Progressing to goal      Endings: None Reported    Modes of Intervention: Education, Support, Socialization and Reality-testing      Discipline Responsible: Psychoeducational Specialist      Signature:  Samir Mayer

## 2019-04-10 NOTE — GROUP NOTE
Group Therapy Note    Date: April 10    Group Start Time: 1430  Group End Time: 1510  Group Topic: Recovery    [facility] G    ANITA Rios LSW    Patient declined to attend Recovery group at 230 pm despite encouragement by staff.         Signature:  ANITA Rios LSW

## 2019-04-10 NOTE — PROGRESS NOTES
CLINICAL PHARMACY NOTE: MEDS TO [address] Select Patient?: Yes  Total # of Prescriptions Filled: 7   The following medications were delivered to the patient:  · Naproxen  · Trazodone  · dok 100  · carbamazepine  · Benztropine  · hydroxazine  · Risperidone  ·   Total # of Interventions Completed: 0  Time Spent (min): 30    Additional Documentation:

## 2019-04-12 NOTE — GROUP NOTE
Group Therapy Note    Date: April 8    Group Start Time: 1000  Group End Time: 6915  Group Topic: Psychotherapy    CZ BHSALLY Hoskins    Pt declined to attend psychotherapy at 1000 am despite encouragement. Pt offered 1:1 and accepted/refused.

## 2019-05-02 NOTE — DISCHARGE SUMMARY
Presenting Evaluation:  Pascal Barthel a 41 y. o. female who was admitted from the ED where she was brought on a pink slip by [facility] due to patient being psychotic, not caring for herself, around 6 months pregnant. She refuses to speak with me today, yells at me to go away. She appears disheveled, poor hygiene, laying in bed all day.        Diagnosis: Schizophrenia    Initially, patient refused to take medications for the large part before baby was born. She remained psychotic, agitated, unable to care for herself. After delivery of baby,I was able to have discussion with patient about potential risks, benefits, side effects, decided to start Risperdal which she took and also received Risperdal Consta prior to discharge. Guardianship hearing was held while patient was in hospital and sister was appointed guardian. She was doing fairly well at the time of discharge and was not in any distress. Thought process was more organized and showed insight into compliance with treatment. Patient had been making rational and realistic plans so she was discharged. Patient had been bright, reactive, and interacting more appropriately with staff and peers. There had been multiple consecutive days without any  safety concerns. Patient was tolerating medication changes without any adverse side effects. She will follow up at [facility].        Medication List      START taking these medications    hydrOXYzine 25 MG tablet  Commonly known as:  ATARAX  Take 1 tablet by mouth 2 times daily as needed for Anxiety  Notes to patient:  For anxiety     risperiDONE 4 MG tablet  Commonly known as:  RISPERDAL  Take 1 tablet by mouth 2 times daily  Notes to patient:  Helps clear thoughts     risperiDONE microspheres 37.5 MG injection  Commonly known as:  RISPERDAL CONSTA  Inject 2 mLs into the muscle every 14 days  Notes to patient:  Helps clear thoughts        CHANGE how you take these medications benztropine 1 MG tablet  Commonly known as:  COGENTIN  Take 1 tablet by mouth 2 times daily  What changed:  when to take this  Notes to patient:  Helps with side effects from medications        CONTINUE taking these medications    carBAMazepine 200 MG tablet  Commonly known as:  TEGRETOL  Take 1 tablet by mouth 2 times daily  Notes to patient:  Helps with mood     docusate sodium 100 MG capsule  Commonly known as:  COLACE  Take 1 capsule by mouth 2 times daily as needed for Constipation  Notes to patient:  For constipation     naproxen 500 MG tablet  Commonly known as:  NAPROSYN  Take 1 tablet by mouth 2 times daily  Notes to patient:  For pain     traZODone 50 MG tablet  Commonly known as:  DESYREL  Take 1 tablet by mouth nightly as needed for Sleep  Notes to patient:  For sleep        STOP taking these medications    ARIPiprazole  MG Susr  Commonly known as:  ABILIFY MAINTENA     lurasidone 80 MG Tabs tablet  Commonly known as:  LATUDA     nicotine polacrilex 2 MG gum  Commonly known as:  NICORETTE     PARoxetine 10 MG tablet  Commonly known as:  PAXIL           Where to Get Your Medications      These medications were sent to [facility] [address] [phone]  [address] [address]    Phone:  [phone]   · benztropine 1 MG tablet  · carBAMazepine 200 MG tablet  · docusate sodium 100 MG capsule  · hydrOXYzine 25 MG tablet  · naproxen 500 MG tablet  · risperiDONE 4 MG tablet  · traZODone 50 MG tablet     Information about where to get these medications is not yet available    Ask your nurse or doctor about these medications  · risperiDONE microspheres 37.5 MG injection         Disposition: Home    Discharge Date: 4/10/2019

## 2019-05-21 ENCOUNTER — TELEPHONE (OUTPATIENT)
Dept: OBGYN CLINIC | Age: 39
End: 2019-05-21

## 2019-05-21 NOTE — TELEPHONE ENCOUNTER
Attempted to call patient to schedule a post partum appt. at [address] [facility] there was no answer so I left a message for patient to call back to schedule.

## 2019-05-23 NOTE — GROUP NOTE
Dysfunctional Uterine Bleeding    Dysfunctional uterine bleeding, also called abnormal uterine bleeding, is a condition in which bleeding is abnormal and occurs at unexpected times of the month. This happens because of changes in the hormones that help control a woman’s menstrual cycle each month.  The bleeding may be heavier or lighter than normal. If you have heavy bleeding often, this can lead to a problem called anemia. With anemia, your red blood cell count is too low. Red blood cells help carry oxygen throughout your body. Severe anemia may cause you to look pale and feel very weak or tired. You might also become short of breath easily.  To treat dysfunctional uterine bleeding, medicines are often tried first. If these don’t help, or if you have additional symptoms or have reached menopause, further testing and treatments may be needed. Discuss all of your options with your provider.  Home care  Medicines  If you’re prescribed medicines, be sure to take them as directed. Some of the more common medicines you may be prescribed include:  · Hormone therapy (Options include most methods of hormonal birth control such as pills, shots, or a hormone-releasing IUD)  · Nonsteroidal anti-inflammatory drugs (NSAIDs), such as ibuprofen  · Iron supplements, if you have anemia     General care  · Get plenty of rest if you tire easily. Avoid heavy exertion.  · To help relieve pain or cramping that may occur with bleeding, try using a heating pad on the lower belly or back. A warm bath may also help.  Follow-up care  Follow up with your healthcare provider, or as directed.  When to seek medical advice  Call your healthcare provider right away if:  · Bleeding becomes heavy (soaking 1 pad or tampon every hour for 3 hours)  · Increased abdominal pain  · Irregular bleeding worsens or does not get better even with treatment  · Fever of 100.4ºF (38ºC) or higher, or as directed by your provider  · Signs of anemia, such as pale  Group Therapy Note    Date: 4/3/2019    Group Start Time: 2030  Group End Time: 2100  Group Topic: Healthy Living/Wellness    RANDELL Granger, GO      Group Therapy Note    Patient participated appropriately in safety group. skin, extreme fatigue or weakness, or shortness of breath  · Dizziness or fainting   Date Last Reviewed: 11/1/2017  © 4271-9318 [employer]. [address] [place] [address], [place] [address]. All rights reserved. This information is not intended as a substitute for professional medical care. Always follow your healthcare professional's instructions.

## 2019-07-19 ENCOUNTER — HOSPITAL ENCOUNTER (INPATIENT)
Age: 39
LOS: 21 days | Discharge: HOME | DRG: 750 | End: 2019-08-09
Attending: EMERGENCY MEDICINE | Admitting: PSYCHIATRY & NEUROLOGY
Payer: MEDICAID

## 2019-07-19 DIAGNOSIS — F25.9 SCHIZOAFFECTIVE DISORDER, UNSPECIFIED TYPE (HCC): Primary | ICD-10-CM

## 2019-07-19 LAB
ABSOLUTE EOS #: 0.04 K/UL (ref 0–0.4)
ABSOLUTE IMMATURE GRANULOCYTE: ABNORMAL K/UL (ref 0–0.3)
ABSOLUTE LYMPH #: 2 K/UL (ref 1–4.8)
ABSOLUTE MONO #: 0.44 K/UL (ref 0.1–1.3)
ACETAMINOPHEN LEVEL: <5 UG/ML (ref 10–30)
ALBUMIN SERPL-MCNC: 4.2 G/DL (ref 3.5–5.2)
ALBUMIN/GLOBULIN RATIO: ABNORMAL (ref 1–2.5)
ALP BLD-CCNC: 106 U/L (ref 35–104)
ALT SERPL-CCNC: 9 U/L (ref 5–33)
ANION GAP SERPL CALCULATED.3IONS-SCNC: 13 MMOL/L (ref 9–17)
AST SERPL-CCNC: 16 U/L
ATYPICAL LYMPHOCYTE ABSOLUTE COUNT: 0.08 K/UL
ATYPICAL LYMPHOCYTES: 2 %
BASOPHILS # BLD: 0 % (ref 0–2)
BASOPHILS ABSOLUTE: 0 K/UL (ref 0–0.2)
BILIRUB SERPL-MCNC: 1.06 MG/DL (ref 0.3–1.2)
BUN BLDV-MCNC: 10 MG/DL (ref 6–20)
BUN/CREAT BLD: ABNORMAL (ref 9–20)
CALCIUM SERPL-MCNC: 9.4 MG/DL (ref 8.6–10.4)
CARBAMAZEPINE DATE LAST DOSE: ABNORMAL
CARBAMAZEPINE DOSE AMOUNT: ABNORMAL
CARBAMAZEPINE DOSE TIME: ABNORMAL
CARBAMAZEPINE LEVEL: <2.5 UG/ML (ref 4–12)
CHLORIDE BLD-SCNC: 103 MMOL/L (ref 98–107)
CO2: 22 MMOL/L (ref 20–31)
CREAT SERPL-MCNC: 0.83 MG/DL (ref 0.5–0.9)
DIFFERENTIAL TYPE: ABNORMAL
EOSINOPHILS RELATIVE PERCENT: 1 % (ref 0–4)
ETHANOL PERCENT: <0.01 %
ETHANOL: <10 MG/DL
GFR AFRICAN AMERICAN: >60 ML/MIN
GFR NON-AFRICAN AMERICAN: >60 ML/MIN
GFR SERPL CREATININE-BSD FRML MDRD: ABNORMAL ML/MIN/{1.73_M2}
GFR SERPL CREATININE-BSD FRML MDRD: ABNORMAL ML/MIN/{1.73_M2}
GLUCOSE BLD-MCNC: 97 MG/DL (ref 70–99)
HCT VFR BLD CALC: 43.4 % (ref 36–46)
HEMOGLOBIN: 14.3 G/DL (ref 12–16)
IMMATURE GRANULOCYTES: ABNORMAL %
LYMPHOCYTES # BLD: 50 % (ref 24–44)
MCH RBC QN AUTO: 32.8 PG (ref 26–34)
MCHC RBC AUTO-ENTMCNC: 33 G/DL (ref 31–37)
MCV RBC AUTO: 99.5 FL (ref 80–100)
MONOCYTES # BLD: 11 % (ref 1–7)
MORPHOLOGY: NORMAL
NRBC AUTOMATED: ABNORMAL PER 100 WBC
PDW BLD-RTO: 13.2 % (ref 11.5–14.9)
PLATELET # BLD: 336 K/UL (ref 150–450)
PLATELET ESTIMATE: ABNORMAL
PMV BLD AUTO: 7.2 FL (ref 6–12)
POTASSIUM SERPL-SCNC: 3.7 MMOL/L (ref 3.7–5.3)
RBC # BLD: 4.37 M/UL (ref 4–5.2)
RBC # BLD: ABNORMAL 10*6/UL
SALICYLATE LEVEL: <1 MG/DL (ref 3–10)
SEG NEUTROPHILS: 36 % (ref 36–66)
SEGMENTED NEUTROPHILS ABSOLUTE COUNT: 1.44 K/UL (ref 1.3–9.1)
SODIUM BLD-SCNC: 138 MMOL/L (ref 135–144)
TOTAL PROTEIN: 8.2 G/DL (ref 6.4–8.3)
TOXIC TRICYCLIC SC,BLOOD: ABNORMAL
WBC # BLD: 4 K/UL (ref 3.5–11)
WBC # BLD: ABNORMAL 10*3/UL

## 2019-07-19 PROCEDURE — 99285 EMERGENCY DEPT VISIT HI MDM: CPT

## 2019-07-19 PROCEDURE — 80053 COMPREHEN METABOLIC PANEL: CPT

## 2019-07-19 PROCEDURE — 80307 DRUG TEST PRSMV CHEM ANLYZR: CPT

## 2019-07-19 PROCEDURE — 80156 ASSAY CARBAMAZEPINE TOTAL: CPT

## 2019-07-19 PROCEDURE — G0480 DRUG TEST DEF 1-7 CLASSES: HCPCS

## 2019-07-19 PROCEDURE — 36415 COLL VENOUS BLD VENIPUNCTURE: CPT

## 2019-07-19 PROCEDURE — 1240000000 HC EMOTIONAL WELLNESS R&B

## 2019-07-19 PROCEDURE — 85025 COMPLETE CBC W/AUTO DIFF WBC: CPT

## 2019-07-19 RX ORDER — MAGNESIUM HYDROXIDE/ALUMINUM HYDROXICE/SIMETHICONE 120; 1200; 1200 MG/30ML; MG/30ML; MG/30ML
30 SUSPENSION ORAL PRN
Status: DISCONTINUED | OUTPATIENT
Start: 2019-07-19 | End: 2019-08-09 | Stop reason: HOSPADM

## 2019-07-19 RX ORDER — RISPERIDONE 1 MG/1
1 TABLET, FILM COATED ORAL 2 TIMES DAILY
Status: DISCONTINUED | OUTPATIENT
Start: 2019-07-19 | End: 2019-07-22

## 2019-07-19 RX ORDER — DOCUSATE SODIUM 100 MG/1
100 CAPSULE, LIQUID FILLED ORAL 2 TIMES DAILY PRN
Status: DISCONTINUED | OUTPATIENT
Start: 2019-07-19 | End: 2019-08-09 | Stop reason: HOSPADM

## 2019-07-19 RX ORDER — ACETAMINOPHEN 325 MG/1
650 TABLET ORAL EVERY 4 HOURS PRN
Status: DISCONTINUED | OUTPATIENT
Start: 2019-07-19 | End: 2019-08-09 | Stop reason: HOSPADM

## 2019-07-19 RX ORDER — HYDROXYZINE HYDROCHLORIDE 25 MG/1
25 TABLET, FILM COATED ORAL 2 TIMES DAILY PRN
Status: DISCONTINUED | OUTPATIENT
Start: 2019-07-19 | End: 2019-08-09 | Stop reason: HOSPADM

## 2019-07-19 RX ORDER — BENZTROPINE MESYLATE 1 MG/ML
2 INJECTION INTRAMUSCULAR; INTRAVENOUS 2 TIMES DAILY PRN
Status: DISCONTINUED | OUTPATIENT
Start: 2019-07-19 | End: 2019-08-09 | Stop reason: HOSPADM

## 2019-07-19 RX ORDER — TRAZODONE HYDROCHLORIDE 50 MG/1
50 TABLET ORAL NIGHTLY PRN
Status: DISCONTINUED | OUTPATIENT
Start: 2019-07-19 | End: 2019-08-09 | Stop reason: HOSPADM

## 2019-07-19 RX ORDER — DIPHENHYDRAMINE HCL 25 MG
25 TABLET ORAL NIGHTLY PRN
Status: DISCONTINUED | OUTPATIENT
Start: 2019-07-19 | End: 2019-08-09 | Stop reason: HOSPADM

## 2019-07-19 RX ORDER — BENZTROPINE MESYLATE 1 MG/1
1 TABLET ORAL 2 TIMES DAILY
Status: DISCONTINUED | OUTPATIENT
Start: 2019-07-19 | End: 2019-08-09 | Stop reason: HOSPADM

## 2019-07-19 RX ORDER — CARBAMAZEPINE 200 MG/1
200 TABLET ORAL 2 TIMES DAILY
Status: DISCONTINUED | OUTPATIENT
Start: 2019-07-19 | End: 2019-08-09 | Stop reason: HOSPADM

## 2019-07-19 ASSESSMENT — SLEEP AND FATIGUE QUESTIONNAIRES
DO YOU HAVE DIFFICULTY SLEEPING: YES
DIFFICULTY FALLING ASLEEP: YES
SLEEP PATTERN: DIFFICULTY FALLING ASLEEP;DISTURBED/INTERRUPTED SLEEP;RESTLESSNESS
DIFFICULTY STAYING ASLEEP: YES
DO YOU USE A SLEEP AID: NO
AVERAGE NUMBER OF SLEEP HOURS: 5
DIFFICULTY ARISING: NO
RESTFUL SLEEP: NO

## 2019-07-19 ASSESSMENT — LIFESTYLE VARIABLES: HISTORY_ALCOHOL_USE: NO

## 2019-07-19 ASSESSMENT — PAIN DESCRIPTION - LOCATION: LOCATION: NECK

## 2019-07-19 ASSESSMENT — PAIN DESCRIPTION - DESCRIPTORS: DESCRIPTORS: SORE

## 2019-07-19 ASSESSMENT — ENCOUNTER SYMPTOMS
ABDOMINAL PAIN: 0
SHORTNESS OF BREATH: 0
COUGH: 0

## 2019-07-19 ASSESSMENT — PAIN SCALES - GENERAL
PAINLEVEL_OUTOF10: 0
PAINLEVEL_OUTOF10: 5

## 2019-07-19 ASSESSMENT — PAIN DESCRIPTION - PAIN TYPE: TYPE: CHRONIC PAIN

## 2019-07-19 NOTE — ED NOTES
Patient reached food tray. Patient currently sitting on in chair watching TV. Patient cooperative with staff. Safeguard in line of sight.

## 2019-07-20 PROCEDURE — 1240000000 HC EMOTIONAL WELLNESS R&B

## 2019-07-20 RX ORDER — DIPHENHYDRAMINE HYDROCHLORIDE 50 MG/ML
50 INJECTION INTRAMUSCULAR; INTRAVENOUS EVERY 6 HOURS PRN
Status: DISCONTINUED | OUTPATIENT
Start: 2019-07-20 | End: 2019-08-09 | Stop reason: HOSPADM

## 2019-07-20 RX ORDER — HALOPERIDOL 5 MG/ML
10 INJECTION INTRAMUSCULAR EVERY 6 HOURS PRN
Status: DISCONTINUED | OUTPATIENT
Start: 2019-07-20 | End: 2019-08-09 | Stop reason: HOSPADM

## 2019-07-20 NOTE — PROGRESS NOTES
`[facility]  Admission Note     Admission Type:   Admission Type:  Involuntary    Reason for admission:  Reason for Admission: Homicidal ideations and threatening physical harm; actively responding to internal stimuli, paranoid, delusional.     PATIENT STRENGTHS:  Strengths: No significant Physical Illness, Positive Support    Patient Strengths and Limitations:  Limitations: Inappropriate/potentially harmful leisure interests, Difficult relationships / poor social skills    Addictive Behavior:   Addictive Behavior  In the past 3 months, have you felt or has someone told you that you have a problem with:  : None  Do you have a history of Chemical Use?: No  Do you have a history of Alcohol Use?: No  Do you have a history of Street Drug Abuse?: No  Histroy of Prescripton Drug Abuse?: No    Medical Problems:   Past Medical History:   Diagnosis Date    Anxiety     Asthma     [facility]     [facility]     Seizures ([facility]        Status EXAM:  Status and Exam  Normal: No  Facial Expression: Avoids Gaze, Hostile  Affect: Unstable  Level of Consciousness: Alert  Mood:Normal: No  Mood: Depressed, Irritable, Anxious  Motor Activity:Normal: Yes  Interview Behavior: Irritable, Uncooperative/Withdrawn  Preception: [place] to Person, [place] to Place  Attention:Normal: No  Attention: Distractible, Unable to Concentrate  Thought Processes: Tangential  Thought Content:Normal: No  Thought Content: Poverty of Content, Paranoia  Hallucinations: None(pt denies but actively responding)  Delusions: No  Memory:Normal: No  Memory: Poor Recent, Poor Remote  Insight and Judgment: No  Insight and Judgment: Poor Judgment, Poor Insight, Unrealistic  Present Suicidal Ideation: No  Present Homicidal Ideation: Yes(towards family members and )    Tobacco Screening:  Practical Counseling, on admission, soren X, if applicable and completed (first 3 are required if patient doesn't refuse):

## 2019-07-20 NOTE — PROGRESS NOTES
Psychiatric Admission Note         Desiree Morris is a 41 y.o. female with Schizoaffective disorder admitted on a pink slip by staff from outpatient [facility] due to worsening psychosis preventing her from caring for herself. She has reportedly been off medications for at least the past month, has been AWOL from sister(guardian)'s place of living has reportedly been sleeping on park benches, urinating on her self, actively responding to internal stimuli. When family have tried to help her, she has threatened violence to them and yelling homicidal threats to family members. Patient is floridly psychotic with extremely disorganized thought process. She is unable to put words together to form coherent responses to any questions. She is angry, labile, yells profanities. Of note, patient now has a guardian which was appointed by the court in March when she was in the hospital for months while pregnant, unaccepting of medications. Allergies:  Bee venom; Beeswax; and Wasp venom protein     History of Substance Abuse     She does have a history of drug use, she is unable to answer at this time    Past Psychiatric History     Patient has reportedly been noncompliant with meds/appts for at least a month. Extensive history of psychiatric inpatient hospitalizations. I am unware of any history of suicide attempts. Family History of psychiatric disorders    Family history: positive for psychosis      Medical History     Past Medical History:   Diagnosis Date    Anxiety     Asthma     [facility]     [facility]     Seizures ([facility]         Mental Status  Pt. was in bed, uncooperative, angry. Appearance and hygiene weredisheveled, poor hygiene, has a very strong body odor. . Mood was angry. Affect was labile, inappropriately animated Thought process was disorganized. Patient is actively responding to internal stimuli.     Patient has been expressing homicidal threats to family and staff

## 2019-07-21 PROCEDURE — 1240000000 HC EMOTIONAL WELLNESS R&B

## 2019-07-21 NOTE — GROUP NOTE
Group Therapy Note    Date: July 21    Group Start Time: 1330  Group End Time: 8696  Group Topic: cognitive skills    RANDELL Galvez, CTRS    Pt did not attend 1330 cognitive skills group d/t resting in room despite staff invitation to attend. talk time offered as alternative to group session, pt declined.           Signature:  Carlie Sotelo

## 2019-07-22 PROCEDURE — 1240000000 HC EMOTIONAL WELLNESS R&B

## 2019-07-22 RX ORDER — RISPERIDONE 2 MG/1
2 TABLET, FILM COATED ORAL 2 TIMES DAILY
Status: DISCONTINUED | OUTPATIENT
Start: 2019-07-22 | End: 2019-07-24

## 2019-07-22 RX ORDER — HALOPERIDOL 5 MG/ML
10 INJECTION INTRAMUSCULAR 2 TIMES DAILY
Status: DISCONTINUED | OUTPATIENT
Start: 2019-07-22 | End: 2019-07-24

## 2019-07-22 NOTE — GROUP NOTE
Group Therapy Note    Date: July 22    Group Start Time: 1100  Group End Time: 7621  Group Topic: Psychotherapy    [facility], MSW, LSW        Group Therapy Note    Attendees: 6/11           Pt declined to attend psychotherapy at 1100 am despite encouragement. Pt offered 1:1 and refused.

## 2019-07-22 NOTE — PROGRESS NOTES
Charting done this shift reviewed by Electronically signed by Laurel Aguilar RN on 7/22/2019 at 2:18 AM

## 2019-07-23 PROCEDURE — 1240000000 HC EMOTIONAL WELLNESS R&B

## 2019-07-23 PROCEDURE — 6370000000 HC RX 637 (ALT 250 FOR IP): Performed by: PSYCHIATRY & NEUROLOGY

## 2019-07-23 RX ADMIN — RISPERIDONE 2 MG: 2 TABLET ORAL at 20:49

## 2019-07-23 RX ADMIN — BENZTROPINE MESYLATE 1 MG: 1 TABLET ORAL at 08:55

## 2019-07-23 RX ADMIN — CARBAMAZEPINE 200 MG: 200 TABLET ORAL at 08:55

## 2019-07-23 RX ADMIN — CARBAMAZEPINE 200 MG: 200 TABLET ORAL at 20:49

## 2019-07-23 RX ADMIN — RISPERIDONE 2 MG: 2 TABLET ORAL at 08:55

## 2019-07-23 RX ADMIN — NICOTINE POLACRILEX 2 MG: 2 GUM, CHEWING BUCCAL at 16:17

## 2019-07-23 RX ADMIN — BENZTROPINE MESYLATE 1 MG: 1 TABLET ORAL at 20:49

## 2019-07-23 NOTE — BH NOTE
Staff and writer encouraged patient to shower due to poor ADL's, poor hygiene. Patient did shower, room cleaned by writer, staff, and Housekeeping.

## 2019-07-24 PROCEDURE — 6370000000 HC RX 637 (ALT 250 FOR IP): Performed by: PSYCHIATRY & NEUROLOGY

## 2019-07-24 PROCEDURE — 1240000000 HC EMOTIONAL WELLNESS R&B

## 2019-07-24 RX ORDER — RISPERIDONE 3 MG/1
3 TABLET, FILM COATED ORAL 2 TIMES DAILY
Status: DISCONTINUED | OUTPATIENT
Start: 2019-07-24 | End: 2019-07-28

## 2019-07-24 RX ORDER — CALCIUM CARBONATE 200(500)MG
500 TABLET,CHEWABLE ORAL 3 TIMES DAILY PRN
Status: DISCONTINUED | OUTPATIENT
Start: 2019-07-24 | End: 2019-08-09 | Stop reason: HOSPADM

## 2019-07-24 RX ORDER — HALOPERIDOL 5 MG/ML
10 INJECTION INTRAMUSCULAR 2 TIMES DAILY
Status: DISCONTINUED | OUTPATIENT
Start: 2019-07-24 | End: 2019-07-28

## 2019-07-24 RX ADMIN — NICOTINE POLACRILEX 2 MG: 2 GUM, CHEWING BUCCAL at 15:37

## 2019-07-24 RX ADMIN — NICOTINE POLACRILEX 2 MG: 2 GUM, CHEWING BUCCAL at 17:54

## 2019-07-24 RX ADMIN — RISPERIDONE 2 MG: 2 TABLET ORAL at 07:38

## 2019-07-24 RX ADMIN — NICOTINE POLACRILEX 2 MG: 2 GUM, CHEWING BUCCAL at 20:54

## 2019-07-24 RX ADMIN — RISPERIDONE 3 MG: 3 TABLET ORAL at 21:11

## 2019-07-24 RX ADMIN — CARBAMAZEPINE 200 MG: 200 TABLET ORAL at 07:38

## 2019-07-24 RX ADMIN — DIPHENHYDRAMINE HCL 25 MG: 25 TABLET ORAL at 21:11

## 2019-07-24 RX ADMIN — BENZTROPINE MESYLATE 1 MG: 1 TABLET ORAL at 07:38

## 2019-07-24 RX ADMIN — TRAZODONE HYDROCHLORIDE 50 MG: 50 TABLET ORAL at 21:11

## 2019-07-24 RX ADMIN — HYDROXYZINE HYDROCHLORIDE 25 MG: 25 TABLET, FILM COATED ORAL at 21:11

## 2019-07-24 RX ADMIN — BENZTROPINE MESYLATE 1 MG: 1 TABLET ORAL at 21:11

## 2019-07-24 RX ADMIN — CARBAMAZEPINE 200 MG: 200 TABLET ORAL at 21:11

## 2019-07-24 NOTE — GROUP NOTE
Group Therapy Note    Date: July 24    Group Start Time: 0900  Group End Time: 6282  Group Topic: Community Meeting    CARY Elizalde    Patient's Goal:  Identify daily goal, review unit schedule and expectations, demonstrate increased interpersonal interaction. Notes:  Pt developed daily goal to stay up and out of bed but stay to self. Status After Intervention:  Improved    Participation Level:  Active Listener and Interactive    Participation Quality: Appropriate, Attentive and Sharing    Speech:  normal    Thought Process/Content: Logical    Affective Functioning: Congruent    Level of consciousness:  Alert, Oriented x4 and Attentive    Response to Learning: Able to verbalize current knowledge/experience, Able to verbalize/acknowledge new learning, Able to retain information, Capable of insight and Progressing to goal    Endings: None Reported    Modes of Intervention: Education, Socialization, Exploration, Clarifying, Problem-solving and Activity    Discipline Responsible: Psychoeducational Specialist    Signature:  Romi Cabrera, [address]

## 2019-07-25 PROCEDURE — 6360000002 HC RX W HCPCS: Performed by: PSYCHIATRY & NEUROLOGY

## 2019-07-25 PROCEDURE — 6370000000 HC RX 637 (ALT 250 FOR IP): Performed by: PSYCHIATRY & NEUROLOGY

## 2019-07-25 PROCEDURE — 1240000000 HC EMOTIONAL WELLNESS R&B

## 2019-07-25 RX ADMIN — BENZTROPINE MESYLATE 1 MG: 1 TABLET ORAL at 21:05

## 2019-07-25 RX ADMIN — RISPERIDONE 50 MG: KIT at 16:10

## 2019-07-25 RX ADMIN — BENZTROPINE MESYLATE 1 MG: 1 TABLET ORAL at 08:10

## 2019-07-25 RX ADMIN — CARBAMAZEPINE 200 MG: 200 TABLET ORAL at 08:10

## 2019-07-25 RX ADMIN — TRAZODONE HYDROCHLORIDE 50 MG: 50 TABLET ORAL at 21:05

## 2019-07-25 RX ADMIN — DIPHENHYDRAMINE HCL 25 MG: 25 TABLET ORAL at 21:05

## 2019-07-25 RX ADMIN — CARBAMAZEPINE 200 MG: 200 TABLET ORAL at 21:05

## 2019-07-25 RX ADMIN — NICOTINE POLACRILEX 2 MG: 2 GUM, CHEWING BUCCAL at 12:13

## 2019-07-25 RX ADMIN — HYDROXYZINE HYDROCHLORIDE 25 MG: 25 TABLET, FILM COATED ORAL at 21:05

## 2019-07-25 RX ADMIN — RISPERIDONE 3 MG: 3 TABLET ORAL at 08:13

## 2019-07-25 RX ADMIN — NICOTINE POLACRILEX 2 MG: 2 GUM, CHEWING BUCCAL at 17:54

## 2019-07-25 RX ADMIN — HALOPERIDOL LACTATE 10 MG: 5 INJECTION INTRAMUSCULAR at 21:03

## 2019-07-26 LAB
AMPHETAMINE SCREEN URINE: NEGATIVE
BARBITURATE SCREEN URINE: NEGATIVE
BENZODIAZEPINE SCREEN, URINE: NEGATIVE
BILIRUBIN URINE: NEGATIVE
BUPRENORPHINE URINE: NORMAL
CANNABINOID SCREEN URINE: NEGATIVE
COCAINE METABOLITE, URINE: NEGATIVE
COLOR: YELLOW
COMMENT UA: NORMAL
GLUCOSE URINE: NEGATIVE
HCG(URINE) PREGNANCY TEST: NEGATIVE
KETONES, URINE: NEGATIVE
LEUKOCYTE ESTERASE, URINE: NEGATIVE
MDMA URINE: NORMAL
METHADONE SCREEN, URINE: NEGATIVE
METHAMPHETAMINE, URINE: NORMAL
NITRITE, URINE: NEGATIVE
OPIATES, URINE: NEGATIVE
OXYCODONE SCREEN URINE: NEGATIVE
PH UA: 7 (ref 5–8)
PHENCYCLIDINE, URINE: NEGATIVE
PROPOXYPHENE, URINE: NORMAL
PROTEIN UA: NEGATIVE
SPECIFIC GRAVITY UA: 1 (ref 1–1.03)
TEST INFORMATION: NORMAL
TRICYCLIC ANTIDEPRESSANTS, UR: NORMAL
TURBIDITY: CLEAR
URINE HGB: NEGATIVE
UROBILINOGEN, URINE: NORMAL

## 2019-07-26 PROCEDURE — 1240000000 HC EMOTIONAL WELLNESS R&B

## 2019-07-26 PROCEDURE — 81003 URINALYSIS AUTO W/O SCOPE: CPT

## 2019-07-26 PROCEDURE — 80307 DRUG TEST PRSMV CHEM ANLYZR: CPT

## 2019-07-26 PROCEDURE — 6370000000 HC RX 637 (ALT 250 FOR IP): Performed by: PSYCHIATRY & NEUROLOGY

## 2019-07-26 PROCEDURE — 81025 URINE PREGNANCY TEST: CPT

## 2019-07-26 RX ADMIN — CARBAMAZEPINE 200 MG: 200 TABLET ORAL at 21:51

## 2019-07-26 RX ADMIN — RISPERIDONE 3 MG: 3 TABLET ORAL at 21:51

## 2019-07-26 RX ADMIN — NICOTINE POLACRILEX 2 MG: 2 GUM, CHEWING BUCCAL at 18:24

## 2019-07-26 RX ADMIN — CARBAMAZEPINE 200 MG: 200 TABLET ORAL at 08:25

## 2019-07-26 RX ADMIN — RISPERIDONE 3 MG: 3 TABLET ORAL at 08:26

## 2019-07-26 RX ADMIN — HYDROXYZINE HYDROCHLORIDE 25 MG: 25 TABLET, FILM COATED ORAL at 21:51

## 2019-07-26 RX ADMIN — TRAZODONE HYDROCHLORIDE 50 MG: 50 TABLET ORAL at 21:51

## 2019-07-26 RX ADMIN — NICOTINE POLACRILEX 2 MG: 2 GUM, CHEWING BUCCAL at 12:17

## 2019-07-26 RX ADMIN — BENZTROPINE MESYLATE 1 MG: 1 TABLET ORAL at 21:51

## 2019-07-26 RX ADMIN — BENZTROPINE MESYLATE 1 MG: 1 TABLET ORAL at 08:25

## 2019-07-26 NOTE — GROUP NOTE
Group Therapy Note    Date: July 26    Group Start Time: 0845  Group End Time: 0915  Group Topic: [address] CTRS      Pt did not attend Therapeutic Recreation d/t resting in room despite staff invitation to attend. 1:1 talk time offered as alternative to group session, pt declined.

## 2019-07-27 PROCEDURE — 1240000000 HC EMOTIONAL WELLNESS R&B

## 2019-07-27 PROCEDURE — 6370000000 HC RX 637 (ALT 250 FOR IP): Performed by: PSYCHIATRY & NEUROLOGY

## 2019-07-27 RX ADMIN — DIPHENHYDRAMINE HCL 25 MG: 25 TABLET ORAL at 21:18

## 2019-07-27 RX ADMIN — NICOTINE POLACRILEX 2 MG: 2 GUM, CHEWING BUCCAL at 07:13

## 2019-07-27 RX ADMIN — HYDROXYZINE HYDROCHLORIDE 25 MG: 25 TABLET, FILM COATED ORAL at 21:19

## 2019-07-27 RX ADMIN — BENZTROPINE MESYLATE 1 MG: 1 TABLET ORAL at 08:10

## 2019-07-27 RX ADMIN — RISPERIDONE 3 MG: 3 TABLET ORAL at 08:10

## 2019-07-27 RX ADMIN — BENZTROPINE MESYLATE 1 MG: 1 TABLET ORAL at 21:18

## 2019-07-27 RX ADMIN — CARBAMAZEPINE 200 MG: 200 TABLET ORAL at 08:10

## 2019-07-27 RX ADMIN — TRAZODONE HYDROCHLORIDE 50 MG: 50 TABLET ORAL at 21:19

## 2019-07-27 RX ADMIN — CARBAMAZEPINE 200 MG: 200 TABLET ORAL at 21:19

## 2019-07-27 RX ADMIN — NICOTINE POLACRILEX 2 MG: 2 GUM, CHEWING BUCCAL at 12:15

## 2019-07-27 RX ADMIN — RISPERIDONE 3 MG: 3 TABLET ORAL at 21:19

## 2019-07-27 RX ADMIN — NICOTINE POLACRILEX 2 MG: 2 GUM, CHEWING BUCCAL at 08:10

## 2019-07-27 RX ADMIN — NICOTINE POLACRILEX 2 MG: 2 GUM, CHEWING BUCCAL at 18:18

## 2019-07-27 NOTE — GROUP NOTE
Group Therapy Note    Date: July 27    Group Start Time: 1100  Group End Time: 9048  Group Topic: Psychotherapy    RANDELL Nguyen, SALLY        Group Therapy Note    Pt declined to attend psychotherapy at 1000 am despite encouragement. Pt offered 1:1 and refused.

## 2019-07-27 NOTE — GROUP NOTE
Group Therapy Note    Date: July 27    Group Start Time: 1330  Group End Time: 1415  Group Topic: Cognitive Skills    RANDELL DOVE    [place], [place]    Attendees: 12         Patient's Goal:  To demonstrate increased interpersonal skills. Notes:  Patient was only present in group for the first five minutes before leaving the room and not returning.      Status After Intervention:  Unchanged    Participation Level: None    Participation Quality: Resistant      Speech:  normal      Thought Process/Content: N/A      Affective Functioning: Constricted/Restricted      Mood: euthymic      Level of consciousness:  Alert and Inattentive      Response to Learning: Resistant      Endings: None Reported       Modes of Intervention: Socialization, Exploration, Clarifying, Problem-solving, Activity, Confrontation, Limit-setting and Reality-testing      Discipline Responsible: Psychoeducational Specialist      Signature:  Marla Elaine

## 2019-07-27 NOTE — GROUP NOTE
Group Therapy Note    Date: July 27    Group Start Time: 0845  Group End Time: 0910  Group Topic: Community Meeting    RANDELL Shea, [address]    Attendees: 10         Patient's Goal for Today:  Discharge planning. Notes:  Patient arrived to group late and attended for the last five minutes. Status After Intervention:  Unchanged    Participation Level:  Active Listener and Minimal    Participation Quality: Appropriate, Attentive and Sharing      Speech:  normal      Thought Process/Content: Logical      Affective Functioning: Constricted/Restricted      Mood: euthymic      Level of consciousness:  Alert, Oriented x4 and Attentive      Response to Learning: Able to verbalize current knowledge/experience, Capable of insight and Progressing to goal      Endings: None Reported       Modes of Intervention: Education, Support, Socialization, Exploration, Clarifying, Problem-solving, Confrontation, Limit-setting and Reality-testing      Discipline Responsible: Psychoeducational Specialist      Signature:  Kirstin Jensen

## 2019-07-27 NOTE — BH NOTE
Wellness Group Note     Date: July 27     Group Start Time: 7163  Group End Time: 7246  Group Topic: Coping Skills/Discharge Planning      RANDELL Bland RN      Patient refused to attend Wellness Group at [phone] after encouragement from staff.  1:1 talk time offered.     Signature: Brandt Bland RN

## 2019-07-28 PROCEDURE — 1240000000 HC EMOTIONAL WELLNESS R&B

## 2019-07-28 PROCEDURE — 6370000000 HC RX 637 (ALT 250 FOR IP): Performed by: PSYCHIATRY & NEUROLOGY

## 2019-07-28 RX ORDER — RISPERIDONE 4 MG/1
4 TABLET, FILM COATED ORAL 2 TIMES DAILY
Status: DISCONTINUED | OUTPATIENT
Start: 2019-07-28 | End: 2019-08-09 | Stop reason: HOSPADM

## 2019-07-28 RX ORDER — HALOPERIDOL 5 MG/ML
10 INJECTION INTRAMUSCULAR 2 TIMES DAILY
Status: DISCONTINUED | OUTPATIENT
Start: 2019-07-28 | End: 2019-08-09 | Stop reason: HOSPADM

## 2019-07-28 RX ADMIN — CARBAMAZEPINE 200 MG: 200 TABLET ORAL at 22:10

## 2019-07-28 RX ADMIN — RISPERIDONE 3 MG: 3 TABLET ORAL at 08:36

## 2019-07-28 RX ADMIN — NICOTINE POLACRILEX 2 MG: 2 GUM, CHEWING BUCCAL at 18:14

## 2019-07-28 RX ADMIN — BENZTROPINE MESYLATE 1 MG: 1 TABLET ORAL at 08:36

## 2019-07-28 RX ADMIN — CARBAMAZEPINE 200 MG: 200 TABLET ORAL at 08:36

## 2019-07-28 RX ADMIN — NICOTINE POLACRILEX 2 MG: 2 GUM, CHEWING BUCCAL at 08:36

## 2019-07-28 RX ADMIN — HYDROXYZINE HYDROCHLORIDE 25 MG: 25 TABLET, FILM COATED ORAL at 22:10

## 2019-07-28 RX ADMIN — TRAZODONE HYDROCHLORIDE 50 MG: 50 TABLET ORAL at 22:10

## 2019-07-28 RX ADMIN — BENZTROPINE MESYLATE 1 MG: 1 TABLET ORAL at 22:10

## 2019-07-28 RX ADMIN — RISPERIDONE 4 MG: 4 TABLET ORAL at 22:10

## 2019-07-28 RX ADMIN — DIPHENHYDRAMINE HCL 25 MG: 25 TABLET ORAL at 22:10

## 2019-07-28 RX ADMIN — NICOTINE POLACRILEX 2 MG: 2 GUM, CHEWING BUCCAL at 12:44

## 2019-07-28 NOTE — GROUP NOTE
Group Therapy Note    Date: July 28    Group Start Time: 0900  Group End Time: 6529  Group Topic: Community Meeting    CARY White    Patient's Goal:  Review community rules/expectations, identify daily goal review community schedule     Notes:  Pt developed daily goal to \"stay up and about\"    Status After Intervention:  Improved    Participation Level:  Active Listener and Interactive    Participation Quality: Appropriate, Attentive and Sharing    Speech:  normal    Thought Process/Content: Logical    Affective Functioning: Congruent    Level of consciousness:  Alert, Oriented x4 and Attentive    Response to Learning: Able to verbalize current knowledge/experience, Able to verbalize/acknowledge new learning, Able to retain information, Capable of insight and Progressing to goal    Endings: None Reported    Modes of Intervention: Education, Socialization, Exploration, Clarifying, Problem-solving and Reality-testing    Discipline Responsible: Psychoeducational Specialist    Signature:  [place] Ya [place]

## 2019-07-29 PROCEDURE — 1240000000 HC EMOTIONAL WELLNESS R&B

## 2019-07-29 PROCEDURE — 6370000000 HC RX 637 (ALT 250 FOR IP): Performed by: PSYCHIATRY & NEUROLOGY

## 2019-07-29 RX ADMIN — DIPHENHYDRAMINE HCL 25 MG: 25 TABLET ORAL at 23:03

## 2019-07-29 RX ADMIN — NICOTINE POLACRILEX 2 MG: 2 GUM, CHEWING BUCCAL at 13:18

## 2019-07-29 RX ADMIN — TRAZODONE HYDROCHLORIDE 50 MG: 50 TABLET ORAL at 23:02

## 2019-07-29 RX ADMIN — CARBAMAZEPINE 200 MG: 200 TABLET ORAL at 23:02

## 2019-07-29 RX ADMIN — HYDROXYZINE HYDROCHLORIDE 25 MG: 25 TABLET, FILM COATED ORAL at 23:02

## 2019-07-29 RX ADMIN — RISPERIDONE 4 MG: 4 TABLET ORAL at 08:35

## 2019-07-29 RX ADMIN — NICOTINE POLACRILEX 2 MG: 2 GUM, CHEWING BUCCAL at 08:35

## 2019-07-29 RX ADMIN — BENZTROPINE MESYLATE 1 MG: 1 TABLET ORAL at 23:03

## 2019-07-29 RX ADMIN — RISPERIDONE 4 MG: 4 TABLET ORAL at 23:02

## 2019-07-29 RX ADMIN — CARBAMAZEPINE 200 MG: 200 TABLET ORAL at 08:35

## 2019-07-29 RX ADMIN — BENZTROPINE MESYLATE 1 MG: 1 TABLET ORAL at 08:35

## 2019-07-29 NOTE — GROUP NOTE
Group Therapy Note    Date: July 29    Group Start Time: 1330  Group End Time: 2404  Group Topic: Recovery    [facility]    ANITA Regalado LSW      patient refused to attend Recovery group at 1:30pm after encouragement from staff.   1:1 talk time provided as alternative to group session        Signature:  ANITA Regalado LSW

## 2019-07-29 NOTE — GROUP NOTE
Group Therapy Note    Date: July 29    Group Start Time: 1430  Group End Time: 6890  Group Topic: Cognitive Skills    RANDELL Ferrell, CTRS    Patient did not attend Cognitive Skills Group after encouragement from staff. 1:1 talk time offered but patient refused.       Signature:  Rosemary Solares

## 2019-07-30 PROCEDURE — 1240000000 HC EMOTIONAL WELLNESS R&B

## 2019-07-30 PROCEDURE — 6370000000 HC RX 637 (ALT 250 FOR IP): Performed by: PSYCHIATRY & NEUROLOGY

## 2019-07-30 RX ADMIN — CARBAMAZEPINE 200 MG: 200 TABLET ORAL at 09:29

## 2019-07-30 RX ADMIN — RISPERIDONE 4 MG: 4 TABLET ORAL at 09:29

## 2019-07-30 RX ADMIN — NICOTINE POLACRILEX 2 MG: 2 GUM, CHEWING BUCCAL at 14:24

## 2019-07-30 RX ADMIN — BENZTROPINE MESYLATE 1 MG: 1 TABLET ORAL at 09:29

## 2019-07-30 RX ADMIN — NICOTINE POLACRILEX 2 MG: 2 GUM, CHEWING BUCCAL at 17:21

## 2019-07-30 ASSESSMENT — PAIN SCALES - GENERAL: PAINLEVEL_OUTOF10: 0

## 2019-07-31 PROCEDURE — 6370000000 HC RX 637 (ALT 250 FOR IP): Performed by: PSYCHIATRY & NEUROLOGY

## 2019-07-31 PROCEDURE — 1240000000 HC EMOTIONAL WELLNESS R&B

## 2019-07-31 RX ADMIN — TRAZODONE HYDROCHLORIDE 50 MG: 50 TABLET ORAL at 22:28

## 2019-07-31 RX ADMIN — RISPERIDONE 4 MG: 4 TABLET ORAL at 00:15

## 2019-07-31 RX ADMIN — RISPERIDONE 4 MG: 4 TABLET ORAL at 22:28

## 2019-07-31 RX ADMIN — CARBAMAZEPINE 200 MG: 200 TABLET ORAL at 22:28

## 2019-07-31 RX ADMIN — HYDROXYZINE HYDROCHLORIDE 25 MG: 25 TABLET, FILM COATED ORAL at 00:15

## 2019-07-31 RX ADMIN — NICOTINE POLACRILEX 2 MG: 2 GUM, CHEWING BUCCAL at 17:16

## 2019-07-31 RX ADMIN — CARBAMAZEPINE 200 MG: 200 TABLET ORAL at 00:15

## 2019-07-31 RX ADMIN — TRAZODONE HYDROCHLORIDE 50 MG: 50 TABLET ORAL at 00:15

## 2019-07-31 RX ADMIN — BENZTROPINE MESYLATE 1 MG: 1 TABLET ORAL at 22:27

## 2019-07-31 RX ADMIN — DIPHENHYDRAMINE HCL 25 MG: 25 TABLET ORAL at 00:15

## 2019-07-31 RX ADMIN — CARBAMAZEPINE 200 MG: 200 TABLET ORAL at 09:59

## 2019-07-31 RX ADMIN — BENZTROPINE MESYLATE 1 MG: 1 TABLET ORAL at 00:15

## 2019-07-31 RX ADMIN — RISPERIDONE 4 MG: 4 TABLET ORAL at 09:59

## 2019-07-31 RX ADMIN — BENZTROPINE MESYLATE 1 MG: 1 TABLET ORAL at 09:59

## 2019-07-31 NOTE — GROUP NOTE
Group Therapy Note    Date: July 31    Group Start Time: 1430  Group End Time: 1005  Group Topic: Recovery    [facility]    ANITA León LSW    patient refused to attend Recovery group at 2:30 pm after encouragement from staff.   1:1 talk time provided as alternative to group session          Signature:  ANITA León LSW

## 2019-08-01 PROCEDURE — 1240000000 HC EMOTIONAL WELLNESS R&B

## 2019-08-01 PROCEDURE — 6370000000 HC RX 637 (ALT 250 FOR IP): Performed by: PSYCHIATRY & NEUROLOGY

## 2019-08-01 RX ADMIN — CARBAMAZEPINE 200 MG: 200 TABLET ORAL at 08:37

## 2019-08-01 RX ADMIN — RISPERIDONE 4 MG: 4 TABLET ORAL at 08:37

## 2019-08-01 RX ADMIN — NICOTINE POLACRILEX 2 MG: 2 GUM, CHEWING BUCCAL at 18:04

## 2019-08-01 RX ADMIN — BENZTROPINE MESYLATE 1 MG: 1 TABLET ORAL at 21:35

## 2019-08-01 RX ADMIN — TRAZODONE HYDROCHLORIDE 50 MG: 50 TABLET ORAL at 21:34

## 2019-08-01 RX ADMIN — NICOTINE POLACRILEX 2 MG: 2 GUM, CHEWING BUCCAL at 08:37

## 2019-08-01 RX ADMIN — NICOTINE POLACRILEX 2 MG: 2 GUM, CHEWING BUCCAL at 15:35

## 2019-08-01 RX ADMIN — BENZTROPINE MESYLATE 1 MG: 1 TABLET ORAL at 08:37

## 2019-08-01 RX ADMIN — CARBAMAZEPINE 200 MG: 200 TABLET ORAL at 21:34

## 2019-08-01 RX ADMIN — HYDROXYZINE HYDROCHLORIDE 25 MG: 25 TABLET, FILM COATED ORAL at 21:35

## 2019-08-01 RX ADMIN — RISPERIDONE 4 MG: 4 TABLET ORAL at 21:34

## 2019-08-01 RX ADMIN — HYDROXYZINE HYDROCHLORIDE 25 MG: 25 TABLET, FILM COATED ORAL at 08:37

## 2019-08-01 NOTE — GROUP NOTE
Group Therapy Note    Date: August 1    Group Start Time: 1100  Group End Time: 1140  Group Topic: Cognitive Skills    [id number] [facility], [facility]    Pt did not attend Therapeutic Recreation d/t resting in room despite staff invitation to attend. 1:1 talk time offered as alternative to group session, pt declined.

## 2019-08-01 NOTE — PROGRESS NOTES
Patient continues to have significant psychotic symptoms affecting ability to function appropriately. She has difficulty answering questions in an appropriate manner due to continued thought blocking. She continues to refuse to address most hygiene tasks even when encouraged by staff. She has been out of her room more, but not interacting with anyone, she sits in the periphery, aloof. She is unable to identify if she has contacted her sister(legal guardian). There is no way the patient could care for self independently outside a hospital setting. She did receive Risperdal Consta injection in addition to receiving oral dose bid. .  Charting and medications reviewed. Therapeutic support provided.   We will continue Risperdal, Tegretol, Cogentin

## 2019-08-02 PROCEDURE — 6370000000 HC RX 637 (ALT 250 FOR IP): Performed by: PSYCHIATRY & NEUROLOGY

## 2019-08-02 PROCEDURE — 1240000000 HC EMOTIONAL WELLNESS R&B

## 2019-08-02 RX ADMIN — TRAZODONE HYDROCHLORIDE 50 MG: 50 TABLET ORAL at 20:51

## 2019-08-02 RX ADMIN — RISPERIDONE 4 MG: 4 TABLET ORAL at 20:52

## 2019-08-02 RX ADMIN — CARBAMAZEPINE 200 MG: 200 TABLET ORAL at 10:26

## 2019-08-02 RX ADMIN — BENZTROPINE MESYLATE 1 MG: 1 TABLET ORAL at 20:59

## 2019-08-02 RX ADMIN — NICOTINE POLACRILEX 2 MG: 2 GUM, CHEWING BUCCAL at 12:09

## 2019-08-02 RX ADMIN — RISPERIDONE 4 MG: 4 TABLET ORAL at 10:26

## 2019-08-02 RX ADMIN — CARBAMAZEPINE 200 MG: 200 TABLET ORAL at 20:51

## 2019-08-02 RX ADMIN — NICOTINE POLACRILEX 2 MG: 2 GUM, CHEWING BUCCAL at 17:42

## 2019-08-02 RX ADMIN — BENZTROPINE MESYLATE 1 MG: 1 TABLET ORAL at 10:26

## 2019-08-02 RX ADMIN — DIPHENHYDRAMINE HCL 25 MG: 25 TABLET ORAL at 20:52

## 2019-08-02 RX ADMIN — HYDROXYZINE HYDROCHLORIDE 25 MG: 25 TABLET, FILM COATED ORAL at 20:51

## 2019-08-02 RX ADMIN — HYDROXYZINE HYDROCHLORIDE 25 MG: 25 TABLET, FILM COATED ORAL at 10:26

## 2019-08-02 RX ADMIN — NICOTINE POLACRILEX 2 MG: 2 GUM, CHEWING BUCCAL at 10:18

## 2019-08-03 PROCEDURE — 6370000000 HC RX 637 (ALT 250 FOR IP): Performed by: PSYCHIATRY & NEUROLOGY

## 2019-08-03 PROCEDURE — 1240000000 HC EMOTIONAL WELLNESS R&B

## 2019-08-03 RX ADMIN — RISPERIDONE 4 MG: 4 TABLET ORAL at 20:57

## 2019-08-03 RX ADMIN — BENZTROPINE MESYLATE 1 MG: 1 TABLET ORAL at 09:43

## 2019-08-03 RX ADMIN — CARBAMAZEPINE 200 MG: 200 TABLET ORAL at 20:57

## 2019-08-03 RX ADMIN — CARBAMAZEPINE 200 MG: 200 TABLET ORAL at 09:43

## 2019-08-03 RX ADMIN — NICOTINE POLACRILEX 2 MG: 2 GUM, CHEWING BUCCAL at 12:58

## 2019-08-03 RX ADMIN — RISPERIDONE 4 MG: 4 TABLET ORAL at 09:43

## 2019-08-03 RX ADMIN — BENZTROPINE MESYLATE 1 MG: 1 TABLET ORAL at 20:57

## 2019-08-03 RX ADMIN — NICOTINE POLACRILEX 2 MG: 2 GUM, CHEWING BUCCAL at 11:29

## 2019-08-03 RX ADMIN — NICOTINE POLACRILEX 2 MG: 2 GUM, CHEWING BUCCAL at 17:40

## 2019-08-03 NOTE — GROUP NOTE
Group Therapy Note    Date: August 3    Group Start Time: 1330  Group End Time: 4089  Group Topic: Cognitive Skills    [facility]    Renee Abby [place], [address]     Patient's Goal:  To demonstrate increased interpersonal interaction    Notes:  Pt was in and out of group several times and had minimal participation.      Status After Intervention:  Unchanged    Participation Level: Minimal    Participation Quality: Resistant      Speech:  normal      Thought Process/Content: Logical      Affective Functioning: Blunted      Mood: dysphoric      Level of consciousness:  Preoccupied and Inattentive      Response to Learning: Progressing to goal      Endings: None Reported    Modes of Intervention: Socialization, Exploration, Problem-solving, Activity, Media and Reality-testing      Discipline Responsible: Psychoeducational Specialist      Signature:  Sandoval Spani

## 2019-08-04 PROCEDURE — 6370000000 HC RX 637 (ALT 250 FOR IP): Performed by: PSYCHIATRY & NEUROLOGY

## 2019-08-04 PROCEDURE — 1240000000 HC EMOTIONAL WELLNESS R&B

## 2019-08-04 RX ADMIN — TRAZODONE HYDROCHLORIDE 50 MG: 50 TABLET ORAL at 21:57

## 2019-08-04 RX ADMIN — BENZTROPINE MESYLATE 1 MG: 1 TABLET ORAL at 21:57

## 2019-08-04 RX ADMIN — NICOTINE POLACRILEX 2 MG: 2 GUM, CHEWING BUCCAL at 11:59

## 2019-08-04 RX ADMIN — CARBAMAZEPINE 200 MG: 200 TABLET ORAL at 11:58

## 2019-08-04 RX ADMIN — CARBAMAZEPINE 200 MG: 200 TABLET ORAL at 21:57

## 2019-08-04 RX ADMIN — NICOTINE POLACRILEX 2 MG: 2 GUM, CHEWING BUCCAL at 17:55

## 2019-08-04 RX ADMIN — RISPERIDONE 4 MG: 4 TABLET ORAL at 11:58

## 2019-08-04 RX ADMIN — BENZTROPINE MESYLATE 1 MG: 1 TABLET ORAL at 11:58

## 2019-08-04 RX ADMIN — RISPERIDONE 4 MG: 4 TABLET ORAL at 21:57

## 2019-08-04 NOTE — GROUP NOTE
Group Therapy Note    Date: August 4    Group Start Time: 1330  Group End Time: 2066  Group Topic: Cognitive Skills    RANDELL Amatoo, CTRS    Pt did not attend RT skills group d/t resting in room despite staff invitation to attend. 1:1 talk time offered as alternative to group session, pt declined.

## 2019-08-04 NOTE — GROUP NOTE
Group Therapy Note    Date: August 4    Group Start Time: 1000  Group End Time: 1051  Group Topic: Psychoeducation    [facility]    ANITA Moreau, SALLY        Group Therapy Note  patient refused to attend psychoeducational group at 10:00am after encouragement from staff.   1:1 talk time offered, but refused    Signature:  ANITA Moreau, TARIW

## 2019-08-05 PROCEDURE — 6370000000 HC RX 637 (ALT 250 FOR IP): Performed by: PSYCHIATRY & NEUROLOGY

## 2019-08-05 PROCEDURE — 1240000000 HC EMOTIONAL WELLNESS R&B

## 2019-08-05 RX ADMIN — RISPERIDONE 4 MG: 4 TABLET ORAL at 21:28

## 2019-08-05 RX ADMIN — NICOTINE POLACRILEX 2 MG: 2 GUM, CHEWING BUCCAL at 19:35

## 2019-08-05 RX ADMIN — NICOTINE POLACRILEX 2 MG: 2 GUM, CHEWING BUCCAL at 17:16

## 2019-08-05 RX ADMIN — CARBAMAZEPINE 200 MG: 200 TABLET ORAL at 21:28

## 2019-08-05 RX ADMIN — NICOTINE POLACRILEX 2 MG: 2 GUM, CHEWING BUCCAL at 11:47

## 2019-08-05 RX ADMIN — BENZTROPINE MESYLATE 1 MG: 1 TABLET ORAL at 08:40

## 2019-08-05 RX ADMIN — TRAZODONE HYDROCHLORIDE 50 MG: 50 TABLET ORAL at 21:28

## 2019-08-05 RX ADMIN — BENZTROPINE MESYLATE 1 MG: 1 TABLET ORAL at 21:28

## 2019-08-05 RX ADMIN — CARBAMAZEPINE 200 MG: 200 TABLET ORAL at 08:39

## 2019-08-05 RX ADMIN — RISPERIDONE 4 MG: 4 TABLET ORAL at 08:40

## 2019-08-05 NOTE — GROUP NOTE
Group Therapy Note    Date: August 5    Group Start Time: 1000  Group End Time: 1045  Group Topic: Psychotherapy    SALLY Yi        Group Therapy Note    Pt declined to attend psychotherapy at 1000 am despite encouragement. Pt offered 1:1 and refused.

## 2019-08-06 PROCEDURE — 1240000000 HC EMOTIONAL WELLNESS R&B

## 2019-08-06 PROCEDURE — 6370000000 HC RX 637 (ALT 250 FOR IP): Performed by: PSYCHIATRY & NEUROLOGY

## 2019-08-06 RX ADMIN — NICOTINE POLACRILEX 2 MG: 2 GUM, CHEWING BUCCAL at 14:32

## 2019-08-06 RX ADMIN — RISPERIDONE 4 MG: 4 TABLET ORAL at 10:29

## 2019-08-06 RX ADMIN — RISPERIDONE 4 MG: 4 TABLET ORAL at 21:07

## 2019-08-06 RX ADMIN — NICOTINE POLACRILEX 2 MG: 2 GUM, CHEWING BUCCAL at 18:27

## 2019-08-06 RX ADMIN — CARBAMAZEPINE 200 MG: 200 TABLET ORAL at 10:29

## 2019-08-06 RX ADMIN — BENZTROPINE MESYLATE 1 MG: 1 TABLET ORAL at 10:29

## 2019-08-06 RX ADMIN — BENZTROPINE MESYLATE 1 MG: 1 TABLET ORAL at 21:07

## 2019-08-06 RX ADMIN — NICOTINE POLACRILEX 2 MG: 2 GUM, CHEWING BUCCAL at 10:29

## 2019-08-06 RX ADMIN — CARBAMAZEPINE 200 MG: 200 TABLET ORAL at 21:07

## 2019-08-06 NOTE — BH NOTE
patient refused to attend wrap up group at 2030 after encouragement from staff.   1:1 talk time provided as alternative to group session

## 2019-08-06 NOTE — GROUP NOTE
Group Therapy Note    Date: August 6    Group Start Time: 1430  Group End Time: 1500  Group Topic: Cognitive Skills    [id number] [facility], [facility]    Pt did not attend Therapeutic Recreation d/t resting in room despite staff invitation to attend. 1:1 talk time offered as alternative to group session, pt declined.

## 2019-08-07 PROCEDURE — 1240000000 HC EMOTIONAL WELLNESS R&B

## 2019-08-07 PROCEDURE — 6370000000 HC RX 637 (ALT 250 FOR IP): Performed by: PSYCHIATRY & NEUROLOGY

## 2019-08-07 RX ADMIN — RISPERIDONE 4 MG: 4 TABLET ORAL at 20:11

## 2019-08-07 RX ADMIN — CARBAMAZEPINE 200 MG: 200 TABLET ORAL at 20:11

## 2019-08-07 RX ADMIN — NICOTINE POLACRILEX 2 MG: 2 GUM, CHEWING BUCCAL at 11:07

## 2019-08-07 RX ADMIN — NICOTINE POLACRILEX 2 MG: 2 GUM, CHEWING BUCCAL at 20:12

## 2019-08-07 RX ADMIN — NICOTINE POLACRILEX 2 MG: 2 GUM, CHEWING BUCCAL at 15:56

## 2019-08-07 RX ADMIN — BENZTROPINE MESYLATE 1 MG: 1 TABLET ORAL at 11:06

## 2019-08-07 RX ADMIN — NICOTINE POLACRILEX 2 MG: 2 GUM, CHEWING BUCCAL at 18:36

## 2019-08-07 RX ADMIN — CARBAMAZEPINE 200 MG: 200 TABLET ORAL at 11:06

## 2019-08-07 RX ADMIN — HYDROXYZINE HYDROCHLORIDE 25 MG: 25 TABLET, FILM COATED ORAL at 11:07

## 2019-08-07 RX ADMIN — RISPERIDONE 4 MG: 4 TABLET ORAL at 11:07

## 2019-08-07 RX ADMIN — BENZTROPINE MESYLATE 1 MG: 1 TABLET ORAL at 20:11

## 2019-08-07 NOTE — GROUP NOTE
Group Therapy Note    Date: August 7    Group Start Time: 0900  Group End Time: 0915  Group Topic: Community Meeting    ASHLYN HairS    Pt did not attend [employer] at 0900 d/t resting in room despite staff invitation to attend. 1:1 talk time offered as alternative to group session, pt declined.

## 2019-08-08 PROCEDURE — 6370000000 HC RX 637 (ALT 250 FOR IP): Performed by: PSYCHIATRY & NEUROLOGY

## 2019-08-08 PROCEDURE — 1240000000 HC EMOTIONAL WELLNESS R&B

## 2019-08-08 PROCEDURE — 6360000002 HC RX W HCPCS: Performed by: PSYCHIATRY & NEUROLOGY

## 2019-08-08 RX ORDER — HYDROXYZINE HYDROCHLORIDE 25 MG/1
25 TABLET, FILM COATED ORAL 2 TIMES DAILY PRN
Qty: 60 TABLET | Refills: 0 | Status: SHIPPED | OUTPATIENT
Start: 2019-08-08

## 2019-08-08 RX ORDER — TRAZODONE HYDROCHLORIDE 50 MG/1
50 TABLET ORAL NIGHTLY PRN
Qty: 30 TABLET | Refills: 0 | Status: SHIPPED | OUTPATIENT
Start: 2019-08-08

## 2019-08-08 RX ORDER — RISPERIDONE 4 MG/1
4 TABLET, FILM COATED ORAL 2 TIMES DAILY
Qty: 60 TABLET | Refills: 0 | Status: SHIPPED | OUTPATIENT
Start: 2019-08-08

## 2019-08-08 RX ORDER — DOCUSATE SODIUM 100 MG/1
100 CAPSULE, LIQUID FILLED ORAL 2 TIMES DAILY PRN
Qty: 60 CAPSULE | Refills: 0 | Status: SHIPPED | OUTPATIENT
Start: 2019-08-08

## 2019-08-08 RX ORDER — CARBAMAZEPINE 200 MG/1
200 TABLET ORAL 2 TIMES DAILY
Qty: 60 TABLET | Refills: 0 | Status: SHIPPED | OUTPATIENT
Start: 2019-08-08

## 2019-08-08 RX ORDER — BENZTROPINE MESYLATE 1 MG/1
3 TABLET ORAL 2 TIMES DAILY
Qty: 60 TABLET | Refills: 0 | Status: SHIPPED | OUTPATIENT
Start: 2019-08-08

## 2019-08-08 RX ADMIN — NICOTINE POLACRILEX 2 MG: 2 GUM, CHEWING BUCCAL at 13:26

## 2019-08-08 RX ADMIN — NICOTINE POLACRILEX 2 MG: 2 GUM, CHEWING BUCCAL at 09:59

## 2019-08-08 RX ADMIN — NICOTINE POLACRILEX 2 MG: 2 GUM, CHEWING BUCCAL at 19:03

## 2019-08-08 RX ADMIN — RISPERIDONE 4 MG: 4 TABLET ORAL at 09:59

## 2019-08-08 RX ADMIN — HYDROXYZINE HYDROCHLORIDE 25 MG: 25 TABLET, FILM COATED ORAL at 09:59

## 2019-08-08 RX ADMIN — CARBAMAZEPINE 200 MG: 200 TABLET ORAL at 09:58

## 2019-08-08 RX ADMIN — BENZTROPINE MESYLATE 1 MG: 1 TABLET ORAL at 09:59

## 2019-08-08 RX ADMIN — RISPERIDONE 50 MG: KIT at 15:31

## 2019-08-08 NOTE — PLAN OF CARE
[address] [place] [address] Interdisciplinary Treatment Plan Note     Review Date & Time: 7/27/2019 0955    Admission Type:   Admission Type: Involuntary    Reason for admission:  Reason for Admission: Homicidal ideations and threatening physical harm; actively responding to internal stimuli, paranoid, delusional.     Estimated Length of Stay :  5-7 days  Estimated Discharge Date Update: to be determined by physician    PATIENT STRENGTHS:  Patient Strengths:Strengths: No significant Physical Illness, Positive Support  Patient Strengths and Limitations:Limitations: Tendency to isolate self, Difficulty problem solving/relies on others to help solve problems, Multiple barriers to leisure interests  Addictive Behavior:Addictive Behavior  In the past 3 months, have you felt or has someone told you that you have a problem with:  : None  Do you have a history of Chemical Use?: No  Do you have a history of Alcohol Use?: No  Do you have a history of Street Drug Abuse?: No  Histroy of Prescripton Drug Abuse?: No  Medical Problems:   Past Medical History:   Diagnosis Date    Anxiety     Asthma     Bipolar 1 disorder ([facility]     Multiple personality ([facility]     Seizures ([facility]        Risk:  Fall RiskTotal: 53  Stephen Scale Stephen Scale Score: 22  BVC Total: 0    Change in scores no. Changes to plan of Care no    Status EXAM:   Status and Exam  Normal: No  Facial Expression: Flat  Affect: Blunt  Level of Consciousness: Alert  Mood:Normal: No  Mood: Depressed, Anxious  Motor Activity:Normal: No  Motor Activity: Decreased  Interview Behavior: Cooperative, Evasive, Impulsive  Preception: Coolin to Person, Coolin to Time, Coolin to Place, Coolin to Situation  Attention:Normal: No  Attention: Distractible  Thought Processes: Blocking  Thought Content:Normal: No  Thought Content: Poverty of Content, Paranoia, Preoccupations  Hallucinations:  Auditory (Comment)  Delusions: No  Delusions: Persecution  Memory:Normal: No  Memory: Poor
Problem: Altered Mood, Psychotic Behavior:  Goal: Able to demonstrate trust by eating, participating in treatment and following staff's direction  Description  Able to demonstrate trust by eating, participating in treatment and following staff's direction  8/7/2019 2129 by Sharon Bridges RN  Note:   Denies suicidal/homicidal ideations, denies hallucinations but observed responding to internal stimuli, reports anxiety nonexistant , denies depression, tends to ADLs with encouragement, 1:1 time [id number], accepting of all evening programming, isolative with both staff and peers, pleasant when interacted with, medication compliant and behaviorally controlled at this time    8/7/2019 1015 by Medhat Marte RN  Note:   Patient has been cooperative with staff this am.
Problem: Altered Mood, Psychotic Behavior:  Goal: Absence of self-harm  Description  Absence of self-harm  7/25/2019 1954 by Antonio Monson LPN  Outcome: Ongoing     Problem: Anger Management/Homicidal Ideation:  Goal: Absence of angry outbursts  Description  Absence of angry outbursts  7/25/2019 1954 by Antonio Monson LPN  Outcome: Ongoing   Remains free from aggressive outbursts, is calm and cooperative att. Denies SI and remains free from harm att.
Problem: Altered Mood, Psychotic Behavior:  Goal: Absence of self-harm  Description  Absence of self-harm  7/31/2019 1104 by Kit Cotton LPN  Outcome: Ongoing  No self harm noted this shift. , pt is med compliant and cooperative Patient agrees to seek staff out if negative thoughts arise. Will continue to monitor Q15 minute and intermittently. Pt has been isolative to room this shift and aloof of peers. Pt has not completed adl's today and currently denies all at this time.  Pt is eating and sleeping well will continue to monitor for safety Q 15 min
Problem: Anger Management/Homicidal Ideation:  Goal: Ability to verbalize frustrations and anger appropriately will improve  Description  Ability to verbalize frustrations and anger appropriately will improve  7/27/2019 2312 by Yobany Snider RN  Outcome: Ongoing     Problem: Anger Management/Homicidal Ideation:  Goal: Absence of angry outbursts  Description  Absence of angry outbursts  7/27/2019 2312 by Yobany Snider RN  Outcome: Ongoing     Problem: Altered Mood, Psychotic Behavior:  Goal: Able to demonstrate trust by eating, participating in treatment and following staff's direction  Description  Able to demonstrate trust by eating, participating in treatment and following staff's direction  7/27/2019 2312 by Yobany Snider RN  Outcome: Ongoing     Problem: Altered Mood, Psychotic Behavior:  Goal: Able to verbalize reality based thinking  Description  Able to verbalize reality based thinking  7/27/2019 2312 by Yobany Snider RN  Outcome: Ongoing     Problem: Altered Mood, Psychotic Behavior:  Goal: Absence of self-harm  Description  Absence of self-harm  7/27/2019 2312 by Yobany Snider RN  Outcome: Ongoing   Pt denies auditory hallucinations but is noted to be responding to internal stimuli, such as talking to people who are not in room and inappropriate laughter. Pt takes medication on her terms, initially refusing and telling writer \"get the fuck out of my room before I bust your balls white boy\" and \"If you don't leave my medication alone I will cut your head off. \" pt does come to nurses station later to ask for and take medication and apologize to writer for previous behaviors. No s/s of distress noted. Q 15 min safety checks maintained.
Problem: Anger Management/Homicidal Ideation:  Goal: Ability to verbalize frustrations and anger appropriately will improve  Description  Ability to verbalize frustrations and anger appropriately will improve  Outcome: Ongoing  Note:   Pt will improve ability to verbalize frustrations and anger appropriately     Problem: Anger Management/Homicidal Ideation:  Goal: Absence of angry outbursts  Description  Absence of angry outbursts  Outcome: Ongoing  Note:   Pt will be absent of angry outbursts     Problem: Altered Mood, Psychotic Behavior:  Goal: Able to demonstrate trust by eating, participating in treatment and following staff's direction  Description  Able to demonstrate trust by eating, participating in treatment and following staff's direction  7/28/2019 2320 by Nenita Martínez  Outcome: Ongoing  Note:   Pt will improve ability to demonstrate trust by eating, participating in treatment and following directions     Problem: Altered Mood, Psychotic Behavior:  Goal: Able to verbalize reality based thinking  Description  Able to verbalize reality based thinking  7/28/2019 2320 by Nenita Martínez  Outcome: Ongoing  Note:   Pt will improve ability to verbalize reality based thinking     Problem: Altered Mood, Psychotic Behavior:  Goal: Absence of self-harm  Description  Absence of self-harm  Outcome: Ongoing  Note:   Pt will be absent of self-harm
Problem: Anger Management/Homicidal Ideation:  Goal: Able to display appropriate communication and problem solving  Description  STG Able to display appropriate communication and problem solving   7/20/2019 0936 by Kirsten Lewis LPN  Outcome: Ongoing  7/20/2019 0759 by CARY Lai  Outcome: Ongoing     Problem: Altered Mood, Psychotic Behavior:  Goal: Able to demonstrate trust by eating, participating in treatment and following staff's direction  Description  STG Able to demonstrate trust by eating, participating in treatment and following staff's direction   7/20/2019 0936 by Kirsten Lewis LPN  Outcome: Ongoing  7/20/2019 0759 by CARY Lai  Outcome: Ongoing   Pt refuses to acknowledge writer at this time.  Pt refuses breakfast and meds this AM
Problem: Anger Management/Homicidal Ideation:  Goal: Able to display appropriate communication and problem solving  Description  STG Able to display appropriate communication and problem solving   7/21/2019 2141 by Maryse Mcneil  Outcome: Ongoing   Refuses offer of vitals taken. Cont. To seclude self to room resting on bed, head under blankets refuses offer of all unit groups refuses offer of 1;1. Refuses offer of all medications. Yelling at staff , slams door to room. Problem: Anger Management/Homicidal Ideation:  Goal: Absence of homicidal ideation  Description  LTG Absence of homicidal ideation   7/21/2019 2146 by Maryse Mcneil  Outcome: Ongoing   Expresses no thoughts to harm others at this time. Problem: Altered Mood, Psychotic Behavior:  Goal: Able to demonstrate trust by eating, participating in treatment and following staff's direction  Description  STG Able to demonstrate trust by eating, participating in treatment and following staff's direction   7/21/2019 2146 by Maryse Mcneil  Outcome: Ongoing   Out to area briefly for nourishment then again retreats to room to rest on bed.
Problem: Anger Management/Homicidal Ideation:  Goal: Absence of angry outbursts  Description  Absence of angry outbursts  8/3/2019 2338 by Michelle Erickson RN  Note:   PT remained free from any angry outbursts as of this evening. PT was accepting of 1:1 meet with RN. PT was accepting of PM medications. PT denied any current issues. Pt denied any auditory/visual hallucinations. PT was accepting of PM snack. Pt did not attend pm group. PT did agree to seek out staff if her stressors were to become to be too much. Problem: Altered Mood, Psychotic Behavior:  Goal: Able to demonstrate trust by eating, participating in treatment and following staff's direction  Description  Able to demonstrate trust by eating, participating in treatment and following staff's direction  8/3/2019 2338 by Michelle Erickson RN  Note:   PT remained free from any angry outbursts as of this evening. PT was accepting of 1:1 meet with RN. PT was accepting of PM medications. PT denied any current issues. Pt denied any auditory/visual hallucinations. PT was accepting of PM snack. Pt did not attend pm group. PT did agree to seek out staff if her stressors were to become to be too much.
Problem: Anger Management/Homicidal Ideation:  Goal: Absence of angry outbursts  Description  Absence of angry outbursts  8/7/2019 0122 by Lobo Troncoso RN  Note:   PT remains free from any angry outbursts as of this shift. PT agreed to seek out staff if her stressors were to become to be too much. Safety maintained per hospital protocol. Problem: Altered Mood, Psychotic Behavior:  Goal: Absence of self-harm  Description  Absence of self-harm  8/7/2019 0122 by Lobo Troncoso RN  Note:   PT was accepting of 1:1 meet with RN. PT remains free from any self harm, falls, or any other type of injury as of this time. PT is wearing non-skid stockings at the time of assessment. PT verbalizes understanding of individual fall risks and ways to prevent them. PT agreed to use the call light if needed. PT agreed to seek out health care staff if stressors or other issues were to become to be too much. Safety checks have been maintained every 15 minutes and at irregular intervals throughout this shift.
Persecution  Memory:Normal: No  Memory: Confabulation, Poor Recent, Poor Remote  Insight and Judgment: No  Insight and Judgment: Poor Judgment, Poor Insight, Unmotivated, Unrealistic  Present Suicidal Ideation: No  Present Homicidal Ideation: No    Daily Assessment Last Entry:   Daily Sleep (WDL): Within Defined Limits         Patient Currently in Pain: Denies  Daily Nutrition (WDL): Within Defined Limits    Patient Monitoring:  Frequency of Checks: 4 times per hour, close    Psychiatric Symptoms:   Depression Symptoms  Depression Symptoms: Isolative, Loss of interest, Impaired concentration  Anxiety Symptoms  Anxiety Symptoms: Generalized  Lora Symptoms  Lora Symptoms: Flight of ideas     Psychosis Symptoms  Delusion Type: Paranoid, Persecutory    Suicide Risk CSSR-S:  1) Within the past month, have you wished you were dead or wished you could go to sleep and not wake up? : No  2) Have you actually had any thoughts of killing yourself? : No  6) Have you ever done anything, started to do anything, or prepared to do anything to end your life?: No  Change in Result no Change in Plan of care no      EDUCATION:   EDUCATION:   Learner Progress Toward Treatment Goals: Reviewed results and recommendations of this team, Reviewed group plan and strategies, Reviewed signs, symptoms and risk of self harm and violent behavior, Reviewed goals and plan of care    Method:small group, individual verbal education    Outcome:verbalized by patient, but needs reinforcement to obtain goals    PATIENT GOALS:  Short term:  To work on discharge [address] term:Pt did not identify a long term goal     PLAN/TREATMENT RECOMMENDATIONS UPDATE: continue with group therapies, increased socialization, continue planning for after discharge goals, continue with medication compliance    SHORT-TERM GOALS UPDATE:   Time frame for Short-Term Goals: 5-7 days    LONG-TERM GOALS UPDATE:   Time frame for Long-Term Goals: 6 months  Members Present in
Team Meeting: See Signature Sheet    EMILY Yen

## 2019-08-08 NOTE — GROUP NOTE
Group Therapy Note    Date: August 8    Group Start Time: 1600  Group End Time: 1630  Group Topic: Healthy Living/Wellness    [facility]    Darleen Royal RN        Group Therapy Note    Attendees: 9             Status After Intervention:  Improved    Participation Level:  Active Listener and Interactive    Participation Quality: Appropriate      Speech:  normal      Thought Process/Content: Logical      Affective Functioning: Congruent      Mood: wnl      Level of consciousness:  Oriented x4      Response to Learning: Able to verbalize current knowledge/experience      Endings: None Reported    Modes of Intervention: Socialization, Activity and Movement      Discipline Responsible: Registered Nurse      Signature:  Darleen Royal RN

## 2019-08-09 VITALS
BODY MASS INDEX: 20.76 KG/M2 | RESPIRATION RATE: 14 BRPM | TEMPERATURE: 98.4 F | HEIGHT: 68 IN | OXYGEN SATURATION: 98 % | WEIGHT: 137 LBS | HEART RATE: 86 BPM | SYSTOLIC BLOOD PRESSURE: 102 MMHG | DIASTOLIC BLOOD PRESSURE: 64 MMHG

## 2019-08-09 PROCEDURE — 6370000000 HC RX 637 (ALT 250 FOR IP): Performed by: PSYCHIATRY & NEUROLOGY

## 2019-08-09 PROCEDURE — 5130000000 HC BRIDGE APPOINTMENT

## 2019-08-09 RX ADMIN — CARBAMAZEPINE 200 MG: 200 TABLET ORAL at 08:17

## 2019-08-09 RX ADMIN — BENZTROPINE MESYLATE 1 MG: 1 TABLET ORAL at 08:17

## 2019-08-09 RX ADMIN — RISPERIDONE 4 MG: 4 TABLET ORAL at 08:17

## 2019-08-09 NOTE — BH NOTE
[facility]  Discharge Note    Pt discharged with followings belongings:   Dentures: None  Vision - Corrective Lenses: None  Hearing Aid: None  Jewelry: None  Body Piercings Removed: N/A  Clothing: Pants, Shirt, Jacket / coat  Were All Patient Medications Collected?: Not Applicable  Other Valuables: Money (Comment)($0.11)   Valuables sent home with patient. Valuables retrieved from safe, Security envelope number:  [id number] and returned to patient. Patient education on aftercare instructions: yes  Information faxed to [facility] by Marjorie Azevedo LPN Patient verbalize understanding of AVS:  Yes. Patient discharged to home, medication sent to home pharmacy, patient went via cab with belongings, guardian made aware.     Status EXAM upon discharge:  Status and Exam  Normal: Yes  Facial Expression: Flat  Affect: Blunt  Level of Consciousness: Alert  Mood:Normal: Yes  Mood: Anxious, Labile, Suspicious  Motor Activity:Normal: Yes  Motor Activity: Decreased  Interview Behavior: Cooperative  Preception: [place] to Person, Lylia Klippel to Time, [place] to Place, [place] to Situation  Attention:Normal: No  Attention: Unable to Concentrate  Thought Processes: Flt.of Ideas  Thought Content:Normal: Yes  Thought Content: Paranoia  Hallucinations: (pt denies)  Delusions: No  Delusions: (pt refused assessment)  Memory:Normal: No  Memory: Poor Recent, Poor Remote  Insight and Judgment: No  Insight and Judgment: Poor Judgment, Poor Insight  Present Suicidal Ideation: No  Present Homicidal Ideation: No      Metabolic Screening:    Lab Results   Component Value Date    LABA1C 4.1 01/23/2019       No results found for: CHOL  No results found for: TRIG  No results found for: HDL  No components found for: LDLCAL  No results found for: Linda Nuñez LPN

## 2019-08-09 NOTE — GROUP NOTE
Group Therapy Note    Date: August 9    Group Start Time: 0900  Group End Time: 0915  Group Topic: Community Meeting    [facility] G    [facility] [place], [place]  Patient's Goal: To demonstrate increased interpersonal interaction    Notes: Pt attended and participated in group. Status After Intervention:  Improved    Participation Level:  Active Listener and Interactive    Participation Quality: Appropriate and Attentive      Speech:  normal      Thought Process/Content: Logical      Affective Functioning: Congruent      Mood: euthymic      Level of consciousness:  Alert and Attentive      Response to Learning: Progressing to goal      Endings: None Reported    Modes of Intervention: Education, Support, Socialization, Problem-solving and Reality-testing      Discipline Responsible: Psychoeducational Specialist      Signature:  Jackie Baugh

## 2019-08-09 NOTE — CARE COORDINATION
LEGAL GUARDIAN     Writer outreach to [employer] and confirmed pt's admission to [facility]. LG states, \"I thought she was at [facility]. Ronny's, thanks for calling me\". LG provided verbal consent for treatment on this date, witnessed by [id number]. LG states she will come to [facility] on 7/23/19 to complete documentation.     Frantz Kill: [phone]
tablet  · risperiDONE 4 MG tablet  · traZODone 50 MG tablet     Information about where to get these medications is not yet available    Ask your nurse or doctor about these medications  · risperiDONE microspheres 50 MG injection         Follow Up Appointment: [address] [facility] [address]    Go on 8/14/2019  Dr. Elaina Mario @ 1:00 pm for medication management.

## 2019-11-25 ENCOUNTER — HOSPITAL ENCOUNTER (OUTPATIENT)
Age: 39
Setting detail: SPECIMEN
Discharge: HOME | End: 2019-11-25
Payer: MEDICAID

## 2019-11-25 LAB
ALBUMIN SERPL-MCNC: 4.2 G/DL (ref 3.5–5.2)
ALBUMIN/GLOBULIN RATIO: NORMAL (ref 1–2.5)
ALP BLD-CCNC: 86 U/L (ref 35–104)
ALT SERPL-CCNC: 9 U/L (ref 5–33)
ANION GAP SERPL CALCULATED.3IONS-SCNC: 13 MMOL/L (ref 9–17)
AST SERPL-CCNC: 14 U/L
BILIRUB SERPL-MCNC: 0.44 MG/DL (ref 0.3–1.2)
BUN BLDV-MCNC: 11 MG/DL (ref 6–20)
BUN/CREAT BLD: 13 (ref 9–20)
CALCIUM SERPL-MCNC: 9.5 MG/DL (ref 8.6–10.4)
CHLORIDE BLD-SCNC: 102 MMOL/L (ref 98–107)
CO2: 25 MMOL/L (ref 20–31)
CREAT SERPL-MCNC: 0.88 MG/DL (ref 0.5–0.9)
GFR AFRICAN AMERICAN: >60 ML/MIN
GFR NON-AFRICAN AMERICAN: >60 ML/MIN
GFR SERPL CREATININE-BSD FRML MDRD: NORMAL ML/MIN/{1.73_M2}
GFR SERPL CREATININE-BSD FRML MDRD: NORMAL ML/MIN/{1.73_M2}
GLUCOSE BLD-MCNC: 81 MG/DL (ref 70–99)
POTASSIUM SERPL-SCNC: 3.7 MMOL/L (ref 3.7–5.3)
SODIUM BLD-SCNC: 140 MMOL/L (ref 135–144)
TOTAL PROTEIN: 7.2 G/DL (ref 6.4–8.3)

## 2019-11-25 PROCEDURE — 80053 COMPREHEN METABOLIC PANEL: CPT

## 2019-11-25 PROCEDURE — 36415 COLL VENOUS BLD VENIPUNCTURE: CPT

## 2019-11-25 PROCEDURE — P9603 ONE-WAY ALLOW PRORATED MILES: HCPCS

## 2020-03-15 NOTE — PLAN OF CARE
[facility] Emergency Department Encounter     Basic Information:  Patient: Janie Jerry Age: 14 month old Sex: female  MRN: [id number] Encounter Date: 3/15/2020    Pt's PCP is No Pcp    Written by Marquita Lee, acting as a medical scribe for Dr. Kirill Morales MD.    Chief Complaint  Chief Complaint   Patient presents with   • Fever 9 Weeks to 74 years       History of Present Illness    13 m/o female presents to the ED with a cc of a fever with an onset of 1100.  Mother reports a max temp of 101 with Tylenol last given at 1800.  Mother reports last wet diaper 40 min ago.  Child has had no rhinorrhea or cough. Child has had no apparent dyspnea. Child has had nl po intake without any vomiting or diarrhea Child has had no pulling at the ears. Child has had no rashes. Child has had no apparent abdominal pain. Child has had no discomfort on urination. Child has had no alteration in behavior/change in mental status. Child is otherwise healthy and immunizations are UTD.      Health Status  Allergies:  ALLERGIES:  No Known Allergies    Current Medications:  No current facility-administered medications on file prior to encounter.   No current outpatient medications on file prior to encounter.      Past Medical History    History reviewed. No pertinent past medical history.    Surgical History:  Negative surgical history.    Family History:  No inherited conditions reported.    Social History:  Alcohol use: no  Drug use: no  Tobacco use: no  Smoking: no    ROS:   10 point ROS negative except as noted above in HPI.    Physical Exam:  Time: 2156  ED Triage Vitals [03/14/20 2152]   BP (!) 96/75   Heart Rate (!) 156   Resp 36   Temp (!) 103.4 °F (39.7 °C)   SpO2 100 %       Physical Exam  Vitals signs and nursing note reviewed.   Constitutional:       General: She is not in acute distress.     Appearance: Normal appearance. She is well-developed. She is not toxic-appearing.      Comments: Awake, alert, active, smiling,  Problem: Anger Management/Homicidal Ideation:  Goal: Absence of angry outbursts  Absence of angry outbursts   Outcome: Ongoing  Pt did not attend Community Meeting at 0900 d/t resting in room despite staff invitation to attend. playful. Child non-toxic appearing and in no distress.  Drinking bottle   HENT:      Head: Normocephalic and atraumatic.      Right Ear: Tympanic membrane normal.      Left Ear: Tympanic membrane normal.      Nose: Nose normal.      Mouth/Throat:      Mouth: Mucous membranes are moist.      Pharynx: Oropharynx is clear.   Eyes:      Extraocular Movements: Extraocular movements intact.      Conjunctiva/sclera: Conjunctivae normal.      Pupils: Pupils are equal, round, and reactive to light.   Neck:      Musculoskeletal: Normal range of motion and neck supple.      Comments: No meningismus  Cardiovascular:      Rate and Rhythm: Normal rate and regular rhythm.      Pulses: Normal pulses.      Heart sounds: Normal heart sounds.   Pulmonary:      Effort: Pulmonary effort is normal.      Breath sounds: Normal breath sounds.      Comments: No retractions, flaring, grunting, drooling or stridor  Abdominal:      General: Abdomen is flat. Bowel sounds are normal. There is no distension.      Palpations: Abdomen is soft.      Tenderness: There is no abdominal tenderness.   Musculoskeletal: Normal range of motion.   Skin:     General: Skin is dry.      Capillary Refill: Capillary refill takes less than 2 seconds.      Comments: Warm to touch.  No petechia/purpura   Neurological:      General: No focal deficit present.      Mental Status: She is alert.      Comments: Awake, alert. Pt moves all extremities. Nl tone             Medical Decision Making    Orders:  Medications   acetaminophen (TYLENOL) 160 MG/5ML suspension 144 mg (144 mg Oral Given 3/14/20 2209)   ibuprofen (CHILDRENS ADVIL) 100 MG/5ML suspension 96 mg (96 mg Oral Given 3/14/20 2210)     Laboratory Results:  Results for orders placed or performed during the hospital encounter of 03/14/20   Rapid Influenza A/B by PCR   Result Value    Specimen Source NASOPHARYNGEAL SWAB    Influenza A Virus NOT DETECTED    Influenza B Virus NOT DETECTED     Comment: This result was  obtained by RT-PCR amplification by fluorescence detection.  This test has been cleared by the U.S. Food and Drug Administration for detection of influenza A and B.   Respiratory Syncytial Virus Rapid Antigen Screen   Result Value    SOURCE NASOPHARYNGEAL SWAB    RSV AG, RAPID SCREEN NEGATIVE     Comment: A negative result does not exclude RSV infection. If more conclusive testing is desired, follow-up confirmatory testing with RT-PCR is warranted.       Radiology Results:  No orders to display       Procedures:  Procedures      ED Course:  Vitals:    03/14/20 2145 03/14/20 2152 03/14/20 2309   BP:  (!) 96/75    Pulse:  (!) 156 (!) 168   Resp:  36 32   Temp:  (!) 103.4 °F (39.7 °C) (!) 101.1 °F (38.4 °C)   TempSrc:  Rectal Rectal   SpO2:  100% 99%   Weight: 9.5 kg (20 lb 15.1 oz)       0009  Pt's fever has improved.  Updated mother on flu result.  Will wait for RSV result.    0050  Reviewed diagnostic results with pt's family, as well as plans for disposition. Pt's family agree to d/c home and understand need for f/u on an outpatient basis.    MDM - child is very well appearing in the ED.  The child was drinking bottle of gatorade.  The child's temperature improved with antipyretics and the child was well appearing and playful in the ED.  Discussed home care and follow up and return precautions with patient.           Impression and Plan:  Multiple differential diagnoses were considered. The patient / caregivers were apprised of diagnostic / treatment options including alternate modes of care, in addition to risks and benefits, for this medical condition. Based on this discussion the patient / caregiver agrees with this chosen diagnostic and treatment plan.    1. Influenza-like illness    2. Febrile illness           Condition:  IMPROVED and STABLE      Disposition:  Time: 0050  Prescriptions:     Summary of your Discharge Medications      You have not been prescribed any medications.           Patient Care  Instructions:   1. Makeda instructions were provided for febrile illness, Tylenol dosing and Motrin dosing.      Follow Up Plan:  1.    Follow-up Information     Follow up With Specialties Details Why Contact Info        Please follow up with your doctor at [facility]           2.  Follow up is needed :   > for re-examination.    3.  Recommended returning to the ED for any change in symptoms or status.    Discharged to Home .    DISCUSSION OF PLAN AND IMPORTANCE OF FOLLOW-UP CARE:  The plan was discussed verbally and key components were summarized in the discharge instructions. All questions were answered. In discussing management and follow-up, they are advised that the plan is based only on information that was available at the time of emergency department evaluation. Additional testing and treatment may be necessary, and outpatient evaluation is frequently required to ensure complete care. At the same time, there is always potential for symptoms to recur or worsen, or for additional signs or symptoms to develop that could substantially affect the treatment plan. If any new or uncontrolled symptoms occur, or if there are any other concerns, they are advised to seek medical evaluation immediately.    Counseled:  Family, Regarding diagnosis, Regarding diagnostic results, Regarding treatment and Family understood      This document serves as a record of the services and decisions personally performed and made by Dr. Kirill Morales MD. It was created on the provider's behalf by Marquita Lee, a trained medical scribe. The creation of this document is based on the provider's statements to the medical scribe.     Marquita Lee, 3/15/2020        The documentation recorded by the scribe accurately reflects the service I personally performed and the decisions made by me, MD Kirill Velasco MD  03/15/20 0139

## 2020-03-18 ENCOUNTER — HOSPITAL ENCOUNTER (OUTPATIENT)
Age: 40
Setting detail: SPECIMEN
Discharge: HOME | End: 2020-03-18
Payer: MEDICAID

## 2020-03-18 LAB
ABSOLUTE EOS #: 0.11 K/UL (ref 0–0.44)
ABSOLUTE IMMATURE GRANULOCYTE: 0.02 K/UL (ref 0–0.3)
ABSOLUTE LYMPH #: 2.73 K/UL (ref 1.1–3.7)
ABSOLUTE MONO #: 0.64 K/UL (ref 0.1–1.2)
ALBUMIN SERPL-MCNC: 4.1 G/DL (ref 3.5–5.2)
ALBUMIN/GLOBULIN RATIO: ABNORMAL (ref 1–2.5)
ALP BLD-CCNC: 101 U/L (ref 35–104)
ALT SERPL-CCNC: 11 U/L (ref 5–33)
ANION GAP SERPL CALCULATED.3IONS-SCNC: 14 MMOL/L (ref 9–17)
AST SERPL-CCNC: 13 U/L
BASOPHILS # BLD: 1 % (ref 0–2)
BASOPHILS ABSOLUTE: 0.04 K/UL (ref 0–0.2)
BILIRUB SERPL-MCNC: 0.26 MG/DL (ref 0.3–1.2)
BUN BLDV-MCNC: 7 MG/DL (ref 6–20)
BUN/CREAT BLD: 8 (ref 9–20)
CALCIUM SERPL-MCNC: 9.2 MG/DL (ref 8.6–10.4)
CHLORIDE BLD-SCNC: 99 MMOL/L (ref 98–107)
CO2: 23 MMOL/L (ref 20–31)
CREAT SERPL-MCNC: 0.92 MG/DL (ref 0.5–0.9)
DIFFERENTIAL TYPE: ABNORMAL
EOSINOPHILS RELATIVE PERCENT: 2 % (ref 1–4)
GFR AFRICAN AMERICAN: >60 ML/MIN
GFR NON-AFRICAN AMERICAN: >60 ML/MIN
GFR SERPL CREATININE-BSD FRML MDRD: ABNORMAL ML/MIN/{1.73_M2}
GFR SERPL CREATININE-BSD FRML MDRD: ABNORMAL ML/MIN/{1.73_M2}
GLUCOSE BLD-MCNC: 123 MG/DL (ref 70–99)
HCT VFR BLD CALC: 36.9 % (ref 36.3–47.1)
HEMOGLOBIN: 12 G/DL (ref 11.9–15.1)
IMMATURE GRANULOCYTES: 0 %
LYMPHOCYTES # BLD: 38 % (ref 24–43)
MCH RBC QN AUTO: 32.4 PG (ref 25.2–33.5)
MCHC RBC AUTO-ENTMCNC: 32.5 G/DL (ref 28.4–34.8)
MCV RBC AUTO: 99.7 FL (ref 82.6–102.9)
MONOCYTES # BLD: 9 % (ref 3–12)
NRBC AUTOMATED: 0 PER 100 WBC
PDW BLD-RTO: 13.2 % (ref 11.8–14.4)
PLATELET # BLD: 358 K/UL (ref 138–453)
PLATELET ESTIMATE: ABNORMAL
PMV BLD AUTO: 9 FL (ref 8.1–13.5)
POTASSIUM SERPL-SCNC: 3.7 MMOL/L (ref 3.7–5.3)
RBC # BLD: 3.7 M/UL (ref 3.95–5.11)
RBC # BLD: ABNORMAL 10*6/UL
SEG NEUTROPHILS: 50 % (ref 36–65)
SEGMENTED NEUTROPHILS ABSOLUTE COUNT: 3.62 K/UL (ref 1.5–8.1)
SODIUM BLD-SCNC: 136 MMOL/L (ref 135–144)
TOTAL PROTEIN: 7.7 G/DL (ref 6.4–8.3)
WBC # BLD: 7.2 K/UL (ref 3.5–11.3)
WBC # BLD: ABNORMAL 10*3/UL

## 2020-03-18 PROCEDURE — 85025 COMPLETE CBC W/AUTO DIFF WBC: CPT

## 2020-03-18 PROCEDURE — P9603 ONE-WAY ALLOW PRORATED MILES: HCPCS

## 2020-03-18 PROCEDURE — 36415 COLL VENOUS BLD VENIPUNCTURE: CPT

## 2020-03-18 PROCEDURE — 80053 COMPREHEN METABOLIC PANEL: CPT

## 2020-03-25 ENCOUNTER — HOSPITAL ENCOUNTER (OUTPATIENT)
Age: 40
Setting detail: SPECIMEN
Discharge: HOME | End: 2020-03-25
Payer: MEDICAID

## 2020-03-25 LAB
ABSOLUTE EOS #: 0.16 K/UL (ref 0–0.44)
ABSOLUTE IMMATURE GRANULOCYTE: <0.03 K/UL (ref 0–0.3)
ABSOLUTE LYMPH #: 3.63 K/UL (ref 1.1–3.7)
ABSOLUTE MONO #: 0.58 K/UL (ref 0.1–1.2)
ALBUMIN SERPL-MCNC: 3.8 G/DL (ref 3.5–5.2)
ALBUMIN/GLOBULIN RATIO: 1.1 (ref 1–2.5)
ALP BLD-CCNC: 104 U/L (ref 35–104)
ALT SERPL-CCNC: 10 U/L (ref 5–33)
ANION GAP SERPL CALCULATED.3IONS-SCNC: 13 MMOL/L (ref 9–17)
AST SERPL-CCNC: 16 U/L
BASOPHILS # BLD: 1 % (ref 0–2)
BASOPHILS ABSOLUTE: 0.06 K/UL (ref 0–0.2)
BILIRUB SERPL-MCNC: 0.22 MG/DL (ref 0.3–1.2)
BUN BLDV-MCNC: 7 MG/DL (ref 6–20)
BUN/CREAT BLD: ABNORMAL (ref 9–20)
CALCIUM SERPL-MCNC: 9.1 MG/DL (ref 8.6–10.4)
CHLORIDE BLD-SCNC: 102 MMOL/L (ref 98–107)
CO2: 23 MMOL/L (ref 20–31)
CREAT SERPL-MCNC: 0.67 MG/DL (ref 0.5–0.9)
DIFFERENTIAL TYPE: ABNORMAL
EOSINOPHILS RELATIVE PERCENT: 2 % (ref 1–4)
ESTIMATED AVERAGE GLUCOSE: 82 MG/DL
GFR AFRICAN AMERICAN: >60 ML/MIN
GFR NON-AFRICAN AMERICAN: >60 ML/MIN
GFR SERPL CREATININE-BSD FRML MDRD: ABNORMAL ML/MIN/{1.73_M2}
GFR SERPL CREATININE-BSD FRML MDRD: ABNORMAL ML/MIN/{1.73_M2}
GLUCOSE BLD-MCNC: 75 MG/DL (ref 70–99)
HBA1C MFR BLD: 4.5 % (ref 4–6)
HCT VFR BLD CALC: 38.2 % (ref 36.3–47.1)
HEMOGLOBIN: 12.4 G/DL (ref 11.9–15.1)
IMMATURE GRANULOCYTES: 0 %
LYMPHOCYTES # BLD: 53 % (ref 24–43)
MCH RBC QN AUTO: 32.1 PG (ref 25.2–33.5)
MCHC RBC AUTO-ENTMCNC: 32.5 G/DL (ref 28.4–34.8)
MCV RBC AUTO: 99 FL (ref 82.6–102.9)
MONOCYTES # BLD: 9 % (ref 3–12)
NRBC AUTOMATED: 0 PER 100 WBC
PDW BLD-RTO: 12.7 % (ref 11.8–14.4)
PLATELET # BLD: 382 K/UL (ref 138–453)
PLATELET ESTIMATE: ABNORMAL
PMV BLD AUTO: 9.5 FL (ref 8.1–13.5)
POTASSIUM SERPL-SCNC: 3.9 MMOL/L (ref 3.7–5.3)
RBC # BLD: 3.86 M/UL (ref 3.95–5.11)
RBC # BLD: ABNORMAL 10*6/UL
SEG NEUTROPHILS: 35 % (ref 36–65)
SEGMENTED NEUTROPHILS ABSOLUTE COUNT: 2.41 K/UL (ref 1.5–8.1)
SODIUM BLD-SCNC: 138 MMOL/L (ref 135–144)
TOTAL PROTEIN: 7.3 G/DL (ref 6.4–8.3)
WBC # BLD: 6.9 K/UL (ref 3.5–11.3)
WBC # BLD: ABNORMAL 10*3/UL

## 2020-03-25 PROCEDURE — 85025 COMPLETE CBC W/AUTO DIFF WBC: CPT

## 2020-03-25 PROCEDURE — P9603 ONE-WAY ALLOW PRORATED MILES: HCPCS

## 2020-03-25 PROCEDURE — 36415 COLL VENOUS BLD VENIPUNCTURE: CPT

## 2020-03-25 PROCEDURE — 80053 COMPREHEN METABOLIC PANEL: CPT

## 2020-03-25 PROCEDURE — 83036 HEMOGLOBIN GLYCOSYLATED A1C: CPT

## 2020-08-07 ENCOUNTER — HOSPITAL ENCOUNTER (OUTPATIENT)
Age: 40
Setting detail: SPECIMEN
Discharge: HOME | End: 2020-08-07
Payer: MEDICAID

## 2020-11-11 ENCOUNTER — HOSPITAL ENCOUNTER (OUTPATIENT)
Age: 40
Setting detail: SPECIMEN
Discharge: HOME | End: 2020-11-11
Payer: MEDICAID

## 2020-11-12 ENCOUNTER — HOSPITAL ENCOUNTER (OUTPATIENT)
Age: 40
Setting detail: SPECIMEN
Discharge: HOME | End: 2020-11-12
Payer: MEDICAID

## 2020-11-13 ENCOUNTER — HOSPITAL ENCOUNTER (OUTPATIENT)
Age: 40
Setting detail: SPECIMEN
Discharge: HOME | End: 2020-11-13
Payer: MEDICAID

## 2020-11-22 NOTE — PLAN OF CARE
Problem: Anger Management/Homicidal Ideation:  Goal: Absence of angry outbursts  Description  Absence of angry outbursts   Note:   Patient has been isolative to room so far today. Patient irritable during conversations.  Refused assessments and medications      Problem: Falls - Risk of:  Goal: Will remain free from falls  Description  Will remain free from falls    Note:   Patient has not had any witnessed falls this shift and has not reported them History of IBS History of IBS History of IBS History of IBS

## 2020-12-15 NOTE — CARE COORDINATION
Phone Number Called: [phone] (home)       Call outcome: Spoke to patient regarding message below.    Message: I called mother let her know Radha is out until March for NP. Mother stated she will call pt's pcp and see if she can be referred to another doctor that can see pt sooner. I told her if she needed anything else to call us back at [phone].     Pt declined to attend psychoeducation at 1000 am despite encouragement. Pt offered 1:1 and refused.

## 2021-02-12 ENCOUNTER — HOSPITAL ENCOUNTER (OUTPATIENT)
Age: 41
Setting detail: SPECIMEN
Discharge: HOME | End: 2021-02-12
Payer: MEDICAID

## 2021-02-12 LAB
ABSOLUTE EOS #: 0.1 K/UL (ref 0–0.44)
ABSOLUTE IMMATURE GRANULOCYTE: 0.01 K/UL (ref 0–0.3)
ABSOLUTE LYMPH #: 3.33 K/UL (ref 1.1–3.7)
ABSOLUTE MONO #: 0.49 K/UL (ref 0.1–1.2)
ALBUMIN SERPL-MCNC: 3.8 G/DL (ref 3.5–5.2)
ALBUMIN/GLOBULIN RATIO: ABNORMAL (ref 1–2.5)
ALP BLD-CCNC: 175 U/L (ref 35–104)
ALT SERPL-CCNC: 13 U/L (ref 5–33)
AST SERPL-CCNC: 16 U/L
BASOPHILS # BLD: 1 % (ref 0–2)
BASOPHILS ABSOLUTE: 0.04 K/UL (ref 0–0.2)
BILIRUB SERPL-MCNC: 0.3 MG/DL (ref 0.3–1.2)
BILIRUBIN DIRECT: <0.08 MG/DL
BILIRUBIN, INDIRECT: ABNORMAL MG/DL (ref 0–1)
DIFFERENTIAL TYPE: ABNORMAL
EOSINOPHILS RELATIVE PERCENT: 2 % (ref 1–4)
GLOBULIN: ABNORMAL G/DL (ref 1.5–3.8)
HCT VFR BLD CALC: 40 % (ref 36.3–47.1)
HEMOGLOBIN: 12.9 G/DL (ref 11.9–15.1)
IMMATURE GRANULOCYTES: 0 %
LYMPHOCYTES # BLD: 50 % (ref 24–43)
MCH RBC QN AUTO: 32 PG (ref 25.2–33.5)
MCHC RBC AUTO-ENTMCNC: 32.3 G/DL (ref 28.4–34.8)
MCV RBC AUTO: 99.3 FL (ref 82.6–102.9)
MONOCYTES # BLD: 7 % (ref 3–12)
NRBC AUTOMATED: 0 PER 100 WBC
PDW BLD-RTO: 13 % (ref 11.8–14.4)
PLATELET # BLD: 395 K/UL (ref 138–453)
PLATELET ESTIMATE: ABNORMAL
PMV BLD AUTO: 9.9 FL (ref 8.1–13.5)
RBC # BLD: 4.03 M/UL (ref 3.95–5.11)
RBC # BLD: ABNORMAL 10*6/UL
SEG NEUTROPHILS: 40 % (ref 36–65)
SEGMENTED NEUTROPHILS ABSOLUTE COUNT: 2.64 K/UL (ref 1.5–8.1)
TOTAL PROTEIN: 7.4 G/DL (ref 6.4–8.3)
WBC # BLD: 6.6 K/UL (ref 3.5–11.3)
WBC # BLD: ABNORMAL 10*3/UL

## 2021-02-12 PROCEDURE — 80076 HEPATIC FUNCTION PANEL: CPT

## 2021-02-12 PROCEDURE — 36415 COLL VENOUS BLD VENIPUNCTURE: CPT

## 2021-02-12 PROCEDURE — P9603 ONE-WAY ALLOW PRORATED MILES: HCPCS

## 2021-02-12 PROCEDURE — 80048 BASIC METABOLIC PNL TOTAL CA: CPT

## 2021-02-12 PROCEDURE — 85025 COMPLETE CBC W/AUTO DIFF WBC: CPT

## 2021-02-15 LAB
ANION GAP SERPL CALCULATED.3IONS-SCNC: 13 MMOL/L (ref 9–17)
BUN BLDV-MCNC: 5 MG/DL (ref 6–20)
BUN/CREAT BLD: 7 (ref 9–20)
CALCIUM SERPL-MCNC: 9.1 MG/DL (ref 8.6–10.4)
CHLORIDE BLD-SCNC: 106 MMOL/L (ref 98–107)
CO2: 21 MMOL/L (ref 20–31)
CREAT SERPL-MCNC: 0.76 MG/DL (ref 0.5–0.9)
GFR AFRICAN AMERICAN: >60 ML/MIN
GFR NON-AFRICAN AMERICAN: >60 ML/MIN
GFR SERPL CREATININE-BSD FRML MDRD: ABNORMAL ML/MIN/{1.73_M2}
GFR SERPL CREATININE-BSD FRML MDRD: ABNORMAL ML/MIN/{1.73_M2}
GLUCOSE BLD-MCNC: 61 MG/DL (ref 70–99)
POTASSIUM SERPL-SCNC: 4.6 MMOL/L (ref 3.7–5.3)
SODIUM BLD-SCNC: 140 MMOL/L (ref 135–144)

## 2021-06-04 NOTE — GROUP NOTE
Group Therapy Note    Date: March 19    Group Start Time: 1430  Group End Time: 1505  Group Topic: Cognitive Skills    RANDELL Meza, CTRS    Pt did not attend RT group at 1430 d/t resting in room despite staff invitation to attend. heaving

## 2021-08-02 NOTE — PLAN OF CARE
Problem: Anger Management/Homicidal Ideation:  Goal: Absence of homicidal ideation  Absence of homicidal ideation   Outcome: Ongoing  Pt did not participate in Goal Setting / Community Meeting group at 0900 despite staff encouragement. N/A

## 2022-02-16 ENCOUNTER — HOSPITAL ENCOUNTER (OUTPATIENT)
Age: 42
Setting detail: SPECIMEN
Discharge: HOME | End: 2022-02-16
Payer: MEDICAID

## 2022-02-16 LAB
ALBUMIN SERPL-MCNC: 3.9 G/DL (ref 3.5–5.2)
ALBUMIN/GLOBULIN RATIO: 1.1 (ref 1–2.5)
ALP BLD-CCNC: 148 U/L (ref 35–104)
ALT SERPL-CCNC: 16 U/L (ref 5–33)
ANION GAP SERPL CALCULATED.3IONS-SCNC: 13 MMOL/L (ref 9–17)
AST SERPL-CCNC: 17 U/L
BILIRUB SERPL-MCNC: 0.33 MG/DL (ref 0.3–1.2)
BUN BLDV-MCNC: 5 MG/DL (ref 6–20)
BUN/CREAT BLD: ABNORMAL (ref 9–20)
CALCIUM SERPL-MCNC: 9.2 MG/DL (ref 8.6–10.4)
CHLORIDE BLD-SCNC: 105 MMOL/L (ref 98–107)
CHOLESTEROL/HDL RATIO: 5.8
CHOLESTEROL: 146 MG/DL
CO2: 20 MMOL/L (ref 20–31)
CREAT SERPL-MCNC: 0.64 MG/DL (ref 0.5–0.9)
ESTIMATED AVERAGE GLUCOSE: 100 MG/DL
GFR AFRICAN AMERICAN: >60 ML/MIN
GFR NON-AFRICAN AMERICAN: >60 ML/MIN
GFR SERPL CREATININE-BSD FRML MDRD: ABNORMAL ML/MIN/{1.73_M2}
GFR SERPL CREATININE-BSD FRML MDRD: ABNORMAL ML/MIN/{1.73_M2}
GLUCOSE BLD-MCNC: 87 MG/DL (ref 70–99)
HBA1C MFR BLD: 5.1 % (ref 4–6)
HCT VFR BLD CALC: 39.8 % (ref 36.3–47.1)
HDLC SERPL-MCNC: 25 MG/DL
HEMOGLOBIN: 13.1 G/DL (ref 11.9–15.1)
LDL CHOLESTEROL: 71 MG/DL (ref 0–130)
MCH RBC QN AUTO: 31.6 PG (ref 25.2–33.5)
MCHC RBC AUTO-ENTMCNC: 32.9 G/DL (ref 28.4–34.8)
MCV RBC AUTO: 96.1 FL (ref 82.6–102.9)
NRBC AUTOMATED: 0 PER 100 WBC
PDW BLD-RTO: 13 % (ref 11.8–14.4)
PLATELET # BLD: 435 K/UL (ref 138–453)
PMV BLD AUTO: 9.7 FL (ref 8.1–13.5)
POTASSIUM SERPL-SCNC: 4.2 MMOL/L (ref 3.7–5.3)
RBC # BLD: 4.14 M/UL (ref 3.95–5.11)
SODIUM BLD-SCNC: 138 MMOL/L (ref 135–144)
TOTAL PROTEIN: 7.6 G/DL (ref 6.4–8.3)
TRIGL SERPL-MCNC: 252 MG/DL
VLDLC SERPL CALC-MCNC: ABNORMAL MG/DL (ref 1–30)
WBC # BLD: 5.9 K/UL (ref 3.5–11.3)

## 2022-02-16 PROCEDURE — 85027 COMPLETE CBC AUTOMATED: CPT

## 2022-02-16 PROCEDURE — 80061 LIPID PANEL: CPT

## 2022-02-16 PROCEDURE — 36415 COLL VENOUS BLD VENIPUNCTURE: CPT

## 2022-02-16 PROCEDURE — 83036 HEMOGLOBIN GLYCOSYLATED A1C: CPT

## 2022-02-16 PROCEDURE — P9603 ONE-WAY ALLOW PRORATED MILES: HCPCS

## 2022-02-16 PROCEDURE — 80053 COMPREHEN METABOLIC PANEL: CPT

## 2022-06-09 NOTE — PLAN OF CARE
June 12, 2022      Álvaro Osborne  [address]  [place] [address]        Dear Parent or Guardian of Álvaro Osborne    We are writing to inform you of your child's test results.    Your strep test was negative.     Resulted Orders   Streptococcus A Rapid Screen w/Reflex to PCR   Result Value Ref Range    Group A Strep antigen Negative Negative   Group A Streptococcus PCR Throat Swab   Result Value Ref Range    Group A strep by PCR Not Detected Not Detected    Narrative    The Xpert Xpress Strep A test, performed on the [employer], is a rapid, qualitative in vitro diagnostic test for the detection of Streptococcus pyogenes (Group A ß-hemolytic Streptococcus, Strep A) in throat swab specimens from patients with signs and symptoms of pharyngitis. The Xpert Xpress Strep A test can be used as an aid in the diagnosis of Group A Streptococcal pharyngitis. The assay is not intended to monitor treatment for Group A Streptococcus infections. The Xpert Xpress Strep A test utilizes an automated real-time polymerase chain reaction (PCR) to detect Streptococcus pyogenes DNA.       If you have any questions or concerns, please call the clinic at the number listed above.       Sincerely,        Moy Mena MD                 Problem: Altered Mood, Deterioration in Function:  Goal: Ability to perform activities of daily living will improve  Ability to perform activities of daily living will improve   Outcome: Ongoing  Pt did not attend Community Meeting at 0900 d/t resting in room despite staff invitation to attend.

## 2022-08-07 NOTE — PLAN OF CARE
Problem: Altered Mood, Psychotic Behavior:  Goal: Able to demonstrate trust by eating, participating in treatment and following staff's direction  Able to demonstrate trust by eating, participating in treatment and following staff's direction   Outcome: Ongoing  Pt did not participate in Goal Setting / [employer] group at [employer] despite staff encouragement. 5

## 2022-08-25 ENCOUNTER — HOSPITAL ENCOUNTER (OUTPATIENT)
Age: 42
Setting detail: SPECIMEN
Discharge: HOME | End: 2022-08-25
Payer: MEDICAID

## 2022-08-25 LAB
ALBUMIN SERPL-MCNC: 3.7 G/DL (ref 3.5–5.2)
ALP BLD-CCNC: 152 U/L (ref 35–104)
ALT SERPL-CCNC: 19 U/L (ref 5–33)
ANION GAP SERPL CALCULATED.3IONS-SCNC: 12 MMOL/L (ref 9–17)
AST SERPL-CCNC: 19 U/L
BILIRUB SERPL-MCNC: 0.26 MG/DL (ref 0.3–1.2)
BUN BLDV-MCNC: 8 MG/DL (ref 6–20)
BUN/CREAT BLD: 10 (ref 9–20)
CALCIUM SERPL-MCNC: 9.1 MG/DL (ref 8.6–10.4)
CHLORIDE BLD-SCNC: 103 MMOL/L (ref 98–107)
CHOLESTEROL/HDL RATIO: 6.9
CHOLESTEROL: 152 MG/DL
CO2: 24 MMOL/L (ref 20–31)
CREAT SERPL-MCNC: 0.79 MG/DL (ref 0.5–0.9)
ESTIMATED AVERAGE GLUCOSE: 97 MG/DL
GFR AFRICAN AMERICAN: >60 ML/MIN
GFR NON-AFRICAN AMERICAN: >60 ML/MIN
GFR SERPL CREATININE-BSD FRML MDRD: ABNORMAL ML/MIN/{1.73_M2}
GLUCOSE BLD-MCNC: 78 MG/DL (ref 70–99)
HBA1C MFR BLD: 5 % (ref 4–6)
HCT VFR BLD CALC: 36.9 % (ref 36.3–47.1)
HDLC SERPL-MCNC: 22 MG/DL
HEMOGLOBIN: 12.3 G/DL (ref 11.9–15.1)
LDL CHOLESTEROL: 75 MG/DL (ref 0–130)
MCH RBC QN AUTO: 31.9 PG (ref 25.2–33.5)
MCHC RBC AUTO-ENTMCNC: 33.3 G/DL (ref 28.4–34.8)
MCV RBC AUTO: 95.6 FL (ref 82.6–102.9)
NRBC AUTOMATED: 0 PER 100 WBC
PDW BLD-RTO: 13.6 % (ref 11.8–14.4)
PLATELET # BLD: 402 K/UL (ref 138–453)
PMV BLD AUTO: 9.4 FL (ref 8.1–13.5)
POTASSIUM SERPL-SCNC: 3.9 MMOL/L (ref 3.7–5.3)
PROLACTIN: 97.24 NG/ML (ref 4.79–23.3)
RBC # BLD: 3.86 M/UL (ref 3.95–5.11)
SODIUM BLD-SCNC: 139 MMOL/L (ref 135–144)
TOTAL PROTEIN: 7.4 G/DL (ref 6.4–8.3)
TRIGL SERPL-MCNC: 274 MG/DL
WBC # BLD: 9.4 K/UL (ref 3.5–11.3)

## 2022-08-25 PROCEDURE — 36415 COLL VENOUS BLD VENIPUNCTURE: CPT

## 2022-08-25 PROCEDURE — P9603 ONE-WAY ALLOW PRORATED MILES: HCPCS

## 2022-08-25 PROCEDURE — 80061 LIPID PANEL: CPT

## 2022-08-25 PROCEDURE — 85027 COMPLETE CBC AUTOMATED: CPT

## 2022-08-25 PROCEDURE — 80053 COMPREHEN METABOLIC PANEL: CPT

## 2022-08-25 PROCEDURE — 84146 ASSAY OF PROLACTIN: CPT

## 2022-08-25 PROCEDURE — 83036 HEMOGLOBIN GLYCOSYLATED A1C: CPT

## 2022-10-06 NOTE — FLOWSHEET NOTE
*Patient participated in the [address] [place] [address], she left for a short time and returned       01/17/19 1522   Encounter Summary   Services provided to: Patient   Referral/Consult From: Sherley   Continue Visiting (1/17/19)   Complexity of Encounter Low   Length of Encounter 30 minutes   Spiritual Assessment Completed Yes   Spiritual/Holiness   Type Spiritual support   Assessment Calm; Approachable   Intervention Active listening   Outcome Receptive None

## 2023-05-23 ENCOUNTER — HOSPITAL ENCOUNTER (OUTPATIENT)
Age: 43
Setting detail: SPECIMEN
Discharge: HOME | End: 2023-05-23
Payer: MEDICAID

## 2023-05-23 LAB
ALBUMIN SERPL-MCNC: 3.6 G/DL (ref 3.5–5.2)
ALP SERPL-CCNC: 133 U/L (ref 35–104)
ALT SERPL-CCNC: 15 U/L (ref 5–33)
ANION GAP SERPL CALCULATED.3IONS-SCNC: 10 MMOL/L (ref 9–17)
AST SERPL-CCNC: 14 U/L
BASOPHILS # BLD: 0.05 K/UL (ref 0–0.2)
BASOPHILS NFR BLD: 1 % (ref 0–2)
BILIRUB SERPL-MCNC: 0.3 MG/DL (ref 0.3–1.2)
BUN SERPL-MCNC: 9 MG/DL (ref 6–20)
BUN/CREAT SERPL: 12 (ref 9–20)
CALCIUM SERPL-MCNC: 8.9 MG/DL (ref 8.6–10.4)
CHLORIDE SERPL-SCNC: 103 MMOL/L (ref 98–107)
CHOLEST SERPL-MCNC: 152 MG/DL
CHOLESTEROL/HDL RATIO: 7.2
CO2 SERPL-SCNC: 25 MMOL/L (ref 20–31)
CREAT SERPL-MCNC: 0.76 MG/DL (ref 0.5–0.9)
EOSINOPHIL # BLD: 0.2 K/UL (ref 0–0.44)
EOSINOPHILS RELATIVE PERCENT: 2 % (ref 1–4)
ERYTHROCYTE [DISTWIDTH] IN BLOOD BY AUTOMATED COUNT: 13.6 % (ref 11.8–14.4)
EST. AVERAGE GLUCOSE BLD GHB EST-MCNC: 91 MG/DL
GFR SERPL CREATININE-BSD FRML MDRD: >60 ML/MIN/1.73M2
GLUCOSE SERPL-MCNC: 77 MG/DL (ref 70–99)
HBA1C MFR BLD: 4.8 % (ref 4–6)
HCT VFR BLD AUTO: 38.3 % (ref 36.3–47.1)
HDLC SERPL-MCNC: 21 MG/DL
HGB BLD-MCNC: 12.2 G/DL (ref 11.9–15.1)
IMM GRANULOCYTES # BLD AUTO: 0.03 K/UL (ref 0–0.3)
IMM GRANULOCYTES NFR BLD: 0 %
LDLC SERPL CALC-MCNC: 73 MG/DL (ref 0–130)
LYMPHOCYTES # BLD: 42 % (ref 24–43)
LYMPHOCYTES NFR BLD: 3.95 K/UL (ref 1.1–3.7)
MCH RBC QN AUTO: 31.3 PG (ref 25.2–33.5)
MCHC RBC AUTO-ENTMCNC: 31.9 G/DL (ref 28.4–34.8)
MCV RBC AUTO: 98.2 FL (ref 82.6–102.9)
MONOCYTES NFR BLD: 0.84 K/UL (ref 0.1–1.2)
MONOCYTES NFR BLD: 9 % (ref 3–12)
NEUTROPHILS NFR BLD: 46 % (ref 36–65)
NEUTS SEG NFR BLD: 4.29 K/UL (ref 1.5–8.1)
NRBC AUTOMATED: 0 PER 100 WBC
PLATELET # BLD AUTO: 373 K/UL (ref 138–453)
PMV BLD AUTO: 9.6 FL (ref 8.1–13.5)
POTASSIUM SERPL-SCNC: 4 MMOL/L (ref 3.7–5.3)
PROLACTIN SERPL-MCNC: 126.1 NG/ML (ref 4.79–23.3)
PROT SERPL-MCNC: 7.3 G/DL (ref 6.4–8.3)
RBC # BLD AUTO: 3.9 M/UL (ref 3.95–5.11)
SODIUM SERPL-SCNC: 138 MMOL/L (ref 135–144)
TRIGL SERPL-MCNC: 288 MG/DL
WBC OTHER # BLD: 9.4 K/UL (ref 3.5–11.3)

## 2023-05-23 PROCEDURE — P9603 ONE-WAY ALLOW PRORATED MILES: HCPCS

## 2023-05-23 PROCEDURE — 36415 COLL VENOUS BLD VENIPUNCTURE: CPT

## 2023-05-23 PROCEDURE — 80053 COMPREHEN METABOLIC PANEL: CPT

## 2023-05-23 PROCEDURE — 80061 LIPID PANEL: CPT

## 2023-05-23 PROCEDURE — 85025 COMPLETE CBC W/AUTO DIFF WBC: CPT

## 2023-05-23 PROCEDURE — 84146 ASSAY OF PROLACTIN: CPT

## 2023-05-23 PROCEDURE — 83036 HEMOGLOBIN GLYCOSYLATED A1C: CPT

## 2023-08-02 NOTE — PROGRESS NOTES
OB/GYN Resident Interval Note     Called [facility] to ask if patient was willing to come to labor and delivery for non-stress test and biophysical profile. Spoke with RN, Tina Okeefe., who stated that patient would not be willing to come for  testing as she has been very verbally aggressive and violent today. Please call if at any time patient is agreeable to come to L&D for testing.        Woody Begum DO  Ob/Gyn Resident  Pager: [phone]  3/4/2019 3:59 PM time frame so as to increase their functional independence with ADLs and activities for carryover to  improved quality of life, tolerance to household and community activities, decrease falls risk. Patient requires skilled Physical Therapy so as to monitor their response to and modify their treatment plan accordingly. Patient appears to be an appropriate candidate for skilled outpatient Physical Therapy. Evaluation Complexity:  History:  HIGH Complexity :3+ comorbidities / personal factors will impact the outcome/ POC ; Examination:  HIGH Complexity : 4+ Standardized tests and measures addressing body structure, function, activity limitation and / or participation in recreation  ;Presentation:  LOW Complexity : Stable, uncomplicated  ;Clinical Decision Making:  MEDIUM Complexity : FOTO score of 26-74 FOTO score = an established functional score where 100 = no disability  Overall Complexity Rating: LOW   Problem List: pain affecting function, decrease ROM, decrease strength, impaired gait/balance, decrease ADL/functional abilities, decrease activity tolerance, decrease flexibility/joint mobility, decrease transfer abilities , and other FOTO    Treatment Plan may include any combination of the followin Therapeutic Exercise, 59519 Neuromuscular Re-Education, 51977 Manual Therapy, 68760 Therapeutic Activity, 84467 Self Care/Home Management, 83375 Electrical Stim unattended, 38368 Electrical Stim attended, 92447 Gait Training, and 76854 Ultrasound  Patient / Family readiness to learn indicated by: asking questions, trying to perform skills, interest, return verbalization , and return demonstration   Persons(s) to be included in education: patient (P)  Barriers to Learning/Limitations: none  Measures taken if barriers to learning present: NA  Patient Goal (s): improved walking, better balance  Patient Self Reported Health Status: good  Rehabilitation Potential: good    Short Term Goals:  To be accomplished in 4 an appropriate candidate for skilled outpatient Physical Therapy. Patient will continue to benefit from skilled PT services to modify and progress therapeutic interventions, analyze and address functional mobility deficits, analyze and address ROM deficits, analyze and address strength deficits, analyze and address soft tissue restrictions, analyze and cue for proper movement patterns, analyze and modify for postural abnormalities, and instruct in home and community integration to address functional deficits and attain remaining goals.        [x]  See Plan of Care for goals and reassessment       PLAN  [x]  Upgrade activities as tolerated     [x]  Continue plan of care  []  Update interventions per flow sheet       []  Other:_      Babs Hobson, PT 8/2/2023  8:56 AM

## 2024-01-12 NOTE — BH NOTE
Date:   Start Time: 830pm  End Time: 915pm    Number Participants in Group:      Goal:  Patient will demonstrate increased interpersonal interaction   Topic:     Discipline Responsible:   OT  AT   x Nsg.  RT  Other       Participation Level:     None  Minimal   x Active Listener x Interactive    Monopolizing         Participation Quality:  x Appropriate  Inappropriate   x       Attentive        Intrusive   x       Sharing        Resistant   x       Supportive        Lethargic       Affective:   x Congruent  Incongruent  Blunted  Flat    Constricted  Anxious  Elated  Angry    Labile  Depressed  Other         Cognitive:  x Alert  Oriented PPTP     Concentration x G  F  P   Attention Span x G  F  P   Short-Term Memory x G  F  P   Long-Term Memory x G  F  P   ProblemSolving/  Decision Making x G  F  P   Ability to Process  Information x G  F  P      Contributing Factors             Delusional             Hallucinating             Flight of Ideas             Other:       Modes of Intervention:  x Education  Support  Exploration   x Clarifying  Problem Solving  Confrontation    Socialization  Limit Setting  Reality Testing    Activity  Movement  Media    Other:            Response to Learning:  x Able to verbalize current knowledge/experience   x Able to verbalize/acknowledge new learning    Able to retain information    Capable of insight    Able to change behavior    Progressing to goal    Other:        Comments: No

## 2024-04-30 NOTE — PLAN OF CARE
Psychotherapy Group Note     Date: 2/20/19              Start Time: 10:00am              End Time: 11:05am     Number Participants in Group:  12     Goal:  Patient will demonstrate increased interpersonal interaction   Topic: [facility] [facility]     Discipline Responsible:    OT   AT X SW   Nsg.   RT   Other         Participation Level:                   None   Minimal   X Active Listener   Interactive     Monopolizing             Participation Quality:  X Appropriate   Inappropriate   X       Attentive         Intrusive   X       Sharing         Resistant   X       Supportive         Lethargic         Affective:          X Congruent   Incongruent   Blunted   Flat     Constricted   Anxious   Elated   Angry     Labile   Depressed   Other             Cognitive:  X Alert X Oriented PPTP      Concentration X G   F   P   Attention Span X G   F   P   Short-Term Memory X G   F   P   Long-Term Memory X G   F   P   ProblemSolving/  Decision Making  X G  F   P   Ability to Process  Information  X G  F   P      N/A Contributing Factors              Delusional              Hallucinating              Flight of Ideas              Other:         Modes of Intervention:  X Education X Support X Exploration   X Clarifying X Problem Solving X Confrontation     Socialization   Limit Setting   Reality Testing     Activity   Movement   Media     Other:                  Response to Learning:   Able to verbalize current knowledge/experience    Able to verbalize/acknowledge new learning   X Able to retain information   X Capable of insight   X Able to change behavior   X Progressing to goal     Other:          Comments:              Prolonged Second Stage/Multiple day inductions with multiple agents

## 2025-05-28 NOTE — PLAN OF CARE
Physical Therapy  Initial Assessment  Date: 2025  Patient Name: Sinai Roberts  MRN: [id number]  : 1976    Referring Physician: Libby Denton MD     PCP: Libby Denton MD     Medical Diagnosis: Chronic bilateral low back pain without sciatica [M54.50, G89.29]  Chronic pain of right knee [M25.561, G89.29]    No data recorded    Insurance: Payor: [place] [employer] / Plan: [employer] / Product Type: *No Product type* /   Insurance ID: [id number] - ([place] [employer])    Subjective:  General  Chart Reviewed: Yes  Patient Assessed for Rehabilitation Services: Yes  Subjective  Subjective: She experienced a severe episode of back pain on 2025, necessitating an urgent care visit. The pain was so intense that it impeded her mobility upon waking. Despite seeking chiropractic intervention, she was unable to attend work that night. She has requested [place] paperwork due to the persistent nature of her back pain. She is considering physical therapy as a potential treatment option. Her occupation at [employer] involves constant movement and repetitive tasks, which she suspects may be contributing to her back pain. Initially localized to the right side, her back pain has since spread to her tailbone area following a chiropractic adjustment. She reports no radiating pain down the back of her leg to her toes. She has been managing her pain with Tylenol and ibuprofen, supplemented by muscle relaxers prescribed during her urgent care visit. She has not yet started her prednisone course. She has not been provided with any stretching exercises.     She reports intermittent knee pain, which she describes as slightly swollen. She is uncertain if her back pain is exacerbating her knee discomfort. She has a history of cross-country running and was informed of an extra bone in her knee, which was surgically removed. She reports no instances of her knee giving way but notes occasional limping and visible swelling. She  Problem: Anger Management/Homicidal Ideation:  Goal: Absence of angry outbursts  Absence of angry outbursts   Outcome: Ongoing  Psychotherapy Group Note    Date: 2/8/2019  Start Time: 1000  End Time: 1045    Number Participants in Group:  5/15    Goal:  Patient will demonstrate increased interpersonal interaction   Topic: [facility]    Discipline Responsible:   OT  AT X SW  Nsg.  RT  Other       Participation Level:    x None  Minimal    Active Listener  Interactive    Monopolizing         Participation Quality:  X Appropriate  Inappropriate          Attentive        Intrusive          Sharing x       Resistant          Supportive        Lethargic       Affective:   X Congruent  Incongruent  Blunted   Flat    Constricted  Anxious  Elated   Angry    Labile  Depressed  Other          Cognitive:  X Alert X Oriented PPTP     Concentration  G  F x P   Attention Span  G  F x P   Short-Term Memory  G  F x P   Long-Term Memory  G  F x P   ProblemSolving/  Decision Making  G  F x P   Ability to Process  Information  G  F x P      Contributing Factors             Delusional             Hallucinating             Flight of Ideas             Other:       Modes of Intervention:  X Education X Support X Exploration   X Clarifying X Problem Solving X Confrontation   X Socialization X Limit Setting X Reality Testing    Activity  Movement  Media    Other:            Response to Learning:   Able to verbalize current knowledge/experience    Able to verbalize/acknowledge new learning    Able to retain information    Capable of insight    Able to change behavior    Progressing to goal   x Other: Pt left group early       Comments: